# Patient Record
Sex: MALE | Race: WHITE | Employment: OTHER | URBAN - METROPOLITAN AREA
[De-identification: names, ages, dates, MRNs, and addresses within clinical notes are randomized per-mention and may not be internally consistent; named-entity substitution may affect disease eponyms.]

---

## 2017-01-11 RX ORDER — PEN NEEDLE, DIABETIC 31 GX5/16"
NEEDLE, DISPOSABLE MISCELLANEOUS
Qty: 100 EACH | Refills: 4 | Status: SHIPPED | OUTPATIENT
Start: 2017-01-11 | End: 2018-03-26 | Stop reason: SDUPTHER

## 2017-01-23 DIAGNOSIS — F32.A DEPRESSION: ICD-10-CM

## 2017-01-23 RX ORDER — DULOXETIN HYDROCHLORIDE 60 MG/1
CAPSULE, DELAYED RELEASE ORAL
Qty: 180 CAPSULE | Refills: 3 | Status: SHIPPED | OUTPATIENT
Start: 2017-01-23 | End: 2017-09-08 | Stop reason: SDUPTHER

## 2017-02-08 DIAGNOSIS — E11.9 TYPE 2 DIABETES MELLITUS WITHOUT COMPLICATION (HCC): ICD-10-CM

## 2017-02-08 RX ORDER — INSULIN HUMAN 100 [IU]/ML
INJECTION, SUSPENSION SUBCUTANEOUS
Qty: 90 ML | Refills: 3 | Status: SHIPPED | OUTPATIENT
Start: 2017-02-08 | End: 2017-10-09

## 2017-02-20 ENCOUNTER — HOSPITAL ENCOUNTER (OUTPATIENT)
Dept: CT IMAGING | Age: 72
Discharge: HOME | End: 2017-02-20
Attending: UROLOGY | Admitting: UROLOGY

## 2017-02-20 DIAGNOSIS — D41.02 NEOPLASM OF UNCERTAIN BEHAVIOR OF LEFT KIDNEY: ICD-10-CM

## 2017-02-20 DIAGNOSIS — D41.02 HEMANGIOPERICYTOMA OF KIDNEY, LEFT: ICD-10-CM

## 2017-03-22 RX ORDER — FENOFIBRATE 160 MG/1
160 TABLET ORAL DAILY
Qty: 90 TABLET | Refills: 3 | Status: SHIPPED | OUTPATIENT
Start: 2017-03-22 | End: 2018-02-02 | Stop reason: SDUPTHER

## 2017-04-03 ENCOUNTER — OFFICE VISIT (OUTPATIENT)
Dept: INTERNAL MEDICINE CLINIC | Age: 72
End: 2017-04-03

## 2017-04-03 VITALS
HEIGHT: 70 IN | RESPIRATION RATE: 12 BRPM | WEIGHT: 273 LBS | SYSTOLIC BLOOD PRESSURE: 138 MMHG | OXYGEN SATURATION: 98 % | BODY MASS INDEX: 39.08 KG/M2 | DIASTOLIC BLOOD PRESSURE: 76 MMHG | HEART RATE: 67 BPM

## 2017-04-03 DIAGNOSIS — Z79.4 CONTROLLED TYPE 2 DIABETES MELLITUS WITHOUT COMPLICATION, WITH LONG-TERM CURRENT USE OF INSULIN (HCC): Primary | ICD-10-CM

## 2017-04-03 DIAGNOSIS — E11.9 CONTROLLED TYPE 2 DIABETES MELLITUS WITHOUT COMPLICATION, WITH LONG-TERM CURRENT USE OF INSULIN (HCC): Primary | ICD-10-CM

## 2017-04-03 DIAGNOSIS — I10 ESSENTIAL HYPERTENSION: ICD-10-CM

## 2017-04-03 DIAGNOSIS — E78.2 MIXED HYPERLIPIDEMIA: ICD-10-CM

## 2017-04-03 LAB
A/G RATIO: 1.2 (ref 1.1–2.2)
ALBUMIN SERPL-MCNC: 4 G/DL (ref 3.4–5)
ALP BLD-CCNC: 41 U/L (ref 40–129)
ALT SERPL-CCNC: 28 U/L (ref 10–40)
ANION GAP SERPL CALCULATED.3IONS-SCNC: 16 MMOL/L (ref 3–16)
AST SERPL-CCNC: 26 U/L (ref 15–37)
BILIRUB SERPL-MCNC: 1.2 MG/DL (ref 0–1)
BUN BLDV-MCNC: 26 MG/DL (ref 7–20)
CALCIUM SERPL-MCNC: 9.3 MG/DL (ref 8.3–10.6)
CHLORIDE BLD-SCNC: 100 MMOL/L (ref 99–110)
CHOLESTEROL, TOTAL: 103 MG/DL (ref 0–199)
CO2: 25 MMOL/L (ref 21–32)
CREAT SERPL-MCNC: 0.9 MG/DL (ref 0.8–1.3)
GFR AFRICAN AMERICAN: >60
GFR NON-AFRICAN AMERICAN: >60
GLOBULIN: 3.3 G/DL
GLUCOSE BLD-MCNC: 181 MG/DL (ref 70–99)
HBA1C MFR BLD: 7.3 %
HDLC SERPL-MCNC: 22 MG/DL (ref 40–60)
LDL CHOLESTEROL CALCULATED: 35 MG/DL
POTASSIUM SERPL-SCNC: 3.6 MMOL/L (ref 3.5–5.1)
SODIUM BLD-SCNC: 141 MMOL/L (ref 136–145)
TOTAL PROTEIN: 7.3 G/DL (ref 6.4–8.2)
TRIGL SERPL-MCNC: 229 MG/DL (ref 0–150)
VLDLC SERPL CALC-MCNC: 46 MG/DL

## 2017-04-03 PROCEDURE — 3045F PR MOST RECENT HEMOGLOBIN A1C LEVEL 7.0-9.0%: CPT | Performed by: INTERNAL MEDICINE

## 2017-04-03 PROCEDURE — 83036 HEMOGLOBIN GLYCOSYLATED A1C: CPT | Performed by: INTERNAL MEDICINE

## 2017-04-03 PROCEDURE — 99214 OFFICE O/P EST MOD 30 MIN: CPT | Performed by: INTERNAL MEDICINE

## 2017-04-03 PROCEDURE — 3017F COLORECTAL CA SCREEN DOC REV: CPT | Performed by: INTERNAL MEDICINE

## 2017-04-03 PROCEDURE — 4040F PNEUMOC VAC/ADMIN/RCVD: CPT | Performed by: INTERNAL MEDICINE

## 2017-04-03 PROCEDURE — G8427 DOCREV CUR MEDS BY ELIG CLIN: HCPCS | Performed by: INTERNAL MEDICINE

## 2017-04-03 PROCEDURE — 1123F ACP DISCUSS/DSCN MKR DOCD: CPT | Performed by: INTERNAL MEDICINE

## 2017-04-03 PROCEDURE — 36415 COLL VENOUS BLD VENIPUNCTURE: CPT | Performed by: INTERNAL MEDICINE

## 2017-04-03 PROCEDURE — G8419 CALC BMI OUT NRM PARAM NOF/U: HCPCS | Performed by: INTERNAL MEDICINE

## 2017-04-03 PROCEDURE — 1036F TOBACCO NON-USER: CPT | Performed by: INTERNAL MEDICINE

## 2017-04-03 ASSESSMENT — ENCOUNTER SYMPTOMS
RESPIRATORY NEGATIVE: 1
GASTROINTESTINAL NEGATIVE: 1

## 2017-07-03 ENCOUNTER — OFFICE VISIT (OUTPATIENT)
Dept: PULMONOLOGY | Age: 72
End: 2017-07-03

## 2017-07-03 VITALS
OXYGEN SATURATION: 94 % | HEART RATE: 70 BPM | WEIGHT: 270 LBS | SYSTOLIC BLOOD PRESSURE: 120 MMHG | HEIGHT: 70 IN | DIASTOLIC BLOOD PRESSURE: 64 MMHG | TEMPERATURE: 98 F | RESPIRATION RATE: 16 BRPM | BODY MASS INDEX: 38.65 KG/M2

## 2017-07-03 DIAGNOSIS — G47.33 OSA TREATED WITH BIPAP: Primary | ICD-10-CM

## 2017-07-03 DIAGNOSIS — G47.10 HYPERSOMNIA: ICD-10-CM

## 2017-07-03 DIAGNOSIS — R06.83 SNORING: ICD-10-CM

## 2017-07-03 DIAGNOSIS — E66.9 OBESITY (BMI 30-39.9): ICD-10-CM

## 2017-07-03 PROCEDURE — 99214 OFFICE O/P EST MOD 30 MIN: CPT | Performed by: INTERNAL MEDICINE

## 2017-07-03 PROCEDURE — 3017F COLORECTAL CA SCREEN DOC REV: CPT | Performed by: INTERNAL MEDICINE

## 2017-07-03 PROCEDURE — 4040F PNEUMOC VAC/ADMIN/RCVD: CPT | Performed by: INTERNAL MEDICINE

## 2017-07-03 PROCEDURE — 1123F ACP DISCUSS/DSCN MKR DOCD: CPT | Performed by: INTERNAL MEDICINE

## 2017-07-03 PROCEDURE — G8427 DOCREV CUR MEDS BY ELIG CLIN: HCPCS | Performed by: INTERNAL MEDICINE

## 2017-07-03 PROCEDURE — 1036F TOBACCO NON-USER: CPT | Performed by: INTERNAL MEDICINE

## 2017-07-03 PROCEDURE — G8417 CALC BMI ABV UP PARAM F/U: HCPCS | Performed by: INTERNAL MEDICINE

## 2017-07-03 RX ORDER — INSULIN GLARGINE 100 [IU]/ML
INJECTION, SOLUTION SUBCUTANEOUS
Qty: 90 ML | Refills: 1 | Status: SHIPPED | OUTPATIENT
Start: 2017-07-03 | End: 2017-09-27 | Stop reason: SDUPTHER

## 2017-07-03 ASSESSMENT — SLEEP AND FATIGUE QUESTIONNAIRES
HOW LIKELY ARE YOU TO NOD OFF OR FALL ASLEEP WHILE SITTING AND TALKING TO SOMEONE: 0
HOW LIKELY ARE YOU TO NOD OFF OR FALL ASLEEP WHEN YOU ARE A PASSENGER IN A CAR FOR AN HOUR WITHOUT A BREAK: 1
HOW LIKELY ARE YOU TO NOD OFF OR FALL ASLEEP IN A CAR, WHILE STOPPED FOR A FEW MINUTES IN TRAFFIC: 0
HOW LIKELY ARE YOU TO NOD OFF OR FALL ASLEEP WHILE SITTING AND READING: 1
HOW LIKELY ARE YOU TO NOD OFF OR FALL ASLEEP WHILE SITTING INACTIVE IN A PUBLIC PLACE: 0
ESS TOTAL SCORE: 5
HOW LIKELY ARE YOU TO NOD OFF OR FALL ASLEEP WHILE LYING DOWN TO REST IN THE AFTERNOON WHEN CIRCUMSTANCES PERMIT: 1
NECK CIRCUMFERENCE (INCHES): 19.75
HOW LIKELY ARE YOU TO NOD OFF OR FALL ASLEEP WHILE WATCHING TV: 2
HOW LIKELY ARE YOU TO NOD OFF OR FALL ASLEEP WHILE SITTING QUIETLY AFTER LUNCH WITHOUT ALCOHOL: 0

## 2017-07-05 ENCOUNTER — OFFICE VISIT (OUTPATIENT)
Dept: INTERNAL MEDICINE CLINIC | Age: 72
End: 2017-07-05

## 2017-07-05 VITALS
RESPIRATION RATE: 16 BRPM | SYSTOLIC BLOOD PRESSURE: 134 MMHG | DIASTOLIC BLOOD PRESSURE: 70 MMHG | WEIGHT: 270 LBS | HEART RATE: 68 BPM | BODY MASS INDEX: 38.74 KG/M2

## 2017-07-05 DIAGNOSIS — E11.65 UNCONTROLLED TYPE 2 DIABETES MELLITUS WITH HYPERGLYCEMIA, WITH LONG-TERM CURRENT USE OF INSULIN (HCC): Primary | ICD-10-CM

## 2017-07-05 DIAGNOSIS — E78.2 MIXED HYPERLIPIDEMIA: ICD-10-CM

## 2017-07-05 DIAGNOSIS — F32.A DEPRESSION, UNSPECIFIED DEPRESSION TYPE: ICD-10-CM

## 2017-07-05 DIAGNOSIS — I10 ESSENTIAL HYPERTENSION: ICD-10-CM

## 2017-07-05 DIAGNOSIS — Z79.4 UNCONTROLLED TYPE 2 DIABETES MELLITUS WITH HYPERGLYCEMIA, WITH LONG-TERM CURRENT USE OF INSULIN (HCC): Primary | ICD-10-CM

## 2017-07-05 LAB — HBA1C MFR BLD: 7.4 %

## 2017-07-05 PROCEDURE — 83036 HEMOGLOBIN GLYCOSYLATED A1C: CPT | Performed by: INTERNAL MEDICINE

## 2017-07-05 PROCEDURE — 3017F COLORECTAL CA SCREEN DOC REV: CPT | Performed by: INTERNAL MEDICINE

## 2017-07-05 PROCEDURE — 1123F ACP DISCUSS/DSCN MKR DOCD: CPT | Performed by: INTERNAL MEDICINE

## 2017-07-05 PROCEDURE — 3046F HEMOGLOBIN A1C LEVEL >9.0%: CPT | Performed by: INTERNAL MEDICINE

## 2017-07-05 PROCEDURE — G8417 CALC BMI ABV UP PARAM F/U: HCPCS | Performed by: INTERNAL MEDICINE

## 2017-07-05 PROCEDURE — 99214 OFFICE O/P EST MOD 30 MIN: CPT | Performed by: INTERNAL MEDICINE

## 2017-07-05 PROCEDURE — 1036F TOBACCO NON-USER: CPT | Performed by: INTERNAL MEDICINE

## 2017-07-05 PROCEDURE — 4040F PNEUMOC VAC/ADMIN/RCVD: CPT | Performed by: INTERNAL MEDICINE

## 2017-07-05 PROCEDURE — G8427 DOCREV CUR MEDS BY ELIG CLIN: HCPCS | Performed by: INTERNAL MEDICINE

## 2017-07-05 ASSESSMENT — ENCOUNTER SYMPTOMS
SHORTNESS OF BREATH: 0
CHEST TIGHTNESS: 0
GASTROINTESTINAL NEGATIVE: 1

## 2017-07-05 ASSESSMENT — PATIENT HEALTH QUESTIONNAIRE - PHQ9
SUM OF ALL RESPONSES TO PHQ QUESTIONS 1-9: 0
SUM OF ALL RESPONSES TO PHQ9 QUESTIONS 1 & 2: 0
1. LITTLE INTEREST OR PLEASURE IN DOING THINGS: 0
2. FEELING DOWN, DEPRESSED OR HOPELESS: 0

## 2017-07-10 RX ORDER — ALPRAZOLAM 0.25 MG/1
TABLET ORAL
Qty: 180 TABLET | Refills: 1 | Status: SHIPPED | OUTPATIENT
Start: 2017-07-10 | End: 2017-12-15 | Stop reason: SDUPTHER

## 2017-08-21 ENCOUNTER — TELEPHONE (OUTPATIENT)
Dept: PSYCHIATRY | Age: 72
End: 2017-08-21

## 2017-09-08 ENCOUNTER — INITIAL CONSULT (OUTPATIENT)
Dept: PSYCHIATRY | Age: 72
End: 2017-09-08

## 2017-09-08 VITALS
DIASTOLIC BLOOD PRESSURE: 82 MMHG | WEIGHT: 270 LBS | HEIGHT: 70 IN | OXYGEN SATURATION: 97 % | BODY MASS INDEX: 38.65 KG/M2 | SYSTOLIC BLOOD PRESSURE: 138 MMHG | RESPIRATION RATE: 18 BRPM | HEART RATE: 64 BPM

## 2017-09-08 DIAGNOSIS — F41.9 ANXIETY: ICD-10-CM

## 2017-09-08 DIAGNOSIS — F32.A DEPRESSION, UNSPECIFIED DEPRESSION TYPE: Primary | ICD-10-CM

## 2017-09-08 LAB
ALBUMIN SERPL-MCNC: 4.1 G/DL (ref 3.4–5)
ALP BLD-CCNC: 37 U/L (ref 40–129)
ALT SERPL-CCNC: 28 U/L (ref 10–40)
AST SERPL-CCNC: 30 U/L (ref 15–37)
BASOPHILS ABSOLUTE: 0 K/UL (ref 0–0.2)
BASOPHILS RELATIVE PERCENT: 0.2 %
BILIRUB SERPL-MCNC: 1.4 MG/DL (ref 0–1)
BILIRUBIN DIRECT: 0.3 MG/DL (ref 0–0.3)
BILIRUBIN, INDIRECT: 1.1 MG/DL (ref 0–1)
EOSINOPHILS ABSOLUTE: 0.2 K/UL (ref 0–0.6)
EOSINOPHILS RELATIVE PERCENT: 3 %
HCT VFR BLD CALC: 38.3 % (ref 40.5–52.5)
HEMOGLOBIN: 13.1 G/DL (ref 13.5–17.5)
LYMPHOCYTES ABSOLUTE: 0.8 K/UL (ref 1–5.1)
LYMPHOCYTES RELATIVE PERCENT: 15.6 %
MCH RBC QN AUTO: 29.5 PG (ref 26–34)
MCHC RBC AUTO-ENTMCNC: 34.3 G/DL (ref 31–36)
MCV RBC AUTO: 86.1 FL (ref 80–100)
MONOCYTES ABSOLUTE: 0.3 K/UL (ref 0–1.3)
MONOCYTES RELATIVE PERCENT: 5.6 %
NEUTROPHILS ABSOLUTE: 3.8 K/UL (ref 1.7–7.7)
NEUTROPHILS RELATIVE PERCENT: 75.6 %
PDW BLD-RTO: 16.1 % (ref 12.4–15.4)
PLATELET # BLD: 110 K/UL (ref 135–450)
PMV BLD AUTO: 8.5 FL (ref 5–10.5)
RBC # BLD: 4.44 M/UL (ref 4.2–5.9)
TOTAL PROTEIN: 7.5 G/DL (ref 6.4–8.2)
TSH SERPL DL<=0.05 MIU/L-ACNC: 3.72 UIU/ML (ref 0.27–4.2)
WBC # BLD: 5 K/UL (ref 4–11)

## 2017-09-08 PROCEDURE — 1036F TOBACCO NON-USER: CPT | Performed by: PSYCHIATRY & NEUROLOGY

## 2017-09-08 PROCEDURE — 3017F COLORECTAL CA SCREEN DOC REV: CPT | Performed by: PSYCHIATRY & NEUROLOGY

## 2017-09-08 PROCEDURE — 99204 OFFICE O/P NEW MOD 45 MIN: CPT | Performed by: PSYCHIATRY & NEUROLOGY

## 2017-09-08 PROCEDURE — G8417 CALC BMI ABV UP PARAM F/U: HCPCS | Performed by: PSYCHIATRY & NEUROLOGY

## 2017-09-08 PROCEDURE — G8427 DOCREV CUR MEDS BY ELIG CLIN: HCPCS | Performed by: PSYCHIATRY & NEUROLOGY

## 2017-09-08 PROCEDURE — 4040F PNEUMOC VAC/ADMIN/RCVD: CPT | Performed by: PSYCHIATRY & NEUROLOGY

## 2017-09-08 RX ORDER — BUPROPION HYDROCHLORIDE 150 MG/1
150 TABLET ORAL EVERY MORNING
Qty: 30 TABLET | Refills: 3 | Status: SHIPPED | OUTPATIENT
Start: 2017-09-08 | End: 2017-10-13 | Stop reason: SINTOL

## 2017-09-08 RX ORDER — DULOXETIN HYDROCHLORIDE 60 MG/1
CAPSULE, DELAYED RELEASE ORAL
Qty: 180 CAPSULE | Refills: 3 | Status: SHIPPED | OUTPATIENT
Start: 2017-09-08 | End: 2018-08-22 | Stop reason: SDUPTHER

## 2017-09-08 ASSESSMENT — ENCOUNTER SYMPTOMS
CHEST TIGHTNESS: 0
DIARRHEA: 0
SHORTNESS OF BREATH: 0
NAUSEA: 0

## 2017-09-27 RX ORDER — INSULIN GLARGINE 100 [IU]/ML
INJECTION, SOLUTION SUBCUTANEOUS
Qty: 105 ML | Refills: 3 | Status: SHIPPED | OUTPATIENT
Start: 2017-09-27 | End: 2018-07-27 | Stop reason: SDUPTHER

## 2017-10-09 ENCOUNTER — OFFICE VISIT (OUTPATIENT)
Dept: INTERNAL MEDICINE CLINIC | Age: 72
End: 2017-10-09

## 2017-10-09 VITALS
WEIGHT: 272.8 LBS | HEART RATE: 66 BPM | BODY MASS INDEX: 39.14 KG/M2 | RESPIRATION RATE: 18 BRPM | DIASTOLIC BLOOD PRESSURE: 70 MMHG | SYSTOLIC BLOOD PRESSURE: 136 MMHG

## 2017-10-09 DIAGNOSIS — G61.0 GBS (GUILLAIN BARRE SYNDROME) (HCC): ICD-10-CM

## 2017-10-09 DIAGNOSIS — K75.81 LIVER CIRRHOSIS SECONDARY TO NASH (HCC): ICD-10-CM

## 2017-10-09 DIAGNOSIS — K74.60 LIVER CIRRHOSIS SECONDARY TO NASH (HCC): ICD-10-CM

## 2017-10-09 DIAGNOSIS — I10 ESSENTIAL HYPERTENSION: ICD-10-CM

## 2017-10-09 DIAGNOSIS — R31.9 BLOOD IN URINE: ICD-10-CM

## 2017-10-09 DIAGNOSIS — E11.65 UNCONTROLLED TYPE 2 DIABETES MELLITUS WITH HYPERGLYCEMIA, WITH LONG-TERM CURRENT USE OF INSULIN (HCC): Primary | ICD-10-CM

## 2017-10-09 DIAGNOSIS — Z79.4 UNCONTROLLED TYPE 2 DIABETES MELLITUS WITH HYPERGLYCEMIA, WITH LONG-TERM CURRENT USE OF INSULIN (HCC): Primary | ICD-10-CM

## 2017-10-09 LAB
BILIRUBIN, POC: ABNORMAL
BLOOD URINE, POC: ABNORMAL
CLARITY, POC: ABNORMAL
COLOR, POC: ABNORMAL
CREATININE URINE: 98.4 MG/DL (ref 39–259)
GLUCOSE URINE, POC: ABNORMAL
HBA1C MFR BLD: 6.9 %
KETONES, POC: ABNORMAL
LEUKOCYTE EST, POC: ABNORMAL
MICROALBUMIN UR-MCNC: 9.9 MG/DL
MICROALBUMIN/CREAT UR-RTO: 100.6 MG/G (ref 0–30)
NITRITE, POC: ABNORMAL
PH, POC: 7
PROTEIN, POC: ABNORMAL
SPECIFIC GRAVITY, POC: 1.02
UROBILINOGEN, POC: 2

## 2017-10-09 PROCEDURE — G8427 DOCREV CUR MEDS BY ELIG CLIN: HCPCS | Performed by: INTERNAL MEDICINE

## 2017-10-09 PROCEDURE — 1036F TOBACCO NON-USER: CPT | Performed by: INTERNAL MEDICINE

## 2017-10-09 PROCEDURE — 81002 URINALYSIS NONAUTO W/O SCOPE: CPT | Performed by: INTERNAL MEDICINE

## 2017-10-09 PROCEDURE — 3046F HEMOGLOBIN A1C LEVEL >9.0%: CPT | Performed by: INTERNAL MEDICINE

## 2017-10-09 PROCEDURE — 99214 OFFICE O/P EST MOD 30 MIN: CPT | Performed by: INTERNAL MEDICINE

## 2017-10-09 PROCEDURE — 83036 HEMOGLOBIN GLYCOSYLATED A1C: CPT | Performed by: INTERNAL MEDICINE

## 2017-10-09 PROCEDURE — 3017F COLORECTAL CA SCREEN DOC REV: CPT | Performed by: INTERNAL MEDICINE

## 2017-10-09 PROCEDURE — 1123F ACP DISCUSS/DSCN MKR DOCD: CPT | Performed by: INTERNAL MEDICINE

## 2017-10-09 PROCEDURE — G8417 CALC BMI ABV UP PARAM F/U: HCPCS | Performed by: INTERNAL MEDICINE

## 2017-10-09 PROCEDURE — G8484 FLU IMMUNIZE NO ADMIN: HCPCS | Performed by: INTERNAL MEDICINE

## 2017-10-09 PROCEDURE — 4040F PNEUMOC VAC/ADMIN/RCVD: CPT | Performed by: INTERNAL MEDICINE

## 2017-10-09 ASSESSMENT — ENCOUNTER SYMPTOMS
VOMITING: 0
DIARRHEA: 0
BACK PAIN: 1
RESPIRATORY NEGATIVE: 1
NAUSEA: 0
ABDOMINAL PAIN: 0
ABDOMINAL DISTENTION: 0

## 2017-10-11 LAB — URINE CULTURE, ROUTINE: NORMAL

## 2017-10-13 ENCOUNTER — OFFICE VISIT (OUTPATIENT)
Dept: PSYCHIATRY | Age: 72
End: 2017-10-13

## 2017-10-13 VITALS
SYSTOLIC BLOOD PRESSURE: 120 MMHG | HEIGHT: 70 IN | OXYGEN SATURATION: 97 % | HEART RATE: 82 BPM | RESPIRATION RATE: 16 BRPM | DIASTOLIC BLOOD PRESSURE: 70 MMHG

## 2017-10-13 DIAGNOSIS — F32.A DEPRESSION, UNSPECIFIED DEPRESSION TYPE: Primary | ICD-10-CM

## 2017-10-13 DIAGNOSIS — F41.9 ANXIETY: ICD-10-CM

## 2017-10-13 PROCEDURE — G8427 DOCREV CUR MEDS BY ELIG CLIN: HCPCS | Performed by: PSYCHIATRY & NEUROLOGY

## 2017-10-13 PROCEDURE — 1036F TOBACCO NON-USER: CPT | Performed by: PSYCHIATRY & NEUROLOGY

## 2017-10-13 PROCEDURE — 1123F ACP DISCUSS/DSCN MKR DOCD: CPT | Performed by: PSYCHIATRY & NEUROLOGY

## 2017-10-13 PROCEDURE — G8484 FLU IMMUNIZE NO ADMIN: HCPCS | Performed by: PSYCHIATRY & NEUROLOGY

## 2017-10-13 PROCEDURE — G8417 CALC BMI ABV UP PARAM F/U: HCPCS | Performed by: PSYCHIATRY & NEUROLOGY

## 2017-10-13 PROCEDURE — 99214 OFFICE O/P EST MOD 30 MIN: CPT | Performed by: PSYCHIATRY & NEUROLOGY

## 2017-10-13 PROCEDURE — 3017F COLORECTAL CA SCREEN DOC REV: CPT | Performed by: PSYCHIATRY & NEUROLOGY

## 2017-10-13 PROCEDURE — 4040F PNEUMOC VAC/ADMIN/RCVD: CPT | Performed by: PSYCHIATRY & NEUROLOGY

## 2017-10-13 RX ORDER — BUPROPION HYDROCHLORIDE 100 MG/1
100 TABLET, EXTENDED RELEASE ORAL DAILY
Qty: 30 TABLET | Refills: 2 | Status: SHIPPED | OUTPATIENT
Start: 2017-10-13 | End: 2018-01-12 | Stop reason: SDUPTHER

## 2017-10-27 ENCOUNTER — NURSE ONLY (OUTPATIENT)
Dept: INTERNAL MEDICINE CLINIC | Age: 72
End: 2017-10-27

## 2017-10-27 DIAGNOSIS — R82.90 URINE ABNORMALITY: Primary | ICD-10-CM

## 2017-10-27 LAB
BILIRUBIN, POC: ABNORMAL
BLOOD URINE, POC: ABNORMAL
CLARITY, POC: ABNORMAL
COLOR, POC: YELLOW
GLUCOSE URINE, POC: ABNORMAL
KETONES, POC: ABNORMAL
LEUKOCYTE EST, POC: ABNORMAL
NITRITE, POC: ABNORMAL
PH, POC: 5.5
PROTEIN, POC: ABNORMAL
SPECIFIC GRAVITY, POC: 1.02
UROBILINOGEN, POC: 0.2

## 2017-10-27 PROCEDURE — 81002 URINALYSIS NONAUTO W/O SCOPE: CPT | Performed by: INTERNAL MEDICINE

## 2017-11-07 RX ORDER — ATENOLOL AND CHLORTHALIDONE TABLET 50; 25 MG/1; MG/1
TABLET ORAL
Qty: 90 TABLET | Refills: 3 | Status: SHIPPED | OUTPATIENT
Start: 2017-11-07 | End: 2018-10-10 | Stop reason: SDUPTHER

## 2017-11-07 RX ORDER — ATORVASTATIN CALCIUM 20 MG/1
TABLET, FILM COATED ORAL
Qty: 90 TABLET | Refills: 3 | Status: SHIPPED | OUTPATIENT
Start: 2017-11-07 | End: 2018-10-10 | Stop reason: SDUPTHER

## 2017-11-07 RX ORDER — TAMSULOSIN HYDROCHLORIDE 0.4 MG/1
CAPSULE ORAL
Qty: 90 CAPSULE | Refills: 3 | Status: SHIPPED | OUTPATIENT
Start: 2017-11-07 | End: 2018-10-10 | Stop reason: SDUPTHER

## 2017-11-07 RX ORDER — LISINOPRIL 10 MG/1
TABLET ORAL
Qty: 90 TABLET | Refills: 3 | Status: SHIPPED | OUTPATIENT
Start: 2017-11-07 | End: 2018-10-10 | Stop reason: SDUPTHER

## 2017-12-15 ENCOUNTER — TELEPHONE (OUTPATIENT)
Dept: INTERNAL MEDICINE CLINIC | Age: 72
End: 2017-12-15

## 2017-12-16 RX ORDER — ALPRAZOLAM 0.25 MG/1
TABLET ORAL
Qty: 180 TABLET | Refills: 1 | Status: SHIPPED | OUTPATIENT
Start: 2017-12-16 | End: 2018-04-13 | Stop reason: SDUPTHER

## 2017-12-18 NOTE — TELEPHONE ENCOUNTER
Pt said he is not out of meds yet. He is just calling a couple weeks in advance because this is for mail order.      [facility]

## 2018-01-12 ENCOUNTER — OFFICE VISIT (OUTPATIENT)
Dept: PSYCHIATRY | Age: 73
End: 2018-01-12

## 2018-01-12 VITALS
BODY MASS INDEX: 43.03 KG/M2 | HEART RATE: 65 BPM | DIASTOLIC BLOOD PRESSURE: 86 MMHG | WEIGHT: 274.2 LBS | OXYGEN SATURATION: 98 % | SYSTOLIC BLOOD PRESSURE: 132 MMHG | HEIGHT: 67 IN

## 2018-01-12 DIAGNOSIS — F41.9 ANXIETY: ICD-10-CM

## 2018-01-12 DIAGNOSIS — F32.A DEPRESSION, UNSPECIFIED DEPRESSION TYPE: Primary | ICD-10-CM

## 2018-01-12 PROCEDURE — G8427 DOCREV CUR MEDS BY ELIG CLIN: HCPCS | Performed by: PSYCHIATRY & NEUROLOGY

## 2018-01-12 PROCEDURE — 99214 OFFICE O/P EST MOD 30 MIN: CPT | Performed by: PSYCHIATRY & NEUROLOGY

## 2018-01-12 PROCEDURE — 1123F ACP DISCUSS/DSCN MKR DOCD: CPT | Performed by: PSYCHIATRY & NEUROLOGY

## 2018-01-12 PROCEDURE — 3017F COLORECTAL CA SCREEN DOC REV: CPT | Performed by: PSYCHIATRY & NEUROLOGY

## 2018-01-12 PROCEDURE — G8484 FLU IMMUNIZE NO ADMIN: HCPCS | Performed by: PSYCHIATRY & NEUROLOGY

## 2018-01-12 PROCEDURE — 4040F PNEUMOC VAC/ADMIN/RCVD: CPT | Performed by: PSYCHIATRY & NEUROLOGY

## 2018-01-12 PROCEDURE — G8417 CALC BMI ABV UP PARAM F/U: HCPCS | Performed by: PSYCHIATRY & NEUROLOGY

## 2018-01-12 PROCEDURE — 1036F TOBACCO NON-USER: CPT | Performed by: PSYCHIATRY & NEUROLOGY

## 2018-01-12 RX ORDER — BUPROPION HYDROCHLORIDE 100 MG/1
100 TABLET, EXTENDED RELEASE ORAL DAILY
Qty: 90 TABLET | Refills: 2 | Status: SHIPPED | OUTPATIENT
Start: 2018-01-12 | End: 2018-01-12 | Stop reason: SDUPTHER

## 2018-01-12 RX ORDER — BUPROPION HYDROCHLORIDE 100 MG/1
100 TABLET, EXTENDED RELEASE ORAL DAILY
Qty: 90 TABLET | Refills: 2 | Status: SHIPPED | OUTPATIENT
Start: 2018-01-12 | End: 2018-04-13

## 2018-01-12 NOTE — PROGRESS NOTES
Psych Follow Up Progress Note    1/12/18  Juanita Miller  [id number]    I have reviewed recent documentation:  Juanita Miller is a 81 y.o. 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 Chief Complaint   Patient presents with    Depression       Subjective/Interval Hx:  Doing a bit better. Still struggling with his introversion vs wife's extroversion. Not sleeping well. But a bit more energy. Objective:  Vitals:    01/12/18 1457   BP: 132/86   Site: Left Arm   Position: Sitting   Cuff Size: Large Adult   Pulse: 65   SpO2: 98%   Weight: 274 lb 3.2 oz (124.4 kg)   Height: 5' 7\" (1.702 m)        Body mass index is 42.95 kg/m². No results found for this or any previous visit (from the past 168 hour(s)).     Current Outpatient Prescriptions   Medication Sig Dispense Refill    ALPRAZolam (XANAX) 0.25 MG tablet TAKE ONE TABLET BY MOUTH TWICE A DAY AS NEEDED. 180 tablet 1    lisinopril (PRINIVIL;ZESTRIL) 10 MG tablet TAKE 1 TABLET BY MOUTH  DAILY 90 tablet 3    tamsulosin (FLOMAX) 0.4 MG capsule TAKE 1 CAPSULE BY MOUTH  DAILY 90 capsule 3    atenolol-chlorthalidone (TENORETIC) 50-25 MG per tablet TAKE 1 TABLET BY MOUTH  DAILY 90 tablet 3    atorvastatin (LIPITOR) 20 MG tablet TAKE 1 TABLET BY MOUTH ONCE A DAY 90 tablet 3    buPROPion (WELLBUTRIN SR) 100 MG extended release tablet Take 1 tablet by mouth daily 30 tablet 2    Insulin NPH Isophane & Regular (HUMULIN 70/30 KWIKPEN SC) Inject 50 Units into the skin 2 times daily (with meals)      LANTUS SOLOSTAR 100 UNIT/ML injection pen INJECT SUBCUTANEOUSLY 105  UNITS NIGHTLY 105 mL 3    DULoxetine (CYMBALTA) 60 MG

## 2018-01-16 ENCOUNTER — OFFICE VISIT (OUTPATIENT)
Dept: INTERNAL MEDICINE CLINIC | Age: 73
End: 2018-01-16

## 2018-01-16 VITALS
RESPIRATION RATE: 16 BRPM | DIASTOLIC BLOOD PRESSURE: 80 MMHG | SYSTOLIC BLOOD PRESSURE: 138 MMHG | BODY MASS INDEX: 42.51 KG/M2 | HEART RATE: 64 BPM | WEIGHT: 271.4 LBS | OXYGEN SATURATION: 98 %

## 2018-01-16 DIAGNOSIS — F41.9 ANXIETY: ICD-10-CM

## 2018-01-16 DIAGNOSIS — E78.2 MIXED HYPERLIPIDEMIA: ICD-10-CM

## 2018-01-16 DIAGNOSIS — Z79.4 UNCONTROLLED TYPE 2 DIABETES MELLITUS WITH HYPERGLYCEMIA, WITH LONG-TERM CURRENT USE OF INSULIN (HCC): Primary | ICD-10-CM

## 2018-01-16 DIAGNOSIS — R35.0 BENIGN PROSTATIC HYPERPLASIA WITH URINARY FREQUENCY: ICD-10-CM

## 2018-01-16 DIAGNOSIS — I10 ESSENTIAL HYPERTENSION: ICD-10-CM

## 2018-01-16 DIAGNOSIS — R06.09 DOE (DYSPNEA ON EXERTION): ICD-10-CM

## 2018-01-16 DIAGNOSIS — N40.1 BENIGN PROSTATIC HYPERPLASIA WITH URINARY FREQUENCY: ICD-10-CM

## 2018-01-16 DIAGNOSIS — E11.65 UNCONTROLLED TYPE 2 DIABETES MELLITUS WITH HYPERGLYCEMIA, WITH LONG-TERM CURRENT USE OF INSULIN (HCC): Primary | ICD-10-CM

## 2018-01-16 LAB
A/G RATIO: 1.1 (ref 1.1–2.2)
ALBUMIN SERPL-MCNC: 4 G/DL (ref 3.4–5)
ALP BLD-CCNC: 42 U/L (ref 40–129)
ALT SERPL-CCNC: 28 U/L (ref 10–40)
ANION GAP SERPL CALCULATED.3IONS-SCNC: 10 MMOL/L (ref 3–16)
AST SERPL-CCNC: 27 U/L (ref 15–37)
BILIRUB SERPL-MCNC: 1.3 MG/DL (ref 0–1)
BUN BLDV-MCNC: 31 MG/DL (ref 7–20)
CALCIUM SERPL-MCNC: 10 MG/DL (ref 8.3–10.6)
CHLORIDE BLD-SCNC: 99 MMOL/L (ref 99–110)
CHOLESTEROL, TOTAL: 105 MG/DL (ref 0–199)
CO2: 30 MMOL/L (ref 21–32)
CREAT SERPL-MCNC: 1 MG/DL (ref 0.8–1.3)
CREATININE URINE: 98.7 MG/DL (ref 39–259)
GFR AFRICAN AMERICAN: >60
GFR NON-AFRICAN AMERICAN: >60
GLOBULIN: 3.5 G/DL
GLUCOSE BLD-MCNC: 151 MG/DL (ref 70–99)
HBA1C MFR BLD: 7.1 %
HDLC SERPL-MCNC: 23 MG/DL (ref 40–60)
LDL CHOLESTEROL CALCULATED: 37 MG/DL
MICROALBUMIN UR-MCNC: 7.5 MG/DL
MICROALBUMIN/CREAT UR-RTO: 76 MG/G (ref 0–30)
POTASSIUM SERPL-SCNC: 4.4 MMOL/L (ref 3.5–5.1)
PROSTATE SPECIFIC ANTIGEN: 0.3 NG/ML (ref 0–4)
SODIUM BLD-SCNC: 139 MMOL/L (ref 136–145)
TOTAL PROTEIN: 7.5 G/DL (ref 6.4–8.2)
TRIGL SERPL-MCNC: 227 MG/DL (ref 0–150)
VLDLC SERPL CALC-MCNC: 45 MG/DL

## 2018-01-16 PROCEDURE — G8427 DOCREV CUR MEDS BY ELIG CLIN: HCPCS | Performed by: INTERNAL MEDICINE

## 2018-01-16 PROCEDURE — 36415 COLL VENOUS BLD VENIPUNCTURE: CPT | Performed by: INTERNAL MEDICINE

## 2018-01-16 PROCEDURE — G8417 CALC BMI ABV UP PARAM F/U: HCPCS | Performed by: INTERNAL MEDICINE

## 2018-01-16 PROCEDURE — 99214 OFFICE O/P EST MOD 30 MIN: CPT | Performed by: INTERNAL MEDICINE

## 2018-01-16 PROCEDURE — 3045F PR MOST RECENT HEMOGLOBIN A1C LEVEL 7.0-9.0%: CPT | Performed by: INTERNAL MEDICINE

## 2018-01-16 PROCEDURE — 83036 HEMOGLOBIN GLYCOSYLATED A1C: CPT | Performed by: INTERNAL MEDICINE

## 2018-01-16 PROCEDURE — 3017F COLORECTAL CA SCREEN DOC REV: CPT | Performed by: INTERNAL MEDICINE

## 2018-01-16 PROCEDURE — 1036F TOBACCO NON-USER: CPT | Performed by: INTERNAL MEDICINE

## 2018-01-16 PROCEDURE — G8484 FLU IMMUNIZE NO ADMIN: HCPCS | Performed by: INTERNAL MEDICINE

## 2018-01-16 PROCEDURE — 1123F ACP DISCUSS/DSCN MKR DOCD: CPT | Performed by: INTERNAL MEDICINE

## 2018-01-16 PROCEDURE — 4040F PNEUMOC VAC/ADMIN/RCVD: CPT | Performed by: INTERNAL MEDICINE

## 2018-01-16 ASSESSMENT — ENCOUNTER SYMPTOMS
CHEST TIGHTNESS: 0
GASTROINTESTINAL NEGATIVE: 1
SHORTNESS OF BREATH: 0
COUGH: 0

## 2018-01-20 LAB
6-ACETYLMORPHINE: NOT DETECTED
7-AMINOCLONAZEPAM: NOT DETECTED
ALPHA-OH-ALPRAZOLAM: PRESENT
ALPRAZOLAM: NOT DETECTED
AMPHETAMINE: NOT DETECTED
BARBITURATES: NOT DETECTED
BENZOYLECGONINE: NOT DETECTED
BUPRENORPHINE: NOT DETECTED
CARISOPRODOL: NOT DETECTED
CLONAZEPAM: NOT DETECTED
CODEINE: NOT DETECTED
CREATININE URINE: 103.2 MG/DL (ref 20–400)
DIAZEPAM: NOT DETECTED
DRUGS EXPECTED: NORMAL
EER PAIN MGT DRUG PANEL, HIGH RES/EMIT U: NORMAL
ETHYL GLUCURONIDE: NOT DETECTED
FENTANYL: NOT DETECTED
HYDROCODONE: NOT DETECTED
HYDROMORPHONE: NOT DETECTED
LORAZEPAM: NOT DETECTED
MARIJUANA METABOLITE: NOT DETECTED
MDA: NOT DETECTED
MDEA: NOT DETECTED
MDMA URINE: NOT DETECTED
MEPERIDINE: NOT DETECTED
METHADONE: NOT DETECTED
METHAMPHETAMINE: NOT DETECTED
METHYLPHENIDATE: NOT DETECTED
MIDAZOLAM: NOT DETECTED
MORPHINE: NOT DETECTED
NORBUPRENORPHINE, FREE: NOT DETECTED
NORDIAZEPAM: NOT DETECTED
NORFENTANYL: NOT DETECTED
NORHYDROCODONE, URINE: NOT DETECTED
NOROXYCODONE: NOT DETECTED
NOROXYMORPHONE, URINE: NOT DETECTED
OXAZEPAM: NOT DETECTED
OXYCODONE: NOT DETECTED
OXYMORPHONE: NOT DETECTED
PAIN MANAGEMENT DRUG PANEL: NORMAL
PAIN MANAGEMENT DRUG PANEL: NORMAL
PCP: NOT DETECTED
PHENTERMINE: NOT DETECTED
PROPOXYPHENE: NOT DETECTED
TAPENTADOL, URINE: NOT DETECTED
TAPENTADOL-O-SULFATE, URINE: NOT DETECTED
TEMAZEPAM: NOT DETECTED
TRAMADOL: NOT DETECTED
ZOLPIDEM: NOT DETECTED

## 2018-01-22 ENCOUNTER — HOSPITAL ENCOUNTER (OUTPATIENT)
Dept: NON INVASIVE DIAGNOSTICS | Age: 73
Discharge: HOME | End: 2018-01-22
Admitting: INTERNAL MEDICINE

## 2018-01-22 DIAGNOSIS — R06.09 OTHER FORMS OF DYSPNEA: ICD-10-CM

## 2018-01-22 LAB
LV EF: 70 %
LVEF MODALITY: NORMAL

## 2018-01-26 LAB
AFP-TUMOR MARKER: 2.1 NG/ML
FERRITIN: 788.5 NG/ML (ref 23.9–336.2)
INR BLD: 1.2 (ref 0.8–1.2)
IRON SATURATION: 22 % (ref 20–55)
IRON: 75 MCG/DL (ref 50–160)
PROTHROMBIN TIME: 14.5 SECONDS (ref 11.7–14.2)
TOTAL IRON BINDING CAPACITY: 335 MCG/DL (ref 250–400)
VITAMIN B-12: 605 PG/ML (ref 180–914)

## 2018-02-02 RX ORDER — INSULIN HUMAN 100 [IU]/ML
INJECTION, SUSPENSION SUBCUTANEOUS
Qty: 90 ML | Refills: 5 | Status: SHIPPED | OUTPATIENT
Start: 2018-02-02 | End: 2019-05-30 | Stop reason: SDUPTHER

## 2018-02-02 RX ORDER — FENOFIBRATE 160 MG/1
160 TABLET ORAL DAILY
Qty: 90 TABLET | Refills: 3 | Status: SHIPPED | OUTPATIENT
Start: 2018-02-02 | End: 2019-01-08 | Stop reason: SDUPTHER

## 2018-03-14 LAB
CATARACTS: NEGATIVE
DIABETIC RETINOPATHY: NORMAL
GLAUCOMA: NEGATIVE
INTRAOCULAR PRESSURE EYE: NORMAL
VISUAL ACUITY DISTANCE LEFT EYE: NORMAL
VISUAL ACUITY DISTANCE RIGHT EYE: NORMAL

## 2018-03-27 RX ORDER — PEN NEEDLE, DIABETIC 31 GX5/16"
NEEDLE, DISPOSABLE MISCELLANEOUS
Qty: 100 EACH | Refills: 5 | Status: SHIPPED | OUTPATIENT
Start: 2018-03-27 | End: 2019-04-30 | Stop reason: SDUPTHER

## 2018-04-13 ENCOUNTER — OFFICE VISIT (OUTPATIENT)
Dept: PSYCHIATRY | Age: 73
End: 2018-04-13

## 2018-04-13 VITALS
WEIGHT: 270 LBS | SYSTOLIC BLOOD PRESSURE: 128 MMHG | HEIGHT: 70 IN | DIASTOLIC BLOOD PRESSURE: 66 MMHG | BODY MASS INDEX: 38.65 KG/M2

## 2018-04-13 DIAGNOSIS — F41.9 ANXIETY: ICD-10-CM

## 2018-04-13 DIAGNOSIS — F32.A DEPRESSION, UNSPECIFIED DEPRESSION TYPE: Primary | ICD-10-CM

## 2018-04-13 PROCEDURE — G8417 CALC BMI ABV UP PARAM F/U: HCPCS | Performed by: PSYCHIATRY & NEUROLOGY

## 2018-04-13 PROCEDURE — 3017F COLORECTAL CA SCREEN DOC REV: CPT | Performed by: PSYCHIATRY & NEUROLOGY

## 2018-04-13 PROCEDURE — 99214 OFFICE O/P EST MOD 30 MIN: CPT | Performed by: PSYCHIATRY & NEUROLOGY

## 2018-04-13 PROCEDURE — 4040F PNEUMOC VAC/ADMIN/RCVD: CPT | Performed by: PSYCHIATRY & NEUROLOGY

## 2018-04-13 PROCEDURE — 1036F TOBACCO NON-USER: CPT | Performed by: PSYCHIATRY & NEUROLOGY

## 2018-04-13 PROCEDURE — G8427 DOCREV CUR MEDS BY ELIG CLIN: HCPCS | Performed by: PSYCHIATRY & NEUROLOGY

## 2018-04-13 PROCEDURE — 1123F ACP DISCUSS/DSCN MKR DOCD: CPT | Performed by: PSYCHIATRY & NEUROLOGY

## 2018-04-13 RX ORDER — ALPRAZOLAM 0.25 MG/1
TABLET ORAL
Qty: 270 TABLET | Refills: 1 | Status: SHIPPED | OUTPATIENT
Start: 2018-04-13 | End: 2018-07-12

## 2018-04-17 ENCOUNTER — OFFICE VISIT (OUTPATIENT)
Dept: INTERNAL MEDICINE CLINIC | Age: 73
End: 2018-04-17

## 2018-04-17 VITALS
WEIGHT: 273 LBS | OXYGEN SATURATION: 96 % | DIASTOLIC BLOOD PRESSURE: 80 MMHG | HEART RATE: 66 BPM | SYSTOLIC BLOOD PRESSURE: 130 MMHG | BODY MASS INDEX: 39.08 KG/M2 | HEIGHT: 70 IN | RESPIRATION RATE: 16 BRPM

## 2018-04-17 DIAGNOSIS — E11.65 UNCONTROLLED TYPE 2 DIABETES MELLITUS WITH HYPERGLYCEMIA, WITH LONG-TERM CURRENT USE OF INSULIN (HCC): Primary | ICD-10-CM

## 2018-04-17 DIAGNOSIS — K74.60 LIVER CIRRHOSIS SECONDARY TO NASH (HCC): ICD-10-CM

## 2018-04-17 DIAGNOSIS — N32.81 OVERACTIVE BLADDER: ICD-10-CM

## 2018-04-17 DIAGNOSIS — K75.81 LIVER CIRRHOSIS SECONDARY TO NASH (HCC): ICD-10-CM

## 2018-04-17 DIAGNOSIS — I10 ESSENTIAL HYPERTENSION: ICD-10-CM

## 2018-04-17 DIAGNOSIS — E78.2 MIXED HYPERLIPIDEMIA: ICD-10-CM

## 2018-04-17 DIAGNOSIS — Z79.4 UNCONTROLLED TYPE 2 DIABETES MELLITUS WITH HYPERGLYCEMIA, WITH LONG-TERM CURRENT USE OF INSULIN (HCC): Primary | ICD-10-CM

## 2018-04-17 LAB
BILIRUBIN, POC: NORMAL
BLOOD URINE, POC: NORMAL
CLARITY, POC: CLEAR
COLOR, POC: NORMAL
GLUCOSE URINE, POC: NORMAL
HBA1C MFR BLD: 7.5 %
KETONES, POC: NORMAL
LEUKOCYTE EST, POC: NORMAL
NITRITE, POC: NORMAL
PH, POC: 6.5
PROTEIN, POC: NORMAL
SPECIFIC GRAVITY, POC: 1.02
UROBILINOGEN, POC: 1

## 2018-04-17 PROCEDURE — G8427 DOCREV CUR MEDS BY ELIG CLIN: HCPCS | Performed by: INTERNAL MEDICINE

## 2018-04-17 PROCEDURE — 3017F COLORECTAL CA SCREEN DOC REV: CPT | Performed by: INTERNAL MEDICINE

## 2018-04-17 PROCEDURE — 3045F PR MOST RECENT HEMOGLOBIN A1C LEVEL 7.0-9.0%: CPT | Performed by: INTERNAL MEDICINE

## 2018-04-17 PROCEDURE — 2022F DILAT RTA XM EVC RTNOPTHY: CPT | Performed by: INTERNAL MEDICINE

## 2018-04-17 PROCEDURE — 1036F TOBACCO NON-USER: CPT | Performed by: INTERNAL MEDICINE

## 2018-04-17 PROCEDURE — 1123F ACP DISCUSS/DSCN MKR DOCD: CPT | Performed by: INTERNAL MEDICINE

## 2018-04-17 PROCEDURE — 4040F PNEUMOC VAC/ADMIN/RCVD: CPT | Performed by: INTERNAL MEDICINE

## 2018-04-17 PROCEDURE — 99214 OFFICE O/P EST MOD 30 MIN: CPT | Performed by: INTERNAL MEDICINE

## 2018-04-17 PROCEDURE — 83036 HEMOGLOBIN GLYCOSYLATED A1C: CPT | Performed by: INTERNAL MEDICINE

## 2018-04-17 PROCEDURE — 81002 URINALYSIS NONAUTO W/O SCOPE: CPT | Performed by: INTERNAL MEDICINE

## 2018-04-17 PROCEDURE — G8417 CALC BMI ABV UP PARAM F/U: HCPCS | Performed by: INTERNAL MEDICINE

## 2018-04-17 RX ORDER — OXYBUTYNIN CHLORIDE 10 MG/1
10 TABLET, EXTENDED RELEASE ORAL DAILY
Qty: 30 TABLET | Refills: 3 | Status: SHIPPED | OUTPATIENT
Start: 2018-04-17 | End: 2018-07-31 | Stop reason: SDUPTHER

## 2018-04-17 ASSESSMENT — ENCOUNTER SYMPTOMS
BACK PAIN: 1
GASTROINTESTINAL NEGATIVE: 1

## 2018-04-17 ASSESSMENT — PATIENT HEALTH QUESTIONNAIRE - PHQ9
1. LITTLE INTEREST OR PLEASURE IN DOING THINGS: 0
2. FEELING DOWN, DEPRESSED OR HOPELESS: 0
SUM OF ALL RESPONSES TO PHQ9 QUESTIONS 1 & 2: 0
SUM OF ALL RESPONSES TO PHQ QUESTIONS 1-9: 0

## 2018-05-15 ENCOUNTER — OFFICE VISIT (OUTPATIENT)
Dept: PULMONOLOGY | Age: 73
End: 2018-05-15

## 2018-05-15 VITALS
OXYGEN SATURATION: 96 % | SYSTOLIC BLOOD PRESSURE: 126 MMHG | HEIGHT: 70 IN | TEMPERATURE: 97.8 F | RESPIRATION RATE: 16 BRPM | DIASTOLIC BLOOD PRESSURE: 78 MMHG | WEIGHT: 274 LBS | BODY MASS INDEX: 39.22 KG/M2 | HEART RATE: 75 BPM

## 2018-05-15 DIAGNOSIS — F51.04 PSYCHOPHYSIOLOGICAL INSOMNIA: ICD-10-CM

## 2018-05-15 DIAGNOSIS — G47.33 OSA (OBSTRUCTIVE SLEEP APNEA): ICD-10-CM

## 2018-05-15 PROCEDURE — G8427 DOCREV CUR MEDS BY ELIG CLIN: HCPCS | Performed by: INTERNAL MEDICINE

## 2018-05-15 PROCEDURE — 4040F PNEUMOC VAC/ADMIN/RCVD: CPT | Performed by: INTERNAL MEDICINE

## 2018-05-15 PROCEDURE — 1036F TOBACCO NON-USER: CPT | Performed by: INTERNAL MEDICINE

## 2018-05-15 PROCEDURE — 1123F ACP DISCUSS/DSCN MKR DOCD: CPT | Performed by: INTERNAL MEDICINE

## 2018-05-15 PROCEDURE — 99213 OFFICE O/P EST LOW 20 MIN: CPT | Performed by: INTERNAL MEDICINE

## 2018-05-15 PROCEDURE — 3017F COLORECTAL CA SCREEN DOC REV: CPT | Performed by: INTERNAL MEDICINE

## 2018-05-15 PROCEDURE — G8417 CALC BMI ABV UP PARAM F/U: HCPCS | Performed by: INTERNAL MEDICINE

## 2018-05-15 ASSESSMENT — SLEEP AND FATIGUE QUESTIONNAIRES
HOW LIKELY ARE YOU TO NOD OFF OR FALL ASLEEP WHILE SITTING AND TALKING TO SOMEONE: 0
HOW LIKELY ARE YOU TO NOD OFF OR FALL ASLEEP WHILE WATCHING TV: 2
HOW LIKELY ARE YOU TO NOD OFF OR FALL ASLEEP WHILE SITTING AND READING: 2
HOW LIKELY ARE YOU TO NOD OFF OR FALL ASLEEP WHILE LYING DOWN TO REST IN THE AFTERNOON WHEN CIRCUMSTANCES PERMIT: 2
HOW LIKELY ARE YOU TO NOD OFF OR FALL ASLEEP WHILE SITTING QUIETLY AFTER LUNCH WITHOUT ALCOHOL: 1
HOW LIKELY ARE YOU TO NOD OFF OR FALL ASLEEP WHEN YOU ARE A PASSENGER IN A CAR FOR AN HOUR WITHOUT A BREAK: 2
HOW LIKELY ARE YOU TO NOD OFF OR FALL ASLEEP WHILE SITTING INACTIVE IN A PUBLIC PLACE: 1
ESS TOTAL SCORE: 10
HOW LIKELY ARE YOU TO NOD OFF OR FALL ASLEEP IN A CAR, WHILE STOPPED FOR A FEW MINUTES IN TRAFFIC: 0

## 2018-07-13 ENCOUNTER — OFFICE VISIT (OUTPATIENT)
Dept: PSYCHIATRY | Age: 73
End: 2018-07-13

## 2018-07-13 VITALS
HEIGHT: 70 IN | WEIGHT: 267 LBS | DIASTOLIC BLOOD PRESSURE: 61 MMHG | RESPIRATION RATE: 16 BRPM | HEART RATE: 68 BPM | SYSTOLIC BLOOD PRESSURE: 130 MMHG | BODY MASS INDEX: 38.22 KG/M2

## 2018-07-13 DIAGNOSIS — F32.A DEPRESSION, UNSPECIFIED DEPRESSION TYPE: Primary | ICD-10-CM

## 2018-07-13 DIAGNOSIS — F41.9 ANXIETY: ICD-10-CM

## 2018-07-13 PROCEDURE — 1123F ACP DISCUSS/DSCN MKR DOCD: CPT | Performed by: PSYCHIATRY & NEUROLOGY

## 2018-07-13 PROCEDURE — G8417 CALC BMI ABV UP PARAM F/U: HCPCS | Performed by: PSYCHIATRY & NEUROLOGY

## 2018-07-13 PROCEDURE — G8427 DOCREV CUR MEDS BY ELIG CLIN: HCPCS | Performed by: PSYCHIATRY & NEUROLOGY

## 2018-07-13 PROCEDURE — 1036F TOBACCO NON-USER: CPT | Performed by: PSYCHIATRY & NEUROLOGY

## 2018-07-13 PROCEDURE — 1101F PT FALLS ASSESS-DOCD LE1/YR: CPT | Performed by: PSYCHIATRY & NEUROLOGY

## 2018-07-13 PROCEDURE — 99214 OFFICE O/P EST MOD 30 MIN: CPT | Performed by: PSYCHIATRY & NEUROLOGY

## 2018-07-13 PROCEDURE — 3017F COLORECTAL CA SCREEN DOC REV: CPT | Performed by: PSYCHIATRY & NEUROLOGY

## 2018-07-13 PROCEDURE — 4040F PNEUMOC VAC/ADMIN/RCVD: CPT | Performed by: PSYCHIATRY & NEUROLOGY

## 2018-07-13 RX ORDER — BUPROPION HYDROCHLORIDE 100 MG/1
100 TABLET, EXTENDED RELEASE ORAL DAILY
Qty: 90 TABLET | Refills: 1 | Status: SHIPPED | OUTPATIENT
Start: 2018-07-13 | End: 2018-10-12 | Stop reason: SDUPTHER

## 2018-07-27 RX ORDER — INSULIN GLARGINE 100 [IU]/ML
INJECTION, SOLUTION SUBCUTANEOUS
Qty: 105 ML | Refills: 5 | Status: SHIPPED | OUTPATIENT
Start: 2018-07-27 | End: 2019-08-06 | Stop reason: SDUPTHER

## 2018-07-31 ENCOUNTER — OFFICE VISIT (OUTPATIENT)
Dept: INTERNAL MEDICINE CLINIC | Age: 73
End: 2018-07-31

## 2018-07-31 VITALS
BODY MASS INDEX: 38.8 KG/M2 | SYSTOLIC BLOOD PRESSURE: 130 MMHG | RESPIRATION RATE: 16 BRPM | HEIGHT: 70 IN | DIASTOLIC BLOOD PRESSURE: 52 MMHG | WEIGHT: 271 LBS

## 2018-07-31 DIAGNOSIS — E78.2 MIXED HYPERLIPIDEMIA: ICD-10-CM

## 2018-07-31 DIAGNOSIS — N32.81 OVERACTIVE BLADDER: ICD-10-CM

## 2018-07-31 DIAGNOSIS — E11.65 UNCONTROLLED TYPE 2 DIABETES MELLITUS WITH HYPERGLYCEMIA, WITH LONG-TERM CURRENT USE OF INSULIN (HCC): Primary | ICD-10-CM

## 2018-07-31 DIAGNOSIS — I10 ESSENTIAL HYPERTENSION: ICD-10-CM

## 2018-07-31 DIAGNOSIS — Z79.4 UNCONTROLLED TYPE 2 DIABETES MELLITUS WITH HYPERGLYCEMIA, WITH LONG-TERM CURRENT USE OF INSULIN (HCC): Primary | ICD-10-CM

## 2018-07-31 LAB — HBA1C MFR BLD: 7.8 %

## 2018-07-31 PROCEDURE — 4040F PNEUMOC VAC/ADMIN/RCVD: CPT | Performed by: INTERNAL MEDICINE

## 2018-07-31 PROCEDURE — G8417 CALC BMI ABV UP PARAM F/U: HCPCS | Performed by: INTERNAL MEDICINE

## 2018-07-31 PROCEDURE — 3017F COLORECTAL CA SCREEN DOC REV: CPT | Performed by: INTERNAL MEDICINE

## 2018-07-31 PROCEDURE — 3045F PR MOST RECENT HEMOGLOBIN A1C LEVEL 7.0-9.0%: CPT | Performed by: INTERNAL MEDICINE

## 2018-07-31 PROCEDURE — 1101F PT FALLS ASSESS-DOCD LE1/YR: CPT | Performed by: INTERNAL MEDICINE

## 2018-07-31 PROCEDURE — 2022F DILAT RTA XM EVC RTNOPTHY: CPT | Performed by: INTERNAL MEDICINE

## 2018-07-31 PROCEDURE — 99214 OFFICE O/P EST MOD 30 MIN: CPT | Performed by: INTERNAL MEDICINE

## 2018-07-31 PROCEDURE — G8427 DOCREV CUR MEDS BY ELIG CLIN: HCPCS | Performed by: INTERNAL MEDICINE

## 2018-07-31 PROCEDURE — 83036 HEMOGLOBIN GLYCOSYLATED A1C: CPT | Performed by: INTERNAL MEDICINE

## 2018-07-31 PROCEDURE — 1123F ACP DISCUSS/DSCN MKR DOCD: CPT | Performed by: INTERNAL MEDICINE

## 2018-07-31 PROCEDURE — 1036F TOBACCO NON-USER: CPT | Performed by: INTERNAL MEDICINE

## 2018-07-31 RX ORDER — OXYBUTYNIN CHLORIDE 10 MG/1
10 TABLET, EXTENDED RELEASE ORAL DAILY
Qty: 90 TABLET | Refills: 3 | Status: SHIPPED | OUTPATIENT
Start: 2018-07-31 | End: 2019-07-09 | Stop reason: ALTCHOICE

## 2018-07-31 ASSESSMENT — ENCOUNTER SYMPTOMS
RESPIRATORY NEGATIVE: 1
GASTROINTESTINAL NEGATIVE: 1

## 2018-07-31 NOTE — PROGRESS NOTES
Subjective:      Patient ID: Robin Meredith is a 72 y.o. male. HPI  Pradip Mesa is here for follow-up diabetes, HTN and HL. Diabetes mellitus - On Humulin 70/30 50 units bid and metformin. FSBS 140-250 mg%. He again admits he is not very compliant with his diet especially in the summer time with excessive fruit intake. He denies any hyper- or hypoglycemic symptoms. He has no neuropathy. He sees and is followed by Dr. Gertrude Arreola for retinopathy. HTN - The patient denies any chest pain, shortness or breath, headaches or palpitations. There is no lower extremity edema. Continues to use the medication as prescribed (Lisinopril) and is having no side effects. HL - on Atorvastatin and Fenofibrate. Last LDL was 37. Review of Systems   Constitutional: Negative. Respiratory: Negative. Cardiovascular: Negative. Gastrointestinal: Negative. Endocrine: Negative for polydipsia, polyphagia and polyuria. Genitourinary:        Less urinary frequency with oxybutynin. Musculoskeletal:        Right knee pain. Neurological: Positive for dizziness. Negative for numbness. Psychiatric/Behavioral: Positive for dysphoric mood. 14 point ros otherwise negative. Objective:   Physical Exam   Constitutional: He is oriented to person, place, and time. He appears well-developed and well-nourished. No distress. Eyes: EOM are normal. Pupils are equal, round, and reactive to light. Neck: No JVD present. Cardiovascular: Normal rate and regular rhythm. Pulmonary/Chest: Effort normal and breath sounds normal. No respiratory distress. He has no wheezes. He has no rales. Musculoskeletal: He exhibits no edema. Neurological: He is alert and oriented to person, place, and time. Skin: Skin is warm and dry. Assessment:      1. Uncontrolled type 2 diabetes mellitus with hyperglycemia, with long-term current use of insulin ([address]    2. Essential hypertension    3. Mixed hyperlipidemia    4.  Overactive bladder            Plan:      Teddy Dickerson was seen today for diabetes. Diagnoses and all orders for this visit:    Uncontrolled type 2 diabetes mellitus with hyperglycemia, with long-term current use of insulin ([facility])  -     POCT glycosylated hemoglobin (Hb A1C) - 7.8. He again admits to not being very compliant with his diet especially in the summer. Discussed this with him and will make a better attempt. Essential hypertension, controlled        - Continue Lisinopril. Mixed hyperlipidemia, controlled        - Continue Atorvastatin and Fenofibrate. Overactive bladder  -     oxybutynin (DITROPAN XL) 10 MG extended release tablet;  Take 1 tablet by mouth daily

## 2018-08-22 DIAGNOSIS — F32.A DEPRESSION, UNSPECIFIED DEPRESSION TYPE: ICD-10-CM

## 2018-08-22 DIAGNOSIS — F41.1 GAD (GENERALIZED ANXIETY DISORDER): Primary | ICD-10-CM

## 2018-08-23 RX ORDER — ALPRAZOLAM 0.25 MG/1
TABLET ORAL
Qty: 180 TABLET | Refills: 1 | Status: SHIPPED | OUTPATIENT
Start: 2018-08-23 | End: 2019-01-18 | Stop reason: SDUPTHER

## 2018-08-23 RX ORDER — DULOXETIN HYDROCHLORIDE 60 MG/1
CAPSULE, DELAYED RELEASE ORAL
Qty: 180 CAPSULE | Refills: 1 | Status: SHIPPED | OUTPATIENT
Start: 2018-08-23 | End: 2019-01-08 | Stop reason: SDUPTHER

## 2018-10-10 RX ORDER — ATORVASTATIN CALCIUM 20 MG/1
TABLET, FILM COATED ORAL
Qty: 90 TABLET | Refills: 3 | Status: SHIPPED | OUTPATIENT
Start: 2018-10-10 | End: 2019-11-24 | Stop reason: SDUPTHER

## 2018-10-10 RX ORDER — TAMSULOSIN HYDROCHLORIDE 0.4 MG/1
CAPSULE ORAL
Qty: 90 CAPSULE | Refills: 3 | Status: SHIPPED | OUTPATIENT
Start: 2018-10-10 | End: 2019-11-24 | Stop reason: SDUPTHER

## 2018-10-10 RX ORDER — ATENOLOL AND CHLORTHALIDONE TABLET 50; 25 MG/1; MG/1
TABLET ORAL
Qty: 90 TABLET | Refills: 3 | Status: SHIPPED | OUTPATIENT
Start: 2018-10-10 | End: 2019-11-24 | Stop reason: SDUPTHER

## 2018-10-10 RX ORDER — LISINOPRIL 10 MG/1
TABLET ORAL
Qty: 90 TABLET | Refills: 3 | Status: SHIPPED | OUTPATIENT
Start: 2018-10-10 | End: 2019-11-24 | Stop reason: SDUPTHER

## 2018-10-15 RX ORDER — BUPROPION HYDROCHLORIDE 100 MG/1
100 TABLET, EXTENDED RELEASE ORAL DAILY
Qty: 90 TABLET | Refills: 1 | Status: SHIPPED | OUTPATIENT
Start: 2018-10-15 | End: 2019-01-18

## 2018-11-05 ENCOUNTER — OFFICE VISIT (OUTPATIENT)
Dept: INTERNAL MEDICINE CLINIC | Age: 73
End: 2018-11-05
Payer: MEDICARE

## 2018-11-05 VITALS
WEIGHT: 269 LBS | SYSTOLIC BLOOD PRESSURE: 132 MMHG | HEART RATE: 64 BPM | DIASTOLIC BLOOD PRESSURE: 64 MMHG | HEIGHT: 70 IN | OXYGEN SATURATION: 98 % | RESPIRATION RATE: 16 BRPM | BODY MASS INDEX: 38.51 KG/M2

## 2018-11-05 DIAGNOSIS — Z79.4 UNCONTROLLED TYPE 2 DIABETES MELLITUS WITH HYPERGLYCEMIA, WITH LONG-TERM CURRENT USE OF INSULIN (HCC): Primary | ICD-10-CM

## 2018-11-05 DIAGNOSIS — E11.65 UNCONTROLLED TYPE 2 DIABETES MELLITUS WITH HYPERGLYCEMIA, WITH LONG-TERM CURRENT USE OF INSULIN (HCC): Primary | ICD-10-CM

## 2018-11-05 DIAGNOSIS — E78.2 MIXED HYPERLIPIDEMIA: ICD-10-CM

## 2018-11-05 DIAGNOSIS — F32.4 MAJOR DEPRESSIVE DISORDER WITH SINGLE EPISODE, IN PARTIAL REMISSION (HCC): ICD-10-CM

## 2018-11-05 DIAGNOSIS — I10 ESSENTIAL HYPERTENSION: ICD-10-CM

## 2018-11-05 LAB — HBA1C MFR BLD: 7.4 %

## 2018-11-05 PROCEDURE — 3288F FALL RISK ASSESSMENT DOCD: CPT | Performed by: INTERNAL MEDICINE

## 2018-11-05 PROCEDURE — 99214 OFFICE O/P EST MOD 30 MIN: CPT | Performed by: INTERNAL MEDICINE

## 2018-11-05 PROCEDURE — 83036 HEMOGLOBIN GLYCOSYLATED A1C: CPT | Performed by: INTERNAL MEDICINE

## 2018-11-05 ASSESSMENT — ENCOUNTER SYMPTOMS
RESPIRATORY NEGATIVE: 1
BACK PAIN: 1
GASTROINTESTINAL NEGATIVE: 1

## 2019-01-08 DIAGNOSIS — F32.A DEPRESSION, UNSPECIFIED DEPRESSION TYPE: ICD-10-CM

## 2019-01-08 RX ORDER — DULOXETIN HYDROCHLORIDE 60 MG/1
CAPSULE, DELAYED RELEASE ORAL
Qty: 180 CAPSULE | Refills: 3 | Status: SHIPPED | OUTPATIENT
Start: 2019-01-08 | End: 2019-12-26

## 2019-01-08 RX ORDER — FENOFIBRATE 160 MG/1
160 TABLET ORAL DAILY
Qty: 90 TABLET | Refills: 3 | Status: SHIPPED | OUTPATIENT
Start: 2019-01-08 | End: 2019-12-26

## 2019-01-18 ENCOUNTER — OFFICE VISIT (OUTPATIENT)
Dept: PSYCHIATRY | Age: 74
End: 2019-01-18
Payer: MEDICARE

## 2019-01-18 DIAGNOSIS — F32.A DEPRESSION, UNSPECIFIED DEPRESSION TYPE: Primary | ICD-10-CM

## 2019-01-18 DIAGNOSIS — F41.1 GAD (GENERALIZED ANXIETY DISORDER): ICD-10-CM

## 2019-01-18 DIAGNOSIS — F41.9 ANXIETY: ICD-10-CM

## 2019-01-18 PROCEDURE — G8484 FLU IMMUNIZE NO ADMIN: HCPCS | Performed by: PSYCHIATRY & NEUROLOGY

## 2019-01-18 PROCEDURE — 1123F ACP DISCUSS/DSCN MKR DOCD: CPT | Performed by: PSYCHIATRY & NEUROLOGY

## 2019-01-18 PROCEDURE — G8427 DOCREV CUR MEDS BY ELIG CLIN: HCPCS | Performed by: PSYCHIATRY & NEUROLOGY

## 2019-01-18 PROCEDURE — 1101F PT FALLS ASSESS-DOCD LE1/YR: CPT | Performed by: PSYCHIATRY & NEUROLOGY

## 2019-01-18 PROCEDURE — 4040F PNEUMOC VAC/ADMIN/RCVD: CPT | Performed by: PSYCHIATRY & NEUROLOGY

## 2019-01-18 PROCEDURE — 3017F COLORECTAL CA SCREEN DOC REV: CPT | Performed by: PSYCHIATRY & NEUROLOGY

## 2019-01-18 PROCEDURE — G8417 CALC BMI ABV UP PARAM F/U: HCPCS | Performed by: PSYCHIATRY & NEUROLOGY

## 2019-01-18 PROCEDURE — 1036F TOBACCO NON-USER: CPT | Performed by: PSYCHIATRY & NEUROLOGY

## 2019-01-18 PROCEDURE — 99214 OFFICE O/P EST MOD 30 MIN: CPT | Performed by: PSYCHIATRY & NEUROLOGY

## 2019-01-18 RX ORDER — CLOMIPRAMINE HYDROCHLORIDE 25 MG/1
CAPSULE ORAL
Qty: 180 CAPSULE | Refills: 1 | Status: SHIPPED | OUTPATIENT
Start: 2019-01-18 | End: 2019-07-09 | Stop reason: ALTCHOICE

## 2019-01-18 RX ORDER — ALPRAZOLAM 0.25 MG/1
TABLET ORAL
Qty: 270 TABLET | Refills: 0 | Status: SHIPPED | OUTPATIENT
Start: 2019-01-18 | End: 2019-04-18

## 2019-02-11 ENCOUNTER — OFFICE VISIT (OUTPATIENT)
Dept: INTERNAL MEDICINE CLINIC | Age: 74
End: 2019-02-11
Payer: MEDICARE

## 2019-02-11 VITALS
HEART RATE: 63 BPM | DIASTOLIC BLOOD PRESSURE: 66 MMHG | WEIGHT: 276 LBS | OXYGEN SATURATION: 98 % | SYSTOLIC BLOOD PRESSURE: 134 MMHG | BODY MASS INDEX: 39.6 KG/M2

## 2019-02-11 DIAGNOSIS — K74.60 LIVER CIRRHOSIS SECONDARY TO NASH (HCC): ICD-10-CM

## 2019-02-11 DIAGNOSIS — Z79.4 UNCONTROLLED TYPE 2 DIABETES MELLITUS WITH HYPERGLYCEMIA, WITH LONG-TERM CURRENT USE OF INSULIN (HCC): Primary | ICD-10-CM

## 2019-02-11 DIAGNOSIS — E11.65 UNCONTROLLED TYPE 2 DIABETES MELLITUS WITH HYPERGLYCEMIA, WITH LONG-TERM CURRENT USE OF INSULIN (HCC): Primary | ICD-10-CM

## 2019-02-11 DIAGNOSIS — N40.1 BENIGN PROSTATIC HYPERPLASIA WITH NOCTURIA: ICD-10-CM

## 2019-02-11 DIAGNOSIS — R35.1 BENIGN PROSTATIC HYPERPLASIA WITH NOCTURIA: ICD-10-CM

## 2019-02-11 DIAGNOSIS — F32.4 MAJOR DEPRESSIVE DISORDER WITH SINGLE EPISODE, IN PARTIAL REMISSION (HCC): ICD-10-CM

## 2019-02-11 DIAGNOSIS — K75.81 LIVER CIRRHOSIS SECONDARY TO NASH (HCC): ICD-10-CM

## 2019-02-11 DIAGNOSIS — E78.2 MIXED HYPERLIPIDEMIA: ICD-10-CM

## 2019-02-11 LAB
A/G RATIO: 1.1 (ref 1.1–2.2)
ALBUMIN SERPL-MCNC: 3.9 G/DL (ref 3.4–5)
ALP BLD-CCNC: 41 U/L (ref 40–129)
ALT SERPL-CCNC: 34 U/L (ref 10–40)
ANION GAP SERPL CALCULATED.3IONS-SCNC: 11 MMOL/L (ref 3–16)
AST SERPL-CCNC: 30 U/L (ref 15–37)
BILIRUB SERPL-MCNC: 1.1 MG/DL (ref 0–1)
BUN BLDV-MCNC: 25 MG/DL (ref 7–20)
CALCIUM SERPL-MCNC: 9.8 MG/DL (ref 8.3–10.6)
CHLORIDE BLD-SCNC: 97 MMOL/L (ref 99–110)
CHOLESTEROL, TOTAL: 105 MG/DL (ref 0–199)
CO2: 29 MMOL/L (ref 21–32)
CREAT SERPL-MCNC: 1 MG/DL (ref 0.8–1.3)
GFR AFRICAN AMERICAN: >60
GFR NON-AFRICAN AMERICAN: >60
GLOBULIN: 3.6 G/DL
GLUCOSE BLD-MCNC: 225 MG/DL (ref 70–99)
HBA1C MFR BLD: 7.7 %
HDLC SERPL-MCNC: 25 MG/DL (ref 40–60)
LDL CHOLESTEROL CALCULATED: 40 MG/DL
POTASSIUM SERPL-SCNC: 4.5 MMOL/L (ref 3.5–5.1)
PROSTATE SPECIFIC ANTIGEN: 0.28 NG/ML (ref 0–4)
SODIUM BLD-SCNC: 137 MMOL/L (ref 136–145)
TOTAL PROTEIN: 7.5 G/DL (ref 6.4–8.2)
TRIGL SERPL-MCNC: 202 MG/DL (ref 0–150)
VLDLC SERPL CALC-MCNC: 40 MG/DL

## 2019-02-11 PROCEDURE — 83036 HEMOGLOBIN GLYCOSYLATED A1C: CPT | Performed by: INTERNAL MEDICINE

## 2019-02-11 PROCEDURE — 99214 OFFICE O/P EST MOD 30 MIN: CPT | Performed by: INTERNAL MEDICINE

## 2019-02-11 PROCEDURE — 1123F ACP DISCUSS/DSCN MKR DOCD: CPT | Performed by: INTERNAL MEDICINE

## 2019-02-11 PROCEDURE — 1101F PT FALLS ASSESS-DOCD LE1/YR: CPT | Performed by: INTERNAL MEDICINE

## 2019-02-11 PROCEDURE — G8417 CALC BMI ABV UP PARAM F/U: HCPCS | Performed by: INTERNAL MEDICINE

## 2019-02-11 PROCEDURE — 2022F DILAT RTA XM EVC RTNOPTHY: CPT | Performed by: INTERNAL MEDICINE

## 2019-02-11 PROCEDURE — G8427 DOCREV CUR MEDS BY ELIG CLIN: HCPCS | Performed by: INTERNAL MEDICINE

## 2019-02-11 PROCEDURE — 1036F TOBACCO NON-USER: CPT | Performed by: INTERNAL MEDICINE

## 2019-02-11 PROCEDURE — 3017F COLORECTAL CA SCREEN DOC REV: CPT | Performed by: INTERNAL MEDICINE

## 2019-02-11 PROCEDURE — 36415 COLL VENOUS BLD VENIPUNCTURE: CPT | Performed by: INTERNAL MEDICINE

## 2019-02-11 PROCEDURE — 4040F PNEUMOC VAC/ADMIN/RCVD: CPT | Performed by: INTERNAL MEDICINE

## 2019-02-11 PROCEDURE — G8484 FLU IMMUNIZE NO ADMIN: HCPCS | Performed by: INTERNAL MEDICINE

## 2019-02-11 PROCEDURE — 3045F PR MOST RECENT HEMOGLOBIN A1C LEVEL 7.0-9.0%: CPT | Performed by: INTERNAL MEDICINE

## 2019-02-11 ASSESSMENT — ENCOUNTER SYMPTOMS
GASTROINTESTINAL NEGATIVE: 1
BACK PAIN: 1
RESPIRATORY NEGATIVE: 1

## 2019-04-30 RX ORDER — PEN NEEDLE, DIABETIC 31 GX5/16"
NEEDLE, DISPOSABLE MISCELLANEOUS
Qty: 10 EACH | Refills: 5 | Status: SHIPPED | OUTPATIENT
Start: 2019-04-30 | End: 2020-07-30 | Stop reason: SDUPTHER

## 2019-05-13 ENCOUNTER — OFFICE VISIT (OUTPATIENT)
Dept: INTERNAL MEDICINE CLINIC | Age: 74
End: 2019-05-13
Payer: MEDICARE

## 2019-05-13 VITALS
BODY MASS INDEX: 39.6 KG/M2 | WEIGHT: 276 LBS | DIASTOLIC BLOOD PRESSURE: 72 MMHG | SYSTOLIC BLOOD PRESSURE: 150 MMHG | HEART RATE: 66 BPM

## 2019-05-13 DIAGNOSIS — Z79.4 UNCONTROLLED TYPE 2 DIABETES MELLITUS WITH HYPERGLYCEMIA, WITH LONG-TERM CURRENT USE OF INSULIN (HCC): Primary | ICD-10-CM

## 2019-05-13 DIAGNOSIS — E11.65 UNCONTROLLED TYPE 2 DIABETES MELLITUS WITH HYPERGLYCEMIA, WITH LONG-TERM CURRENT USE OF INSULIN (HCC): Primary | ICD-10-CM

## 2019-05-13 DIAGNOSIS — E78.2 MIXED HYPERLIPIDEMIA: ICD-10-CM

## 2019-05-13 DIAGNOSIS — Z23 NEED FOR TDAP VACCINATION: ICD-10-CM

## 2019-05-13 DIAGNOSIS — I10 ESSENTIAL HYPERTENSION: ICD-10-CM

## 2019-05-13 LAB — HBA1C MFR BLD: 7 %

## 2019-05-13 PROCEDURE — G8417 CALC BMI ABV UP PARAM F/U: HCPCS | Performed by: INTERNAL MEDICINE

## 2019-05-13 PROCEDURE — 2022F DILAT RTA XM EVC RTNOPTHY: CPT | Performed by: INTERNAL MEDICINE

## 2019-05-13 PROCEDURE — 90471 IMMUNIZATION ADMIN: CPT | Performed by: INTERNAL MEDICINE

## 2019-05-13 PROCEDURE — 99214 OFFICE O/P EST MOD 30 MIN: CPT | Performed by: INTERNAL MEDICINE

## 2019-05-13 PROCEDURE — 83036 HEMOGLOBIN GLYCOSYLATED A1C: CPT | Performed by: INTERNAL MEDICINE

## 2019-05-13 PROCEDURE — 3045F PR MOST RECENT HEMOGLOBIN A1C LEVEL 7.0-9.0%: CPT | Performed by: INTERNAL MEDICINE

## 2019-05-13 PROCEDURE — G8427 DOCREV CUR MEDS BY ELIG CLIN: HCPCS | Performed by: INTERNAL MEDICINE

## 2019-05-13 PROCEDURE — 1036F TOBACCO NON-USER: CPT | Performed by: INTERNAL MEDICINE

## 2019-05-13 PROCEDURE — 4040F PNEUMOC VAC/ADMIN/RCVD: CPT | Performed by: INTERNAL MEDICINE

## 2019-05-13 PROCEDURE — 3017F COLORECTAL CA SCREEN DOC REV: CPT | Performed by: INTERNAL MEDICINE

## 2019-05-13 PROCEDURE — 90715 TDAP VACCINE 7 YRS/> IM: CPT | Performed by: INTERNAL MEDICINE

## 2019-05-13 PROCEDURE — 1123F ACP DISCUSS/DSCN MKR DOCD: CPT | Performed by: INTERNAL MEDICINE

## 2019-05-13 ASSESSMENT — ENCOUNTER SYMPTOMS
RESPIRATORY NEGATIVE: 1
BACK PAIN: 1
ABDOMINAL DISTENTION: 1

## 2019-05-13 NOTE — PROGRESS NOTES
Subjective:      Patient ID: Pierce Valencia is a 69 y.o. male. HPI  Zak Crawley is here for follow-up of diabetes, HTN and HL. Diabetes - on Lantus 100 units, Humalog 50 units bid and Metformin 1 gram bid. Blood sugars are still elevated. No symptoms. He does see Dr. Real Dawson for his eye twice yearly. HTN - The patient denies any chest pain, shortness or breath, headaches or palpitations. There is no lower extremity edema. Continues to use the medication as prescribed (Lisininopril) and is having no side effects. HL - on Atorvastatin and Fenofibrate. Last LDL 2/19 was 40; . Review of Systems   Constitutional: Positive for activity change. Negative for appetite change. Respiratory: Negative. Cardiovascular: Negative. Gastrointestinal: Positive for abdominal distention. Musculoskeletal: Positive for back pain. Neurological: Negative for numbness. Psychiatric/Behavioral: The patient is nervous/anxious. 14 point ros otherwise negative. Objective:   Physical Exam   Constitutional: He is oriented to person, place, and time. He appears well-nourished. No distress. Cardiovascular: Normal rate, regular rhythm and normal heart sounds. Pulmonary/Chest: Effort normal and breath sounds normal. No respiratory distress. Abdominal: Soft. Bowel sounds are normal.   Musculoskeletal: He exhibits no edema. Neurological: He is alert and oriented to person, place, and time. Assessment:      1. Uncontrolled type 2 diabetes mellitus with hyperglycemia, with long-term current use of insulin ([facility] [address]    2. Essential hypertension    3. Mixed hyperlipidemia    4. Need for Tdap vaccination            Plan:      Zak Crawley was seen today for diabetes. Diagnoses and all orders for this visit:    Uncontrolled type 2 diabetes mellitus with hyperglycemia, with long-term current use of insulin ([facility])  -     POCT glycosylated hemoglobin (Hb A1C) - 7.0 (very good for him).   Just add 25 units to the Humalog regimen in the AM and ocntinue everything else the same. Essential hypertension, controlled       - Continue Lisnopril    Mixed hyperlipidemia, controlled        - Continue Atorvastatin and Lopid.     Need for Tdap vaccination  -     Tdap (age 6y and older) IM ([address])                Honey Belcher MD

## 2019-05-30 RX ORDER — INSULIN HUMAN 100 [IU]/ML
INJECTION, SUSPENSION SUBCUTANEOUS
Qty: 90 ML | Refills: 5 | Status: SHIPPED | OUTPATIENT
Start: 2019-05-30 | End: 2020-06-09 | Stop reason: SDUPTHER

## 2019-07-09 ENCOUNTER — OFFICE VISIT (OUTPATIENT)
Dept: PULMONOLOGY | Age: 74
End: 2019-07-09
Payer: MEDICARE

## 2019-07-09 VITALS
HEIGHT: 70 IN | OXYGEN SATURATION: 96 % | HEART RATE: 62 BPM | TEMPERATURE: 98.2 F | BODY MASS INDEX: 38.94 KG/M2 | WEIGHT: 272 LBS | SYSTOLIC BLOOD PRESSURE: 130 MMHG | DIASTOLIC BLOOD PRESSURE: 60 MMHG | RESPIRATION RATE: 16 BRPM

## 2019-07-09 DIAGNOSIS — G47.33 OSA (OBSTRUCTIVE SLEEP APNEA): ICD-10-CM

## 2019-07-09 PROCEDURE — 1036F TOBACCO NON-USER: CPT | Performed by: INTERNAL MEDICINE

## 2019-07-09 PROCEDURE — G8427 DOCREV CUR MEDS BY ELIG CLIN: HCPCS | Performed by: INTERNAL MEDICINE

## 2019-07-09 PROCEDURE — 1123F ACP DISCUSS/DSCN MKR DOCD: CPT | Performed by: INTERNAL MEDICINE

## 2019-07-09 PROCEDURE — 99213 OFFICE O/P EST LOW 20 MIN: CPT | Performed by: INTERNAL MEDICINE

## 2019-07-09 PROCEDURE — G8417 CALC BMI ABV UP PARAM F/U: HCPCS | Performed by: INTERNAL MEDICINE

## 2019-07-09 PROCEDURE — 3017F COLORECTAL CA SCREEN DOC REV: CPT | Performed by: INTERNAL MEDICINE

## 2019-07-09 PROCEDURE — 4040F PNEUMOC VAC/ADMIN/RCVD: CPT | Performed by: INTERNAL MEDICINE

## 2019-07-09 ASSESSMENT — SLEEP AND FATIGUE QUESTIONNAIRES
HOW LIKELY ARE YOU TO NOD OFF OR FALL ASLEEP WHILE SITTING AND READING: 2
ESS TOTAL SCORE: 9
HOW LIKELY ARE YOU TO NOD OFF OR FALL ASLEEP IN A CAR, WHILE STOPPED FOR A FEW MINUTES IN TRAFFIC: 0
HOW LIKELY ARE YOU TO NOD OFF OR FALL ASLEEP WHILE WATCHING TV: 2
HOW LIKELY ARE YOU TO NOD OFF OR FALL ASLEEP WHILE SITTING QUIETLY AFTER LUNCH WITHOUT ALCOHOL: 0
HOW LIKELY ARE YOU TO NOD OFF OR FALL ASLEEP WHILE SITTING AND TALKING TO SOMEONE: 0
HOW LIKELY ARE YOU TO NOD OFF OR FALL ASLEEP WHEN YOU ARE A PASSENGER IN A CAR FOR AN HOUR WITHOUT A BREAK: 2
HOW LIKELY ARE YOU TO NOD OFF OR FALL ASLEEP WHILE LYING DOWN TO REST IN THE AFTERNOON WHEN CIRCUMSTANCES PERMIT: 2
HOW LIKELY ARE YOU TO NOD OFF OR FALL ASLEEP WHILE SITTING INACTIVE IN A PUBLIC PLACE: 1

## 2019-07-09 NOTE — PROGRESS NOTES
mention of complication, not stated as uncontrolled        PAST SURGICAL HISTORY:  Past Surgical History:   Procedure Laterality Date    CATARACT REMOVAL Left 2010       FAMILY HISTORY:  family history is not on file. SOCIAL HISTORY:   reports that he has never smoked. He has never used smokeless tobacco.      ALLERGIES:  Patient has No Known Allergies. REVIEW OF SYSTEMS:  Constitutional: Negative for fever   HENT: Negative for sore throat  Respiratory: Negative for dyspnea, cough  Cardiovascular: Negative for chest pain  Gastrointestinal: Negative for vomiting, diarrhea   Skin: Negative for rash  Psychiatric/Behavorial: Negative for anxiety     Objective:   PHYSICAL EXAM:  Blood pressure 130/60, pulse 62, temperature 98.2 °F (36.8 °C), temperature source Oral, resp. rate 16, height 5' 10\" (1.778 m), weight 272 lb (123.4 kg), SpO2 96 %.'  Gen: No distress. ENT:   Resp: No accessory muscle use. No crackles. No wheezes. No rhonchi. CV: Regular rate. Regular rhythm. No murmur or rub. No edema. Skin: Warm, dry, normal texture and turgor. No nodule on exposed extremities. M/S: No cyanosis. No clubbing. No joint deformity. Psych: Oriented x 3. No anxiety. Awake. Alert. Intact judgement and insight. Good Mood / Affect.   Memory appears in tact     Current Outpatient Medications   Medication Sig Dispense Refill    HUMULIN 70/30 KWIKPEN (70-30) 100 UNIT/ML injection pen INJECT SUBCUTANEOUSLY 50  UNITS TWO TIMES DAILY  BEFORE MEALS 90 mL 5    B-D ULTRAFINE III SHORT PEN 31G X 8 MM MISC USE TO INJECT INSULIN THREE TIMES A DAY 10 each 5    fenofibrate 160 MG tablet TAKE 1 TABLET BY MOUTH  DAILY 90 tablet 3    DULoxetine (CYMBALTA) 60 MG extended release capsule TAKE 1 CAPSULE BY MOUTH TWO TIMES DAILY 180 capsule 3    metFORMIN (GLUCOPHAGE) 1000 MG tablet TAKE 1 TABLET BY MOUTH  TWICE A  tablet 3    ONE TOUCH ULTRA TEST strip USE THREE TIMES A DAY AS NEEDED 100 strip 5    lisinopril

## 2019-08-06 RX ORDER — INSULIN GLARGINE 100 [IU]/ML
INJECTION, SOLUTION SUBCUTANEOUS
Qty: 105 ML | Refills: 5 | Status: SHIPPED | OUTPATIENT
Start: 2019-08-06 | End: 2020-09-16 | Stop reason: SDUPTHER

## 2019-08-14 ENCOUNTER — OFFICE VISIT (OUTPATIENT)
Dept: INTERNAL MEDICINE CLINIC | Age: 74
End: 2019-08-14
Payer: MEDICARE

## 2019-08-14 VITALS
TEMPERATURE: 98.3 F | SYSTOLIC BLOOD PRESSURE: 138 MMHG | WEIGHT: 270 LBS | BODY MASS INDEX: 42.38 KG/M2 | HEART RATE: 59 BPM | HEIGHT: 67 IN | OXYGEN SATURATION: 98 % | RESPIRATION RATE: 16 BRPM | DIASTOLIC BLOOD PRESSURE: 82 MMHG

## 2019-08-14 DIAGNOSIS — E11.65 UNCONTROLLED TYPE 2 DIABETES MELLITUS WITH HYPERGLYCEMIA, WITH LONG-TERM CURRENT USE OF INSULIN (HCC): Primary | ICD-10-CM

## 2019-08-14 DIAGNOSIS — E78.2 MIXED HYPERLIPIDEMIA: ICD-10-CM

## 2019-08-14 DIAGNOSIS — E66.01 MORBID OBESITY WITH BMI OF 40.0-44.9, ADULT (HCC): ICD-10-CM

## 2019-08-14 DIAGNOSIS — Z79.4 UNCONTROLLED TYPE 2 DIABETES MELLITUS WITH HYPERGLYCEMIA, WITH LONG-TERM CURRENT USE OF INSULIN (HCC): Primary | ICD-10-CM

## 2019-08-14 DIAGNOSIS — F41.1 GAD (GENERALIZED ANXIETY DISORDER): ICD-10-CM

## 2019-08-14 DIAGNOSIS — I10 ESSENTIAL HYPERTENSION: ICD-10-CM

## 2019-08-14 LAB — HBA1C MFR BLD: 7.7 %

## 2019-08-14 PROCEDURE — 1036F TOBACCO NON-USER: CPT | Performed by: INTERNAL MEDICINE

## 2019-08-14 PROCEDURE — 3045F PR MOST RECENT HEMOGLOBIN A1C LEVEL 7.0-9.0%: CPT | Performed by: INTERNAL MEDICINE

## 2019-08-14 PROCEDURE — G8427 DOCREV CUR MEDS BY ELIG CLIN: HCPCS | Performed by: INTERNAL MEDICINE

## 2019-08-14 PROCEDURE — 4040F PNEUMOC VAC/ADMIN/RCVD: CPT | Performed by: INTERNAL MEDICINE

## 2019-08-14 PROCEDURE — 99214 OFFICE O/P EST MOD 30 MIN: CPT | Performed by: INTERNAL MEDICINE

## 2019-08-14 PROCEDURE — 3017F COLORECTAL CA SCREEN DOC REV: CPT | Performed by: INTERNAL MEDICINE

## 2019-08-14 PROCEDURE — 1123F ACP DISCUSS/DSCN MKR DOCD: CPT | Performed by: INTERNAL MEDICINE

## 2019-08-14 PROCEDURE — G8417 CALC BMI ABV UP PARAM F/U: HCPCS | Performed by: INTERNAL MEDICINE

## 2019-08-14 PROCEDURE — 2022F DILAT RTA XM EVC RTNOPTHY: CPT | Performed by: INTERNAL MEDICINE

## 2019-08-14 PROCEDURE — 83036 HEMOGLOBIN GLYCOSYLATED A1C: CPT | Performed by: INTERNAL MEDICINE

## 2019-08-14 RX ORDER — ALPRAZOLAM 0.25 MG/1
TABLET ORAL
Qty: 180 TABLET | Refills: 0 | Status: SHIPPED | OUTPATIENT
Start: 2019-08-14 | End: 2019-11-19 | Stop reason: SDUPTHER

## 2019-08-14 RX ORDER — ALPRAZOLAM 0.25 MG/1
TABLET ORAL
COMMUNITY
Start: 2017-12-18 | End: 2019-08-14 | Stop reason: SDUPTHER

## 2019-08-14 ASSESSMENT — ENCOUNTER SYMPTOMS
GASTROINTESTINAL NEGATIVE: 1
RESPIRATORY NEGATIVE: 1

## 2019-08-14 NOTE — PROGRESS NOTES
Subjective:      Patient ID: Karl Latham is a 69 y.o. male. HPI  Daniel Amezquita is here for follow-up of Diabetes, HTN, HL and obesity. Diabetes - Daniel Amezquita is on Lantus 100 units daily, Humulin 70/30 70 bid and Metformin 1.0 gms bid. Recent retinal exam was + for retinopathy ([place] every 8 months). Feet do \"tingle\". FSBS 150-250. HTN - The patient denies any chest pain, shortness or breath, headaches or palpitations. There is no lower extremity edema. Continues to use the medication as prescribed (Lisinopril) and is having no side effects. HL - on Atorvastatin and Fenofibrate. Last LDL was 40 in 2/19. Obesity - Weight is down 7# since last visit. Review of Systems   Constitutional: Negative for activity change and appetite change. Eyes: Negative for visual disturbance. Respiratory: Negative. Cardiovascular: Negative. Gastrointestinal: Negative. Musculoskeletal: Positive for arthralgias. Neurological: Positive for numbness. Psychiatric/Behavioral: Negative. 14 point ros otherwise negative. Objective:   Physical Exam   Constitutional: He is oriented to person, place, and time. Obese. Eyes: Pupils are equal, round, and reactive to light. EOM are normal.   Neck: No JVD present. Cardiovascular: Normal rate, regular rhythm, normal heart sounds and intact distal pulses. Pulmonary/Chest: Effort normal and breath sounds normal. No stridor. No respiratory distress. Abdominal: Soft. Bowel sounds are normal.   Musculoskeletal: He exhibits no edema. Neurological: He is alert and oriented to person, place, and time. Skin: Skin is warm and dry. Psychiatric: He has a normal mood and affect. Assessment:      1. Uncontrolled type 2 diabetes mellitus with hyperglycemia, with long-term current use of insulin ([address]    2. Morbid obesity with BMI of 40.0-44.9, adult ([address]    3. Mixed hyperlipidemia    4.  Essential hypertension              Plan:      Daniel Amezquita was seen today

## 2019-10-08 ENCOUNTER — TELEPHONE (OUTPATIENT)
Dept: INTERNAL MEDICINE CLINIC | Age: 74
End: 2019-10-08

## 2019-10-10 ENCOUNTER — OFFICE VISIT (OUTPATIENT)
Dept: INTERNAL MEDICINE CLINIC | Age: 74
End: 2019-10-10
Payer: MEDICARE

## 2019-10-10 VITALS
BODY MASS INDEX: 42.75 KG/M2 | OXYGEN SATURATION: 97 % | WEIGHT: 272.4 LBS | SYSTOLIC BLOOD PRESSURE: 120 MMHG | HEIGHT: 67 IN | DIASTOLIC BLOOD PRESSURE: 62 MMHG | RESPIRATION RATE: 16 BRPM | TEMPERATURE: 97.5 F | HEART RATE: 54 BPM

## 2019-10-10 DIAGNOSIS — Z00.00 ROUTINE GENERAL MEDICAL EXAMINATION AT A HEALTH CARE FACILITY: Primary | ICD-10-CM

## 2019-10-10 PROBLEM — Z96.1 PRESENCE OF INTRAOCULAR LENS: Status: ACTIVE | Noted: 2019-10-10

## 2019-10-10 PROBLEM — H02.831 DERMATOCHALASIS OF RIGHT UPPER EYELID: Status: ACTIVE | Noted: 2019-10-10

## 2019-10-10 PROBLEM — H26.493 OTHER SECONDARY CATARACT, BILATERAL: Status: ACTIVE | Noted: 2019-10-10

## 2019-10-10 PROBLEM — H02.834 DERMATOCHALASIS OF LEFT UPPER EYELID: Status: ACTIVE | Noted: 2019-10-10

## 2019-10-10 PROBLEM — H35.63 RETINAL HEMORRHAGE, BILATERAL: Status: ACTIVE | Noted: 2019-10-10

## 2019-10-10 PROBLEM — H02.403 UNSPECIFIED PTOSIS OF BILATERAL EYELIDS: Status: ACTIVE | Noted: 2019-10-10

## 2019-10-10 PROBLEM — K75.81 NASH (NONALCOHOLIC STEATOHEPATITIS): Status: ACTIVE | Noted: 2019-10-10

## 2019-10-10 PROBLEM — H43.811 VITREOUS DEGENERATION OF RIGHT EYE: Status: ACTIVE | Noted: 2019-10-10

## 2019-10-10 PROBLEM — E11.3293 TYPE 2 DIABETES MELLITUS WITH MILD NONPROLIFERATIVE DIABETIC RETINOPATHY WITHOUT MACULAR EDEMA, BILATERAL (HCC): Status: ACTIVE | Noted: 2019-10-10

## 2019-10-10 PROBLEM — Z79.4 LONG TERM (CURRENT) USE OF INSULIN (HCC): Status: ACTIVE | Noted: 2019-10-10

## 2019-10-10 PROBLEM — H11.123 CONJUNCTIVAL CONCRETIONS OF BOTH EYES: Status: ACTIVE | Noted: 2019-10-10

## 2019-10-10 PROCEDURE — G0438 PPPS, INITIAL VISIT: HCPCS | Performed by: NURSE PRACTITIONER

## 2019-10-10 RX ORDER — OMEPRAZOLE 40 MG/1
1 CAPSULE, DELAYED RELEASE ORAL DAILY
COMMUNITY
Start: 2019-09-16 | End: 2020-06-22

## 2019-10-10 ASSESSMENT — PATIENT HEALTH QUESTIONNAIRE - PHQ9
1. LITTLE INTEREST OR PLEASURE IN DOING THINGS: 3
7. TROUBLE CONCENTRATING ON THINGS, SUCH AS READING THE NEWSPAPER OR WATCHING TELEVISION: 2
5. POOR APPETITE OR OVEREATING: 2
4. FEELING TIRED OR HAVING LITTLE ENERGY: 3
SUM OF ALL RESPONSES TO PHQ9 QUESTIONS 1 & 2: 6
SUM OF ALL RESPONSES TO PHQ QUESTIONS 1-9: 18
8. MOVING OR SPEAKING SO SLOWLY THAT OTHER PEOPLE COULD HAVE NOTICED. OR THE OPPOSITE, BEING SO FIGETY OR RESTLESS THAT YOU HAVE BEEN MOVING AROUND A LOT MORE THAN USUAL: 1
9. THOUGHTS THAT YOU WOULD BE BETTER OFF DEAD, OR OF HURTING YOURSELF: 0
SUM OF ALL RESPONSES TO PHQ QUESTIONS 1-9: 18
6. FEELING BAD ABOUT YOURSELF - OR THAT YOU ARE A FAILURE OR HAVE LET YOURSELF OR YOUR FAMILY DOWN: 1
10. IF YOU CHECKED OFF ANY PROBLEMS, HOW DIFFICULT HAVE THESE PROBLEMS MADE IT FOR YOU TO DO YOUR WORK, TAKE CARE OF THINGS AT HOME, OR GET ALONG WITH OTHER PEOPLE: 0
2. FEELING DOWN, DEPRESSED OR HOPELESS: 3
3. TROUBLE FALLING OR STAYING ASLEEP: 3

## 2019-10-10 ASSESSMENT — LIFESTYLE VARIABLES: HOW OFTEN DO YOU HAVE A DRINK CONTAINING ALCOHOL: 0

## 2019-11-11 DIAGNOSIS — N32.81 OVERACTIVE BLADDER: ICD-10-CM

## 2019-11-11 RX ORDER — OXYBUTYNIN CHLORIDE 10 MG/1
10 TABLET, EXTENDED RELEASE ORAL DAILY
Qty: 90 TABLET | Refills: 1 | Status: SHIPPED | OUTPATIENT
Start: 2019-11-11 | End: 2020-06-08 | Stop reason: SDUPTHER

## 2019-11-18 ENCOUNTER — OFFICE VISIT (OUTPATIENT)
Dept: INTERNAL MEDICINE CLINIC | Age: 74
End: 2019-11-18
Payer: MEDICARE

## 2019-11-18 VITALS
HEIGHT: 67 IN | DIASTOLIC BLOOD PRESSURE: 72 MMHG | BODY MASS INDEX: 43.22 KG/M2 | RESPIRATION RATE: 18 BRPM | SYSTOLIC BLOOD PRESSURE: 134 MMHG | HEART RATE: 73 BPM | WEIGHT: 275.4 LBS | TEMPERATURE: 97.6 F | OXYGEN SATURATION: 98 %

## 2019-11-18 DIAGNOSIS — Z79.4 UNCONTROLLED TYPE 2 DIABETES MELLITUS WITH HYPERGLYCEMIA, WITH LONG-TERM CURRENT USE OF INSULIN (HCC): Primary | ICD-10-CM

## 2019-11-18 DIAGNOSIS — E11.65 UNCONTROLLED TYPE 2 DIABETES MELLITUS WITH HYPERGLYCEMIA, WITH LONG-TERM CURRENT USE OF INSULIN (HCC): Primary | ICD-10-CM

## 2019-11-18 DIAGNOSIS — I10 ESSENTIAL HYPERTENSION: ICD-10-CM

## 2019-11-18 DIAGNOSIS — E78.2 MIXED HYPERLIPIDEMIA: ICD-10-CM

## 2019-11-18 DIAGNOSIS — F41.1 GAD (GENERALIZED ANXIETY DISORDER): ICD-10-CM

## 2019-11-18 LAB
CREATININE URINE: 50.1 MG/DL (ref 39–259)
HBA1C MFR BLD: 7.1 %
MICROALBUMIN UR-MCNC: 4.1 MG/DL
MICROALBUMIN/CREAT UR-RTO: 81.8 MG/G (ref 0–30)

## 2019-11-18 PROCEDURE — 2022F DILAT RTA XM EVC RTNOPTHY: CPT | Performed by: INTERNAL MEDICINE

## 2019-11-18 PROCEDURE — 3017F COLORECTAL CA SCREEN DOC REV: CPT | Performed by: INTERNAL MEDICINE

## 2019-11-18 PROCEDURE — G8417 CALC BMI ABV UP PARAM F/U: HCPCS | Performed by: INTERNAL MEDICINE

## 2019-11-18 PROCEDURE — G8484 FLU IMMUNIZE NO ADMIN: HCPCS | Performed by: INTERNAL MEDICINE

## 2019-11-18 PROCEDURE — G8598 ASA/ANTIPLAT THER USED: HCPCS | Performed by: INTERNAL MEDICINE

## 2019-11-18 PROCEDURE — 83036 HEMOGLOBIN GLYCOSYLATED A1C: CPT | Performed by: INTERNAL MEDICINE

## 2019-11-18 PROCEDURE — G8427 DOCREV CUR MEDS BY ELIG CLIN: HCPCS | Performed by: INTERNAL MEDICINE

## 2019-11-18 PROCEDURE — 1123F ACP DISCUSS/DSCN MKR DOCD: CPT | Performed by: INTERNAL MEDICINE

## 2019-11-18 PROCEDURE — 1036F TOBACCO NON-USER: CPT | Performed by: INTERNAL MEDICINE

## 2019-11-18 PROCEDURE — 99214 OFFICE O/P EST MOD 30 MIN: CPT | Performed by: INTERNAL MEDICINE

## 2019-11-18 PROCEDURE — 4040F PNEUMOC VAC/ADMIN/RCVD: CPT | Performed by: INTERNAL MEDICINE

## 2019-11-18 ASSESSMENT — ENCOUNTER SYMPTOMS
GASTROINTESTINAL NEGATIVE: 1
SHORTNESS OF BREATH: 0
COUGH: 0
CHEST TIGHTNESS: 0

## 2019-11-19 PROBLEM — E11.29 TYPE 2 DIABETES MELLITUS WITH PROTEINURIA (HCC): Status: ACTIVE | Noted: 2019-11-19

## 2019-11-19 PROBLEM — K75.81 NASH (NONALCOHOLIC STEATOHEPATITIS): Status: RESOLVED | Noted: 2019-10-10 | Resolved: 2019-11-19

## 2019-11-19 PROBLEM — H11.123 CONJUNCTIVAL CONCRETIONS OF BOTH EYES: Status: RESOLVED | Noted: 2019-10-10 | Resolved: 2019-11-19

## 2019-11-19 PROBLEM — H02.403 UNSPECIFIED PTOSIS OF BILATERAL EYELIDS: Status: RESOLVED | Noted: 2019-10-10 | Resolved: 2019-11-19

## 2019-11-19 PROBLEM — H02.834 DERMATOCHALASIS OF LEFT UPPER EYELID: Status: RESOLVED | Noted: 2019-10-10 | Resolved: 2019-11-19

## 2019-11-19 PROBLEM — H02.831 DERMATOCHALASIS OF RIGHT UPPER EYELID: Status: RESOLVED | Noted: 2019-10-10 | Resolved: 2019-11-19

## 2019-11-19 PROBLEM — H26.493 OTHER SECONDARY CATARACT, BILATERAL: Status: RESOLVED | Noted: 2019-10-10 | Resolved: 2019-11-19

## 2019-11-19 PROBLEM — R80.9 TYPE 2 DIABETES MELLITUS WITH PROTEINURIA (HCC): Status: ACTIVE | Noted: 2019-11-19

## 2019-11-19 RX ORDER — ALPRAZOLAM 0.25 MG/1
TABLET ORAL
Qty: 180 TABLET | Refills: 0 | Status: SHIPPED | OUTPATIENT
Start: 2019-11-19 | End: 2020-03-13 | Stop reason: SDUPTHER

## 2019-11-25 RX ORDER — ATORVASTATIN CALCIUM 20 MG/1
TABLET, FILM COATED ORAL
Qty: 90 TABLET | Refills: 1 | Status: SHIPPED | OUTPATIENT
Start: 2019-11-25 | End: 2020-05-13 | Stop reason: SDUPTHER

## 2019-11-25 RX ORDER — ATENOLOL AND CHLORTHALIDONE TABLET 50; 25 MG/1; MG/1
TABLET ORAL
Qty: 90 TABLET | Refills: 1 | Status: SHIPPED | OUTPATIENT
Start: 2019-11-25 | End: 2020-05-13 | Stop reason: SDUPTHER

## 2019-11-25 RX ORDER — LISINOPRIL 10 MG/1
TABLET ORAL
Qty: 90 TABLET | Refills: 1 | Status: SHIPPED | OUTPATIENT
Start: 2019-11-25 | End: 2020-05-13 | Stop reason: SDUPTHER

## 2019-11-25 RX ORDER — TAMSULOSIN HYDROCHLORIDE 0.4 MG/1
CAPSULE ORAL
Qty: 90 CAPSULE | Refills: 1 | Status: SHIPPED | OUTPATIENT
Start: 2019-11-25 | End: 2020-05-13 | Stop reason: SDUPTHER

## 2019-12-24 DIAGNOSIS — F32.A DEPRESSION, UNSPECIFIED DEPRESSION TYPE: ICD-10-CM

## 2019-12-26 RX ORDER — DULOXETIN HYDROCHLORIDE 60 MG/1
CAPSULE, DELAYED RELEASE ORAL
Qty: 180 CAPSULE | Refills: 0 | Status: SHIPPED | OUTPATIENT
Start: 2019-12-26 | End: 2020-05-13 | Stop reason: SDUPTHER

## 2019-12-26 RX ORDER — FENOFIBRATE 160 MG/1
160 TABLET ORAL DAILY
Qty: 90 TABLET | Refills: 0 | Status: SHIPPED | OUTPATIENT
Start: 2019-12-26 | End: 2020-05-13 | Stop reason: SDUPTHER

## 2020-01-13 ENCOUNTER — OFFICE VISIT (OUTPATIENT)
Dept: INTERNAL MEDICINE CLINIC | Age: 75
End: 2020-01-13
Payer: MEDICARE

## 2020-01-13 VITALS
HEART RATE: 58 BPM | DIASTOLIC BLOOD PRESSURE: 68 MMHG | BODY MASS INDEX: 42.22 KG/M2 | SYSTOLIC BLOOD PRESSURE: 138 MMHG | OXYGEN SATURATION: 96 % | TEMPERATURE: 98 F | WEIGHT: 269 LBS | RESPIRATION RATE: 16 BRPM | HEIGHT: 67 IN

## 2020-01-13 PROCEDURE — 1123F ACP DISCUSS/DSCN MKR DOCD: CPT | Performed by: NURSE PRACTITIONER

## 2020-01-13 PROCEDURE — 99213 OFFICE O/P EST LOW 20 MIN: CPT | Performed by: NURSE PRACTITIONER

## 2020-01-13 PROCEDURE — 1036F TOBACCO NON-USER: CPT | Performed by: NURSE PRACTITIONER

## 2020-01-13 PROCEDURE — G8427 DOCREV CUR MEDS BY ELIG CLIN: HCPCS | Performed by: NURSE PRACTITIONER

## 2020-01-13 PROCEDURE — G8417 CALC BMI ABV UP PARAM F/U: HCPCS | Performed by: NURSE PRACTITIONER

## 2020-01-13 PROCEDURE — 4040F PNEUMOC VAC/ADMIN/RCVD: CPT | Performed by: NURSE PRACTITIONER

## 2020-01-13 PROCEDURE — G8484 FLU IMMUNIZE NO ADMIN: HCPCS | Performed by: NURSE PRACTITIONER

## 2020-01-13 PROCEDURE — 3017F COLORECTAL CA SCREEN DOC REV: CPT | Performed by: NURSE PRACTITIONER

## 2020-01-13 RX ORDER — AMOXICILLIN AND CLAVULANATE POTASSIUM 875; 125 MG/1; MG/1
1 TABLET, FILM COATED ORAL 2 TIMES DAILY
Qty: 14 TABLET | Refills: 0 | Status: SHIPPED | OUTPATIENT
Start: 2020-01-13 | End: 2020-01-20

## 2020-01-13 SDOH — ECONOMIC STABILITY: TRANSPORTATION INSECURITY
IN THE PAST 12 MONTHS, HAS THE LACK OF TRANSPORTATION KEPT YOU FROM MEDICAL APPOINTMENTS OR FROM GETTING MEDICATIONS?: NO

## 2020-01-13 SDOH — ECONOMIC STABILITY: TRANSPORTATION INSECURITY
IN THE PAST 12 MONTHS, HAS LACK OF TRANSPORTATION KEPT YOU FROM MEETINGS, WORK, OR FROM GETTING THINGS NEEDED FOR DAILY LIVING?: NO

## 2020-01-13 SDOH — ECONOMIC STABILITY: FOOD INSECURITY: WITHIN THE PAST 12 MONTHS, THE FOOD YOU BOUGHT JUST DIDN'T LAST AND YOU DIDN'T HAVE MONEY TO GET MORE.: NEVER TRUE

## 2020-01-13 SDOH — ECONOMIC STABILITY: INCOME INSECURITY: HOW HARD IS IT FOR YOU TO PAY FOR THE VERY BASICS LIKE FOOD, HOUSING, MEDICAL CARE, AND HEATING?: NOT HARD AT ALL

## 2020-01-13 SDOH — ECONOMIC STABILITY: FOOD INSECURITY: WITHIN THE PAST 12 MONTHS, YOU WORRIED THAT YOUR FOOD WOULD RUN OUT BEFORE YOU GOT MONEY TO BUY MORE.: NEVER TRUE

## 2020-01-13 ASSESSMENT — ENCOUNTER SYMPTOMS
SINUS PRESSURE: 0
RHINORRHEA: 0
SORE THROAT: 1
TROUBLE SWALLOWING: 0
WHEEZING: 0
NAUSEA: 0
SINUS PAIN: 0
SHORTNESS OF BREATH: 0
FACIAL SWELLING: 1
COUGH: 0

## 2020-01-13 NOTE — PATIENT INSTRUCTIONS
Warm compresses to the area 3-4 x a day . Suck on sour hard candy   Monitor for worsening symptoms that include increase swelling, redness, taste in mouth that is foul, drainage, fever . Notify office asap . Patient Education        Salivary Gland Infection: Care Instructions  Your Care Instructions    Salivary glands make saliva, or spit. An infection in these glands can make the glands swell and hurt. An infection can happen when bacteria gets into the gland. This is more common in people who have diabetes, poor tooth care, or stones in these glands. Bacteria can build up and cause an infection if you don't get enough fluids. It can also happen if the flow of saliva gets blocked by a small stone in the gland. A virus can also cause an infection. Your care depends on the cause. If the problem is caused by bacteria, your doctor may prescribe antibiotics. Home treatment may help. You can drink more fluids or suck on sugar-free lemon drops to increase the flow of saliva. Follow-up care is a key part of your treatment and safety. Be sure to make and go to all appointments, and call your doctor if you are having problems. It's also a good idea to know your test results and keep a list of the medicines you take. How can you care for yourself at home? · If your doctor prescribed antibiotics, take them as directed. Do not stop taking them just because you feel better. You need to take the full course of antibiotics. · Take an over-the-counter pain medicine if needed, such as acetaminophen (Tylenol), ibuprofen (Advil, Motrin), or naproxen (Aleve). Be safe with medicines. Read and follow all instructions on the label. · Do not take two or more pain medicines at the same time unless the doctor told you to. Many pain medicines have acetaminophen, which is Tylenol. Too much acetaminophen (Tylenol) can be harmful. · Drink plenty of fluids, enough so that your urine is light yellow or clear like water.  If you have

## 2020-01-13 NOTE — PROGRESS NOTES
2020     Olaf Awad (:  1945) is a 69 y.o. male, here for evaluation of the following medical concerns:    Chief Complaint   Patient presents with   [place] Led     left right started on saturday        HPI     Presents with swelling and tenderness to the front part of the left ear and jaw x 2 days. Notes that he is having increase pain with chewing on left side . Does use a CPAP and is known to have dry mouth disorder. Reports that he went to see his dentist 3 weeks ago and due back soon , does have a hx of dental caries due to dry mouth. He states that he is to use oral rinses for this but does not. Has tried ibuprofen as a treatment  For symptoms x 1, that  helped slightly . Denies fevers, chills , body aches , tooth pain , drainage from the left ear , SOB , or trouble swallowing. Review of Systems   Constitutional: Negative for appetite change, chills, fatigue and fever. HENT: Positive for ear pain, facial swelling (left side in front of ear ) and sore throat (left side hurts ). Negative for congestion, ear discharge, postnasal drip, rhinorrhea, sinus pressure, sinus pain and trouble swallowing. Respiratory: Negative for cough, shortness of breath and wheezing. Cardiovascular: Negative for chest pain, palpitations and leg swelling. Gastrointestinal: Negative for nausea. Musculoskeletal: Negative for myalgias. Neurological: Negative for headaches. Psychiatric/Behavioral: Negative for dysphoric mood and suicidal ideas. Prior to Visit Medications    Medication Sig Taking?  Authorizing Provider   amoxicillin-clavulanate (AUGMENTIN) 875-125 MG per tablet Take 1 tablet by mouth 2 times daily for 7 days Yes ADDISON German - CNP   fenofibrate 160 MG tablet TAKE 1 TABLET BY MOUTH  DAILY Yes Jose Miguel Holguin MD   metFORMIN (GLUCOPHAGE) 1000 MG tablet TAKE 1 TABLET BY MOUTH  TWICE A DAY Yes Jose Miguel Holguin MD   DULoxetine (CYMBALTA) 60 MG extended release capsule TAKE 1 (1.702 m). Weight as of this encounter: 269 lb (122 kg). Physical Exam  Vitals signs reviewed. Constitutional:       General: He is not in acute distress. Appearance: Normal appearance. He is not ill-appearing. HENT:      Head: Normocephalic and atraumatic. Jaw: Pain on movement (discomfort to left side with movement) present. Salivary Glands: Left salivary gland is diffusely enlarged and tender. Comments: Left parotid gland with swelling and mild tenderness to palpation . NO erythema , warmth or drainage noted. Right Ear: Tympanic membrane, ear canal and external ear normal.      Left Ear: Tympanic membrane, ear canal and external ear normal.      Nose: Nose normal. No congestion. Mouth/Throat:      Lips: Pink. Mouth: Mucous membranes are moist.      Dentition: No dental caries. Tongue: No lesions. Pharynx: Oropharynx is clear. Uvula midline. No pharyngeal swelling or posterior oropharyngeal erythema. Comments: No drainage , odor or redness noted inside mouth . Neck:      Musculoskeletal: Normal range of motion and neck supple. Cardiovascular:      Rate and Rhythm: Normal rate and regular rhythm. Pulses: Normal pulses. Heart sounds: No murmur. Pulmonary:      Effort: Pulmonary effort is normal.      Breath sounds: Normal breath sounds. Musculoskeletal: Normal range of motion. Lymphadenopathy:      Cervical: No cervical adenopathy. Skin:     General: Skin is warm and dry. Capillary Refill: Capillary refill takes less than 2 seconds. Findings: No bruising, erythema or rash. Neurological:      General: No focal deficit present. Mental Status: He is alert and oriented to person, place, and time. Psychiatric:         Mood and Affect: Mood normal.         Behavior: Behavior normal.         Thought Content: Thought content normal.         Judgment: Judgment normal.         ASSESSMENT/PLAN:  1.  Parotitis  Warm compresses to the area 3-4 x a day . Suck on sour hard candy   Monitor for worsening symptoms that include increase swelling, redness, taste in mouth that is foul, drainage, fever . Notify office asap . - amoxicillin-clavulanate (AUGMENTIN) 875-125 MG per tablet; Take 1 tablet by mouth 2 times daily for 7 days  Dispense: 14 tablet; Refill: 0    If you develop worsening or concerning symptoms ( fevers, SOB, difficulty breathing , chest pain , etc)   go to the ER ASAP    Return if symptoms worsen or fail to improve. All questions addresses and answered . Patient agrees with plan of care. An electronic signature was used to authenticate this note.     --ADDISON Liz CNP on 1/13/2020 at 10:12 AM

## 2020-03-05 ENCOUNTER — OFFICE VISIT (OUTPATIENT)
Dept: INTERNAL MEDICINE CLINIC | Age: 75
End: 2020-03-05
Payer: MEDICARE

## 2020-03-05 VITALS
OXYGEN SATURATION: 98 % | HEIGHT: 67 IN | WEIGHT: 270 LBS | RESPIRATION RATE: 18 BRPM | SYSTOLIC BLOOD PRESSURE: 124 MMHG | HEART RATE: 57 BPM | TEMPERATURE: 97.7 F | DIASTOLIC BLOOD PRESSURE: 70 MMHG | BODY MASS INDEX: 42.38 KG/M2

## 2020-03-05 PROBLEM — E66.01 MORBID OBESITY WITH BMI OF 40.0-44.9, ADULT (HCC): Status: ACTIVE | Noted: 2020-03-05

## 2020-03-05 PROBLEM — Z79.4 LONG TERM (CURRENT) USE OF INSULIN (HCC): Status: RESOLVED | Noted: 2019-10-10 | Resolved: 2020-03-05

## 2020-03-05 PROBLEM — H43.811 VITREOUS DEGENERATION OF RIGHT EYE: Status: RESOLVED | Noted: 2019-10-10 | Resolved: 2020-03-05

## 2020-03-05 PROBLEM — H35.63 RETINAL HEMORRHAGE, BILATERAL: Status: RESOLVED | Noted: 2019-10-10 | Resolved: 2020-03-05

## 2020-03-05 PROBLEM — R80.9 TYPE 2 DIABETES MELLITUS WITH PROTEINURIA (HCC): Status: RESOLVED | Noted: 2019-11-19 | Resolved: 2020-03-05

## 2020-03-05 PROBLEM — Z96.1 PRESENCE OF INTRAOCULAR LENS: Status: RESOLVED | Noted: 2019-10-10 | Resolved: 2020-03-05

## 2020-03-05 PROBLEM — E11.29 TYPE 2 DIABETES MELLITUS WITH PROTEINURIA (HCC): Status: RESOLVED | Noted: 2019-11-19 | Resolved: 2020-03-05

## 2020-03-05 PROBLEM — E11.3293 TYPE 2 DIABETES MELLITUS WITH MILD NONPROLIFERATIVE DIABETIC RETINOPATHY WITHOUT MACULAR EDEMA, BILATERAL (HCC): Status: RESOLVED | Noted: 2019-10-10 | Resolved: 2020-03-05

## 2020-03-05 LAB
A/G RATIO: 1.2 (ref 1.1–2.2)
ALBUMIN SERPL-MCNC: 4.2 G/DL (ref 3.4–5)
ALP BLD-CCNC: 42 U/L (ref 40–129)
ALT SERPL-CCNC: 28 U/L (ref 10–40)
ANION GAP SERPL CALCULATED.3IONS-SCNC: 14 MMOL/L (ref 3–16)
AST SERPL-CCNC: 30 U/L (ref 15–37)
BILIRUB SERPL-MCNC: 1.4 MG/DL (ref 0–1)
BUN BLDV-MCNC: 29 MG/DL (ref 7–20)
CALCIUM SERPL-MCNC: 9.5 MG/DL (ref 8.3–10.6)
CHLORIDE BLD-SCNC: 100 MMOL/L (ref 99–110)
CHOLESTEROL, TOTAL: 100 MG/DL (ref 0–199)
CO2: 27 MMOL/L (ref 21–32)
CREAT SERPL-MCNC: 1.1 MG/DL (ref 0.8–1.3)
GFR AFRICAN AMERICAN: >60
GFR NON-AFRICAN AMERICAN: >60
GLOBULIN: 3.4 G/DL
GLUCOSE BLD-MCNC: 158 MG/DL (ref 70–99)
HBA1C MFR BLD: 7.4 %
HDLC SERPL-MCNC: 25 MG/DL (ref 40–60)
LDL CHOLESTEROL CALCULATED: 35 MG/DL
POTASSIUM SERPL-SCNC: 4 MMOL/L (ref 3.5–5.1)
PROSTATE SPECIFIC ANTIGEN: 0.37 NG/ML (ref 0–4)
SODIUM BLD-SCNC: 141 MMOL/L (ref 136–145)
T4 FREE: 1 NG/DL (ref 0.9–1.8)
TOTAL PROTEIN: 7.6 G/DL (ref 6.4–8.2)
TRIGL SERPL-MCNC: 202 MG/DL (ref 0–150)
TSH REFLEX: 4.96 UIU/ML (ref 0.27–4.2)
VLDLC SERPL CALC-MCNC: 40 MG/DL

## 2020-03-05 PROCEDURE — 1123F ACP DISCUSS/DSCN MKR DOCD: CPT | Performed by: NURSE PRACTITIONER

## 2020-03-05 PROCEDURE — 3051F HG A1C>EQUAL 7.0%<8.0%: CPT | Performed by: NURSE PRACTITIONER

## 2020-03-05 PROCEDURE — 99214 OFFICE O/P EST MOD 30 MIN: CPT | Performed by: NURSE PRACTITIONER

## 2020-03-05 PROCEDURE — 3017F COLORECTAL CA SCREEN DOC REV: CPT | Performed by: NURSE PRACTITIONER

## 2020-03-05 PROCEDURE — 2022F DILAT RTA XM EVC RTNOPTHY: CPT | Performed by: NURSE PRACTITIONER

## 2020-03-05 PROCEDURE — G8427 DOCREV CUR MEDS BY ELIG CLIN: HCPCS | Performed by: NURSE PRACTITIONER

## 2020-03-05 PROCEDURE — G8417 CALC BMI ABV UP PARAM F/U: HCPCS | Performed by: NURSE PRACTITIONER

## 2020-03-05 PROCEDURE — 36415 COLL VENOUS BLD VENIPUNCTURE: CPT | Performed by: NURSE PRACTITIONER

## 2020-03-05 PROCEDURE — 83036 HEMOGLOBIN GLYCOSYLATED A1C: CPT | Performed by: NURSE PRACTITIONER

## 2020-03-05 PROCEDURE — 1036F TOBACCO NON-USER: CPT | Performed by: NURSE PRACTITIONER

## 2020-03-05 PROCEDURE — 4040F PNEUMOC VAC/ADMIN/RCVD: CPT | Performed by: NURSE PRACTITIONER

## 2020-03-05 PROCEDURE — G8484 FLU IMMUNIZE NO ADMIN: HCPCS | Performed by: NURSE PRACTITIONER

## 2020-03-05 RX ORDER — OMEPRAZOLE 40 MG/1
40 CAPSULE, DELAYED RELEASE ORAL DAILY
Qty: 90 CAPSULE | Status: CANCELLED | OUTPATIENT
Start: 2020-03-05

## 2020-03-05 ASSESSMENT — ENCOUNTER SYMPTOMS
CONSTIPATION: 0
WHEEZING: 0
SHORTNESS OF BREATH: 0
VISUAL CHANGE: 0
NAUSEA: 0
ABDOMINAL PAIN: 0
CHEST TIGHTNESS: 0
VOMITING: 0
BLURRED VISION: 0
COUGH: 0
TROUBLE SWALLOWING: 0
DIARRHEA: 0

## 2020-03-05 NOTE — PROGRESS NOTES
with stream or urgency . Does not nocturia . Takes ditropan to help with bladder control , he states if he runs out of Flomax he has symptoms in the past pretty quickly. Agrees with PSA testing today. Obesity: Calles snot exercise regularly . In the spring and summer he mows his yard. He did not that he has lost weight since last year . Was 28 now 89/ aware that he needs to diet and exercise for a better control of chronic diseases. WYLIE/cirrohosis - He is followed twice yearly by Dr. Zeynep Humphrey for this. Last appt 11/2019. KIMMIE: Use a  Cpap and followed by Dr Sheyla Katz due to see him in July . Foot exam regularly  : Dr Cedric Barnard , next appt today at 100. Regular yearly eye exams : + diabetic retinopathy. Colonoscopy : scheduled with EGD in June . Review of Systems   Constitutional: Negative for appetite change, chills, diaphoresis, fatigue and unexpected weight change. HENT: Negative for trouble swallowing. Eyes: Negative for blurred vision and visual disturbance. Respiratory: Negative for cough, chest tightness, shortness of breath and wheezing. Cardiovascular: Negative for chest pain, palpitations and leg swelling. Gastrointestinal: Negative for abdominal pain, constipation, diarrhea, nausea and vomiting. Endocrine: Negative for polydipsia, polyphagia and polyuria. Genitourinary: Negative for difficulty urinating, dysuria, frequency, hematuria and urgency. Nocturia , some bladder control issues    Musculoskeletal: Negative for arthralgias, gait problem and myalgias. Neurological: Negative for dizziness, syncope, weakness, light-headedness, numbness and headaches. Psychiatric/Behavioral: Positive for sleep disturbance. Negative for dysphoric mood and suicidal ideas. The patient is nervous/anxious. Prior to Visit Medications    Medication Sig Taking?  Authorizing Provider   blood glucose test strips (ONE TOUCH ULTRA TEST) strip USE TO TEST four TIMES A DAY AS NEEDED Yes Katty Valadez, APRN - CNP   fenofibrate 160 MG tablet TAKE 1 TABLET BY MOUTH  DAILY Yes Kaitlyn Persaud MD   metFORMIN (GLUCOPHAGE) 1000 MG tablet TAKE 1 TABLET BY MOUTH  TWICE A DAY Yes Kaitlyn Persadu MD   DULoxetine (CYMBALTA) 60 MG extended release capsule TAKE 1 CAPSULE BY MOUTH TWO TIMES DAILY Yes Kaitlyn Persaud MD   lisinopril (PRINIVIL;ZESTRIL) 10 MG tablet TAKE 1 TABLET BY MOUTH  DAILY Yes Kaitlyn Persaud MD   tamsulosin (FLOMAX) 0.4 MG capsule TAKE 1 CAPSULE BY MOUTH  DAILY Yes Kaitlyn Persaud MD   atenolol-chlorthalidone (TENORETIC) 50-25 MG per tablet TAKE 1 TABLET BY MOUTH  DAILY Yes Kaitlyn Persaud MD   atorvastatin (LIPITOR) 20 MG tablet TAKE 1 TABLET BY MOUTH ONCE A DAY Yes Kaitlyn Persaud MD   ALPRAZolam (XANAX) 0.25 MG tablet One bid prn anxiety. Yes Kaitlyn Persaud MD   oxybutynin (DITROPAN XL) 10 MG extended release tablet Take 1 tablet by mouth daily Yes Kaitlyn Persaud MD   omeprazole (PRILOSEC) 40 MG delayed release capsule Take 1 capsule by mouth daily Yes Historical Provider, MD   LANFILI SOLOSTAR 100 UNIT/ML injection pen INJECT SUBCUTANEOUSLY 105  UNITS NIGHTLY Yes Kaitlyn Persaud MD   HUMULIN 70/30 KWIKPEN (70-30) 100 UNIT/ML injection pen INJECT SUBCUTANEOUSLY [address] Yes Kaitlyn Persaud MD   B-D ULTRAFINE III SHORT PEN 31G X 8 MM MISC USE TO INJECT INSULIN THREE TIMES A DAY Yes Kaitlyn Persaud MD   albuterol (PROVENTIL HFA) 108 (90 BASE) MCG/ACT inhaler Inhale 2 puffs into the lungs every 6 hours as needed for Wheezing. Yes Kaitlyn Persaud MD   aspirin 81 MG tablet Take 81 mg by mouth daily.    Yes Historical Provider, MD        Social History     Tobacco Use    Smoking status: Never Smoker    Smokeless tobacco: Never Used   Substance Use Topics    Alcohol use: No        Vitals:    03/05/20 0919   BP: 124/70   Site: Right Upper Arm   Position: Sitting   Cuff Size: Large Adult   Pulse: 57   Resp: 18   Temp: 97.7 °F (36.5 °C)   TempSrc: Oral   SpO2: 98%   Weight: 270 lb (122.5 kg)   Height: 5' 7\" (1.702 m)     Estimated body mass index is 42.29 kg/m² as calculated from the following:    Height as of this encounter: 5' 7\" (1.702 m). Weight as of this encounter: 270 lb (122.5 kg). Physical Exam  Vitals signs reviewed. Constitutional:       General: He is not in acute distress. Appearance: Normal appearance. He is not ill-appearing or toxic-appearing. HENT:      Head: Normocephalic and atraumatic. Eyes:      General:         Right eye: No discharge. Left eye: No discharge. Conjunctiva/sclera: Conjunctivae normal.   Neck:      Musculoskeletal: Normal range of motion and neck supple. No muscular tenderness. Thyroid: No thyromegaly. Vascular: No carotid bruit. Cardiovascular:      Rate and Rhythm: Normal rate and regular rhythm. Pulses:           Carotid pulses are 2+ on the right side and 2+ on the left side. Dorsalis pedis pulses are 1+ on the right side and 1+ on the left side. Heart sounds: Normal heart sounds. No murmur. Pulmonary:      Effort: Pulmonary effort is normal. No respiratory distress. Breath sounds: Normal breath sounds. Abdominal:      General: Abdomen is protuberant. Bowel sounds are normal. There is no distension or abdominal bruit. Palpations: Abdomen is soft. There is no hepatomegaly, splenomegaly or mass. Tenderness: There is no abdominal tenderness. Hernia: No hernia is present. Musculoskeletal: Normal range of motion. Right lower leg: No edema. Left lower leg: No edema. Feet:    Feet:      Right foot:      Protective Sensation: 8 sites tested. 7 sites sensed. Skin integrity: Callus and dry skin present. Toenail Condition: Right toenails are abnormally thick. Fungal disease present. Left foot:      Protective Sensation: 8 sites tested. 7 sites sensed. Skin integrity: Skin breakdown, callus and dry skin present. Lisinopril , atenolol-chorthalidone   Reports compliance   No dosage changes , will monitor   -     Comprehensive Metabolic Panel; Future  -     Comprehensive Metabolic Panel    Liver cirrhosis secondary to WYLIE [facility]), stable  Followed and managed by Dr Miguel Angel Izgauirre . Next appt in May 2020. Major depressive disorder with single episode, in partial remission ([facility] [address] controlled   Currently taking  Cymbalta , xanax bid prn  Reports compliance   No dosage changes , will monitor   -   -     TSH with Reflex; Future  -     TSH with Reflex  -     Drug Panel-PM-HI Res-UR Interp-A    Morbid obesity with BMI of 40.0-44.9, adult ([facility] [address] ongoing   BMI: 42  Instructed on lowfat/low cholesterol diet. ADA recommends 150 mins /week of physical activity. Has lost weight since last PE. Discussed that weight loss would benefit his chronic disease maangement    Mixed hyperlipidemia, stable  Currently taking  Fenofibrate, lipitor  Reports compliance   No dosage changes , will monitor ,  -     Lipid Panel; Future  -     Lipid Panel      KIMMIE (obstructive sleep apnea)  Uses CPAP nightly   Followed and managed by Dr Inés Wakefield  Next appt july    Benign prostatic hyperplasia with nocturia, controlled   Currently taking  Flomax and ditropan   Reports compliance   No dosage changes , will monitor     Screening PSA (prostate specific antigen)  -     Psa screening; Future  -     Psa screening    Anxiety/Psychophysiological insomnia :   Currently taking xanax bid prn    Reports compliance   No dosage changes , will monitor   Medication agreement reviewed and signed       Drug Panel-PM-HI Res-UR Interp-A; Future  Controlled Substance Monitoring:    Acute and Chronic Pain Monitoring:   RX Monitoring 3/5/2020   Attestation -   Periodic Controlled Substance Monitoring Possible medication side effects, risk of tolerance/dependence & alternative treatments discussed. ;No signs of potential drug abuse or diversion identified. ;Assessed functional

## 2020-03-05 NOTE — LETTER
MEDICATION AGREEMENT     Ras Kirit  [birth year]      For certain conditions, multiple classes of medications may be used to help better manage your symptoms, and to improve your ability to function at home, work and in social settings. However, these medications do have risks, which will be discussed with you, including addiction and dependency. The following prescribed medications need frequent monitoring and will require you to partner and assist in your healthcare. Medication  Dose, instructions and quantity as indicated on current prescription bottle Diagnosis/Reason(s) for Taking Category     ALPRAZolam (XANAX) 0.25 MG tablet     One bid prn anxiety                            Benefits and goals of Controlled Substance Medications: There are two potential goals for your treatment: (1) decreased pain and suffering (2) improved daily life functions. There are many possible treatments for your chronic condition(s), and, in addition to controlled substance medications, we will try alternatives such as physical therapy, yoga, massage, home daily exercise, meditation, relaxation techniques, injections, chiropractic manipulations, surgery, cognitive therapy, hypnosis and many medications that are not habit-forming. Use of controlled substance medications may be helpful, but they are unlikely to resolve all of your symptoms or restore all function.      Risks of Controlled Substance Medications: Opioid pain medications: These medications can lead to problems such as addiction/dependence, sedation, lightheadedness/dizziness, memory issues, falls, constipation, nausea, or vomiting. They may also impair the ability to drive or operate machinery. Additionally, these medications may lower testosterone levels, leading to loss of bone strength, stamina and sex drive. They may cause problems with breathing, sleep apnea and reduced coughing, which are especially dangerous for patients with lung disease. Overdose or dangerous interactions with alcohol and other medications may occur, leading to death. Hyperalgesia may develop, in which patients receiving opioids for the treatment of pain may actually become more sensitive to certain painful stimuli, and in some cases, experience pain from ordinarily non-painful stimuli. Women between the ages of 14-53 who could become pregnant should carefully weigh the risks and benefits of opioids with their physicians, as these medications increase the risk of pregnancy complications, including miscarriage,  delivery and stillbirth. It is also possible for babies to be born addicted to opioids. Opioid dependence withdrawal symptoms may include; feelings of uneasiness, increased pain, irritability, belly pain, diarrhea, sweats and goose-flesh. Benzodiazepines and non-benzodiazepine sleep medications: These medications can lead to problems such as addiction/dependence, sedation, fatigue, lightheadedness, dizziness, incoordination, falls, depression, hallucinations, and impaired judgment, memory and concentration. The ability to drive and operate machinery may also be affected. Abnormal sleep-related behaviors have been reported, including sleep walking, driving, making telephone calls, eating, or having sex while not fully awake. These medications can suppress breathing and worsen sleep apnea, particularly when combined with alcohol or other sedating medications, potentially leading to death. Dependence withdrawal symptoms may include tremors, anxiety, hallucinations and seizures. Stimulants:  Common adverse effects include addiction/dependence, increased blood pressure and heart rate, decreased appetite, nausea, involuntary weight loss, insomnia, irritability, and headaches. These risks may increase when these medications are combined with other stimulants, such as caffeine pills or energy drinks, certain weight loss supplements and oral decongestants. Dependence withdrawal symptoms may include depressed mood, loss of interest, suicidal thoughts, anxiety, fatigue, appetite changes and agitation. Testosterone replacement therapy:  Potential side effects include increased risk of stroke and heart attack, blood clots, increased blood pressure, increased cholesterol, enlarged prostate, sleep apnea, irritability/aggression and other mood disorders, and decreased fertility. Other:     1. I understand that I have the following responsibilities:  · I will take medications at the dose and frequency prescribed. · I will not increase or change how I take my medications without the approval of the health care provider who signs this Medication Agreement. If in treatment, I will request that a copy of the programs initial evaluation and treatment recommendations be sent to this provider and will not expect refills until that is received. I will also request written monthly updates be sent to this provider to verify my continuing treatment. 3. I will consent to drug screening upon my providers request to assure I am only taking the prescribed drugs, described in this MEDICATION AGREEMENT. I understand that a drug screen is a laboratory test in which a sample of my urine, blood or saliva is checked to see what drugs I have been taking. 4. I agree that I will treat the providers and staff at this office with respect at all times. I will keep all of my scheduled appointments, but if I need to cancel my appointment, I will do so a minimum of 24 hours before it is scheduled. 5. I understand that this provider may stop prescribing the medications listed if:  · I do not show any improvement in pain, or my activity has not improved. · I develop rapid tolerance or loss of improvement, as described in my treatment plan. · I develop significant side effects from the medication. · My behavior is inconsistent with the responsibilities outlined above, which may also result in my being prevented from receiving further care from this office.   · Other:____________________________________________________________________    AGREEMENT: I have read the above and have had all of my questions answered. For chronic disease management, I know that my symptoms can be managed with many types of treatments. A chronic medication trial may be part of my treatment, but I must be an active participant in my care. Medication therapy is only one part of my symptom management plan. In some cases, there may be limited scientific evidence to support the chronic use of certain medications to improve symptoms and daily function. Furthermore, in certain circumstances, there may be scientific information that suggests that use of chronic controlled substances may actually worsen my symptoms and increase my risk of unintentional death directly related to this medication therapy. I know that if my provider feels my risk from controlled medications is greater than my benefit, I will have my controlled substance medication(s) compassionately lowered or removed altogether. I agree to a controlled substance medication trial.      I further agree to allow this office to contact my HIPAA contact on file if there are concerns about my safety and use of controlled medications. I have agreed to use the following medications above as instructed by my physician and as stated in this [address].      Patient Signature:  ______________________  DKWU:4/1/2184 or _____________    Provider Signature:______________________  ZFZJ:1/3/2348 or _____________

## 2020-03-08 LAB
6-ACETYLMORPHINE: NOT DETECTED
7-AMINOCLONAZEPAM: NOT DETECTED
ALPHA-OH-ALPRAZOLAM: PRESENT
ALPRAZOLAM: NOT DETECTED
AMPHETAMINE: NOT DETECTED
BARBITURATES: NOT DETECTED
BENZOYLECGONINE: NOT DETECTED
BUPRENORPHINE: NOT DETECTED
CARISOPRODOL: NOT DETECTED
CLONAZEPAM: NOT DETECTED
CODEINE: NOT DETECTED
CREATININE URINE: 80 MG/DL (ref 20–400)
DIAZEPAM: NOT DETECTED
DRUGS EXPECTED: NORMAL
EER PAIN MGT DRUG PANEL, HIGH RES/EMIT U: NORMAL
ETHYL GLUCURONIDE: NOT DETECTED
FENTANYL: NOT DETECTED
HYDROCODONE: NOT DETECTED
HYDROMORPHONE: NOT DETECTED
LORAZEPAM: NOT DETECTED
MARIJUANA METABOLITE: NOT DETECTED
MDA: NOT DETECTED
MDEA: NOT DETECTED
MDMA URINE: NOT DETECTED
MEPERIDINE: NOT DETECTED
METHADONE: NOT DETECTED
METHAMPHETAMINE: NOT DETECTED
METHYLPHENIDATE: NOT DETECTED
MIDAZOLAM: NOT DETECTED
MORPHINE: NOT DETECTED
NORBUPRENORPHINE, FREE: NOT DETECTED
NORDIAZEPAM: NOT DETECTED
NORFENTANYL: NOT DETECTED
NORHYDROCODONE, URINE: NOT DETECTED
NOROXYCODONE: NOT DETECTED
NOROXYMORPHONE, URINE: NOT DETECTED
OXAZEPAM: NOT DETECTED
OXYCODONE: NOT DETECTED
OXYMORPHONE: NOT DETECTED
PAIN MANAGEMENT DRUG PANEL: NORMAL
PAIN MANAGEMENT DRUG PANEL: NORMAL
PCP: NOT DETECTED
PHENTERMINE: NOT DETECTED
PROPOXYPHENE: NOT DETECTED
TAPENTADOL, URINE: NOT DETECTED
TAPENTADOL-O-SULFATE, URINE: NOT DETECTED
TEMAZEPAM: NOT DETECTED
TRAMADOL: NOT DETECTED
ZOLPIDEM: NOT DETECTED

## 2020-03-13 RX ORDER — ALPRAZOLAM 0.25 MG/1
TABLET ORAL
Qty: 30 TABLET | Refills: 0 | Status: SHIPPED | OUTPATIENT
Start: 2020-03-13 | End: 2020-04-27

## 2020-03-25 PROBLEM — F32.A DEPRESSION: Status: RESOLVED | Noted: 2020-03-25 | Resolved: 2020-03-24

## 2020-04-27 RX ORDER — ALPRAZOLAM 0.25 MG/1
TABLET ORAL
Qty: 30 TABLET | Refills: 0 | Status: SHIPPED | OUTPATIENT
Start: 2020-04-27 | End: 2020-05-20

## 2020-05-13 RX ORDER — ATORVASTATIN CALCIUM 20 MG/1
TABLET, FILM COATED ORAL
Qty: 90 TABLET | Refills: 0 | Status: SHIPPED | OUTPATIENT
Start: 2020-05-13 | End: 2020-08-11

## 2020-05-13 RX ORDER — TAMSULOSIN HYDROCHLORIDE 0.4 MG/1
CAPSULE ORAL
Qty: 90 CAPSULE | Refills: 0 | Status: SHIPPED | OUTPATIENT
Start: 2020-05-13 | End: 2020-08-11

## 2020-05-13 RX ORDER — DULOXETIN HYDROCHLORIDE 60 MG/1
CAPSULE, DELAYED RELEASE ORAL
Qty: 180 CAPSULE | Refills: 0 | Status: SHIPPED | OUTPATIENT
Start: 2020-05-13 | End: 2020-08-11

## 2020-05-13 RX ORDER — FENOFIBRATE 160 MG/1
160 TABLET ORAL DAILY
Qty: 90 TABLET | Refills: 0 | Status: SHIPPED | OUTPATIENT
Start: 2020-05-13 | End: 2020-08-11

## 2020-05-13 RX ORDER — ATENOLOL AND CHLORTHALIDONE TABLET 50; 25 MG/1; MG/1
TABLET ORAL
Qty: 90 TABLET | Refills: 0 | Status: SHIPPED | OUTPATIENT
Start: 2020-05-13 | End: 2020-06-23 | Stop reason: SDUPTHER

## 2020-05-13 RX ORDER — LISINOPRIL 10 MG/1
TABLET ORAL
Qty: 90 TABLET | Refills: 0 | Status: SHIPPED | OUTPATIENT
Start: 2020-05-13 | End: 2020-08-11

## 2020-05-13 NOTE — TELEPHONE ENCOUNTER
Last ov 3/5/2020  Future Appointments   Date Time Provider Eduardo Kennedy   6/2/2020  1:00 PM MD Lziette Yoder [facility]   6/19/2020  8:30 AM Coty Nguyen, [address] [place] [address]   7/7/2020 11:40 AM Gustavo Cannon MD [facility]     rx pended -- patient needs #90 day supply to mail away pharmacy [facility]

## 2020-05-20 NOTE — TELEPHONE ENCOUNTER
Refill request for xanax last filled 4/27/20       Last ov 3/5/20                                        Next ov  6/19/20

## 2020-05-21 RX ORDER — ALPRAZOLAM 0.25 MG/1
TABLET ORAL
Qty: 30 TABLET | Refills: 0 | Status: SHIPPED | OUTPATIENT
Start: 2020-05-21 | End: 2020-06-08

## 2020-06-08 RX ORDER — OXYBUTYNIN CHLORIDE 10 MG/1
10 TABLET, EXTENDED RELEASE ORAL DAILY
Qty: 90 TABLET | Refills: 0 | Status: SHIPPED | OUTPATIENT
Start: 2020-06-08 | End: 2020-09-08

## 2020-06-08 NOTE — TELEPHONE ENCOUNTER
Last ov 3/5/2020  Future Appointments   Date Time Provider Eduardo Kennedy   7/7/2020 11:40 AM Radha Dobbins MD [facility]   7/22/2020  1:00 PM Flavia Gabriel MD [facility]   7/23/2020  9:00 AM Aicha Fitzgerald, [address] [facility]

## 2020-06-09 RX ORDER — INSULIN HUMAN 100 [IU]/ML
50 INJECTION, SUSPENSION SUBCUTANEOUS
Qty: 90 ML | Refills: 1 | Status: SHIPPED | OUTPATIENT
Start: 2020-06-09 | End: 2020-06-23 | Stop reason: SDUPTHER

## 2020-06-09 NOTE — TELEPHONE ENCOUNTER
Refill request for humalin pen last filled 5/30/19                                                        Last ov 3/5/20                                             Next ov 7/23/20

## 2020-06-15 RX ORDER — BLOOD SUGAR DIAGNOSTIC
STRIP MISCELLANEOUS
Qty: 100 STRIP | Refills: 0 | Status: SHIPPED | OUTPATIENT
Start: 2020-06-15 | End: 2020-07-24

## 2020-06-22 ENCOUNTER — OFFICE VISIT (OUTPATIENT)
Dept: INTERNAL MEDICINE CLINIC | Age: 75
End: 2020-06-22
Payer: MEDICARE

## 2020-06-22 VITALS
OXYGEN SATURATION: 99 % | WEIGHT: 269.4 LBS | HEIGHT: 67 IN | SYSTOLIC BLOOD PRESSURE: 112 MMHG | DIASTOLIC BLOOD PRESSURE: 64 MMHG | RESPIRATION RATE: 18 BRPM | BODY MASS INDEX: 42.28 KG/M2 | HEART RATE: 59 BPM | TEMPERATURE: 96.7 F

## 2020-06-22 LAB
A/G RATIO: 1.2 (ref 1.1–2.2)
ALBUMIN SERPL-MCNC: 3.8 G/DL (ref 3.4–5)
ALP BLD-CCNC: 37 U/L (ref 40–129)
ALT SERPL-CCNC: 21 U/L (ref 10–40)
ANION GAP SERPL CALCULATED.3IONS-SCNC: 12 MMOL/L (ref 3–16)
AST SERPL-CCNC: 23 U/L (ref 15–37)
BASOPHILS ABSOLUTE: 0 K/UL (ref 0–0.2)
BASOPHILS RELATIVE PERCENT: 0.2 %
BILIRUB SERPL-MCNC: 1 MG/DL (ref 0–1)
BUN BLDV-MCNC: 41 MG/DL (ref 7–20)
CALCIUM SERPL-MCNC: 8.6 MG/DL (ref 8.3–10.6)
CHLORIDE BLD-SCNC: 102 MMOL/L (ref 99–110)
CO2: 27 MMOL/L (ref 21–32)
CREAT SERPL-MCNC: 1.2 MG/DL (ref 0.8–1.3)
EOSINOPHILS ABSOLUTE: 0.1 K/UL (ref 0–0.6)
EOSINOPHILS RELATIVE PERCENT: 2 %
GFR AFRICAN AMERICAN: >60
GFR NON-AFRICAN AMERICAN: 59
GLOBULIN: 3.2 G/DL
GLUCOSE BLD-MCNC: 89 MG/DL (ref 70–99)
HBA1C MFR BLD: 7.9 %
HCT VFR BLD CALC: 35.1 % (ref 40.5–52.5)
HEMOGLOBIN: 12.1 G/DL (ref 13.5–17.5)
LYMPHOCYTES ABSOLUTE: 0.7 K/UL (ref 1–5.1)
LYMPHOCYTES RELATIVE PERCENT: 15.8 %
MCH RBC QN AUTO: 29.2 PG (ref 26–34)
MCHC RBC AUTO-ENTMCNC: 34.5 G/DL (ref 31–36)
MCV RBC AUTO: 84.5 FL (ref 80–100)
MONOCYTES ABSOLUTE: 0.3 K/UL (ref 0–1.3)
MONOCYTES RELATIVE PERCENT: 6.5 %
NEUTROPHILS ABSOLUTE: 3.3 K/UL (ref 1.7–7.7)
NEUTROPHILS RELATIVE PERCENT: 75.5 %
PDW BLD-RTO: 15.9 % (ref 12.4–15.4)
PLATELET # BLD: 111 K/UL (ref 135–450)
PMV BLD AUTO: 8.5 FL (ref 5–10.5)
POTASSIUM SERPL-SCNC: 4 MMOL/L (ref 3.5–5.1)
RBC # BLD: 4.15 M/UL (ref 4.2–5.9)
SODIUM BLD-SCNC: 141 MMOL/L (ref 136–145)
TOTAL PROTEIN: 7 G/DL (ref 6.4–8.2)
TSH REFLEX: 4.1 UIU/ML (ref 0.27–4.2)
WBC # BLD: 4.4 K/UL (ref 4–11)

## 2020-06-22 PROCEDURE — 83036 HEMOGLOBIN GLYCOSYLATED A1C: CPT | Performed by: NURSE PRACTITIONER

## 2020-06-22 PROCEDURE — 99214 OFFICE O/P EST MOD 30 MIN: CPT | Performed by: NURSE PRACTITIONER

## 2020-06-22 PROCEDURE — G8427 DOCREV CUR MEDS BY ELIG CLIN: HCPCS | Performed by: NURSE PRACTITIONER

## 2020-06-22 PROCEDURE — 1123F ACP DISCUSS/DSCN MKR DOCD: CPT | Performed by: NURSE PRACTITIONER

## 2020-06-22 PROCEDURE — 3017F COLORECTAL CA SCREEN DOC REV: CPT | Performed by: NURSE PRACTITIONER

## 2020-06-22 PROCEDURE — 2022F DILAT RTA XM EVC RTNOPTHY: CPT | Performed by: NURSE PRACTITIONER

## 2020-06-22 PROCEDURE — 1036F TOBACCO NON-USER: CPT | Performed by: NURSE PRACTITIONER

## 2020-06-22 PROCEDURE — 4040F PNEUMOC VAC/ADMIN/RCVD: CPT | Performed by: NURSE PRACTITIONER

## 2020-06-22 PROCEDURE — 36415 COLL VENOUS BLD VENIPUNCTURE: CPT | Performed by: NURSE PRACTITIONER

## 2020-06-22 PROCEDURE — G8417 CALC BMI ABV UP PARAM F/U: HCPCS | Performed by: NURSE PRACTITIONER

## 2020-06-22 PROCEDURE — 3051F HG A1C>EQUAL 7.0%<8.0%: CPT | Performed by: NURSE PRACTITIONER

## 2020-06-22 ASSESSMENT — ENCOUNTER SYMPTOMS
VISUAL CHANGE: 0
WHEEZING: 0
ABDOMINAL PAIN: 0
TROUBLE SWALLOWING: 0
ORTHOPNEA: 0
COUGH: 0
VOMITING: 0
SHORTNESS OF BREATH: 0
CHEST TIGHTNESS: 0
NAUSEA: 0
BLURRED VISION: 0

## 2020-06-22 NOTE — PROGRESS NOTES
negatives include no blurred vision, chest pain, headaches, orthopnea, palpitations, peripheral edema, shortness of breath or sweats. Risk factors for coronary artery disease include diabetes mellitus, dyslipidemia, male gender and obesity. Past treatments include ACE inhibitors. The current treatment provides significant improvement. There are no compliance problems. Hyperlipidemia   This is a chronic problem. The current episode started more than 1 year ago. The problem is controlled. Recent lipid tests were reviewed and are normal. Exacerbating diseases include diabetes. There are no known factors aggravating his hyperlipidemia. Pertinent negatives include no chest pain, myalgias or shortness of breath. Current antihyperlipidemic treatment includes statins. There are no compliance problems. Risk factors for coronary artery disease include diabetes mellitus, dyslipidemia, hypertension, male sex, obesity and a sedentary lifestyle. DM: patient has a appt with Dr Ildefonso Garrido for July . Aware that he needs to llse weight and m,anah-ge diet . He would not lioke to increase or change meds until he see endo . He was been drinking more water . Aware that he needs to check glucose more often cindy with high nu,bers . Aware that we could add long acting insulin or po med . Anxiety : states that he has decrease xanax to only oncwe a day . Feels that anxiety is improving   Has been taking ASA and notes that he bruises when bumped easily . Will check CBC. Review of Systems   Constitutional: Negative for appetite change and fatigue. HENT: Negative for trouble swallowing. Eyes: Negative for blurred vision and visual disturbance. Respiratory: Negative for cough, chest tightness, shortness of breath and wheezing. Cardiovascular: Negative for chest pain, palpitations, orthopnea and leg swelling. Gastrointestinal: Negative for abdominal pain, nausea and vomiting.    Endocrine: Negative for polydipsia, polyphagia and polyuria. Musculoskeletal: Negative for myalgias. Right knee swells sometimes    Neurological: Negative for dizziness, tremors, syncope, weakness, light-headedness, numbness and headaches. Psychiatric/Behavioral: Negative for confusion and sleep disturbance. The patient is nervous/anxious. Prior to Visit Medications    Medication Sig Taking? Authorizing Provider   blood glucose test strips (ONETOUCH ULTRA) strip USE TO TEST FOUR TIMES A DAY AS NEEDED Yes ADDISON German CNP   Insulin NPH Isophane & Regular (HUMULIN 70/30 KWIKPEN) (70-30) 100 UNIT per ML injection pen Inject 50 Units into the skin 2 times daily (before meals) Yes ADDISON German CNP   ALPRAZolam (XANAX) 0.25 MG tablet TAKE ONE TABLET BY MOUTH TWICE A DAY AS NEEDED FOR ANXIETY. Do not drink alcohol or drive while taking this medication.  Yes ADDISON German CNP   oxybutynin (DITROPAN XL) 10 MG extended release tablet Take 1 tablet by mouth daily Yes ADDISON German CNP   atorvastatin (LIPITOR) 20 MG tablet TAKE 1 TABLET BY MOUTH ONCE A DAY Yes ADDISON German CNP   fenofibrate (TRIGLIDE) 160 MG tablet Take 1 tablet by mouth daily Yes ADDISON German CNP   lisinopril (PRINIVIL;ZESTRIL) 10 MG tablet TAKE 1 TABLET BY MOUTH  DAILY Yes ADDISON German CNP   metFORMIN (GLUCOPHAGE) 1000 MG tablet TAKE 1 TABLET BY MOUTH  TWICE A DAY Yes ADDISON German CNP   tamsulosin (FLOMAX) 0.4 MG capsule TAKE 1 CAPSULE BY MOUTH  DAILY Yes ADDISON German CNP   atenolol-chlorthalidone (TENORETIC) 50-25 MG per tablet TAKE 1 TABLET BY MOUTH  DAILY Yes ADDISON German CNP   DULoxetine (CYMBALTA) 60 MG extended release capsule TAKE 1 CAPSULE BY MOUTH TWO TIMES DAILY Yes ADDISON German CNP   LANTUS SOLOSTAR 100 UNIT/ML injection pen INJECT SUBCUTANEOUSLY 105  UNITS NIGHTLY Yes Dot Frye MD   B-D ULTRAFINE III SHORT PEN 31G X 8 MM MISC USE TO INJECT INSULIN THREE TIMES A DAY Yes Emmanuel Ochoa MD Hannah   albuterol (PROVENTIL HFA) 108 (90 BASE) MCG/ACT inhaler Inhale 2 puffs into the lungs every 6 hours as needed for Wheezing. Yes Niurka Brar MD   aspirin 81 MG tablet Take 81 mg by mouth daily. Yes Historical Provider, MD        Social History     Tobacco Use    Smoking status: Never Smoker    Smokeless tobacco: Never Used   Substance Use Topics    Alcohol use: No        Vitals:    06/22/20 0922   BP: 112/64   Site: Left Upper Arm   Position: Sitting   Cuff Size: Large Adult   Pulse: 59   Resp: 18   Temp: 96.7 °F (35.9 °C)   SpO2: 99%   Weight: 269 lb 6.4 oz (122.2 kg)   Height: 5' 7\" (1.702 m)     Estimated body mass index is 42.19 kg/m² as calculated from the following:    Height as of this encounter: 5' 7\" (1.702 m). Weight as of this encounter: 269 lb 6.4 oz (122.2 kg). Physical Exam  Vitals signs reviewed. Constitutional:       General: He is not in acute distress. Appearance: Normal appearance. He is not ill-appearing. HENT:      Head: Normocephalic and atraumatic. Neck:      Musculoskeletal: Normal range of motion and neck supple. Vascular: No carotid bruit. Cardiovascular:      Rate and Rhythm: Normal rate and regular rhythm. Pulses: Normal pulses. Radial pulses are 2+ on the right side and 2+ on the left side. Heart sounds: Normal heart sounds. No murmur. Pulmonary:      Effort: Pulmonary effort is normal.      Breath sounds: Normal breath sounds. Abdominal:      General: Abdomen is flat. Bowel sounds are normal. There is no distension. Palpations: Abdomen is soft. There is no hepatomegaly, splenomegaly or mass. Tenderness: There is no abdominal tenderness. Musculoskeletal: Normal range of motion. General: No swelling. Right lower leg: No edema. Left lower leg: No edema. Lymphadenopathy:      Cervical: No cervical adenopathy. Skin:     General: Skin is warm and dry.       Capillary Refill: Capillary refill

## 2020-06-22 NOTE — PATIENT INSTRUCTIONS
Keep appt with Dr Patricia Duque . Patient Education        Learning About High Blood Sugar  What is high blood sugar? Your body turns the food you eat into glucose (sugar), which it uses for energy. But if your body isn't able to use the sugar right away, it can build up in your blood and lead to high blood sugar. When the amount of sugar in your blood stays too high for too much of the time, you may have diabetes. Diabetes is a disease that can cause serious health problems. The good news is that lifestyle changes may help you get your blood sugar back to normal and avoid or delay diabetes. What causes high blood sugar? Sugar (glucose) can build up in your blood if you:  · Are overweight. · Have a family history of diabetes. · Take certain medicines, such as steroids. What are the symptoms? Having high blood sugar may not cause any symptoms at all. Or it may make you feel very thirsty or very hungry. You may also urinate more often than usual, have blurry vision, or lose weight without trying. How is high blood sugar treated? You can take steps to lower your blood sugar level if you understand what makes it get higher. Your doctor may want you to learn how to test your blood sugar level at home. Then you can see how illness, stress, or different kinds of food or medicine raise or lower your blood sugar level. Other tests may be needed to see if you have diabetes. How can you prevent high blood sugar? · Watch your weight. If you're overweight, losing just a small amount of weight may help. Reducing fat around your waist is most important. · Limit the amount of calories, sweets, and unhealthy fat you eat. Ask your doctor if a dietitian can help you. A registered dietitian can help you create meal plans that fit your lifestyle. · Get at least 30 minutes of exercise on most days of the week. Exercise helps control your blood sugar. It also helps you maintain a healthy weight. Walking is a good choice.  You

## 2020-06-23 RX ORDER — INSULIN HUMAN 100 [IU]/ML
50 INJECTION, SUSPENSION SUBCUTANEOUS
Qty: 90 ML | Refills: 0 | Status: SHIPPED | OUTPATIENT
Start: 2020-06-23 | End: 2020-07-22

## 2020-06-23 RX ORDER — ATENOLOL AND CHLORTHALIDONE TABLET 50; 25 MG/1; MG/1
TABLET ORAL
Qty: 90 TABLET | Refills: 0 | Status: SHIPPED | OUTPATIENT
Start: 2020-06-23 | End: 2020-08-11

## 2020-06-23 NOTE — TELEPHONE ENCOUNTER
LAST OV 6/23/2020  Future Appointments   Date Time Provider Eduardo Kenneyd   7/7/2020 11:40 AM Darrick Medrano MD [facility]ULM [facility]   7/22/2020  1:00 PM Wendy Lutz MD [facility]   9/22/2020  9:00 AM Macario Dale, [address]

## 2020-06-30 RX ORDER — ALPRAZOLAM 0.25 MG/1
TABLET ORAL
Qty: 30 TABLET | Refills: 0 | Status: SHIPPED | OUTPATIENT
Start: 2020-06-30 | End: 2020-07-30

## 2020-06-30 NOTE — TELEPHONE ENCOUNTER
Last office visit :06/22/2020    Future Appointments   Date Time Provider Eduardo Marcia   7/7/2020 11:40 AM Nati Marcos MD [facility] PULM [facility]   7/22/2020  1:00 PM Josephine Chávez MD [facility] Endo [facility]   9/22/2020  9:00 AM Dovie Pallas, [address]

## 2020-07-07 ENCOUNTER — OFFICE VISIT (OUTPATIENT)
Dept: PULMONOLOGY | Age: 75
End: 2020-07-07
Payer: MEDICARE

## 2020-07-07 VITALS
RESPIRATION RATE: 14 BRPM | WEIGHT: 265 LBS | DIASTOLIC BLOOD PRESSURE: 64 MMHG | HEIGHT: 67 IN | OXYGEN SATURATION: 95 % | TEMPERATURE: 98.4 F | HEART RATE: 58 BPM | BODY MASS INDEX: 41.59 KG/M2 | SYSTOLIC BLOOD PRESSURE: 118 MMHG

## 2020-07-07 PROCEDURE — G8427 DOCREV CUR MEDS BY ELIG CLIN: HCPCS | Performed by: INTERNAL MEDICINE

## 2020-07-07 PROCEDURE — G8417 CALC BMI ABV UP PARAM F/U: HCPCS | Performed by: INTERNAL MEDICINE

## 2020-07-07 PROCEDURE — 99214 OFFICE O/P EST MOD 30 MIN: CPT | Performed by: INTERNAL MEDICINE

## 2020-07-07 PROCEDURE — 1036F TOBACCO NON-USER: CPT | Performed by: INTERNAL MEDICINE

## 2020-07-07 PROCEDURE — 1123F ACP DISCUSS/DSCN MKR DOCD: CPT | Performed by: INTERNAL MEDICINE

## 2020-07-07 PROCEDURE — 4040F PNEUMOC VAC/ADMIN/RCVD: CPT | Performed by: INTERNAL MEDICINE

## 2020-07-07 PROCEDURE — 3017F COLORECTAL CA SCREEN DOC REV: CPT | Performed by: INTERNAL MEDICINE

## 2020-07-07 ASSESSMENT — SLEEP AND FATIGUE QUESTIONNAIRES
HOW LIKELY ARE YOU TO NOD OFF OR FALL ASLEEP WHILE SITTING INACTIVE IN A PUBLIC PLACE: 0
HOW LIKELY ARE YOU TO NOD OFF OR FALL ASLEEP WHILE LYING DOWN TO REST IN THE AFTERNOON WHEN CIRCUMSTANCES PERMIT: 0
HOW LIKELY ARE YOU TO NOD OFF OR FALL ASLEEP WHILE SITTING AND READING: 2
ESS TOTAL SCORE: 7
HOW LIKELY ARE YOU TO NOD OFF OR FALL ASLEEP WHEN YOU ARE A PASSENGER IN A CAR FOR AN HOUR WITHOUT A BREAK: 2
HOW LIKELY ARE YOU TO NOD OFF OR FALL ASLEEP WHILE SITTING AND TALKING TO SOMEONE: 0
HOW LIKELY ARE YOU TO NOD OFF OR FALL ASLEEP IN A CAR, WHILE STOPPED FOR A FEW MINUTES IN TRAFFIC: 0
HOW LIKELY ARE YOU TO NOD OFF OR FALL ASLEEP WHILE WATCHING TV: 2
HOW LIKELY ARE YOU TO NOD OFF OR FALL ASLEEP WHILE SITTING QUIETLY AFTER LUNCH WITHOUT ALCOHOL: 1

## 2020-07-07 NOTE — PROGRESS NOTES
REASON FOR CONSULTATION/CC: KIMMIE      CONSULTING PHYSICIAN: ADDISON Diallo Aas, CNP   PCP: ADDISON Diallo Aas, CNP    HISTORY OF PRESENT ILLNESS: Luke Galeazzi is a 70y.o. year old male with a history of KIMMIE who presents :     Sleep history:       KIMMIE  Will having rare sleep paralysis. Occurs once every 1-2 months. Doing well with mask. occational dry mouth        Obesity. Weight loss. [place] Sleepiness Scale:    Sleep Medicine 7/7/2020 7/9/2019 5/15/2018 7/3/2017 5/2/2016 2/2/2016 8/25/2015   Sitting and reading 2 2 2 1 1 1 2   Watching TV 2 2 2 2 1 1 2   Sitting, inactive in a public place (e.g. a theatre or a meeting) 0 1 1 0 0 0 2   As a passenger in a car for an hour without a break 2 2 2 1 0 1 2   Lying down to rest in the afternoon when circumstances permit 0 2 2 1 0 1 1   Sitting and talking to someone 0 0 0 0 0 0 1   Sitting quietly after a lunch without alcohol 1 0 1 0 1 0 0   In a car, while stopped for a few minutes in traffic 0 0 0 0 0 0 0   Total score 7 9 10 5 3 4 10   Neck circumference - - - 19.75 - - 20         0 = no chance of dozing  1 = slight chance of dozing  2 = moderate chance of dozing  3 = high chance of dozing    Interpretation:   0-7: It is unlikely that you are abnormally sleepy. 8-9:You have an average amount of daytime sleepiness. 10-15: You may be excessively sleepy depending on the situation. You may want to consider   seeking medical attention. 16-24: You are excessively sleepy and should consider seeking medical attention      PAST MEDICAL HISTORY:  Past Medical History:   Diagnosis Date    Anxiety     BPH (benign prostatic hyperplasia)     Depression     Diabetes with proteinuria     Disorder of iron metabolism 4/27/2010    GBS (Guillain [place] syndrome) ([facility] 1990's    History of colonic polyps     History of colonic polyps     Hyperlipidemia     Hypertension     Liver cirrhosis secondary to WYLIE ([facility]     Long term (current) use of insulin ([facility] 10/10/2019    Obesity     Presence of intraocular lens 10/10/2019    Psychophysiological insomnia 5/15/2018    Retinal hemorrhage, bilateral 10/10/2019    Type 2 diabetes mellitus with mild nonproliferative diabetic retinopathy without macular edema, bilateral ([address] 10/10/2019    Type 2 diabetes mellitus with proteinuria ([facility] 11/19/2019    Type II or unspecified type diabetes mellitus without mention of complication, not stated as uncontrolled     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       PAST SURGICAL HISTORY:  Past Surgical History:   Procedure Laterality Date    CATARACT REMOVAL Left 2010       FAMILY HISTORY:  family history is not on file. SOCIAL HISTORY:   reports that he has never smoked. He has never used smokeless tobacco.      ALLERGIES:  Patient is allergic to seasonal.    REVIEW OF SYSTEMS:  Constitutional: Negative for fever   HENT: Negative for sore throat  Respiratory: Negative for dyspnea, cough  Cardiovascular: Negative for chest pain  Gastrointestinal: Negative for vomiting, diarrhea   Skin: Negative for rash  Psychiatric/Behavorial: Negative for anxiety     Objective:   PHYSICAL EXAM:  Blood pressure 118/64, pulse 58, temperature 98.4 °F (36.9 °C), temperature source Oral, resp. rate 14, height 5' 7\" (1.702 m), weight 265 lb (120.2 kg), SpO2 95 %.'  Gen: No distress. ENT:   Resp: No accessory muscle use. No crackles. No wheezes. No rhonchi. CV: Regular rate. Regular rhythm. No murmur or rub. No edema. Skin: Warm, dry, normal texture and turgor. No nodule on exposed extremities. M/S: No cyanosis. No clubbing. No joint deformity. Psych: Oriented x 3. No anxiety. Awake. Alert. Intact judgement and insight. Good Mood / Affect.   Memory appears in tact     Current Outpatient Medications   Medication Sig Dispense Refill    ALPRAZolam (XANAX) 0.25 MG tablet TAKE ONE TABLET BY MOUTH TWICE A DAY AS NEEDED FOR ANIXETY DO NOT DRIVE OR DRINK ALCOHOL WHILE TAKING THS MEDICATION 30 tablet 0    atenolol-chlorthalidone (TENORETIC) 50-25 MG per tablet TAKE 1 TABLET BY MOUTH  DAILY 90 tablet 0    Insulin NPH Isophane & Regular (HUMULIN 70/30 KWIKPEN) (70-30) 100 UNIT per ML injection pen Inject 50 Units into the skin 2 times daily (before meals) 90 mL 0    blood glucose test strips (ONETOUCH ULTRA) strip USE TO TEST FOUR TIMES A DAY AS NEEDED 100 strip 0    oxybutynin (DITROPAN XL) 10 MG extended release tablet Take 1 tablet by mouth daily 90 tablet 0    atorvastatin (LIPITOR) 20 MG tablet TAKE 1 TABLET BY MOUTH ONCE A DAY 90 tablet 0    fenofibrate (TRIGLIDE) 160 MG tablet Take 1 tablet by mouth daily 90 tablet 0    lisinopril (PRINIVIL;ZESTRIL) 10 MG tablet TAKE 1 TABLET BY MOUTH  DAILY 90 tablet 0    metFORMIN (GLUCOPHAGE) 1000 MG tablet TAKE 1 TABLET BY MOUTH  TWICE A  tablet 0    tamsulosin (FLOMAX) 0.4 MG capsule TAKE 1 CAPSULE BY MOUTH  DAILY 90 capsule 0    DULoxetine (CYMBALTA) 60 MG extended release capsule TAKE 1 CAPSULE BY MOUTH TWO TIMES DAILY 180 capsule 0    LANTUS SOLOSTAR 100 UNIT/ML injection pen INJECT SUBCUTANEOUSLY 105  UNITS NIGHTLY 105 mL 5    B-D ULTRAFINE III SHORT PEN 31G X 8 MM MISC USE TO INJECT INSULIN THREE TIMES A DAY 10 each 5    albuterol (PROVENTIL HFA) 108 (90 BASE) MCG/ACT inhaler Inhale 2 puffs into the lungs every 6 hours as needed for Wheezing. 1 Inhaler 3    aspirin 81 MG tablet Take 81 mg by mouth daily. No current facility-administered medications for this visit.       Data Reviewed:   CBC and Renal reviewed  Last CBC  Lab Results   Component Value Date    WBC 4.4 06/22/2020     Last Renal  Lab Results   Component Value Date     06/22/2020    K 4.0 06/22/2020     06/22/2020    CO2 27 06/22/2020    CO2 27 03/05/2020    CO2 29 02/11/2019    BUN 41 06/22/2020    CREATININE 1.2 06/22/2020    GLUCOSE 89 06/22/2020    CALCIUM 8.6 06/22/2020       Last ABG  POC Blood Gas:   No results found for: POCPH  No results for input(s): PH, PCO2, PO2, HCO3, BE, O2SAT in the last 72 hours. Assessment:     · KIMMIE  · Fatigue  · Depression  · Sleep paralysis  · Insomnia    Plan:      Problem List Items Addressed This Visit     Sleep paralysis     He has episodes of sleep paralysis which occur every 1 or 2 months. We discussed treatments and he does not want another medication. He does not appear to be a BiPAP titration issue given that this only occurs off of BiPAP or with a leak. If he decides on treatment, I will offer an SSRI to suppress REM sleep and suppress sleep paralysis. KIMMIE (obstructive sleep apnea)     Dinesh Islas  is deriving benefit from PAP demonstrated by improved [place], AHI, symptoms. PAP download information:  Usage > 4 hours  97 %   Pressure setting  autocpap cwp    AHI with usage  3.4                Morbid obesity with BMI of 40.0-44.9, adult (HCC)     Weight loss was advised. We discussed methods of weight loss.

## 2020-07-08 PROBLEM — G47.8 SLEEP PARALYSIS: Status: ACTIVE | Noted: 2020-07-08

## 2020-07-08 NOTE — ASSESSMENT & PLAN NOTE
Rosary Narrow  is deriving benefit from PAP demonstrated by improved [place], AHI, symptoms.     PAP download information:  Usage > 4 hours  97 %   Pressure setting  autocpap cwp    AHI with usage  3.4

## 2020-07-22 ENCOUNTER — OFFICE VISIT (OUTPATIENT)
Dept: ENDOCRINOLOGY | Age: 75
End: 2020-07-22
Payer: MEDICARE

## 2020-07-22 VITALS
RESPIRATION RATE: 18 BRPM | OXYGEN SATURATION: 95 % | HEART RATE: 60 BPM | WEIGHT: 264.6 LBS | BODY MASS INDEX: 41.53 KG/M2 | DIASTOLIC BLOOD PRESSURE: 67 MMHG | HEIGHT: 67 IN | SYSTOLIC BLOOD PRESSURE: 149 MMHG

## 2020-07-22 LAB — HBA1C MFR BLD: 7 %

## 2020-07-22 PROCEDURE — 83036 HEMOGLOBIN GLYCOSYLATED A1C: CPT | Performed by: INTERNAL MEDICINE

## 2020-07-22 PROCEDURE — 4040F PNEUMOC VAC/ADMIN/RCVD: CPT | Performed by: INTERNAL MEDICINE

## 2020-07-22 PROCEDURE — 1123F ACP DISCUSS/DSCN MKR DOCD: CPT | Performed by: INTERNAL MEDICINE

## 2020-07-22 PROCEDURE — G8427 DOCREV CUR MEDS BY ELIG CLIN: HCPCS | Performed by: INTERNAL MEDICINE

## 2020-07-22 PROCEDURE — 3051F HG A1C>EQUAL 7.0%<8.0%: CPT | Performed by: INTERNAL MEDICINE

## 2020-07-22 PROCEDURE — G8417 CALC BMI ABV UP PARAM F/U: HCPCS | Performed by: INTERNAL MEDICINE

## 2020-07-22 PROCEDURE — 2022F DILAT RTA XM EVC RTNOPTHY: CPT | Performed by: INTERNAL MEDICINE

## 2020-07-22 PROCEDURE — 3017F COLORECTAL CA SCREEN DOC REV: CPT | Performed by: INTERNAL MEDICINE

## 2020-07-22 PROCEDURE — 99204 OFFICE O/P NEW MOD 45 MIN: CPT | Performed by: INTERNAL MEDICINE

## 2020-07-22 PROCEDURE — 1036F TOBACCO NON-USER: CPT | Performed by: INTERNAL MEDICINE

## 2020-07-22 RX ORDER — INSULIN LISPRO 100 [IU]/ML
INJECTION, SOLUTION INTRAVENOUS; SUBCUTANEOUS
Qty: 30 PEN | Refills: 2 | Status: SHIPPED | OUTPATIENT
Start: 2020-07-22 | End: 2021-02-15

## 2020-07-22 NOTE — PROGRESS NOTES
Seen as new patient for diabetes    Diagnosed with Type 2 diabetes mellitus > 15 years    Known diabetic complications: diabetic retinopathy  Uncontrolled, moderate    Current diabetic medications     Lantus 105 unit but taking 80 units  Humulin 70/30 50/50  Metformin 1gm BID    States was on humalog in the past but was not keeping them down consistently    Last A1c 7%<------ 7.9<-----   7.4 <--- 7.1    Prior visit with dietician: Yes   Current diet: on average, 3 meals per day  +snacks  Current exercise: walking   Current monitoring regimen: home blood tests - 1  times daily     Has brought blood glucose log/meter:   Home blood sugar records: 140-240  Any episodes of hypoglycemia?    Worsened by high CHO    No Hx of CAD , PVD, CVA    Hyperlipidemia:   For   Years  Takes lipitor 20mg fenofibrate 160mg  Controlled  LDL 35 on 3/20    Hypertension for years  Stable  Takes atenolol/chlorthalidone 50/25 lisinopril 10mg    Last eye exam: 7/19  Last foot exam: 7/20  Last microalbumin to creatinine ratio:  81.8 on 11/19       Past Medical History:   Diagnosis Date    Anxiety     BPH (benign prostatic hyperplasia)     Depression     Diabetes with proteinuria     Disorder of iron metabolism 4/27/2010    GBS (Guillain [place] syndrome) ([address] 1990's    History of colonic polyps     History of colonic polyps     Hyperlipidemia     Hypertension     Liver cirrhosis secondary to WYLIE ([address]     Long term (current) use of insulin ([address] 10/10/2019    Obesity     Presence of intraocular lens 10/10/2019    Psychophysiological insomnia 5/15/2018    Retinal hemorrhage, bilateral 10/10/2019    Type 2 diabetes mellitus with mild nonproliferative diabetic retinopathy without macular edema, bilateral ([address] 10/10/2019    Type 2 diabetes mellitus with proteinuria ([address] 11/19/2019    Type II or unspecified type diabetes mellitus without mention of complication, not stated as uncontrolled     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     Past Surgical History:   Procedure Laterality Date    CATARACT REMOVAL Left 2010       Review of Systems  Please see scanned document dated and signed      Objective:      BP (!) 149/67 (Site: Left Upper Arm, Position: Sitting, Cuff Size: Medium Adult)   Pulse 60   Resp 18   Ht 5' 7\" (1.702 m)   Wt 264 lb 9.6 oz (120 kg)   SpO2 95%   BMI 41.44 kg/m²   Wt Readings from Last 3 Encounters:   07/22/20 264 lb 9.6 oz (120 kg)   07/07/20 265 lb (120.2 kg)   06/22/20 269 lb 6.4 oz (122.2 kg)     Constitutional: Well-developed, alert, appears stated age, cooperative, in no acute distress  H/E/N/M/T:atraumatic, normocephalic, external ears, nose, lips normal without lesions  Eyes: Arcus Senilis is not present, extraocular muscles are intact  Neck: supple, trachea midline, acanthosis nigricance is not present. Thyroid: gland size is normal, non-tender on palpation  Respiratory: breathing is unlabored, lungs are clear to auscultations. Cardiovascular: regular rate and rhythm, S1, S2, regular rate and rhythm, no murmur, rub or gallop.    Skeletal muscular: no kyphosis, no gross abnormalities  Skin: Xanthoma/Xanthelasmas are  not present  Psychiatric: Judgement and Insight:  judgement and insight appear normal  Neuro: Normal without focal findings, speech is spontaneous, and movements are coordinated, alert and oriented x3   Skeletal foot exam is normal, no skin lesions, toenails are normal,  10 g monofilament is detected, lesion/callus on big toe    Lab Reviewed   No components found for: CHLPL  Lab Results   Component Value Date    TRIG 202 (H) 03/05/2020    TRIG 202 (H) 02/11/2019    TRIG 227 (H) 01/16/2018     Lab Results   Component Value Date    HDL 25 (L) 03/05/2020    HDL 25 (L) 02/11/2019    HDL 23 (L) 01/16/2018     Lab Results   Component Value Date    LDLCALC 35 03/05/2020    LDLCALC 40 02/11/2019    LDLCALC 37 01/16/2018     Lab Results   Component Value Date    LABVLDL 40 03/05/2020 LABVLDL 40 02/11/2019    LABVLDL 45 01/16/2018     Lab Results   Component Value Date    LABA1C 7.9 06/22/2020       Assessment:     Dimitri Ceballos is a 70 y.o. male with :    1.T2DM: Longstanding, uncontrolled, discussed goals. He is both basal and 70/30 insulin, advised basal insulin with rapid acting insulin. He agrees to switch once runs out of current insulin. 2.HTN : Blood pressure slightly elevated, reports better home readings  3. HLD: LDL at goal  4. Obesity       Plan:      Lantus 80 units   Humalog 32 units AC TID   SSI 2 for 50 > 150   Continue Metformin   Advise to check blood sugar 4 times a day   Patient to send blood sugar log for titration. Advise to exercise regularly. Advise to low simple carbohydrate and protein with each  meal diet. Diabetes Care: recommend yearly eye exam, foot exam and urine microalbumin to   creatinine ratio. Patient is up-to-date.

## 2020-07-24 RX ORDER — BLOOD SUGAR DIAGNOSTIC
STRIP MISCELLANEOUS
Qty: 100 STRIP | Refills: 0 | Status: SHIPPED | OUTPATIENT
Start: 2020-07-24 | End: 2020-09-02

## 2020-07-30 RX ORDER — PEN NEEDLE, DIABETIC 31 GX5/16"
NEEDLE, DISPOSABLE MISCELLANEOUS
Qty: 100 EACH | Refills: 0 | Status: SHIPPED | OUTPATIENT
Start: 2020-07-30 | End: 2020-10-27

## 2020-07-30 NOTE — TELEPHONE ENCOUNTER
Last visit: 06/22/2020  Future Appointments   Date Time Provider Eduardo Krameri   9/22/2020  9:00 AM Franca Carter, [address] [facility]   10/22/2020  1:40 PM Rochelle Kaur MD [address] Endo & Jovana Balling MMA   7/12/2021 11:00 AM Donald Hernandez MD [facility] PULM MMA

## 2020-08-11 RX ORDER — ATENOLOL AND CHLORTHALIDONE TABLET 50; 25 MG/1; MG/1
TABLET ORAL
Qty: 90 TABLET | Refills: 1 | Status: SHIPPED | OUTPATIENT
Start: 2020-08-11 | End: 2021-02-15

## 2020-08-11 RX ORDER — TAMSULOSIN HYDROCHLORIDE 0.4 MG/1
CAPSULE ORAL
Qty: 90 CAPSULE | Refills: 0 | Status: SHIPPED | OUTPATIENT
Start: 2020-08-11 | End: 2020-10-19

## 2020-08-11 RX ORDER — FENOFIBRATE 160 MG/1
160 TABLET ORAL DAILY
Qty: 90 TABLET | Refills: 0 | Status: SHIPPED | OUTPATIENT
Start: 2020-08-11 | End: 2020-10-19

## 2020-08-11 RX ORDER — ATORVASTATIN CALCIUM 20 MG/1
TABLET, FILM COATED ORAL
Qty: 90 TABLET | Refills: 0 | Status: SHIPPED | OUTPATIENT
Start: 2020-08-11 | End: 2020-10-19

## 2020-08-11 RX ORDER — INSULIN HUMAN 100 [IU]/ML
50 INJECTION, SUSPENSION SUBCUTANEOUS
Qty: 90 ML | Refills: 1 | Status: SHIPPED | OUTPATIENT
Start: 2020-08-11 | End: 2021-01-05 | Stop reason: ALTCHOICE

## 2020-08-11 RX ORDER — LISINOPRIL 10 MG/1
TABLET ORAL
Qty: 90 TABLET | Refills: 1 | Status: SHIPPED | OUTPATIENT
Start: 2020-08-11 | End: 2021-02-15

## 2020-08-11 RX ORDER — DULOXETIN HYDROCHLORIDE 60 MG/1
CAPSULE, DELAYED RELEASE ORAL
Qty: 180 CAPSULE | Refills: 1 | Status: SHIPPED | OUTPATIENT
Start: 2020-08-11 | End: 2021-02-15

## 2020-09-02 RX ORDER — BLOOD SUGAR DIAGNOSTIC
STRIP MISCELLANEOUS
Qty: 100 STRIP | Refills: 0 | Status: SHIPPED | OUTPATIENT
Start: 2020-09-02 | End: 2020-10-22 | Stop reason: SDUPTHER

## 2020-09-02 NOTE — TELEPHONE ENCOUNTER
LAST OFFICE VISIT: 06/22/2020    Future Appointments   Date Time Provider Eduardo Krameri   9/22/2020  9:00 AM Alyssa Alegre, 1800 [facility] [address]   10/22/2020  1:40 PM Meme Muniz MD [address] Endo & Jaspal Quintanilla [facility]   7/12/2021 11:00 AM Akhil Padilla  [facility]

## 2020-09-08 RX ORDER — OXYBUTYNIN CHLORIDE 10 MG/1
TABLET, EXTENDED RELEASE ORAL
Qty: 90 TABLET | Refills: 0 | Status: SHIPPED | OUTPATIENT
Start: 2020-09-08 | End: 2020-11-30

## 2020-09-08 RX ORDER — ALPRAZOLAM 0.25 MG/1
TABLET ORAL
Qty: 60 TABLET | Refills: 0 | Status: SHIPPED | OUTPATIENT
Start: 2020-09-08 | End: 2020-10-27

## 2020-09-08 NOTE — TELEPHONE ENCOUNTER
Last ov 6/22/2020  Future Appointments   Date Time Provider Eduardo Kennedy   9/22/2020  9:00 AM [facility] [place], 1800 [facility]   10/22/2020  1:40 PM Mesfin Ellis MD [facility] & Aniyah Andrade MMA   7/12/2021 11:00 AM Cezar Gilbert MD [facility] PULM MMA     rx pended

## 2020-09-15 RX ORDER — INSULIN GLARGINE 100 [IU]/ML
INJECTION, SOLUTION SUBCUTANEOUS
Qty: 105 ML | Refills: 5 | Status: CANCELLED | OUTPATIENT
Start: 2020-09-15

## 2020-09-15 NOTE — TELEPHONE ENCOUNTER
LAST OV 6.22.20  Future Appointments   Date Time Provider Eduardo Kennedy   9/22/2020  9:00 AM Barrynicholas Goodrich, 1800 [facility]   10/22/2020  1:40 PM [facility] Fozia Ayoub MMA   7/12/2021 11:00 AM Fausto Sal MD [facility] PULM MMA     RX PENDED   #90 DAY SUPPLY REQUESTED FOR [facility]      ROSENDO PLEASE REVIEW CHART   [facility] IS REQUESTING LANTUS SOLOSTAR 5X3 ML* INJECT SUBCUTANEOUSLY 105 UNITS NIGHTLY    QUANTITY 7*15 ML = 105ML TOTAL     IS PATIENT STILL ON THIS MEDICATION ?

## 2020-09-16 ENCOUNTER — TELEPHONE (OUTPATIENT)
Dept: ENDOCRINOLOGY | Age: 75
End: 2020-09-16

## 2020-09-16 RX ORDER — INSULIN GLARGINE 100 [IU]/ML
INJECTION, SOLUTION SUBCUTANEOUS
Qty: 100 ML | Refills: 5 | Status: SHIPPED | OUTPATIENT
Start: 2020-09-16 | End: 2021-09-21 | Stop reason: SDUPTHER

## 2020-09-16 NOTE — TELEPHONE ENCOUNTER
Patient needs us to contact the pharmacy to let them know he is to stop the 70-30 and start taking the humalog     He also needs a script sent for lantus solostar pen 100 units every night qty 7 boxes       [address] [place] [address]

## 2020-09-22 ENCOUNTER — OFFICE VISIT (OUTPATIENT)
Dept: INTERNAL MEDICINE CLINIC | Age: 75
End: 2020-09-22
Payer: MEDICARE

## 2020-09-22 VITALS
HEART RATE: 59 BPM | WEIGHT: 266.2 LBS | DIASTOLIC BLOOD PRESSURE: 72 MMHG | RESPIRATION RATE: 18 BRPM | SYSTOLIC BLOOD PRESSURE: 124 MMHG | OXYGEN SATURATION: 99 % | BODY MASS INDEX: 41.69 KG/M2

## 2020-09-22 PROCEDURE — 3051F HG A1C>EQUAL 7.0%<8.0%: CPT | Performed by: NURSE PRACTITIONER

## 2020-09-22 PROCEDURE — 4040F PNEUMOC VAC/ADMIN/RCVD: CPT | Performed by: NURSE PRACTITIONER

## 2020-09-22 PROCEDURE — 1123F ACP DISCUSS/DSCN MKR DOCD: CPT | Performed by: NURSE PRACTITIONER

## 2020-09-22 PROCEDURE — G8417 CALC BMI ABV UP PARAM F/U: HCPCS | Performed by: NURSE PRACTITIONER

## 2020-09-22 PROCEDURE — 99214 OFFICE O/P EST MOD 30 MIN: CPT | Performed by: NURSE PRACTITIONER

## 2020-09-22 PROCEDURE — G8427 DOCREV CUR MEDS BY ELIG CLIN: HCPCS | Performed by: NURSE PRACTITIONER

## 2020-09-22 PROCEDURE — 3017F COLORECTAL CA SCREEN DOC REV: CPT | Performed by: NURSE PRACTITIONER

## 2020-09-22 PROCEDURE — 2022F DILAT RTA XM EVC RTNOPTHY: CPT | Performed by: NURSE PRACTITIONER

## 2020-09-22 PROCEDURE — 1036F TOBACCO NON-USER: CPT | Performed by: NURSE PRACTITIONER

## 2020-09-22 ASSESSMENT — ENCOUNTER SYMPTOMS
SHORTNESS OF BREATH: 0
NAUSEA: 0
ORTHOPNEA: 0
COUGH: 0
BLURRED VISION: 0
ABDOMINAL PAIN: 0
WHEEZING: 0
VOMITING: 0
TROUBLE SWALLOWING: 0
CHEST TIGHTNESS: 0
CHOKING: 0

## 2020-09-22 NOTE — PROGRESS NOTES
2020     Ozzy Ojeda (:  1945) is a 70 y.o. male, here for evaluation of the following medical concerns:    Chief Complaint   Patient presents with    3 Month Follow-Up       Diabetes   He presents for his follow-up diabetic visit. He has type 2 diabetes mellitus. His disease course has been stable. Hypoglycemia symptoms include nervousness/anxiousness. Pertinent negatives for hypoglycemia include no dizziness or headaches. There are no diabetic associated symptoms. Pertinent negatives for diabetes include no blurred vision, no chest pain, no fatigue and no weakness. There are no hypoglycemic complications. Symptoms are stable. There are no diabetic complications. Risk factors for coronary artery disease include diabetes mellitus, dyslipidemia, hypertension, male sex, obesity and sedentary lifestyle. Current diabetic treatment includes insulin injections and oral agent (monotherapy). He is compliant with treatment all of the time. His weight is stable. He is following a generally healthy diet. When asked about meal planning, he reported none. He rarely participates in exercise. An ACE inhibitor/angiotensin II receptor blocker is being taken. He sees a podiatrist.Eye exam is current. Hypertension   This is a chronic problem. The current episode started more than 1 year ago. The problem has been resolved since onset. The problem is controlled. Associated symptoms include anxiety. Pertinent negatives include no blurred vision, chest pain, headaches, orthopnea, palpitations, peripheral edema or shortness of breath. There are no associated agents to hypertension. Risk factors for coronary artery disease include stress, obesity, male gender, dyslipidemia and diabetes mellitus. Past treatments include ACE inhibitors. The current treatment provides moderate improvement. There are no compliance problems. Hyperlipidemia   This is a chronic problem. The current episode started more than 1 year ago. oxybutynin (DITROPAN-XL) 10 MG extended release tablet TAKE ONE TABLET BY MOUTH DAILY Yes ADDISON German CNP   blood glucose test strips (ONETOUCH ULTRA) strip USE FOUR TIMES A DAY AS NEEDED Yes ADDISON German CNP   fenofibrate (TRIGLIDE) 160 MG tablet TAKE 1 TABLET BY MOUTH  DAILY Yes ADDISON German CNP   atorvastatin (LIPITOR) 20 MG tablet TAKE 1 TABLET BY MOUTH ONCE DAILY Yes ADDISON German CNP   tamsulosin (FLOMAX) 0.4 MG capsule TAKE 1 CAPSULE BY MOUTH  DAILY Yes ADDISON German CNP   lisinopril (PRINIVIL;ZESTRIL) 10 MG tablet TAKE 1 TABLET BY MOUTH  DAILY Yes ADDISON German CNP   atenolol-chlorthalidone (TENORETIC) 50-25 MG per tablet TAKE 1 TABLET BY MOUTH  DAILY Yes ADDISON German CNP   DULoxetine (CYMBALTA) 60 MG extended release capsule TAKE 1 CAPSULE BY MOUTH  TWICE DAILY Yes ADDISON German CNP   metFORMIN (GLUCOPHAGE) 1000 MG tablet TAKE 1 TABLET BY MOUTH  TWICE DAILY Yes ADDISON German CNP   HUMULIN 70/30 KWIKPEN (70-30) 100 UNIT/ML injection pen INJECT 50 UNITS INTO THE  SKIN 2 TIMES DAILY (BEFORE  MEALS) Yes ADDISON German CNP   Insulin Pen Needle (B-D ULTRAFINE III SHORT PEN) 31G X 8 MM MISC USE TO INJECT INSULIN THREE TIMES A DAY Yes ADDISON Germna CNP   albuterol (PROVENTIL HFA) 108 (90 BASE) MCG/ACT inhaler Inhale 2 puffs into the lungs every 6 hours as needed for Wheezing. Yes Azucena Palacios MD   aspirin 81 MG tablet Take 81 mg by mouth daily.    Yes Historical Provider, MD   insulin lispro, 1 Unit Dial, (HUMALOG KWIKPEN) 100 UNIT/ML SOPN 32 -40 units AC TID  Patient not taking: Reported on 9/22/2020  Liborio Dudley MD        Social History     Tobacco Use    Smoking status: Never Smoker    Smokeless tobacco: Never Used   Substance Use Topics    Alcohol use: No        Vitals:    09/22/20 0908   BP: 124/72   Site: Left Upper Arm   Position: Sitting   Cuff Size: Large Adult   Pulse: 59   Resp: 18   SpO2: 99% Weight: 266 lb 3.2 oz (120.7 kg)     Estimated body mass index is 41.69 kg/m² as calculated from the following:    Height as of 7/22/20: 5' 7\" (1.702 m). Weight as of this encounter: 266 lb 3.2 oz (120.7 kg). Physical Exam  Vitals signs reviewed. Constitutional:       General: He is not in acute distress. Appearance: Normal appearance. He is not ill-appearing, toxic-appearing or diaphoretic. HENT:      Head: Normocephalic and atraumatic. Neck:      Musculoskeletal: Normal range of motion and neck supple. Vascular: No carotid bruit. Cardiovascular:      Rate and Rhythm: Normal rate and regular rhythm. Pulses: Normal pulses. Heart sounds: Normal heart sounds. No murmur. Pulmonary:      Effort: Pulmonary effort is normal. No respiratory distress. Breath sounds: Normal breath sounds. Abdominal:      General: Bowel sounds are normal. There is no distension. Palpations: Abdomen is soft. There is no mass. Tenderness: There is no abdominal tenderness. Hernia: A hernia (abdominal rectus ) is present. Musculoskeletal: Normal range of motion. Right lower leg: No edema. Left lower leg: No edema. Lymphadenopathy:      Cervical: No cervical adenopathy. Skin:     General: Skin is warm and dry. Capillary Refill: Capillary refill takes less than 2 seconds. Neurological:      General: No focal deficit present. Mental Status: He is alert and oriented to person, place, and time. Psychiatric:         Attention and Perception: Attention and perception normal.         Mood and Affect: Mood normal.         Speech: Speech normal.         Behavior: Behavior normal. Behavior is cooperative. Thought Content: Thought content normal. Thought content does not include homicidal or suicidal plan. Cognition and Memory: Cognition and memory normal.         Judgment: Judgment normal.         ASSESSMENT/PLAN:  1.  Uncontrolled type 2 diabetes mellitus with hyperglycemia, with long-term current use of insulin ([facility] stable   Followed by Dr Lucie Albright - next appt October  Last A1c 7   Normal foot exam noted in July   Will change to Humolog TID after her uses all his 70/30 insulin. Denies any hypoglycemic events. 2. Major depressive disorder with single episode, in partial remission ([facility] ongoing   Currently taking Cymbalta , xanax   Reports compliance   No dosage changes , will monitor     3. Essential hypertension, stable   Currently taking  Lisinopril   Reports compliance   No dosage changes , will monitor   Reviewed diet and physical activity lifestyle modifications to improve management of HTN. 4. Mixed hyperlipidemia, stable   Currently taking  Lipitor   Reports compliance   No dosage changes , will monitor     Return in about 3 months (around 12/22/2020) for med check . All questions addresses and answered . Patient agrees with plan of care. An electronic signature was used to authenticate this note.     --ADDISON Du - CNP on 9/22/2020 at 9:33 AM

## 2020-10-19 RX ORDER — ATORVASTATIN CALCIUM 20 MG/1
TABLET, FILM COATED ORAL
Qty: 90 TABLET | Refills: 3 | Status: SHIPPED | OUTPATIENT
Start: 2020-10-19 | End: 2021-09-21

## 2020-10-19 RX ORDER — FENOFIBRATE 160 MG/1
160 TABLET ORAL DAILY
Qty: 90 TABLET | Refills: 3 | Status: SHIPPED | OUTPATIENT
Start: 2020-10-19 | End: 2021-09-21

## 2020-10-19 RX ORDER — TAMSULOSIN HYDROCHLORIDE 0.4 MG/1
CAPSULE ORAL
Qty: 90 CAPSULE | Refills: 3 | Status: SHIPPED | OUTPATIENT
Start: 2020-10-19 | End: 2021-09-21

## 2020-10-19 NOTE — TELEPHONE ENCOUNTER
Last office visit :09/22/2020    Future Appointments   Date Time Provider Eduardo Kennedy   10/22/2020  1:40 PM Soumya Hayes MD [address] Endo & Di [facility]   1/5/2021  9:00 AM Cheng Cifuentes, [address] [place] [address]   7/12/2021 11:00 AM Jake Brorego MD [facility] PUL MMA

## 2020-10-22 ENCOUNTER — OFFICE VISIT (OUTPATIENT)
Dept: ENDOCRINOLOGY | Age: 75
End: 2020-10-22
Payer: MEDICARE

## 2020-10-22 VITALS
DIASTOLIC BLOOD PRESSURE: 72 MMHG | SYSTOLIC BLOOD PRESSURE: 136 MMHG | HEIGHT: 67 IN | OXYGEN SATURATION: 94 % | WEIGHT: 260.8 LBS | RESPIRATION RATE: 18 BRPM | HEART RATE: 66 BPM | BODY MASS INDEX: 40.93 KG/M2

## 2020-10-22 LAB — HBA1C MFR BLD: 6 %

## 2020-10-22 PROCEDURE — 99214 OFFICE O/P EST MOD 30 MIN: CPT | Performed by: INTERNAL MEDICINE

## 2020-10-22 PROCEDURE — 1123F ACP DISCUSS/DSCN MKR DOCD: CPT | Performed by: INTERNAL MEDICINE

## 2020-10-22 PROCEDURE — G8417 CALC BMI ABV UP PARAM F/U: HCPCS | Performed by: INTERNAL MEDICINE

## 2020-10-22 PROCEDURE — 3017F COLORECTAL CA SCREEN DOC REV: CPT | Performed by: INTERNAL MEDICINE

## 2020-10-22 PROCEDURE — 83036 HEMOGLOBIN GLYCOSYLATED A1C: CPT | Performed by: INTERNAL MEDICINE

## 2020-10-22 PROCEDURE — 3051F HG A1C>EQUAL 7.0%<8.0%: CPT | Performed by: INTERNAL MEDICINE

## 2020-10-22 PROCEDURE — 4040F PNEUMOC VAC/ADMIN/RCVD: CPT | Performed by: INTERNAL MEDICINE

## 2020-10-22 PROCEDURE — G8484 FLU IMMUNIZE NO ADMIN: HCPCS | Performed by: INTERNAL MEDICINE

## 2020-10-22 PROCEDURE — 2022F DILAT RTA XM EVC RTNOPTHY: CPT | Performed by: INTERNAL MEDICINE

## 2020-10-22 PROCEDURE — 1036F TOBACCO NON-USER: CPT | Performed by: INTERNAL MEDICINE

## 2020-10-22 PROCEDURE — G8427 DOCREV CUR MEDS BY ELIG CLIN: HCPCS | Performed by: INTERNAL MEDICINE

## 2020-10-22 RX ORDER — BLOOD SUGAR DIAGNOSTIC
STRIP MISCELLANEOUS
Qty: 100 STRIP | Refills: 3 | Status: SHIPPED | OUTPATIENT
Start: 2020-10-22 | End: 2021-03-11 | Stop reason: SDUPTHER

## 2020-10-22 NOTE — PROGRESS NOTES
units   Humalog 32 units AC TID   SSI 2 for 50 > 150   Continue Metformin   Advise to check blood sugar 4 times a day   Patient to send blood sugar log for titration. Advise to exercise regularly. Advise to low simple carbohydrate and protein with each  meal diet. Diabetes Care: recommend yearly eye exam, foot exam and urine microalbumin to   creatinine ratio. Patient is up-to-date.

## 2020-10-27 RX ORDER — ALPRAZOLAM 0.25 MG/1
TABLET ORAL
Qty: 60 TABLET | Refills: 0 | Status: SHIPPED | OUTPATIENT
Start: 2020-10-27 | End: 2020-11-30

## 2020-10-27 RX ORDER — PEN NEEDLE, DIABETIC 31 GX5/16"
NEEDLE, DISPOSABLE MISCELLANEOUS
Qty: 100 EACH | Refills: 0 | Status: SHIPPED | OUTPATIENT
Start: 2020-10-27 | End: 2021-03-02

## 2021-01-05 ENCOUNTER — OFFICE VISIT (OUTPATIENT)
Dept: INTERNAL MEDICINE CLINIC | Age: 76
End: 2021-01-05
Payer: MEDICARE

## 2021-01-05 VITALS
WEIGHT: 255 LBS | DIASTOLIC BLOOD PRESSURE: 70 MMHG | RESPIRATION RATE: 18 BRPM | SYSTOLIC BLOOD PRESSURE: 136 MMHG | BODY MASS INDEX: 39.94 KG/M2 | TEMPERATURE: 96.7 F | HEART RATE: 56 BPM | OXYGEN SATURATION: 99 %

## 2021-01-05 DIAGNOSIS — I10 ESSENTIAL HYPERTENSION: ICD-10-CM

## 2021-01-05 DIAGNOSIS — E11.65 UNCONTROLLED TYPE 2 DIABETES MELLITUS WITH HYPERGLYCEMIA, WITH LONG-TERM CURRENT USE OF INSULIN (HCC): Primary | ICD-10-CM

## 2021-01-05 DIAGNOSIS — G47.33 OSA (OBSTRUCTIVE SLEEP APNEA): ICD-10-CM

## 2021-01-05 DIAGNOSIS — Z79.4 UNCONTROLLED TYPE 2 DIABETES MELLITUS WITH HYPERGLYCEMIA, WITH LONG-TERM CURRENT USE OF INSULIN (HCC): Primary | ICD-10-CM

## 2021-01-05 DIAGNOSIS — Z12.5 PROSTATE CANCER SCREENING: ICD-10-CM

## 2021-01-05 DIAGNOSIS — F32.4 MAJOR DEPRESSIVE DISORDER WITH SINGLE EPISODE, IN PARTIAL REMISSION (HCC): ICD-10-CM

## 2021-01-05 DIAGNOSIS — F51.04 PSYCHOPHYSIOLOGICAL INSOMNIA: ICD-10-CM

## 2021-01-05 DIAGNOSIS — E78.2 MIXED HYPERLIPIDEMIA: ICD-10-CM

## 2021-01-05 LAB
A/G RATIO: 1.3 (ref 1.1–2.2)
ALBUMIN SERPL-MCNC: 4.3 G/DL (ref 3.4–5)
ALP BLD-CCNC: 44 U/L (ref 40–129)
ALT SERPL-CCNC: 21 U/L (ref 10–40)
ANION GAP SERPL CALCULATED.3IONS-SCNC: 13 MMOL/L (ref 3–16)
AST SERPL-CCNC: 24 U/L (ref 15–37)
BILIRUB SERPL-MCNC: 1.1 MG/DL (ref 0–1)
BUN BLDV-MCNC: 25 MG/DL (ref 7–20)
CALCIUM SERPL-MCNC: 9.9 MG/DL (ref 8.3–10.6)
CHLORIDE BLD-SCNC: 102 MMOL/L (ref 99–110)
CHOLESTEROL, TOTAL: 131 MG/DL (ref 0–199)
CO2: 25 MMOL/L (ref 21–32)
CREAT SERPL-MCNC: 0.9 MG/DL (ref 0.8–1.3)
CREATININE URINE: 85.5 MG/DL (ref 39–259)
GFR AFRICAN AMERICAN: >60
GFR NON-AFRICAN AMERICAN: >60
GLOBULIN: 3.4 G/DL
GLUCOSE BLD-MCNC: 71 MG/DL (ref 70–99)
HDLC SERPL-MCNC: 27 MG/DL (ref 40–60)
LDL CHOLESTEROL CALCULATED: ABNORMAL MG/DL
LDL CHOLESTEROL DIRECT: 68 MG/DL
MICROALBUMIN UR-MCNC: 11.9 MG/DL
MICROALBUMIN/CREAT UR-RTO: 139.2 MG/G (ref 0–30)
POTASSIUM SERPL-SCNC: 3.3 MMOL/L (ref 3.5–5.1)
PROSTATE SPECIFIC ANTIGEN: 0.35 NG/ML (ref 0–4)
SODIUM BLD-SCNC: 140 MMOL/L (ref 136–145)
TOTAL PROTEIN: 7.7 G/DL (ref 6.4–8.2)
TRIGL SERPL-MCNC: 301 MG/DL (ref 0–150)
VLDLC SERPL CALC-MCNC: ABNORMAL MG/DL

## 2021-01-05 PROCEDURE — 3017F COLORECTAL CA SCREEN DOC REV: CPT | Performed by: NURSE PRACTITIONER

## 2021-01-05 PROCEDURE — 3046F HEMOGLOBIN A1C LEVEL >9.0%: CPT | Performed by: NURSE PRACTITIONER

## 2021-01-05 PROCEDURE — 36415 COLL VENOUS BLD VENIPUNCTURE: CPT | Performed by: NURSE PRACTITIONER

## 2021-01-05 PROCEDURE — G8427 DOCREV CUR MEDS BY ELIG CLIN: HCPCS | Performed by: NURSE PRACTITIONER

## 2021-01-05 PROCEDURE — G8417 CALC BMI ABV UP PARAM F/U: HCPCS | Performed by: NURSE PRACTITIONER

## 2021-01-05 PROCEDURE — G8484 FLU IMMUNIZE NO ADMIN: HCPCS | Performed by: NURSE PRACTITIONER

## 2021-01-05 PROCEDURE — 99214 OFFICE O/P EST MOD 30 MIN: CPT | Performed by: NURSE PRACTITIONER

## 2021-01-05 PROCEDURE — 1036F TOBACCO NON-USER: CPT | Performed by: NURSE PRACTITIONER

## 2021-01-05 PROCEDURE — 2022F DILAT RTA XM EVC RTNOPTHY: CPT | Performed by: NURSE PRACTITIONER

## 2021-01-05 PROCEDURE — 4040F PNEUMOC VAC/ADMIN/RCVD: CPT | Performed by: NURSE PRACTITIONER

## 2021-01-05 PROCEDURE — 1123F ACP DISCUSS/DSCN MKR DOCD: CPT | Performed by: NURSE PRACTITIONER

## 2021-01-05 ASSESSMENT — ENCOUNTER SYMPTOMS
CHEST TIGHTNESS: 0
TROUBLE SWALLOWING: 0
WHEEZING: 0
BLURRED VISION: 0
CHOKING: 0
NAUSEA: 0
SHORTNESS OF BREATH: 0
COUGH: 0
ORTHOPNEA: 0
VOMITING: 0
ABDOMINAL PAIN: 0

## 2021-01-05 NOTE — PATIENT INSTRUCTIONS

## 2021-01-05 NOTE — PROGRESS NOTES
2021     Baltazar Carroll (:  1945) is a 70 y.o. male, here for evaluation of the following medical concerns:    Chief Complaint   Patient presents with    Diabetes     fasting blood work    Hypertension    Hyperlipidemia     Diabetes: Patient is being followed an managed by DR Julia Colon, His last A1C in oct was 6 . He is was changed to sliding scale of Humalog , [id number] was dc , Sriniavsa Arango feels that this is working better. His weight is down 5 lbs , he has been watching his diet better . Exercise is ot to much. But tries to move around. Did see Dr Layne Ellis yesterday and has not c/o of neuropathy. Eye exams with dr Eugenia Wolf - denies any vision issues. Diabetes  He presents for his follow-up diabetic visit. He has type 2 diabetes mellitus. His disease course has been stable. Hypoglycemia symptoms include nervousness/anxiousness. Pertinent negatives for hypoglycemia include no dizziness or headaches. There are no diabetic associated symptoms. Pertinent negatives for diabetes include no blurred vision, no chest pain, no fatigue and no weakness. There are no hypoglycemic complications. Symptoms are stable. There are no diabetic complications. Risk factors for coronary artery disease include diabetes mellitus, dyslipidemia, hypertension, male sex, obesity and sedentary lifestyle. Current diabetic treatment includes insulin injections and oral agent (monotherapy). He is compliant with treatment all of the time. His weight is stable. He is following a generally healthy diet. When asked about meal planning, he reported none. He rarely participates in exercise. An ACE inhibitor/angiotensin II receptor blocker is being taken. He sees a podiatrist.Eye exam is current. Hypertension  This is a chronic problem. The current episode started more than 1 year ago. The problem has been resolved since onset. The problem is controlled. Associated symptoms include anxiety.  Pertinent negatives include no blurred vision, chest pain, headaches, orthopnea, palpitations, peripheral edema or shortness of breath. There are no associated agents to hypertension. Risk factors for coronary artery disease include stress, obesity, male gender, dyslipidemia and diabetes mellitus. Past treatments include ACE inhibitors. The current treatment provides moderate improvement. There are no compliance problems. Hyperlipidemia  This is a chronic problem. The current episode started more than 1 year ago. The problem is controlled. Recent lipid tests were reviewed and are normal. Exacerbating diseases include diabetes. There are no known factors aggravating his hyperlipidemia. Pertinent negatives include no chest pain, myalgias or shortness of breath. Current antihyperlipidemic treatment includes statins. There are no compliance problems. Risk factors for coronary artery disease include diabetes mellitus, dyslipidemia, hypertension, male sex, obesity and stress. Depression/Anxiety : Patient states that he has to use xanax nightly to sleep  he did try for 2 weeks not to take medication but cold not sleep . He states that he has been on Cymbalta for years and may need a new medications. He is followed by Dr Moy Lorenzo but has not seen him in a year and does not really want any more therapy at this time . He denies any Si/HI . Just feels that his depression could be better. KIMMIE/insomnia: Patient states that he see Dr Lindsey Toscano for KIMMIE. He uses CPAP nightly. He c/o of insomnia issues. He is aware that insomnia may be worsening anxiety and depression. He has not changed his settings in awhile. He agrees to call dr Guillermina Rosenbaum to discuss his insomnia issues. Insomnia: states that he wakes at 0400 to void and they can not go back to sleep . He then watches TV until 6 am then goes back to sleep until 10-11. This upset him also . He also has some daytime sleepiness. He denies any need for a sleep med.  He has tried melatonin in the past . Reviewed sleep hygieine strategies. Review of Systems   Constitutional: Negative for appetite change, chills, fatigue and fever. HENT: Negative for trouble swallowing. Eyes: Negative for blurred vision and visual disturbance. Respiratory: Negative for cough, choking, chest tightness, shortness of breath and wheezing. Cardiovascular: Negative for chest pain, palpitations, orthopnea and leg swelling. Gastrointestinal: Negative for abdominal pain, nausea and vomiting. Genitourinary: Negative for dysuria. Nocturia   Musculoskeletal: Negative for arthralgias and myalgias. Neurological: Negative for dizziness, syncope, weakness, light-headedness and headaches. Psychiatric/Behavioral: Positive for dysphoric mood and sleep disturbance. The patient is nervous/anxious. Prior to Visit Medications    Medication Sig Taking?  Authorizing Provider   ALPRAZolam (XANAX) 0.25 MG tablet TAKE ONE TABLET BY MOUTH TWICE A DAY AS NEEDED Yes ADDISON German CNP   oxybutynin (DITROPAN-XL) 10 MG extended release tablet TAKE ONE TABLET BY MOUTH DAILY Yes ADDISON German CNP   B-D ULTRAFINE III SHORT PEN 31G X 8 MM MISC USE TO INJECT INSULIN THREE TIMES A DAY Yes ADDISON German CNP   blood glucose test strips (ONETOUCH ULTRA) strip USE FOUR TIMES A DAY AS NEEDED Yes Jarvis Rollins MD   tamsulosin (FLOMAX) 0.4 MG capsule TAKE 1 CAPSULE BY MOUTH  DAILY Yes ADDISON German CNP   metFORMIN (GLUCOPHAGE) 1000 MG tablet TAKE 1 TABLET BY MOUTH  TWICE DAILY Yes ADDISON German CNP   fenofibrate (TRIGLIDE) 160 MG tablet TAKE 1 TABLET BY MOUTH  DAILY Yes ADDISON German CNP   atorvastatin (LIPITOR) 20 MG tablet TAKE 1 TABLET BY MOUTH ONCE DAILY Yes ADDISON German CNP   insulin glargine (LANTUS SOLOSTAR) 100 UNIT/ML injection pen INJECT SUBCUTANEOUSLY 100  UNITS NIGHTLY 7 boxes Yes Jarvis Rollins MD   lisinopril (PRINIVIL;ZESTRIL) 10 MG tablet TAKE 1 TABLET BY MOUTH  DAILY Yes Normal range of motion. Right lower leg: No edema. Left lower leg: No edema. Lymphadenopathy:      Cervical: No cervical adenopathy. Skin:     General: Skin is warm and dry. Capillary Refill: Capillary refill takes less than 2 seconds. Neurological:      General: No focal deficit present. Mental Status: He is alert and oriented to person, place, and time. Psychiatric:         Attention and Perception: Attention and perception normal.         Mood and Affect: Mood normal.         Speech: Speech normal.         Behavior: Behavior normal. Behavior is cooperative. Thought Content: Thought content normal. Thought content does not include homicidal or suicidal plan. Cognition and Memory: Cognition and memory normal.         Judgment: Judgment normal.       Alivia Kern was seen today for diabetes, hypertension and hyperlipidemia. Diagnoses and all orders for this visit:    Uncontrolled type 2 diabetes mellitus with hyperglycemia, with long-term current use of insulin ([facility] [address] controlled  Followed by Dr Armida Saldana - next appt March   Last A1c 6  Normal foot exam noted in january    Denies any hypoglycemic events. -     Microalbumin / Creatinine Urine Ratio; Future  -     Microalbumin / Creatinine Urine Ratio  Weight down 5 lbs     Essential hypertension, stable   Currently taking  Lisinopril   Reports compliance   No dosage changes , will monitor   Reviewed diet and physical activity lifestyle modifications to improve management of HTN. -     Comprehensive Metabolic Panel; Future  -     Comprehensive Metabolic Panel    Mixed hyperlipidemia, stable   Currently taking  Lipitor   Reports compliance   No dosage changes , will monitor     -     Lipid Panel; Future  -     Lipid Panel    Prostate cancer screening  -     Psa screening;  Future  -     Psa screening    KIMMIE (obstructive sleep apnea), stable  Followed and managed by Dr Lena Schwab  Uses CPAP  Instructed to call and discuss insomnia issues to see if r/t KIMMIE. Psychophysiological insomnia, uncontrolled  Discuss sleep hygiene strategies   Discussing with Dr Ainsley Maldonado to try melatonin  Declines any sleep medication  patient education printout of insomnia given for review. Major depressive disorder with single episode, in partial remission ([facility] [address] ongoing   Currently taking Cymbalta , xanax   Reports compliance   No dosage changes , will monitor   Declines f/u with Dr Jean Pierre Sierra that I will consult with [facility] NP regarding dosing of Cymbalta and let him know a plan. Aware that he needs to call the office of depression worsens. Return in about 6 months (around 7/5/2021) for dm check . All questions addresses and answered . Patient agrees with plan of care. An electronic signature was used to authenticate this note.     --Jina Chowdhury, ADDISON Quintana CNP on 1/5/2021 at 9:49 AM

## 2021-01-07 ENCOUNTER — TELEPHONE (OUTPATIENT)
Dept: INTERNAL MEDICINE CLINIC | Age: 76
End: 2021-01-07

## 2021-01-07 NOTE — TELEPHONE ENCOUNTER
----- Message from Sumi Randolph, [address] - CNP sent at 1/7/2021 12:54 PM EST -----  Regarding: depression medication  Please call Niki Lane and let him know that there are several options to improve his depression symptoms. I did consult with Elías , she would like to meet once to get you set up on a  medication change that will improve your depression. Please help him set a time to meet her in the next few weeks. This is not for counseling but for medication management.

## 2021-01-12 ENCOUNTER — NURSE ONLY (OUTPATIENT)
Dept: INTERNAL MEDICINE CLINIC | Age: 76
End: 2021-01-12
Payer: MEDICARE

## 2021-01-12 DIAGNOSIS — E87.6 LOW SERUM POTASSIUM: Primary | ICD-10-CM

## 2021-01-12 LAB
A/G RATIO: 1.3 (ref 1.1–2.2)
ALBUMIN SERPL-MCNC: 4.3 G/DL (ref 3.4–5)
ALP BLD-CCNC: 36 U/L (ref 40–129)
ALT SERPL-CCNC: 23 U/L (ref 10–40)
ANION GAP SERPL CALCULATED.3IONS-SCNC: 9 MMOL/L (ref 3–16)
AST SERPL-CCNC: 24 U/L (ref 15–37)
BILIRUB SERPL-MCNC: 1.1 MG/DL (ref 0–1)
BUN BLDV-MCNC: 24 MG/DL (ref 7–20)
CALCIUM SERPL-MCNC: 9.5 MG/DL (ref 8.3–10.6)
CHLORIDE BLD-SCNC: 103 MMOL/L (ref 99–110)
CO2: 28 MMOL/L (ref 21–32)
CREAT SERPL-MCNC: 1.1 MG/DL (ref 0.8–1.3)
GFR AFRICAN AMERICAN: >60
GFR NON-AFRICAN AMERICAN: >60
GLOBULIN: 3.2 G/DL
GLUCOSE BLD-MCNC: 167 MG/DL (ref 70–99)
POTASSIUM SERPL-SCNC: 4.4 MMOL/L (ref 3.5–5.1)
SODIUM BLD-SCNC: 140 MMOL/L (ref 136–145)
TOTAL PROTEIN: 7.5 G/DL (ref 6.4–8.2)

## 2021-01-12 PROCEDURE — 36415 COLL VENOUS BLD VENIPUNCTURE: CPT | Performed by: NURSE PRACTITIONER

## 2021-01-12 NOTE — PROGRESS NOTES
PSYCHIATRY INITIAL EVALUATION/DIAGNOSTIC ASSESSMENT    Mallorie Oliveira  1945  01/14/21      CC:   Chief Complaint   Patient presents with    New Patient    Anxiety    Depression       HPI:   Mallorie Oliveira is a 70 y.o. male with h/o anxiety and depression who p/t clinic to establish care with this provider. Referred by ADDISON Gibbs CNP. Patient presents today with worsening complaints of insomnia and mostly anxiety. Lack of sleep is now effecting his mood and thoughts he believes. Experiencing loss of motivation, sleep deprivation, racing thoughts with anxiety, irritability, agitation, poor concentration, and increased isolation. Seemingly uncontrolled at this point with Cymbalta and Alprazolam. Sx occur upon waking up in the morning, anxiety and in the evening. Gets worse when his wife leaves to be with her friends. Finds himself to be more \"possessive\" of wife now that he is retired because she has a very full social life. Patient endorses that he has never had that with friends. Has been isolating himself from family functions, making excuses as to why he cannot go to things. Irritable at home with his wife. Son passed away 10 years ago, Pollo Felton, age the age of 29 from a Pulmonary Embolism. Does not attribute sons death to start of depression or anxiety but certainly endorses that it worsened this. Currently retired, not much of a schedule. Does not thrive on any kind of routine especially during the winter months. Will do some housework but not much else. Previously saw Dr. Ada Beatty who encouraged socialization, routine. Patient states this made him uncomfortable when he would do this. Sleep seems to be the biggest concern for patient at this point as it seems to be causing a tremendous about of fatigue, restlessness, mood changes, and anxiety. Wears a CPAP at night for KIMMIE.      Psych ROS: Depression: rates 3/10 (10 best); decreased sleep, maybe a couple of hours at night, broken up. Takes naps throughout the day. Appetite is okay, has been trying to lose weight. Concentration and focus very poor. +guilt, hopelessness, helplessness sometimes about the fact that he did not save enough money for retired. Self esteem \"not too good. \" DENIES ADL difficulty. Endorses not finding much interest in doing things. Has always kind of been this way. Poor energy daily. DENIES tearfulness. Has always kind of isolated himself, isolating more from family recently, just wants to be by himself for the most part. DENIES SI/HI. Rosie: + irritability, racing thoughts with anxiety. DENIES insomnia with increased energy, rapid speech, easily distracted or decreased attention, expansive mood, increase in energy and goal directed behavior, grandiosity, flight of ideas    Anxiety: rates 5/10 (10 worst); constant worrying about anything and everything. + irritability, sleep disruption. + restlessness, happens at night. + fatigue. Somatic complaints comes with agitation, twitching, feels nervousness on the inside but does not manifest on the outside much.  Endorses fear of doing/saying wrong things, fear of judgement, avoidant of social situations    OCD: DENIES Repetitive actions or rituals, excessive hand washing, contamination fears, need for symmetry, violent thoughts, hoarding, fear of being harmed, mental or verbal repetition of words or phrases and counting    Panic: DENIES shortness of breath, dizziness, diaphoresis, trembling, palpitations, feeing of choking, nausea, feeling outside of self, numbness, chills, hot flashes, chest discomfort and fear of dying Phobias: DENIES shortness of breath, trembling, increased heart rate, panic, dread, terror, horror, awareness that fear is out of proportion to the actual threat, overwhelming urge to escape the situation and intense measures taken to steer clear of the feared object or situation    Psychosis: DENIES A/VH, paranoia, delusions    ADHD:DENIES       PTSD: DENIES nightmares, flashbacks, hypervigilance, easily startled, decreased sleep, reliving the event, avoiding situations that remind you of trauma, physical and mental paralysis when reminded of the experience, same despair, easily angry or irritable, trouble concentrating, fear for safety,  Numbness of emotions, feeling of detachment    Eating disorders: DENIES      TANISHA 7 SCORE 1/14/2021   TANISHA-7 Total Score 13     Interpretation of TANISHA-7 score: 5-9 = mild anxiety, 10-14 = moderate anxiety, 15+ = severe anxiety. Recommend referral to behavioral health for scores 10 or greater. PHQ-9 Total Score: 17 (1/14/2021  9:05 AM)  Thoughts that you would be better off dead, or of hurting yourself in some way: 0 (1/14/2021  9:05 AM)    Interpretation of PHQ-9 score:  1-4 = minimal depression, 5-9 = mild depression, 10-14 = moderate depression; 15-19 = moderately severe depression, 20-27 = severe depression    History obtained from patient and chart (confirmed by patient today). Past Psychiatric History:    Prior hospitalizations: Denies   Prior diagnoses: Anxiety, Depression   Outpatient Treatment:     Psychiatrist: Dr. Terrence Alvarez    Therapist: Has seen therapist, unsure of names. (did not enjoy therapy)   Suicide Attempts: Denies   Hx SH:  Denies    Past Psychopharmacologic Trials (including response/reactions):   Wellbutrin  Clomipramine  Effexor   Celexa    Substance Use History:   Nicotine: Denies  Social History     Tobacco Use   Smoking Status Never Smoker   Smokeless Tobacco Never Used      Alcohol: Denies   Illicits: Denies   Caffeine: Denies Rehabs/Complicated W/D: Denies    Past Medical/Surgical History:   Past Medical History:   Diagnosis Date    Anxiety     BPH (benign prostatic hyperplasia)     Depression     Diabetes with proteinuria     Disorder of iron metabolism 2010    GBS (Guillain [place] syndrome) ([address]     History of colonic polyps     History of colonic polyps     Hyperlipidemia     Hypertension     Liver cirrhosis secondary to WYLIE ([address]     Long term (current) use of insulin ([address] 10/10/2019    Obesity     Presence of intraocular lens 10/10/2019    Psychophysiological insomnia 5/15/2018    Retinal hemorrhage, bilateral 10/10/2019    Type 2 diabetes mellitus with mild nonproliferative diabetic retinopathy without macular edema, bilateral ([address] 10/10/2019    Type 2 diabetes mellitus with proteinuria ([address] 2019    Type II or unspecified type diabetes mellitus without mention of complication, not stated as uncontrolled     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 Raised by both parents throughout lifetime. Both parents involved and good relationships with them. Marital:  for 45 years. Wife, Kelly Cunningham. Overall, average relationships. Kind of possessive over nicole now that he is retired. Children: 4. Samia Sieving . 4 boys, good relationships with them and grandkids 4   Family: Sister, 3 years younger than patient. Close with her. Housing: private residence with wife. 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 Education: Pharmacist    : Denies              Sabianist: [employer]   Legal hx: Denies   Trauma hx: Dad passed away when he was 27, sudden death. Son passed away 10 years ago suddenly as well.     Violence hx: Denies Access to firearms: No    Primary Support System: Jessie Tyson, Wife    Family History:    Medical/Psychiatric History:  Family History   Problem Relation Age of Onset    Alzheimer's Disease Mother     Heart Attack Father     Anxiety Disorder Sister     Other Son         Pulmonary Embolism        Health status of family/Cause of death including parents, siblings: Mom,  66, Alzheimers. Dad,  at 46. Had a heart attack. Jonnie Perez, son, passed away from Pulmonary Embolism. Family Hx of Psychiatric Issues: Sister, suffers from anxiety. Worse with COVID   Family Hx of hereditary Disease: See above         History of completed suicide:Denies    Allergies:    Allergies   Allergen Reactions    Seasonal          Current Medications:     Current Outpatient Medications on File Prior to Visit   Medication Sig Dispense Refill    ALPRAZolam (XANAX) 0.25 MG tablet TAKE ONE TABLET BY MOUTH TWICE A DAY AS NEEDED 60 tablet 0    oxybutynin (DITROPAN-XL) 10 MG extended release tablet TAKE ONE TABLET BY MOUTH DAILY 90 tablet 0    B-D ULTRAFINE III SHORT PEN 31G X 8 MM MISC USE TO INJECT INSULIN THREE TIMES A  each 0    blood glucose test strips (ONETOUCH ULTRA) strip USE FOUR TIMES A DAY AS NEEDED 100 strip 3    tamsulosin (FLOMAX) 0.4 MG capsule TAKE 1 CAPSULE BY MOUTH  DAILY 90 capsule 3    metFORMIN (GLUCOPHAGE) 1000 MG tablet TAKE 1 TABLET BY MOUTH  TWICE DAILY 180 tablet 3    fenofibrate (TRIGLIDE) 160 MG tablet TAKE 1 TABLET BY MOUTH  DAILY 90 tablet 3    atorvastatin (LIPITOR) 20 MG tablet TAKE 1 TABLET BY MOUTH ONCE DAILY 90 tablet 3    insulin glargine (LANTUS SOLOSTAR) 100 UNIT/ML injection pen INJECT SUBCUTANEOUSLY 100  UNITS NIGHTLY 7 boxes 100 mL 5    lisinopril (PRINIVIL;ZESTRIL) 10 MG tablet TAKE 1 TABLET BY MOUTH  DAILY 90 tablet 1    atenolol-chlorthalidone (TENORETIC) 50-25 MG per tablet TAKE 1 TABLET BY MOUTH  DAILY 90 tablet 1  DULoxetine (CYMBALTA) 60 MG extended release capsule TAKE 1 CAPSULE BY MOUTH  TWICE DAILY 180 capsule 1    insulin lispro, 1 Unit Dial, (HUMALOG KWIKPEN) 100 UNIT/ML SOPN 32 -40 units AC TID 30 pen 2    albuterol (PROVENTIL HFA) 108 (90 BASE) MCG/ACT inhaler Inhale 2 puffs into the lungs every 6 hours as needed for Wheezing. 1 Inhaler 3    aspirin 81 MG tablet Take 81 mg by mouth daily. No current facility-administered medications on file prior to visit. Controlled Substance Monitoring:  PDMP Monitoring:    Last PDMP [place] as Reviewed [facility]):  Review User Review Instant Review Result   MADDIE MAJOR 1/12/2021  9:15 AM Reviewed PDMP [1]     Last Controlled Substance Monitoring Documentation      Office Visit from 3/5/2020 in [facility]   Periodic Controlled Substance Monitoring  Possible medication side effects, risk of tolerance/dependence & alternative treatments discussed., No signs of potential drug abuse or diversion identified. , Assessed functional status., Obtaining appropriate analgesic effect of treatment. , Random urine drug screen sent today.  filed at 03/05/2020 1044        Urine Drug Screenings (1 yr)     Drug Panel-PM-HI Res-UR Interp-A  Collected: 3/5/2020 10:10 AM (Final result)    Narrative: Referred out by:  [address] [address]   Phone [phone]    Complete Results          Drug Panel-PM-HI Res-UR Interp-A  Collected: 1/16/2018 10:05 AM (Final result)    Narrative: Referred out by:  [facility]  [address] [facility] [address]   Phone [phone]    Complete Results          Drug Panel-PM-HI Res-UR Interp-A  Collected: 3/8/2016 10:36 AM (Final result)    Complete Results              Medication Contract and Consent for Opioid Use Documents Filed     Patient Documents       Type of Document Status Date Received Received By Description Medication Contract [Status Missing]  Arsh ZUNIGA 12/7/15, Medication Agreement     Medication Contract Received  Delmar Paige 1/16/18, Medication Agreement     Medication Contract [Status Missing]  LETTY ZUNIGA 8/17/15, Medication Agreement     Medication Contract Received 3/5/2020 11:20 AM ENDY KOCH 3/5/2020 medication contract                OBJECTIVE:    Wt Readings from Last 3 Encounters:   01/05/21 255 lb (115.7 kg)   10/22/20 260 lb 12.8 oz (118.3 kg)   09/22/20 266 lb 3.2 oz (120.7 kg)       ROS: Denies trouble with fever, rash, headache, vision changes, chest pain, shortness of breath, nausea, extremity pain, weakness, dysuria.      Review of Systems -   History obtained from chart review and the patient  General ROS: positive for  - fatigue and sleep disturbance  Psychological ROS: positive for - anxiety, concentration difficulties, depression, irritability, mood swings and sleep disturbances  Respiratory ROS: no cough, shortness of breath, or wheezing  Cardiovascular ROS: no chest pain or dyspnea on exertion  Neurological ROS: no TIA or stroke symptoms    Mental Status Exam:     Appearance    alert, cooperative, appropriate dress for season, well groomed, appears stated age  Muscle strength/tone: no atrophy or abnormal movements  Gait/station: normal  Speech    spontaneous, normal rate and normal volume  Mood    Anxious  Depressed  Irritability  Affect    anxiety Congruent to thought content and mood  Thought Content    hopelessness, helplessness, excessive guilt and excessive preoccupations, no delusions voiced  Thought Process    linear   Associations    logical connections  Perceptions: denies AH/VH, does not appear preoccupied with the internal environment  Insight    Good  Judgment    Intact  Orientation    oriented to person, place, time, and general circumstances  Memory    recent and remote memory intact  Attention/Concentration    intact  Ability to understand instructions Yes Ability to respond meaningfully Yes  Language: [address] of knowledge/Intellect: Average  SI:   no suicidal ideation  HI: Denies HI    Labs:   Lab Review   Nurse Only on 01/12/2021   Component Date Value    Sodium 01/12/2021 140     Potassium 01/12/2021 4.4     Chloride 01/12/2021 103     CO2 01/12/2021 28     Anion Gap 01/12/2021 9     Glucose 01/12/2021 167*    BUN 01/12/2021 24*    CREATININE 01/12/2021 1.1     GFR Non- 01/12/2021 >60     GFR  01/12/2021 >60     Calcium 01/12/2021 9.5     Total Protein 01/12/2021 7.5     Alb 01/12/2021 4.3     Albumin/Globulin Ratio 01/12/2021 1.3     Total Bilirubin 01/12/2021 1.1*    Alkaline Phosphatase 01/12/2021 36*    ALT 01/12/2021 23     AST 01/12/2021 24     Globulin 01/12/2021 3.2    Office Visit on 01/05/2021   Component Date Value    Microalbumin, Random Uri* 01/05/2021 11.90*    Creatinine, Ur 01/05/2021 85.5     Microalbumin Creatinine * 01/05/2021 139.2*    PSA 01/05/2021 0.35     Cholesterol, Total 01/05/2021 131     Triglycerides 01/05/2021 301*    HDL 01/05/2021 27*    LDL Calculated 01/05/2021 see below     VLDL Cholesterol Calcula* 01/05/2021 see below     Sodium 01/05/2021 140     Potassium 01/05/2021 3.3*    Chloride 01/05/2021 102     CO2 01/05/2021 25     Anion Gap 01/05/2021 13     Glucose 01/05/2021 71     BUN 01/05/2021 25*    CREATININE 01/05/2021 0.9     GFR Non- 01/05/2021 >60     GFR  01/05/2021 >60     Calcium 01/05/2021 9.9     Total Protein 01/05/2021 7.7     Alb 01/05/2021 4.3     Albumin/Globulin Ratio 01/05/2021 1.3     Total Bilirubin 01/05/2021 1.1*    Alkaline Phosphatase 01/05/2021 44     ALT 01/05/2021 21     AST 01/05/2021 24     Globulin 01/05/2021 3.4     LDL Direct 01/05/2021 68    Office Visit on 10/22/2020   Component Date Value    Hemoglobin A1C 10/22/2020 6.0    Office Visit on 07/22/2020   Component Date Value  Hemoglobin A1C 07/22/2020 7.0        Last Drug screen:   Lab Results   Component Value Date    MDMA Not Detected 03/05/2020       Imaging: no head imaging on file      ASSESSMENT AND PLAN     Diagnosis Orders   1. TANISHA (generalized anxiety disorder)  traZODone (DESYREL) 50 MG tablet   2. Moderate episode of recurrent major depressive disorder (HCC)  traZODone (DESYREL) 50 MG tablet   3. Sleep disturbance  traZODone (DESYREL) 50 MG tablet         1. Safety: NO Imminent risk of danger to/self/others based on the factors considered below. Appropriate for outpatient level of care. Safety plan includes: 911, PES, hotlines, and interventions discussed today. Risk factors: Age <25 or >49, male gender, depressed mood, social isolation, no outpatient services in place, and no collateral information to support safety. Protective factors: denies suicidal ideation, does not have lethal plan, does not have access to guns or weapons, patient is didier for safety, no prior suicide attempts, no family h/o suicide, no substance abuse, patient has social or family support, no active psychosis or cognitive dysfunction, physically healthy, compliant with recommended medications,  and patient is future oriented. 2. Psychiatric Plan    MDD, recurrent, moderate, Generalized Anxiety Disorder, Sleep disturbance:  Patient currently seems to be experiencing worsening of mood, anxiety sx due to the sleep disturbance he is experiencing. Unable to relax his brain with the increased anxiety. Waking up several times throughout the night after a couple hours of sleep. Naps throughout the day. Continues to feel fatigue, low motivation, low energy. -CONTINUE Cymbalta 60 mg BID for depression/anxiety sx. Patient unsure if this medication is actually working at this point but will not titrate off and switch.    -Continue Xanax 0.25 mg BID PRN for anxiety sx. No issues, will not increase. -ADD Trazodone 25-50 mg nightly for sleep/mood augmentation. Side effects discussed. R/B discussed. -CONSIDER augmenting with SGA in future for mood improvement. Considered Seroquel but would want to monitor weight, BMI, Lipids, HA1C closely with this patient. Consider Buspar for anxiety management in future as well if continues.    -Labs: reviewed in Epic. Check TSH, Vit D in future    -Recommend outpt therapy. Not interested at this time. -OARRS reviewed, c/w history  -R/b/se/a d/w pt who consents. 3. Medical  -Following with ADDISON Rob - CNP    4. Substance   -No active issues. 5. RTC - 4 weeks    [facility] COREY Orellana CNP  Psychiatric Mental Health Nurse Practitioner     A total of 55 minutes were spent face-to-face with the patient during this encounter and half this time was med mgt, and > 1/2 of that time was spent counseling this pt.

## 2021-01-14 ENCOUNTER — OFFICE VISIT (OUTPATIENT)
Dept: PSYCHIATRY | Age: 76
End: 2021-01-14
Payer: MEDICARE

## 2021-01-14 DIAGNOSIS — F33.1 MODERATE EPISODE OF RECURRENT MAJOR DEPRESSIVE DISORDER (HCC): ICD-10-CM

## 2021-01-14 DIAGNOSIS — G47.9 SLEEP DISTURBANCE: ICD-10-CM

## 2021-01-14 DIAGNOSIS — F41.1 GAD (GENERALIZED ANXIETY DISORDER): Primary | ICD-10-CM

## 2021-01-14 PROCEDURE — G8427 DOCREV CUR MEDS BY ELIG CLIN: HCPCS | Performed by: NURSE PRACTITIONER

## 2021-01-14 PROCEDURE — 4040F PNEUMOC VAC/ADMIN/RCVD: CPT | Performed by: NURSE PRACTITIONER

## 2021-01-14 PROCEDURE — 1036F TOBACCO NON-USER: CPT | Performed by: NURSE PRACTITIONER

## 2021-01-14 PROCEDURE — 3017F COLORECTAL CA SCREEN DOC REV: CPT | Performed by: NURSE PRACTITIONER

## 2021-01-14 PROCEDURE — G8417 CALC BMI ABV UP PARAM F/U: HCPCS | Performed by: NURSE PRACTITIONER

## 2021-01-14 PROCEDURE — 1123F ACP DISCUSS/DSCN MKR DOCD: CPT | Performed by: NURSE PRACTITIONER

## 2021-01-14 PROCEDURE — 99214 OFFICE O/P EST MOD 30 MIN: CPT | Performed by: NURSE PRACTITIONER

## 2021-01-14 PROCEDURE — G8484 FLU IMMUNIZE NO ADMIN: HCPCS | Performed by: NURSE PRACTITIONER

## 2021-01-14 RX ORDER — TRAZODONE HYDROCHLORIDE 50 MG/1
TABLET ORAL
Qty: 30 TABLET | Refills: 2 | Status: SHIPPED | OUTPATIENT
Start: 2021-01-14 | End: 2021-03-18

## 2021-01-14 ASSESSMENT — PATIENT HEALTH QUESTIONNAIRE - PHQ9
6. FEELING BAD ABOUT YOURSELF - OR THAT YOU ARE A FAILURE OR HAVE LET YOURSELF OR YOUR FAMILY DOWN: 2
5. POOR APPETITE OR OVEREATING: 3
SUM OF ALL RESPONSES TO PHQ9 QUESTIONS 1 & 2: 5
3. TROUBLE FALLING OR STAYING ASLEEP: 3
8. MOVING OR SPEAKING SO SLOWLY THAT OTHER PEOPLE COULD HAVE NOTICED. OR THE OPPOSITE, BEING SO FIGETY OR RESTLESS THAT YOU HAVE BEEN MOVING AROUND A LOT MORE THAN USUAL: 0
SUM OF ALL RESPONSES TO PHQ QUESTIONS 1-9: 17
SUM OF ALL RESPONSES TO PHQ QUESTIONS 1-9: 17
1. LITTLE INTEREST OR PLEASURE IN DOING THINGS: 2

## 2021-01-14 ASSESSMENT — ANXIETY QUESTIONNAIRES
7. FEELING AFRAID AS IF SOMETHING AWFUL MIGHT HAPPEN: 1-SEVERAL DAYS
3. WORRYING TOO MUCH ABOUT DIFFERENT THINGS: 2-OVER HALF THE DAYS
4. TROUBLE RELAXING: 3-NEARLY EVERY DAY
1. FEELING NERVOUS, ANXIOUS, OR ON EDGE: 3-NEARLY EVERY DAY
6. BECOMING EASILY ANNOYED OR IRRITABLE: 2-OVER HALF THE DAYS
2. NOT BEING ABLE TO STOP OR CONTROL WORRYING: 2-OVER HALF THE DAYS

## 2021-01-14 NOTE — PATIENT INSTRUCTIONS
Mobile Crisis, Emergency [facility] for patients in need of medication and or a Psychiatrist, temporarily. [place] [address] [address], [address]. ([facility]). [phone]      [address] [place] [address] [place]   [phone]  [phone])    National Suicide Prevention Lifeline  (158) 273-TALK (0284)  www.suicidepreventionlifeline. Geraldine Peck [address] (PES): [phone]  [address] [place] [address]) provides authorization for Crisis Stabilization services in [facility] for both Adults and Children.   Phone: [phone]  Fax: [phone]  [address]    Mobile Crisis Team: [phone]    Text Support  Text 950675 \"connect\" if suicidal for contact via text or phone call

## 2021-02-09 DIAGNOSIS — N32.81 OVERACTIVE BLADDER: ICD-10-CM

## 2021-02-09 RX ORDER — OXYBUTYNIN CHLORIDE 10 MG/1
TABLET, EXTENDED RELEASE ORAL
Qty: 90 TABLET | Refills: 3 | Status: SHIPPED | OUTPATIENT
Start: 2021-02-09 | End: 2021-02-11 | Stop reason: SDUPTHER

## 2021-02-11 DIAGNOSIS — N32.81 OVERACTIVE BLADDER: ICD-10-CM

## 2021-02-11 RX ORDER — OXYBUTYNIN CHLORIDE 10 MG/1
TABLET, EXTENDED RELEASE ORAL
Qty: 90 TABLET | Refills: 3 | Status: SHIPPED | OUTPATIENT
Start: 2021-02-11 | End: 2022-01-17 | Stop reason: SDUPTHER

## 2021-02-11 NOTE — TELEPHONE ENCOUNTER
#90  Day supply requested to [facility]  Sent to local pharmacy on 2/9/2021  oxybutynin (DITROPAN-XL) 10 MG extended release tablet     rx pended

## 2021-02-14 DIAGNOSIS — F32.A DEPRESSION, UNSPECIFIED DEPRESSION TYPE: ICD-10-CM

## 2021-02-15 RX ORDER — INSULIN LISPRO 100 [IU]/ML
INJECTION, SOLUTION INTRAVENOUS; SUBCUTANEOUS
Qty: 120 ML | Refills: 3 | Status: SHIPPED | OUTPATIENT
Start: 2021-02-15 | End: 2022-04-13 | Stop reason: SDUPTHER

## 2021-02-15 RX ORDER — ATENOLOL AND CHLORTHALIDONE TABLET 50; 25 MG/1; MG/1
TABLET ORAL
Qty: 90 TABLET | Refills: 3 | Status: SHIPPED | OUTPATIENT
Start: 2021-02-15 | End: 2022-01-14 | Stop reason: SDUPTHER

## 2021-02-15 RX ORDER — LISINOPRIL 10 MG/1
TABLET ORAL
Qty: 90 TABLET | Refills: 3 | Status: SHIPPED | OUTPATIENT
Start: 2021-02-15 | End: 2021-11-11 | Stop reason: DRUGHIGH

## 2021-02-15 RX ORDER — DULOXETIN HYDROCHLORIDE 60 MG/1
CAPSULE, DELAYED RELEASE ORAL
Qty: 180 CAPSULE | Refills: 3 | Status: SHIPPED | OUTPATIENT
Start: 2021-02-15 | End: 2022-04-26 | Stop reason: SDUPTHER

## 2021-02-15 NOTE — TELEPHONE ENCOUNTER
Request cymbalta 8/11/20  lisinopril 8/11/20 tenoretic 8/11/20                                                                               Last ov 1/5/21                                                              Next ov 7/6/21

## 2021-02-15 NOTE — TELEPHONE ENCOUNTER
Medication:   Requested Prescriptions     Pending Prescriptions Disp Refills    insulin lispro, 1 Unit Dial, (HUMALOG KWIKPEN) 100 UNIT/ML SOPN [Pharmacy Med Name: Kasey Britton 100U/ML 5X3ML SOLUTION] 120 mL 3     Sig: INJECT 32-40 UNITS  SUBCUTANEOUSLY BEFORE MEALS 3 TIMES DAILY       Last Filled:      Patient Phone Number: [phone] (home) [phone] (work)    Last appt: 10/22/2020   Next appt: 3/11/2021    Last Labs DM:   Lab Results   Component Value Date    LABA1C 6.0 10/22/2020

## 2021-02-16 NOTE — PROGRESS NOTES
HISTORICALLY rates 3/10 (10 best). Decreased sleep, maybe a couple of hours at night, broken up. Takes naps throughout the day. Appetite is okay, has been trying to lose weight. Concentration and focus very poor. +guilt, hopelessness, helplessness sometimes about the fact that he did not save enough money for retired. Self esteem \"not too good. \" DENIES ADL difficulty. Endorses not finding much interest in doing things. Has always kind of been this way. Poor energy daily. DENIES tearfulness. Has always kind of isolated himself, isolating more from family recently, just wants to be by himself for the most part. DENIES SI/HI.      Rosie: CONTINUES to endorse and HISTORICALLY endorses + irritability, racing thoughts with anxiety. DENIES insomnia with increased energy, rapid speech, easily distracted or decreased attention, expansive mood, increase in energy and goal directed behavior, grandiosity, flight of ideas.      Anxiety: Today continues to rate anxiety as 5/10 (10 worst). Denies panic attacks. Just constant anxiety throughout the day. HISTORICALLY rates 5/10 (10 worst); constant worrying about anything and everything. + irritability, sleep disruption. + restlessness, happens at night. + fatigue. Somatic complaints comes with agitation, twitching, feels nervousness on the inside but does not manifest on the outside much.  Endorses fear of doing/saying wrong things, fear of judgement, avoidant of social situations     OCD: DENIES Repetitive actions or rituals, excessive hand washing, contamination fears, need for symmetry, violent thoughts, hoarding, fear of being harmed, mental or verbal repetition of words or phrases and counting     Panic: DENIES shortness of breath, dizziness, diaphoresis, trembling, palpitations, feeing of choking, nausea, feeling outside of self, numbness, chills, hot flashes, chest discomfort and fear of dying    Phobias: DENIES shortness of breath, trembling, increased heart rate, panic, dread, terror, horror, awareness that fear is out of proportion to the actual threat, overwhelming urge to escape the situation and intense measures taken to steer clear of the feared object or situation     Psychosis: DENIES A/VH, paranoia, delusions     ADHD:DENIES        PTSD: DENIES nightmares, flashbacks, hypervigilance, easily startled, decreased sleep, reliving the event, avoiding situations that remind you of trauma, physical and mental paralysis when reminded of the experience, same despair, easily angry or irritable, trouble concentrating, fear for safety,  Numbness of emotions, feeling of detachment     Eating disorders: DENIES    TANISHA 7 SCORE 2/18/2021 1/14/2021   TANISHA-7 Total Score 13 13     Interpretation of TANISHA-7 score: 5-9 = mild anxiety, 10-14 = moderate anxiety,   15+ = severe anxiety. Recommend referral to behavioral health for scores 10 or greater. PHQ-9 Total Score: 11 (2/18/2021  9:11 AM)  Thoughts that you would be better off dead, or of hurting yourself in some way: 0 (2/18/2021  9:11 AM)    Interpretation of PHQ-9 score:  1-4 = minimal depression, 5-9 = mild depression,   10-14 = moderate depression; 15-19 = moderately severe depression, 20-27 = severe depression    Past Psychiatric History:               Prior hospitalizations: Denies              Prior diagnoses: Anxiety, Depression              Outpatient Treatment:                           Psychiatrist: Dr. Krystian Aldana                          Therapist: Has seen therapist, unsure of names. 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/2010    GBS (Guillain [place] syndrome) ([address] 1990's    History of colonic polyps     History of colonic polyps     Hyperlipidemia     Hypertension     Liver cirrhosis secondary to WYLIE ([address]     Long term (current) use of insulin ([address] 10/10/2019    Obesity     Presence of intraocular lens 10/10/2019    Psychophysiological insomnia 5/15/2018    Retinal hemorrhage, bilateral 10/10/2019    Type 2 diabetes mellitus with mild nonproliferative diabetic retinopathy without macular edema, bilateral ([address] 10/10/2019    Type 2 diabetes mellitus with proteinuria ([address] 11/19/2019    Type II or unspecified type diabetes mellitus without mention of complication, not stated as uncontrolled     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     Past Surgical History:   Procedure Laterality Date    CATARACT REMOVAL Left 2010       Family History   Problem Relation Age of Onset    Alzheimer's Disease Mother     Heart Attack Father     Anxiety Disorder Sister     Other Son         Pulmonary Embolism         PCP: ADDISON Black CNP      Allergies:    Allergies   Allergen Reactions    Seasonal          Current Medications:   Current Outpatient Medications on File Prior to Visit   Medication Sig Dispense Refill    lisinopril (PRINIVIL;ZESTRIL) 10 MG tablet TAKE 1 TABLET BY MOUTH  DAILY 90 tablet 3    atenolol-chlorthalidone (TENORETIC) 50-25 MG per tablet TAKE 1 TABLET BY MOUTH  DAILY 90 tablet 3    DULoxetine (CYMBALTA) 60 MG extended release capsule TAKE 1 CAPSULE BY MOUTH  TWICE DAILY 180 capsule 3    insulin lispro, 1 Unit Dial, (HUMALOG KWIKPEN) 100 UNIT/ML SOPN INJECT 32-40 UNITS  SUBCUTANEOUSLY BEFORE MEALS 3 TIMES DAILY 120 mL 3    oxybutynin (DITROPAN-XL) 10 MG extended release tablet TAKE ONE TABLET BY MOUTH DAILY 90 tablet 3    ALPRAZolam (XANAX) 0.25 MG tablet TAKE ONE TABLET BY MOUTH TWICE A DAY AS NEEDED 60 tablet 0  traZODone (DESYREL) 50 MG tablet Start with 1/2 tablet at night and then can increase to 1 tablet if needed 30 tablet 2    B-D ULTRAFINE III SHORT PEN 31G X 8 MM MISC USE TO INJECT INSULIN THREE TIMES A  each 0    blood glucose test strips (ONETOUCH ULTRA) strip USE FOUR TIMES A DAY AS NEEDED 100 strip 3    tamsulosin (FLOMAX) 0.4 MG capsule TAKE 1 CAPSULE BY MOUTH  DAILY 90 capsule 3    metFORMIN (GLUCOPHAGE) 1000 MG tablet TAKE 1 TABLET BY MOUTH  TWICE DAILY 180 tablet 3    fenofibrate (TRIGLIDE) 160 MG tablet TAKE 1 TABLET BY MOUTH  DAILY 90 tablet 3    atorvastatin (LIPITOR) 20 MG tablet TAKE 1 TABLET BY MOUTH ONCE DAILY 90 tablet 3    insulin glargine (LANTUS SOLOSTAR) 100 UNIT/ML injection pen INJECT SUBCUTANEOUSLY 100  UNITS NIGHTLY 7 boxes 100 mL 5    albuterol (PROVENTIL HFA) 108 (90 BASE) MCG/ACT inhaler Inhale 2 puffs into the lungs every 6 hours as needed for Wheezing. 1 Inhaler 3    aspirin 81 MG tablet Take 81 mg by mouth daily. No current facility-administered medications on file prior to visit. Controlled Substance Monitoring:    PDMP Monitoring:    Last PDMP [place] as Reviewed [facility]):  Review User Review Instant Review Result   Malathi Proctor 2/16/2021 11:29 AM Reviewed PDMP [1]     Last Controlled Substance Monitoring Documentation      Office Visit from 3/5/2020 in [facility]   Periodic Controlled Substance Monitoring  Possible medication side effects, risk of tolerance/dependence & alternative treatments discussed., No signs of potential drug abuse or diversion identified. , Assessed functional status., Obtaining appropriate analgesic effect of treatment. , Random urine drug screen sent today.  filed at 03/05/2020 1044          OBJECTIVE:    Wt Readings from Last 3 Encounters:   01/05/21 255 lb (115.7 kg)   10/22/20 260 lb 12.8 oz (118.3 kg)   09/22/20 266 lb 3.2 oz (120.7 kg) ROS: Denies trouble with fever, rash, headache, vision changes, chest pain, shortness of breath, nausea, extremity pain, weakness, dysuria.      Mental Status Exam:     Appearance    alert, cooperative  Muscle strength/tone: no atrophy or abnormal movements  Gait/station: normal  Impulsive behavior No  Speech    spontaneous, normal rate and normal volume  Mood    Anxious  Depressed  Affect    depressed affect Congruent to thought content and mood  Thought Content    intact, no delusions voiced  Thought Process    linear   Associations    logical connections  Perceptions: denies AH/VH, does not appear preoccupied with the internal environment, no delusions  Insight    Good  Judgment    Intact  Orientation    oriented to person, place, time, and general circumstances  Memory    recent and remote memory intact  Attention/Concentration    intact  Ability to understand instructions Yes  Ability to respond meaningfully Yes  Language:  [address] of Knowledge/Intelligence:  Average  SI:   no suicidal ideation  HI: Denies HI    Labs:     Nurse Only on 01/12/2021   Component Date Value    Sodium 01/12/2021 140     Potassium 01/12/2021 4.4     Chloride 01/12/2021 103     CO2 01/12/2021 28     Anion Gap 01/12/2021 9     Glucose 01/12/2021 167*    BUN 01/12/2021 24*    CREATININE 01/12/2021 1.1     GFR Non- 01/12/2021 >60     GFR  01/12/2021 >60     Calcium 01/12/2021 9.5     Total Protein 01/12/2021 7.5     Albumin 01/12/2021 4.3     Albumin/Globulin Ratio 01/12/2021 1.3     Total Bilirubin 01/12/2021 1.1*    Alkaline Phosphatase 01/12/2021 36*    ALT 01/12/2021 23     AST 01/12/2021 24     Globulin 01/12/2021 3.2    Office Visit on 01/05/2021   Component Date Value    Microalbumin, Random Uri* 01/05/2021 11.90*    Creatinine, Ur 01/05/2021 85.5     Microalbumin Creatinine * 01/05/2021 139.2*    PSA 01/05/2021 0.35     Cholesterol, Total 01/05/2021 131  Triglycerides 01/05/2021 301*    HDL 01/05/2021 27*    LDL Calculated 01/05/2021 see below     VLDL Cholesterol Calcula* 01/05/2021 see below     Sodium 01/05/2021 140     Potassium 01/05/2021 3.3*    Chloride 01/05/2021 102     CO2 01/05/2021 25     Anion Gap 01/05/2021 13     Glucose 01/05/2021 71     BUN 01/05/2021 25*    CREATININE 01/05/2021 0.9     GFR Non- 01/05/2021 >60     GFR  01/05/2021 >60     Calcium 01/05/2021 9.9     Total Protein 01/05/2021 7.7     Albumin 01/05/2021 4.3     Albumin/Globulin Ratio 01/05/2021 1.3     Total Bilirubin 01/05/2021 1.1*    Alkaline Phosphatase 01/05/2021 44     ALT 01/05/2021 21     AST 01/05/2021 24     Globulin 01/05/2021 3.4     LDL Direct 01/05/2021 68    Office Visit on 10/22/2020   Component Date Value    Hemoglobin A1C 10/22/2020 6.0        Last Drug screen:  Lab Results   Component Value Date    MDMA Not Detected 03/05/2020         Imaging: no head imaging on file      ASSESSMENT AND PLAN     Diagnosis Orders   1. Moderate episode of recurrent major depressive disorder (HCC)  ARIPiprazole (ABILIFY) 5 MG tablet   2. Sleep disturbance     3. TANISHA (generalized anxiety disorder)         1. Safety: NO Imminent risk of danger to/self/others based on the factors considered below. Appropriate for outpatient level of care.   Safety plan includes: 911, PES, hotlines, and interventions discussed today.      Risk factors: Age <25 or >49, male gender, depressed mood, social isolation, no outpatient services in place, and no collateral information to support safety.

## 2021-02-18 ENCOUNTER — OFFICE VISIT (OUTPATIENT)
Dept: PSYCHIATRY | Age: 76
End: 2021-02-18
Payer: MEDICARE

## 2021-02-18 DIAGNOSIS — F41.1 GAD (GENERALIZED ANXIETY DISORDER): ICD-10-CM

## 2021-02-18 DIAGNOSIS — G47.9 SLEEP DISTURBANCE: ICD-10-CM

## 2021-02-18 DIAGNOSIS — F33.1 MODERATE EPISODE OF RECURRENT MAJOR DEPRESSIVE DISORDER (HCC): Primary | ICD-10-CM

## 2021-02-18 PROCEDURE — 4040F PNEUMOC VAC/ADMIN/RCVD: CPT | Performed by: NURSE PRACTITIONER

## 2021-02-18 PROCEDURE — 3017F COLORECTAL CA SCREEN DOC REV: CPT | Performed by: NURSE PRACTITIONER

## 2021-02-18 PROCEDURE — 1036F TOBACCO NON-USER: CPT | Performed by: NURSE PRACTITIONER

## 2021-02-18 PROCEDURE — 99214 OFFICE O/P EST MOD 30 MIN: CPT | Performed by: NURSE PRACTITIONER

## 2021-02-18 PROCEDURE — 1123F ACP DISCUSS/DSCN MKR DOCD: CPT | Performed by: NURSE PRACTITIONER

## 2021-02-18 PROCEDURE — G8427 DOCREV CUR MEDS BY ELIG CLIN: HCPCS | Performed by: NURSE PRACTITIONER

## 2021-02-18 PROCEDURE — G8484 FLU IMMUNIZE NO ADMIN: HCPCS | Performed by: NURSE PRACTITIONER

## 2021-02-18 PROCEDURE — G8417 CALC BMI ABV UP PARAM F/U: HCPCS | Performed by: NURSE PRACTITIONER

## 2021-02-18 RX ORDER — ARIPIPRAZOLE 5 MG/1
5 TABLET ORAL DAILY
Qty: 30 TABLET | Refills: 3 | Status: SHIPPED
Start: 2021-02-18 | End: 2021-03-18 | Stop reason: SINTOL

## 2021-02-18 ASSESSMENT — PATIENT HEALTH QUESTIONNAIRE - PHQ9
SUM OF ALL RESPONSES TO PHQ QUESTIONS 1-9: 11
6. FEELING BAD ABOUT YOURSELF - OR THAT YOU ARE A FAILURE OR HAVE LET YOURSELF OR YOUR FAMILY DOWN: 1
SUM OF ALL RESPONSES TO PHQ QUESTIONS 1-9: 11
SUM OF ALL RESPONSES TO PHQ QUESTIONS 1-9: 11
8. MOVING OR SPEAKING SO SLOWLY THAT OTHER PEOPLE COULD HAVE NOTICED. OR THE OPPOSITE, BEING SO FIGETY OR RESTLESS THAT YOU HAVE BEEN MOVING AROUND A LOT MORE THAN USUAL: 0
9. THOUGHTS THAT YOU WOULD BE BETTER OFF DEAD, OR OF HURTING YOURSELF: 0
4. FEELING TIRED OR HAVING LITTLE ENERGY: 1
5. POOR APPETITE OR OVEREATING: 3
10. IF YOU CHECKED OFF ANY PROBLEMS, HOW DIFFICULT HAVE THESE PROBLEMS MADE IT FOR YOU TO DO YOUR WORK, TAKE CARE OF THINGS AT HOME, OR GET ALONG WITH OTHER PEOPLE: 1
3. TROUBLE FALLING OR STAYING ASLEEP: 1

## 2021-02-18 ASSESSMENT — ANXIETY QUESTIONNAIRES
6. BECOMING EASILY ANNOYED OR IRRITABLE: 3-NEARLY EVERY DAY
2. NOT BEING ABLE TO STOP OR CONTROL WORRYING: 1-SEVERAL DAYS
1. FEELING NERVOUS, ANXIOUS, OR ON EDGE: 2-OVER HALF THE DAYS
4. TROUBLE RELAXING: 2-OVER HALF THE DAYS

## 2021-02-18 NOTE — PATIENT INSTRUCTIONS
[facility] for patients in need of medication and or a Psychiatrist, temporarily. [place], [id number]. (North Entrance). [phone]      [facility]   [phone]  [phone])    National Suicide Prevention Lifeline  (076) 273-TALK (5346)  www.suicidepreventionlifeline. [employer] [place] (PES): [phone]  [address] [place] [address]) provides authorization for Crisis Stabilization services in [facility] for both Adults and Children.   Phone: [phone]  Fax: [phone]  [address]    Mobile Crisis Team: [phone]    Text Support  Text 850474 \"connect\" if suicidal for contact via text or phone call

## 2021-03-02 RX ORDER — PEN NEEDLE, DIABETIC 31 GX5/16"
NEEDLE, DISPOSABLE MISCELLANEOUS
Qty: 100 EACH | Refills: 2 | Status: SHIPPED | OUTPATIENT
Start: 2021-03-02 | End: 2022-04-06 | Stop reason: SDUPTHER

## 2021-03-11 ENCOUNTER — OFFICE VISIT (OUTPATIENT)
Dept: ENDOCRINOLOGY | Age: 76
End: 2021-03-11
Payer: MEDICARE

## 2021-03-11 VITALS
HEART RATE: 62 BPM | WEIGHT: 262 LBS | HEIGHT: 67 IN | OXYGEN SATURATION: 97 % | RESPIRATION RATE: 18 BRPM | DIASTOLIC BLOOD PRESSURE: 64 MMHG | SYSTOLIC BLOOD PRESSURE: 116 MMHG | BODY MASS INDEX: 41.12 KG/M2

## 2021-03-11 DIAGNOSIS — I10 ESSENTIAL HYPERTENSION: ICD-10-CM

## 2021-03-11 LAB — HBA1C MFR BLD: 6.4 %

## 2021-03-11 PROCEDURE — 99214 OFFICE O/P EST MOD 30 MIN: CPT | Performed by: INTERNAL MEDICINE

## 2021-03-11 PROCEDURE — 1036F TOBACCO NON-USER: CPT | Performed by: INTERNAL MEDICINE

## 2021-03-11 PROCEDURE — 1123F ACP DISCUSS/DSCN MKR DOCD: CPT | Performed by: INTERNAL MEDICINE

## 2021-03-11 PROCEDURE — G8427 DOCREV CUR MEDS BY ELIG CLIN: HCPCS | Performed by: INTERNAL MEDICINE

## 2021-03-11 PROCEDURE — 3017F COLORECTAL CA SCREEN DOC REV: CPT | Performed by: INTERNAL MEDICINE

## 2021-03-11 PROCEDURE — G8417 CALC BMI ABV UP PARAM F/U: HCPCS | Performed by: INTERNAL MEDICINE

## 2021-03-11 PROCEDURE — 3046F HEMOGLOBIN A1C LEVEL >9.0%: CPT | Performed by: INTERNAL MEDICINE

## 2021-03-11 PROCEDURE — 2022F DILAT RTA XM EVC RTNOPTHY: CPT | Performed by: INTERNAL MEDICINE

## 2021-03-11 PROCEDURE — 4040F PNEUMOC VAC/ADMIN/RCVD: CPT | Performed by: INTERNAL MEDICINE

## 2021-03-11 PROCEDURE — 83036 HEMOGLOBIN GLYCOSYLATED A1C: CPT | Performed by: INTERNAL MEDICINE

## 2021-03-11 PROCEDURE — G8484 FLU IMMUNIZE NO ADMIN: HCPCS | Performed by: INTERNAL MEDICINE

## 2021-03-11 RX ORDER — BLOOD SUGAR DIAGNOSTIC
STRIP MISCELLANEOUS
Qty: 400 STRIP | Refills: 3 | Status: SHIPPED | OUTPATIENT
Start: 2021-03-11 | End: 2021-06-24

## 2021-03-11 NOTE — PROGRESS NOTES
Seen as  patient for diabetes    Interim:  Glucose high since on abilify    Diagnosed with Type 2 diabetes mellitus > 15 years    Known diabetic complications: diabetic retinopathy  Uncontrolled, moderate    Current diabetic medications     Lantus  80 units  Humalog 32  Metformin 1gm BID    States was on humalog in the past but was not keeping them down consistently    Last A1c 6.4%<-----6%<----- 7%<------ 7.9<-----   7.4 <--- 7.1    Prior visit with dietician: Yes   Current diet: on average, 3 meals per day  +snacks  Current exercise: walking   Current monitoring regimen: home blood tests -4  times daily     Has brought blood glucose log/meter:   Home blood sugar records: 100-250  Any episodes of hypoglycemia?    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/2010    GBS (Guillain [place] syndrome) ([address] 1990's    History of colonic polyps     History of colonic polyps     Hyperlipidemia     Hypertension     Liver cirrhosis secondary to WYLIE ([address]     Long term (current) use of insulin ([address] 10/10/2019    Obesity     Presence of intraocular lens 10/10/2019    Psychophysiological insomnia 5/15/2018    Retinal hemorrhage, bilateral 10/10/2019    Type 2 diabetes mellitus with mild nonproliferative diabetic retinopathy without macular edema, bilateral ([address] 10/10/2019    Type 2 diabetes mellitus with proteinuria ([address] 11/19/2019    Type II or unspecified type diabetes mellitus without mention of complication, not stated as uncontrolled     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     Past Surgical History:   Procedure Laterality Date    CATARACT REMOVAL Left 2010       Review of Systems  Please see scanned document dated and signed      Objective:      /64   Pulse 62   Resp 18   Ht 5' 7\" (1.702 m)   Wt 262 lb (118.8 kg)   SpO2 97%   BMI 41.04 kg/m²   Wt Readings from Last 3 Encounters:   03/11/21 262 lb (118.8 kg)   01/05/21 255 lb (115.7 kg)   10/22/20 260 lb 12.8 oz (118.3 kg)     Constitutional: Well-developed, appears stated age, cooperative, in no acute distress  H/E/N/M/T:atraumatic, normocephalic, external ears, nose, lips normal without lesions  Eyes: Lids, lashes, conjunctivae and sclerae normal, No proptosis, no redness  Neck: supple, symmetrical, no swelling  Skin: No obvious rashes or lesions present. Skin and hair texture normal  Psychiatric: Judgement and Insight:  judgement and insight appear normal  Neuro: Normal without focal findings, speech is normal normal, speech is spontaneous  Chest: No labored breathing, no chest deformity, no stridor  Musculoskeletal: No joint deformity, swelling    Lab Reviewed   No components found for: CHLPL  Lab Results   Component Value Date    TRIG 301 (H) 01/05/2021    TRIG 202 (H) 03/05/2020    TRIG 202 (H) 02/11/2019     Lab Results   Component Value Date    HDL 27 (L) 01/05/2021    HDL 25 (L) 03/05/2020    HDL 25 (L) 02/11/2019     Lab Results   Component Value Date    LDLCALC see below 01/05/2021    LDLCALC 35 03/05/2020    LDLCALC 40 02/11/2019     Lab Results   Component Value Date    LABVLDL see below 01/05/2021    LABVLDL 40 03/05/2020    LABVLDL 40 02/11/2019     Lab Results   Component Value Date    LABA1C 6.0 10/22/2020       Assessment:     Alberto Maier is a 78 y.o. male with :    1.T2DM: Longstanding, controlled, discussed goals. He is on basal bolus. A1c at goal. Discussed CGM, he prefers finger stick.   Reviewed log, glucose high, will adjust insulin dose  Discussed trulicity, he would like to wait till the next time. 2.HTN : Blood pressure at goal  3. HLD: LDL at goal  4. Obesity       Plan:      Lantus 80 --->88 units   Humalog 32---> 35 units AC TID   SSI 2 for 50 > 150   Continue Metformin   Advise to check blood sugar 4 times a day   Patient to send blood sugar log for titration. Advise to exercise regularly. Advise to low simple carbohydrate and protein with each  meal diet. Diabetes Care: recommend yearly eye exam, foot exam and urine microalbumin to   creatinine ratio. Patient is up-to-date.

## 2021-03-16 NOTE — PROGRESS NOTES
PSYCHIATRY PROGRESS NOTE    Marny Bumpers  1945  03/18/21      CC:   Chief Complaint   Patient presents with    Depression    Follow-up     HPI:   Marny Bumpers is a 78 y.o. male with h/o depression and anxiety who p/t clinic for follow up. Since initial evaluation, patient endorses that he has been sleeping much better than previously. Continues to use BIPAP. Stopped taking the Abilify because he felt that he was noticing an increase in the blood sugars in the morning as well his blood pressure. Irritability continues to be a little bit of an issue. Together all the time still due to COVID. Got vaccinated, 2 doses, no side effects of issues with the vaccination. Has been doing some yard work on the nice days. Has a backyard pool so he is excited to have the family over to swim over the summer. Wife is doing pretty good, has been on the keto diet and has been losing a lot of weight. Continues to feel less and less fatigued. Believes that his moods really can be regulated with sleep issues. Continues to endorse some new low motivation, isolation but states he states he leads a pretty simple life, not a lot of friends. Psych ROS:     Depression: Continues rates 7-8/10 (10 best); sleep continues to be better. Appetite remains okay. Concentration and focus poor on days where he feels like he is not getting enough sleep. More interest in things since weather is getting better. DENIES SI/HI. HISTORICALLY rates 3/10 (10 best). Decreased sleep, maybe a couple of hours at night, broken up. Takes naps throughout the day. Appetite is okay, has been trying to lose weight. Concentration and focus very poor. +guilt, hopelessness, helplessness sometimes about the fact that he did not save enough money for retired. Self esteem \"not too good. \" DENIES ADL difficulty. Endorses not finding much interest in doing things. Has always kind of been this way. Poor energy daily. DENIES tearfulness.  Has same despair, easily angry or irritable, trouble concentrating, fear for safety,  Numbness of emotions, feeling of detachment     Eating disorders: DENIES    TANISHA 7 SCORE 3/18/2021 2/18/2021 1/14/2021   TANISHA-7 Total Score 10 13 13     Interpretation of TANISHA-7 score: 5-9 = mild anxiety, 10-14 = moderate anxiety,   15+ = severe anxiety. Recommend referral to behavioral health for scores 10 or greater. PHQ-9 Total Score: 12 (3/18/2021  9:01 AM)  Thoughts that you would be better off dead, or of hurting yourself in some way: 0 (3/18/2021  9:01 AM)    Interpretation of PHQ-9 score:  1-4 = minimal depression, 5-9 = mild depression,   10-14 = moderate depression; 15-19 = moderately severe depression, 20-27 = severe depression    Past Psychiatric History:               Prior hospitalizations: Denies              Prior diagnoses: Anxiety, Depression              Outpatient Treatment:                           Psychiatrist: Dr. Barth Peer                          Therapist: Has seen therapist, unsure of names. 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/2010    GBS (Guillain [place] syndrome) ([address] 1990's    History of colonic polyps     History of colonic polyps     Hyperlipidemia     Hypertension     Liver cirrhosis secondary to WYLIE ([address]     Long term (current) use of insulin ([address] 10/10/2019    Obesity     Presence of intraocular lens 10/10/2019    Psychophysiological insomnia 5/15/2018    Retinal hemorrhage, bilateral 10/10/2019    Type 2 diabetes mellitus with mild nonproliferative diabetic retinopathy without macular edema, bilateral ([address] Agreement     Medication Contract [Status Missing]  LETTY ZUNIGA 8/17/15, Medication Agreement     Medication Contract Received 3/5/2020 11:20 AM ENDY KOCH 3/5/2020 medication contract                OBJECTIVE:    Wt Readings from Last 3 Encounters:   03/11/21 262 lb (118.8 kg)   01/05/21 255 lb (115.7 kg)   10/22/20 260 lb 12.8 oz (118.3 kg)       ROS: Denies trouble with fever, rash, headache, vision changes, chest pain, shortness of breath, nausea, extremity pain, weakness, dysuria.      Mental Status Exam:     Appearance    alert, cooperative  Muscle strength/tone: no atrophy or abnormal movements  Gait/station: normal  Impulsive behavior No  Speech    spontaneous, normal rate and normal volume  Mood    Neutral  Affect    normal affect Congruent to thought content and mood  Thought Content    intact, no delusions voiced  Thought Process    linear   Associations    logical connections  Perceptions: denies AH/VH, does not appear preoccupied with the internal environment, no delusions  Insight    Good  Judgment    Intact  Orientation    oriented to person, place, time, and general circumstances  Memory    recent and remote memory intact  Attention/Concentration    intact  Ability to understand instructions Yes  Ability to respond meaningfully Yes  Language:  1948 [address] of Knowledge/Intelligence:  Average  SI:   no suicidal ideation  HI: Denies HI    Labs:     Office Visit on 03/11/2021   Component Date Value    Hemoglobin A1C 03/11/2021 6.4    Nurse Only on 01/12/2021   Component Date Value    Sodium 01/12/2021 140     Potassium 01/12/2021 4.4     Chloride 01/12/2021 103     CO2 01/12/2021 28     Anion Gap 01/12/2021 9     Glucose 01/12/2021 167*    BUN 01/12/2021 24*    CREATININE 01/12/2021 1.1     GFR Non- 01/12/2021 >60     GFR  01/12/2021 >60     Calcium 01/12/2021 9.5     Total Protein 01/12/2021 7.5     Albumin 01/12/2021 4.3     Albumin/Globulin Ratio 01/12/2021 1.3     Total Bilirubin 01/12/2021 1.1*    Alkaline Phosphatase 01/12/2021 36*    ALT 01/12/2021 23     AST 01/12/2021 24     Globulin 01/12/2021 3.2    Office Visit on 01/05/2021   Component Date Value    Microalbumin, Random Uri* 01/05/2021 11.90*    Creatinine, Ur 01/05/2021 85.5     Microalbumin Creatinine * 01/05/2021 139.2*    PSA 01/05/2021 0.35     Cholesterol, Total 01/05/2021 131     Triglycerides 01/05/2021 301*    HDL 01/05/2021 27*    LDL Calculated 01/05/2021 see below     VLDL Cholesterol Calcula* 01/05/2021 see below     Sodium 01/05/2021 140     Potassium 01/05/2021 3.3*    Chloride 01/05/2021 102     CO2 01/05/2021 25     Anion Gap 01/05/2021 13     Glucose 01/05/2021 71     BUN 01/05/2021 25*    CREATININE 01/05/2021 0.9     GFR Non- 01/05/2021 >60     GFR  01/05/2021 >60     Calcium 01/05/2021 9.9     Total Protein 01/05/2021 7.7     Albumin 01/05/2021 4.3     Albumin/Globulin Ratio 01/05/2021 1.3     Total Bilirubin 01/05/2021 1.1*    Alkaline Phosphatase 01/05/2021 44     ALT 01/05/2021 21     AST 01/05/2021 24     Globulin 01/05/2021 3.4     LDL Direct 01/05/2021 68    Office Visit on 10/22/2020   Component Date Value    Hemoglobin A1C 10/22/2020 6.0        Last Drug screen:  Lab Results   Component Value Date    MDMA Not Detected 03/05/2020         Imaging: no head imaging on file      ASSESSMENT AND PLAN     Diagnosis Orders   1. TANISHA (generalized anxiety disorder)  traZODone (DESYREL) 100 MG tablet   2. Moderate episode of recurrent major depressive disorder (HCC)  traZODone (DESYREL) 100 MG tablet   3. Sleep disturbance  traZODone (DESYREL) 100 MG tablet       1. Safety: NO Imminent risk of danger to/self/others based on the factors considered below.  Appropriate for outpatient level of care.  Safety plan includes: 911, PES, hotlines, and interventions discussed today.      Risk factors: Age <25 or >49, male gender, depressed mood, social isolation, no outpatient services in place, and no collateral information to support safety.     Protective factors: denies suicidal ideation, does not have lethal plan, does not have access to guns or weapons, patient is didier for safety, no prior suicide attempts, no family h/o suicide, no substance abuse, patient has social or family support, no active psychosis or cognitive dysfunction, physically healthy, compliant with recommended medications,  and patient is future oriented.        2. Psychiatric Plan     MDD, recurrent, moderate, Generalized Anxiety Disorder, Sleep disturbance:     -CONTINUE Cymbalta 60 mg BID for depression/anxiety sx. Patient unsure if this medication is actually working at this point but will not titrate off and switch.     -CONTINUE Xanax 0.25 mg BID PRN for anxiety sx. No issues, will not increase. LNF 2/4/2021     -INCREASE Trazodone  mg nightly for sleep/mood augmentation. Side effects discussed. R/B discussed.      -D/C Abilify 5 mg once daily for mood augmentation. Possible side effects of increased blood pressure and sugars. Patient going to monitor this week. -Consider Rexulti for mood in future. Info given to patient.     -Labs: reviewed in Epic. Check TSH, Vit D in future    -Declines therapy at this time    -OARRS reviewed, c/w history  -R/b/se/a d/w pt who consents. 3. Medical  -Following with ADDISON Flores - CNP    4. Substance   -No active issues. 5. RTC - 4 weeks    [facility], [address] [place] [address], Ne Nurse Practitioner    On this date 3/18/2021 I have spent 25 minutes reviewing previous notes, test results and face to face with the patient discussing the diagnosis and importance of compliance with the treatment plan as well as documenting on the day of the visit.

## 2021-03-18 ENCOUNTER — OFFICE VISIT (OUTPATIENT)
Dept: PSYCHIATRY | Age: 76
End: 2021-03-18
Payer: MEDICARE

## 2021-03-18 DIAGNOSIS — F41.1 GAD (GENERALIZED ANXIETY DISORDER): ICD-10-CM

## 2021-03-18 DIAGNOSIS — F33.1 MODERATE EPISODE OF RECURRENT MAJOR DEPRESSIVE DISORDER (HCC): ICD-10-CM

## 2021-03-18 DIAGNOSIS — G47.9 SLEEP DISTURBANCE: ICD-10-CM

## 2021-03-18 PROCEDURE — 3017F COLORECTAL CA SCREEN DOC REV: CPT | Performed by: NURSE PRACTITIONER

## 2021-03-18 PROCEDURE — 1036F TOBACCO NON-USER: CPT | Performed by: NURSE PRACTITIONER

## 2021-03-18 PROCEDURE — G8484 FLU IMMUNIZE NO ADMIN: HCPCS | Performed by: NURSE PRACTITIONER

## 2021-03-18 PROCEDURE — G8417 CALC BMI ABV UP PARAM F/U: HCPCS | Performed by: NURSE PRACTITIONER

## 2021-03-18 PROCEDURE — G8427 DOCREV CUR MEDS BY ELIG CLIN: HCPCS | Performed by: NURSE PRACTITIONER

## 2021-03-18 PROCEDURE — 99213 OFFICE O/P EST LOW 20 MIN: CPT | Performed by: NURSE PRACTITIONER

## 2021-03-18 PROCEDURE — 1123F ACP DISCUSS/DSCN MKR DOCD: CPT | Performed by: NURSE PRACTITIONER

## 2021-03-18 PROCEDURE — 4040F PNEUMOC VAC/ADMIN/RCVD: CPT | Performed by: NURSE PRACTITIONER

## 2021-03-18 RX ORDER — TRAZODONE HYDROCHLORIDE 100 MG/1
TABLET ORAL
Qty: 30 TABLET | Refills: 2 | Status: SHIPPED | OUTPATIENT
Start: 2021-03-18 | End: 2021-07-13 | Stop reason: SDUPTHER

## 2021-03-18 ASSESSMENT — PATIENT HEALTH QUESTIONNAIRE - PHQ9
SUM OF ALL RESPONSES TO PHQ QUESTIONS 1-9: 12
8. MOVING OR SPEAKING SO SLOWLY THAT OTHER PEOPLE COULD HAVE NOTICED. OR THE OPPOSITE, BEING SO FIGETY OR RESTLESS THAT YOU HAVE BEEN MOVING AROUND A LOT MORE THAN USUAL: 0
5. POOR APPETITE OR OVEREATING: 3
2. FEELING DOWN, DEPRESSED OR HOPELESS: 1
9. THOUGHTS THAT YOU WOULD BE BETTER OFF DEAD, OR OF HURTING YOURSELF: 0
SUM OF ALL RESPONSES TO PHQ QUESTIONS 1-9: 12
4. FEELING TIRED OR HAVING LITTLE ENERGY: 2
SUM OF ALL RESPONSES TO PHQ QUESTIONS 1-9: 12

## 2021-03-18 ASSESSMENT — ANXIETY QUESTIONNAIRES
GAD7 TOTAL SCORE: 10
5. BEING SO RESTLESS THAT IT IS HARD TO SIT STILL: 0-NOT AT ALL
2. NOT BEING ABLE TO STOP OR CONTROL WORRYING: 1-SEVERAL DAYS

## 2021-03-18 NOTE — PATIENT INSTRUCTIONS
[facility] for patients in need of medication and or a Psychiatrist, temporarily. [place] Gunn, [id number]. ([facility]). [phone]      [address] [place] [address] [phone]  [phone])    National Suicide Prevention Lifeline  (110) 273-TALK (1331)  www.suicidepreventionlifeline. Lawerance Mutter [address] (PES): [phone]  [address] [place] [address]) provides authorization for Crisis Stabilization services in [facility] for both Adults and Children.   Phone: [phone]  Fax: [phone]  [address]    Mobile Crisis Team: [phone]    Text Support  Text [phone] \"connect\" if suicidal for contact via text or phone call

## 2021-03-26 ENCOUNTER — OFFICE VISIT (OUTPATIENT)
Dept: ORTHOPEDIC SURGERY | Age: 76
End: 2021-03-26
Payer: MEDICARE

## 2021-03-26 VITALS
DIASTOLIC BLOOD PRESSURE: 70 MMHG | HEART RATE: 58 BPM | BODY MASS INDEX: 41.12 KG/M2 | WEIGHT: 262 LBS | TEMPERATURE: 97.3 F | SYSTOLIC BLOOD PRESSURE: 168 MMHG | HEIGHT: 67 IN

## 2021-03-26 DIAGNOSIS — M17.11 ARTHRITIS OF RIGHT KNEE: ICD-10-CM

## 2021-03-26 DIAGNOSIS — G89.29 CHRONIC PAIN OF RIGHT KNEE: Primary | ICD-10-CM

## 2021-03-26 DIAGNOSIS — M25.561 CHRONIC PAIN OF RIGHT KNEE: Primary | ICD-10-CM

## 2021-03-26 PROCEDURE — G8484 FLU IMMUNIZE NO ADMIN: HCPCS | Performed by: ORTHOPAEDIC SURGERY

## 2021-03-26 PROCEDURE — 20610 DRAIN/INJ JOINT/BURSA W/O US: CPT | Performed by: ORTHOPAEDIC SURGERY

## 2021-03-26 PROCEDURE — 4040F PNEUMOC VAC/ADMIN/RCVD: CPT | Performed by: ORTHOPAEDIC SURGERY

## 2021-03-26 PROCEDURE — G8427 DOCREV CUR MEDS BY ELIG CLIN: HCPCS | Performed by: ORTHOPAEDIC SURGERY

## 2021-03-26 PROCEDURE — 1036F TOBACCO NON-USER: CPT | Performed by: ORTHOPAEDIC SURGERY

## 2021-03-26 PROCEDURE — 99204 OFFICE O/P NEW MOD 45 MIN: CPT | Performed by: ORTHOPAEDIC SURGERY

## 2021-03-26 PROCEDURE — 3017F COLORECTAL CA SCREEN DOC REV: CPT | Performed by: ORTHOPAEDIC SURGERY

## 2021-03-26 PROCEDURE — G8417 CALC BMI ABV UP PARAM F/U: HCPCS | Performed by: ORTHOPAEDIC SURGERY

## 2021-03-26 PROCEDURE — 1123F ACP DISCUSS/DSCN MKR DOCD: CPT | Performed by: ORTHOPAEDIC SURGERY

## 2021-03-26 RX ORDER — LIDOCAINE HYDROCHLORIDE 10 MG/ML
4 INJECTION, SOLUTION INFILTRATION; PERINEURAL ONCE
Status: COMPLETED | OUTPATIENT
Start: 2021-03-26 | End: 2021-03-26

## 2021-03-26 RX ORDER — METHYLPREDNISOLONE ACETATE 40 MG/ML
80 INJECTION, SUSPENSION INTRA-ARTICULAR; INTRALESIONAL; INTRAMUSCULAR; SOFT TISSUE ONCE
Status: COMPLETED | OUTPATIENT
Start: 2021-03-26 | End: 2021-03-26

## 2021-03-26 RX ADMIN — LIDOCAINE HYDROCHLORIDE 4 ML: 10 INJECTION, SOLUTION INFILTRATION; PERINEURAL at 13:16

## 2021-03-26 RX ADMIN — METHYLPREDNISOLONE ACETATE 80 MG: 40 INJECTION, SUSPENSION INTRA-ARTICULAR; INTRALESIONAL; INTRAMUSCULAR; SOFT TISSUE at 13:16

## 2021-03-26 ASSESSMENT — ENCOUNTER SYMPTOMS
RESPIRATORY NEGATIVE: 1
ALLERGIC/IMMUNOLOGIC COMMENTS: SEASONAL
GASTROINTESTINAL NEGATIVE: 1
EYES NEGATIVE: 1

## 2021-03-26 NOTE — PROGRESS NOTES
Subjective:      Patient ID: Allyn Noyola is a 78 y.o. male. HPI  Allyn Noyola is seen today for evaluation of chronic right knee pain. He has had symptoms for 10 to 12 years. He does recall any major trauma. With typically bother him with activity such as mowing the lawn or getting up from a seated position. Gotten worse over the last 6 to 8 months with increased swelling. He typically manages the swelling with rest and ibuprofen. Pain is medial.  He has not had formal treatment. He is retired. He has multiple medical problems including type 2 diabetes that now requires insulin and hypertension. He also gets psychiatric counseling for anxiety. Review of Systems   Constitutional: Negative. HENT: Negative. Eyes: Negative. Respiratory: Negative. Cardiovascular: Negative. Gastrointestinal: Negative. Endocrine: Negative. Genitourinary: Negative. Musculoskeletal: Positive for arthralgias, gait problem and joint swelling. Right knee pain    Skin: Negative. Allergic/Immunologic: Positive for environmental allergies. Seasonal    Hematological: Negative. Psychiatric/Behavioral: Negative. Objective:   Physical Exam  History: Patient's relevant past family, medical, and social history are reviewed as part of today's visit. ROS of pertinent positives and negatives as above; otherwise negative. General Exam:    Vitals: Blood pressure (!) 168/70, pulse 58, temperature 97.3 °F (36.3 °C), temperature source Temporal, height 5' 7\" (1.702 m), weight 262 lb (118.8 kg). Constitutional: Patient is adequately groomed with no evidence of malnutrition  Mental Status: The patient is oriented to time, place and person. The patient's mood and affect are appropriate. Gait:  Patient walks with a hesitant gait  Lymphatic: The lymphatic examination bilaterally reveals all areas to be without enlargement or induration.   Vascular: Examination reveals no swelling or calf tenderness. Peripheral pulses are palpable and 2+. Neurological: The patient has good coordination. There is no weakness or sensory deficit. Skin:    Head/Neck: inspection reveals no rashes, ulcerations or lesions. Trunk:  inspection reveals no rashes, ulcerations or lesions. Right Lower Extremity: inspection reveals no rashes, ulcerations or lesions. Left Lower Extremity: inspection reveals no rashes, ulcerations or lesions. Examination of the bilateral hips reveals normal flexion and extension. There is no restriction in rotation. There is no tenderness to palpation anteriorly posteriorly or laterally. Left knee exam is principally benign. He has mild crepitation but no joint line tenderness. Range of motion 0 to 120 degrees. Calf is soft but he does have mild edema and stasis. No evidence of infection. Right knee has no large effusion. He has mild varicosities. He has moderate tenderness medially. He has a moderate crepitation. Range of motion 0 to 120 degrees. Calf is soft but there is pitting edema and stasis. There is no ligamentous  instability    X-rays were obtained today in the office and interpreted by me. AP standing, PA flexed, and merchant views of the bilateral knees as well as a lateral of the right knee. These demonstrate:  Bone-on-bone medial compartment arthritis with severe patellofemoral narrowing as well. Left knee has more mild degenerative change. Most recent hemoglobin A1c was 6.4. Assessment:      Right knee arthritis      Plan:      We discussed this at length. We discussed treatment options including the fact that arthroscopy would not be beneficial.  We discussed arthroplasty. We discussed viscosupplementation. We discussed cortisone injections. At this point he is elected to proceed with a cortisone injection. He understands this will raise his sugar for several days.   If this does not provide relief we will proceed with viscosupplementation. Handouts were provided about viscosupplementation as well as arthroplasty. Procedure: Under sterile technique, the right knee was injected into the anterolateral arthroscopy portal 80 mg of Depo-Medrol and 40 mg of lidocaine. He tolerated that well. Follow-up with me in about 4 weeks. Medical decision making today was at least moderate. This note was created using voice recognition software. It has been proofread, but occasionally errors remain. Please disregard these errors. They will be corrected as they are noted.

## 2021-04-16 DIAGNOSIS — M54.41 ACUTE RIGHT-SIDED LOW BACK PAIN WITH RIGHT-SIDED SCIATICA: Primary | ICD-10-CM

## 2021-04-16 NOTE — TELEPHONE ENCOUNTER
----- Message from [facility] [place] Amanda sent at 4/16/2021  8:56 AM EDT -----  Subject: Message to Provider    QUESTIONS  Information for Provider? Pt has been having pain in right leg from   buttock to foot. Has an appt for 4/20/21. Is wondering if he could get   some anti-inflammatory medicine until the appt. He is having to use a cane   to walk due to pain.   ---------------------------------------------------------------------------  --------------  CALL BACK INFO  What is the best way for the office to contact you? OK to leave message on   voicemail  Preferred Call Back Phone Number? [phone]  ---------------------------------------------------------------------------  --------------  SCRIPT ANSWERS  Relationship to Patient?  Self

## 2021-04-16 NOTE — TELEPHONE ENCOUNTER
Spoke with Jarrod Her has no other symptoms would like to try diclofenac please. Has appt set up with Dr Vidal Keep but on April 26th and one with you on April 20th. Should he still be seen on the 20th? Please send rx to [facility].

## 2021-04-20 ENCOUNTER — OFFICE VISIT (OUTPATIENT)
Dept: ORTHOPEDIC SURGERY | Age: 76
End: 2021-04-20
Payer: MEDICARE

## 2021-04-20 ENCOUNTER — OFFICE VISIT (OUTPATIENT)
Dept: INTERNAL MEDICINE CLINIC | Age: 76
End: 2021-04-20
Payer: MEDICARE

## 2021-04-20 VITALS — WEIGHT: 260 LBS | HEIGHT: 70 IN | BODY MASS INDEX: 37.22 KG/M2

## 2021-04-20 VITALS
SYSTOLIC BLOOD PRESSURE: 126 MMHG | OXYGEN SATURATION: 99 % | DIASTOLIC BLOOD PRESSURE: 78 MMHG | HEART RATE: 65 BPM | RESPIRATION RATE: 18 BRPM

## 2021-04-20 DIAGNOSIS — M54.50 LUMBAR SPINE PAIN: Primary | ICD-10-CM

## 2021-04-20 DIAGNOSIS — M54.40 BACK PAIN OF LUMBAR REGION WITH SCIATICA: Primary | ICD-10-CM

## 2021-04-20 DIAGNOSIS — M54.16 RIGHT LUMBAR RADICULITIS: ICD-10-CM

## 2021-04-20 DIAGNOSIS — M43.00 SPONDYLOLYSIS: ICD-10-CM

## 2021-04-20 PROCEDURE — 3017F COLORECTAL CA SCREEN DOC REV: CPT | Performed by: FAMILY MEDICINE

## 2021-04-20 PROCEDURE — 1123F ACP DISCUSS/DSCN MKR DOCD: CPT | Performed by: FAMILY MEDICINE

## 2021-04-20 PROCEDURE — G8427 DOCREV CUR MEDS BY ELIG CLIN: HCPCS | Performed by: NURSE PRACTITIONER

## 2021-04-20 PROCEDURE — 1036F TOBACCO NON-USER: CPT | Performed by: FAMILY MEDICINE

## 2021-04-20 PROCEDURE — 99203 OFFICE O/P NEW LOW 30 MIN: CPT | Performed by: FAMILY MEDICINE

## 2021-04-20 PROCEDURE — 4040F PNEUMOC VAC/ADMIN/RCVD: CPT | Performed by: FAMILY MEDICINE

## 2021-04-20 PROCEDURE — G8427 DOCREV CUR MEDS BY ELIG CLIN: HCPCS | Performed by: FAMILY MEDICINE

## 2021-04-20 PROCEDURE — 4040F PNEUMOC VAC/ADMIN/RCVD: CPT | Performed by: NURSE PRACTITIONER

## 2021-04-20 PROCEDURE — G8417 CALC BMI ABV UP PARAM F/U: HCPCS | Performed by: FAMILY MEDICINE

## 2021-04-20 PROCEDURE — 3017F COLORECTAL CA SCREEN DOC REV: CPT | Performed by: NURSE PRACTITIONER

## 2021-04-20 PROCEDURE — 1123F ACP DISCUSS/DSCN MKR DOCD: CPT | Performed by: NURSE PRACTITIONER

## 2021-04-20 PROCEDURE — 1036F TOBACCO NON-USER: CPT | Performed by: NURSE PRACTITIONER

## 2021-04-20 PROCEDURE — 99213 OFFICE O/P EST LOW 20 MIN: CPT | Performed by: NURSE PRACTITIONER

## 2021-04-20 PROCEDURE — L0625 LO FLEX L1-BELOW L5 PRE OTS: HCPCS | Performed by: FAMILY MEDICINE

## 2021-04-20 PROCEDURE — G8417 CALC BMI ABV UP PARAM F/U: HCPCS | Performed by: NURSE PRACTITIONER

## 2021-04-20 RX ORDER — METHYLPREDNISOLONE 4 MG/1
TABLET ORAL
Qty: 42 KIT | Refills: 0 | Status: SHIPPED | OUTPATIENT
Start: 2021-04-20 | End: 2021-04-26 | Stop reason: ALTCHOICE

## 2021-04-20 ASSESSMENT — ENCOUNTER SYMPTOMS
SHORTNESS OF BREATH: 0
BACK PAIN: 1

## 2021-04-20 NOTE — PATIENT INSTRUCTIONS
If you're currently taking an anti-inflammatory such as advil, aleve, ibuprofen, diclofenac, naproxen, meloxicam, celebrex, or nabumetone, please stop. Take Medrol first for 6 912 pills for you) days. This is a steroid pack. Flip the package over to the foil side and the directions will tell you to start with 6 pills the first day, 5 pills the second day, etc. Please do not take any other anti-inflammatories with the medrol dose amanda as this can upset your stomach. If something else is needed, you may take extra strength tylenol.      Once you are finished with the medrol, then you may re-start or start your anti-inflammatory: DICLOFENAC

## 2021-04-20 NOTE — PROGRESS NOTES
2021     Annel Marroquin (:  1945) is a 78 y.o. male, here for evaluation of the following medical concerns:    Chief Complaint   Patient presents with    Back Pain     lower back pain radiating down to leg/ankle. started last wednesday legs feel fatigued    Hypertension     has had high BP readings at home       HPI  Back pain: States that back pain has been to the right side for  Awhile intermittently but in the last week he has felt radiating pain down his buttock to lateral leg to foot. He is not using a cane due to pain and ambulation issues. He has been using diclofenac as prescribed and it has helped some. He denies any trauma or injury. NO swelling, lesions or redness. He is seeing Dr Joseph Gómez fro his right knee. Review of Systems   Constitutional: Negative for chills, fatigue and fever. Eyes: Negative for visual disturbance. Respiratory: Negative for shortness of breath. Cardiovascular: Negative for chest pain, palpitations and leg swelling. Musculoskeletal: Positive for back pain and gait problem. Skin: Negative for rash. Neurological: Positive for weakness and numbness. Negative for dizziness and headaches. Hematological: Does not bruise/bleed easily. Prior to Visit Medications    Medication Sig Taking?  Authorizing Provider   diclofenac (VOLTAREN) 50 MG EC tablet Take 1 tablet by mouth 2 times daily Yes ADDISON German CNP   traZODone (DESYREL) 100 MG tablet Take 1/2 tablet to 1 tablet nightly Yes ADDISON Sahni - NP   blood glucose test strips (ONETOUCH ULTRA) strip USE FOUR TIMES A DAY AS NEEDED Yes Ayde Colindres MD   B-D ULTRAFINE III SHORT PEN 31G X 8 MM MISC USE TO INJECT INSULIN THREE TIMES A DAY Yes ADDISON German CNP   lisinopril (PRINIVIL;ZESTRIL) 10 MG tablet TAKE 1 TABLET BY MOUTH  DAILY Yes ADDISON German CNP   atenolol-chlorthalidone (TENORETIC) 50-25 MG per tablet TAKE 1 TABLET BY MOUTH  DAILY Yes ADDISON Morgan - CNP   DULoxetine (CYMBALTA) 60 MG extended release capsule TAKE 1 CAPSULE BY MOUTH  TWICE DAILY Yes ADDISON German CNP   insulin lispro, 1 Unit Dial, (HUMALOG KWIKPEN) 100 UNIT/ML SOPN INJECT 32-40 UNITS  SUBCUTANEOUSLY BEFORE MEALS 3 TIMES DAILY Yes Eri Prakash MD   oxybutynin (DITROPAN-XL) 10 MG extended release tablet TAKE ONE TABLET BY MOUTH DAILY Yes ADDISON German CNP   tamsulosin (FLOMAX) 0.4 MG capsule TAKE 1 CAPSULE BY MOUTH  DAILY Yes ADDISON German CNP   metFORMIN (GLUCOPHAGE) 1000 MG tablet TAKE 1 TABLET BY MOUTH  TWICE DAILY Yes ADDISON German CNP   fenofibrate (TRIGLIDE) 160 MG tablet TAKE 1 TABLET BY MOUTH  DAILY Yes ADDISON German CNP   atorvastatin (LIPITOR) 20 MG tablet TAKE 1 TABLET BY MOUTH ONCE DAILY Yes ADDISON German CNP   insulin glargine (LANTUS SOLOSTAR) 100 UNIT/ML injection pen INJECT SUBCUTANEOUSLY 100  UNITS NIGHTLY 7 boxes Yes Eri Prakash MD   ALPRAZolam (XANAX) 0.25 MG tablet TAKE ONE TABLET BY MOUTH TWICE A DAY AS NEEDED  ADDISON Torres CNP        Social History     Tobacco Use    Smoking status: Never Smoker    Smokeless tobacco: Never Used   Substance Use Topics    Alcohol use: No        Vitals:    04/20/21 1106   BP: 126/78   Site: Left Upper Arm   Position: Sitting   Cuff Size: Medium Adult   Pulse: 65   Resp: 18   SpO2: 99%     Estimated body mass index is 41.04 kg/m² as calculated from the following:    Height as of 3/26/21: 5' 7\" (1.702 m). Weight as of 3/26/21: 262 lb (118.8 kg). Physical Exam  Vitals signs reviewed. Constitutional:       General: He is not in acute distress. Appearance: Normal appearance. He is obese. He is not ill-appearing, toxic-appearing or diaphoretic. HENT:      Head: Normocephalic and atraumatic. Neck:      Musculoskeletal: Normal range of motion and neck supple. Cardiovascular:      Rate and Rhythm: Normal rate and regular rhythm. Pulses: Normal pulses.

## 2021-04-21 ENCOUNTER — TELEPHONE (OUTPATIENT)
Dept: ORTHOPEDIC SURGERY | Age: 76
End: 2021-04-21

## 2021-04-23 ENCOUNTER — HOSPITAL ENCOUNTER (OUTPATIENT)
Dept: PHYSICAL THERAPY | Age: 76
Setting detail: THERAPIES SERIES
Discharge: HOME | End: 2021-04-23
Payer: MEDICARE

## 2021-04-23 PROCEDURE — 97162 PT EVAL MOD COMPLEX 30 MIN: CPT | Performed by: PHYSICAL THERAPIST

## 2021-04-23 PROCEDURE — 97110 THERAPEUTIC EXERCISES: CPT | Performed by: PHYSICAL THERAPIST

## 2021-04-23 PROCEDURE — 97112 NEUROMUSCULAR REEDUCATION: CPT | Performed by: PHYSICAL THERAPIST

## 2021-04-23 NOTE — PLAN OF CARE
[address] [facility],[address], [address] [place], [address]  Phone: [phone]   Fax: [phone]              Physical Therapy Certification    Dear Referring Practitioner: Shawna Mukherjee,    We had the pleasure of evaluating the following patient for physical therapy services at     [address] [place] [address]. A summary of our findings can be found in the initial assessment below. This includes our plan of care. If you have any questions or concerns regarding these findings, please do not hesitate to contact me at the office phone number checked above.   Thank you for the referral.       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   [] Yes, Patient intake triggered further evaluation      [] C-SSRS Screening completed  [] PCP notified via Plan of Care  [] Emergency services notified     Latex Allergy:  [] NO      [] YES  Preferred Language for Healthcare:   [] English       [] other:    SUBJECTIVE: Patient stated complaint: Take from MD intake; Loan Crawley is a 78 y.o. 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 Functional Limitations/Impairments: [] Sitting [x] Standing [] Walking    [x] Squatting/bending  [x] Stairs         [x] ADL's  [x] Transfers [] Sports/Recreation [] Other:    Occupation/School: retired    Living Status/Prior Level of Function: Independent with ADLs     Standing Posture Exam   [] Normal    [x] FF head and rounded shlds: [] Mild   [x] Mod  []Severe     [] Lateral shift:  []  Left:                     [] Right   [] Lumbar Lordosis:    [] Normal        [x] Abnormal; flat    [] Other:         Standing Exam Normal Abnormal N/A Comments   Gait flat terrain- ADL pace  x  antalgic due to knee and distal sxs.     Toe walk    x     Heel Walk  x     Side bending  x  Decreased bilateral    Iliac crest height x      PSIS height x      SIJ sulcus- static x      SIJ sulcus- BB x      SIJ sulcus- hip flexion  x                 Fwd Bend- (aberrant juttering or innominate mvmt)  x     Extension  x     Trendelenburg  x                   Seated Exam Normal Abnormal N/A Comments   Pelvic Height       Seated Rotation- Left       Seated Rotation- Right       Seated flexion- SIJ x   Helped with sxs   Seated ext- SIJ  x     Hip IR- Left       Hip- Right       Slump test        Supine Exam Left Right N/A Comments   Hip flexion  Decreased with early rotation     Abduction       ER       IR 15 degrees trace     KANDY/Sherman       Hip scour       SLR  Decreased BLE     SI distraction/compression       Thigh thrust       LLD                   Myotomes / MMT Left Right Comments   Hip flexion (L1-L2) WNL WNL    Knee extension (L2-L4) WNL WNL    Dorsiflexion (L4-L5)      Great Toe Ext (L5)      Ankle Eversion (S1-S2)      Ankle PF(S1-S2)      Hip Abductors      Hip Extensors          Dermatomes Normal Abnormal Comments   inguinal area (L1)  x     anterior mid-thigh (L2) x     distal ant thigh/med knee (L3) x     medial lower leg and foot (L4) x     lateral lower leg and foot (L5)  x Decrease due to DM Optimization Approach   [x] \"unspecified subgroup\"    [x] lower disability scores     [x] lower fear avoidance scores     [x] longer duration of symptoms (chronic)    [x] prior episodes of low back pain    [x] older age                         [x] Patient history, allergies, meds reviewed. Medical chart reviewed. See intake form. Review Of Systems (ROS):  [x]Performed Review of systems (Integumentary, CardioPulmonary, Neurological) by intake and observation. Intake form has been scanned into medical record. Patient has been instructed to contact their primary care physician regarding ROS issues if not already being addressed at this time.       Co-morbidities/Complexities (which will affect course of rehabilitation):   []None           Arthritic conditions   [x]Rheumatoid arthritis (M05.9)  [x]Osteoarthritis (M19.91)   Cardiovascular conditions   [x]Hypertension (I10)  []Hyperlipidemia (E78.5)  []Angina pectoris (I20)  []Atherosclerosis (I70)   Musculoskeletal conditions   []Disc pathology   []Congenital spine pathologies   []Prior surgical intervention  []Osteoporosis (M81.8)  []Osteopenia (M85.8)   Endocrine conditions   []Hypothyroid (E03.9)  []Hyperthyroid Gastrointestinal conditions   []Constipation (D77.26)   Metabolic conditions   [x]Morbid obesity (E66.01)  [x]Diabetes type 1(E10.65) or 2 (E11.65)   [x]Neuropathy (G60.9)     Pulmonary conditions   []Asthma (J45)  []Coughing   []COPD (J44.9)   Psychological Disorders  [x]Anxiety (F41.9)  [x]Depression (F32.9)   []Other:   [x]Other:          Barriers to/and or personal factors that will affect rehab potential:              [x]Age  []Sex              []Motivation/Lack of Motivation                        [x]Co-Morbidities              []Cognitive Function, education/learning barriers              []Environmental, home barriers              []profession/work barriers  [x]past PT/medical experience  []other:  Justification: The patient requires skilled PT to restore functional mobility and strength to be I with ADL's. Falls Risk Assessment (30 days):   [x] Falls Risk assessed and no intervention required. [] Falls Risk assessed and Patient requires intervention due to being higher risk   TUG score (>12s at risk):     [] Falls education provided, including       ASSESSMENT:   Functional Impairments:     [x]Noted lumbar/proximal hip hypomobility   []Noted lumbosacral and/or generalized hypermobility   [x]Decreased Lumbosacral/hip/LE functional ROM   [x]Decreased core/proximal hip strength and neuromuscular control    [x]Decreased LE functional strength    []Abnormal reflexes/sensation/myotomal/dermatomal deficits  [x]Reduced balance/proprioceptive control    []other:      Functional Activity Limitations (from functional questionnaire and intake)   [x]Reduced ability to tolerate prolonged functional positions   [x]Reduced ability or difficulty with changes of positions or transfers between positions   [x]Reduced ability to maintain good posture and demonstrate good body mechanics with sitting, bending, and lifting   [x]Reduced ability to sleep   [] Reduced ability or tolerance with driving and/or computer work   [x]Reduced ability to perform lifting, reaching, carrying tasks   [x]Reduced ability to squat   [x]Reduced ability to forward bend   [x]Reduced ability to ambulate prolonged functional periods/distances/surfaces   [x]Reduced ability to ascend/descend stairs   []other:       Participation Restrictions   [x]Reduced participation in self care activities   [x]Reduced participation in home management activities   [x]Reduced participation in work activities   [x]Reduced participation in social activities. []Reduced participation in sport/recreation activities. Classification:   []Signs/symptoms consistent with Lumbar instability/stabilization subgroup.       []Signs/symptoms consistent with Lumbar mobilization/manipulation subgroup, myotomes and dermatomes intact. Meets manipulation criteria. [x]Signs/symptoms consistent with Lumbar direction specific/centralization subgroup   []Signs/symptoms consistent with Lumbar traction subgroup     []Signs/symptoms consistent with lumbar facet dysfunction   [x]Signs/symptoms consistent with lumbar stenosis type dysfunction   []Signs/symptoms consistent with nerve root involvement including myotome & dermatome dysfunction   []Signs/symptoms consistent with post-surgical status including: decreased ROM, strength and function. []signs/symptoms consistent with pathology which may benefit from Dry needling     []other:  Clinically, the pt presents with decreased ROM, decreased strength, decreased function, and increased pain consistent with the MD diagnosis. Prognosis/Rehab Potential:      []Excellent   [x]Good    []Fair   []Poor    Tolerance of evaluation/treatment:    []Excellent   [x]Good    []Fair   []Poor    HEP instruction: (see scanned forms)  Access Code: [email or web address]: [email or web address]: 04/23/2021Prepared by: Len Dalton   Supine Hamstring Stretch with Strap - 1 x daily - 7 x weekly - 1 sets - 5 reps - 30 hold   Supine Piriformis Stretch with Foot on Ground - 1 x daily - 7 x weekly - 1 sets - 5 reps - 30 hold   Hooklying Single Knee to Chest Stretch - 1 x daily - 7 x weekly - 10 reps - 3 sets   Supine Posterior Pelvic Tilt - 1 x daily - 7 x weekly - 1 sets - 10 reps - 10 hold   Lower Trunk Rotations - 1 x daily - 7 x weekly - 1 sets - 10 reps - 5 hold   Seated Forward Bending - 1 x daily - 7 x weekly - 1 sets - 10 reps - 5 hold   Supine Figure 4 Piriformis Stretch - 1 x daily - 7 x weekly - 3 sets - 10 reps      GOALS:  Patient stated goal: decrease pain and not use cane with amb. [] Progressing: [] Met: [] Not Met: [] Adjusted  Therapist goals for Patient:   Short Term Goals: To be achieved in: 2 weeks  1.  Independent in HEP and progression per patient tolerance, in order to prevent re-injury. [] Progressing: [] Met: [] Not Met: [] Adjusted  2. Patient will have a decrease in pain to facilitate improvement in movement, function, and ADLs as indicated by Functional Deficits. [] Progressing: [] Met: [] Not Met: [] Adjusted    Long Term Goals: To be achieved in: 4 weeks  1. Disability index score of 50% or less for the [facility] to assist with reaching prior level of function. [] Progressing: [] Met: [] Not Met: [] Adjusted  2. Patient will demonstrate increased AROM to WNL, good LS mobility, good hip ROM to allow for proper joint functioning as indicated by patients Functional Deficits. [] Progressing: [] Met: [] Not Met: [] Adjusted  3. Patient will demonstrate an increase in Strength to good proximal hip and core activation to allow for proper functional mobility as indicated by patients Functional Deficits. [] Progressing: [] Met: [] Not Met: [] Adjusted  4. Patient will return to I functional activities without increased symptoms or restriction. [] Progressing: [] Met: [] Not Met: [] Adjusted  5. Patient able to amb for 100 feet x 3 without A device.    [] Progressing: [] Met: [] Not Met: [] Adjusted    Physical Therapy Evaluation Complexity Justification  [x] A history of present problem with:  [] no personal factors and/or comorbidities that impact the plan of care;  []1-2 personal factors and/or comorbidities that impact the plan of care  [x]3 personal factors and/or comorbidities that impact the plan of care  [x] An examination of body systems using standardized tests and measures addressing any of the following: body structures and functions (impairments), activity limitations, and/or participation restrictions;:  [] a total of 1-2 or more elements   [x] a total of 3 or more elements   [] a total of 4 or more elements   [x] A clinical presentation with:  [] stable and/or uncomplicated characteristics   [x] evolving clinical presentation with changing characteristics  [] unstable and

## 2021-04-23 NOTE — FLOWSHEET NOTE
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 / M54.16 (ICD-10-CM) - Right lumbar radiculitis  · Treatment Diagnosis: M54.5 (ICD-10-CM) - Lumbar spine pain  Insurance/Certification information:  PT Insurance Information: Medicare  Physician Information:  Referring Practitioner: Dawn Baker  Has the plan of care been signed (Y/N):        []  Yes  [x]  No     Date of Patient follow up with Physician: one week for MRI results      Is this a Progress Report:     []  Yes  [x]  No        If Yes:  Date Range for reporting period:  Beginning  Ending    Progress report will be due (10 Rx or 30 days whichever is less):        Recertification will be due (POC Duration  / 90 days whichever is less): 21       Precautions/ Contra-indications:     C-SSRS Triggered by Intake questionnaire (Past 2 wk assessment):   [x] No, Questionnaire did not trigger screening.   [] Yes, Patient intake triggered further evaluation      [] C-SSRS Screening completed  [] PCP notified via Plan of Care  [] Emergency services notified       Visit # Insurance Allowable Auth Required   1 medicare []  Yes [x]  No        Functional Scale:     Owestry= 22%   Date assessed:  21     Pain level:  6/10     SUBJECTIVE:  See eval    OBJECTIVE: See eval      Numbness/Tingling: burning and N/T mostly at night.      Functional Limitations/Impairments: []? Sitting     [x]? Standing    []? Walking    [x]? Squatting/bending      [x]? Stairs         [x]? ADL's         [x]? Transfers   []? Sports/Recreation []? Other:     Occupation/School: retired     Living Status/Prior Level of Function: Independent with ADLs      Standing Posture Exam              []? Normal                     [x]?  FF head and rounded Hip and knee              []? Hyper     Palpation: TTP piriformis and R Lumbar paraspinalis     Functional Mobility/Transfers:     Normal Abnormal Comments   Sit ? Stand   x Increased effort and LLE ramos most of the work. Sit ? Supine x       Other:                                 Classification driving today's treatment approach and dosing:  - Prevailing signs and symptoms consistent with:  []? Symptom Modulation Approach              []? \"Directional preference subgroup\"                          [x]? Flexion Based                                       [x]? age >47                                       [x]? imaging evidence of lumbar stenosis                                      [x]? preference for flexion                           []? Extension Based                                      []? symptoms distal to buttock                                      []? symptoms centralize with lumbar extension                                      []? symptoms peripheralize with lumbar flexion                                      []? directional preference for extension                          []? Lateral Shift                                      []? visible frontal plane deviation                                      []? directional preference for lateral translation              []? \"Manipulation subgroup\"  (3/4 meets manipulation criteria)                           []? symptoms less than 16 days                          []? FABQ less than 19                          []? Hypomobility of lumbar spine                          []? IR of at least 1 hip >35 degrees                          Other Factors to consider:                          []? no symptoms distal to the knee                          []? no red flags and minimal yellow flags                          []? no contraindications to manipulation              []?   \"Traction subgroup\"                            []? signs and symptoms of nerve root compression (radiculopathy)                           []? unable to centralize distal symptoms with movement                          []? pain or numbness in the lumbar spine, buttock, and/or LE                          []? peripheralization with extension movements                          []? + SLR or + crossed SLR (symptoms less than 70 degrees)  []? Movement Control Approach              []? \"Stabilization subgroup\"                          []? younger age (less than 36)                           []? greater general flexibility (post-partum, SLR >90)                          []? abberant movements, painful arc, or [place]'s sign with flex/ext ROM                          []? positive prone instability test  []? Functional Optimization Approach              [x]? \"unspecified subgroup\"                          [x]? lower disability scores                           [x]? lower fear avoidance scores                           [x]? longer duration of symptoms (chronic)                          [x]? prior episodes of low back pain                          [x]?  older age    EExercises/Interventions:     Therapeutic Ex (85776) Position Sets/sec Reps Notes/CUES   Stretching       Hamstring supine 30 5    Hip Flexion       ITB       Groin       Quad       Inclined Calf- Gastroc       Inclined Calf-Soleus       S- KTC  10 10    D- KTC       Piriformis  30 5    Figure 4 push   30 5    Hip Adductor       Prone Press-ups       Cat / Camel                            Child's pose       Isometrics       Quad Sets       Glut Sets       Hip Abduction       Pelvic tilts       Hip Flexion       Hip Adduction- BS                     ROM       Sheet Pulls       Wall Slides       Edge of Bed       ERMI - Flexionator       ERMI- Extensionator       Weighted Hangs       Ankle Pumps       E-Z Stretch              PRE's       Leg Press- Range: 70 - 5        Leg Extension- Range: 90 - 40        Leg Curl- Range: 0 -90              CCTKE- Cable Column CCTKE- Prone on table              CKC       Calf Raises       Wall Sits       Mini Squats       Lateral Band Walks                                                 Scifit Bike Time:   Level:   Program:   Seat:       AD- Bike Time:   RPM's:   Seat:      Alter G Treadmill Time:      Short Size:    Treadmill       Elliptical              NMR re-education (26716)    CUES NEEDED   Biodex-balance       Single leg stance       Plyoback       Rocker Board       SL Deadlifts       Bridging       Planks- front       Planks- Side       TrA/mutlifidi walk outs       Bird / Dog       Pelvic tilt with kegel  10 10    Lower trunk rotation  10 10    Seated trunk flexion  10 10                  Therapeutic Activity (15315)       Step Ups       Step up and overs       Lateral Step downs       Stool Scoots                     Manual Intervention (36775)                                                          HEP instruction: (see scanned forms)  Access Code: [email or web address]: [email or web address]. [employer]/[email or web address]: 04/23/2021Prepared by: Denny Leyva   · Supine Hamstring Stretch with Strap - 1 x daily - 7 x weekly - 1 sets - 5 reps - 30 hold   · Supine Piriformis Stretch with Foot on Ground - 1 x daily - 7 x weekly - 1 sets - 5 reps - 30 hold   · Hooklying Single Knee to Chest Stretch - 1 x daily - 7 x weekly - 10 reps - 3 sets   · Supine Posterior Pelvic Tilt - 1 x daily - 7 x weekly - 1 sets - 10 reps - 10 hold   · Lower Trunk Rotations - 1 x daily - 7 x weekly - 1 sets - 10 reps - 5 hold   · Seated Forward Bending - 1 x daily - 7 x weekly - 1 sets - 10 reps - 5 hold   · Supine Figure 4 Piriformis Stretch - 1 x daily - 7 x weekly - 3 sets - 10 reps    Therapeutic Exercise and NMR EXR  [] (36493) Provided verbal/tactile cueing for activities related to strengthening, flexibility, endurance, ROM for improvements in LE, proximal hip, and core control with self care, mobility, lifting, ambulation.  [] (47059) Provided verbal/tactile cueing for activities related to improving balance, coordination, kinesthetic sense, posture, motor skill, proprioception  to assist with LE, proximal hip, and core control in self care, mobility, lifting, ambulation and eccentric single leg control. NMR and Therapeutic Activities:    [] (80556 or 12601) Provided verbal/tactile cueing for activities related to improving balance, coordination, kinesthetic sense, posture, motor skill, proprioception and motor activation to allow for proper function of core, proximal hip and LE with self care and ADLs  [] (81677) Gait Re-education- Provided training and instruction to the patient for proper LE, core and proximal hip recruitment and positioning and eccentric body weight control with ambulation re-education including up and down stairs     Home Exercise Program:    [x] (00844) Reviewed/Progressed HEP activities related to strengthening, flexibility, endurance, ROM of core, proximal hip and LE for functional self-care, mobility, lifting and ambulation/stair navigation   [] (36010)Reviewed/Progressed HEP activities related to improving balance, coordination, kinesthetic sense, posture, motor skill, proprioception of core, proximal hip and LE for self care, mobility, lifting, and ambulation/stair navigation      Manual Treatments:  PROM / STM / Oscillations-Mobs:  G-I, II, III, IV (PA's, Inf., Post.)  [x] (00713) Provided manual therapy to mobilize LE, proximal hip and/or LS spine soft tissue/joints for the purpose of modulating pain, promoting relaxation,  increasing ROM, reducing/eliminating soft tissue swelling/inflammation/restriction, improving soft tissue extensibility and allowing for proper ROM for normal function with self care, mobility, lifting and ambulation. Hypervolt to piriformis - 6'       Modalities:     [] GAME READY (VASO)- for significant edema, swelling, pain control.      Charges:  Timed Code Treatment Minutes: 30   Total Treatment Minutes: 65 [] EVAL (LOW) 89744 (typically 20 minutes face-to-face)  [x] EVAL (MOD) 75753 (typically 30 minutes face-to-face)  [] EVAL (HIGH) 53472 (typically 45 minutes face-to-face)  [] RE-EVAL     [x] UD(29787) x 1    [] IONTO  [x] NMR (51027) x  1  [] VASO  [] Manual (17992) x     [] Other:  [] TA x      [] Mech Traction (66769)  [] ES(attended) (63113)      [] ES (un) (19977):    GOALS:  Patient stated goal: decrease pain and not use cane with amb. [] Progressing: [] Met: [] Not Met: [] Adjusted  Therapist goals for Patient:   Short Term Goals: To be achieved in: 2 weeks  1. Independent in HEP and progression per patient tolerance, in order to prevent re-injury. [] Progressing: [] Met: [] Not Met: [] Adjusted  2. Patient will have a decrease in pain to facilitate improvement in movement, function, and ADLs as indicated by Functional Deficits. [] Progressing: [] Met: [] Not Met: [] Adjusted    Long Term Goals: To be achieved in: 4 weeks  1. Disability index score of 50% or less for the [facility] to assist with reaching prior level of function. [] Progressing: [] Met: [] Not Met: [] Adjusted  2. Patient will demonstrate increased AROM to WNL, good LS mobility, good hip ROM to allow for proper joint functioning as indicated by patients Functional Deficits. [] Progressing: [] Met: [] Not Met: [] Adjusted  3. Patient will demonstrate an increase in Strength to good proximal hip and core activation to allow for proper functional mobility as indicated by patients Functional Deficits. [] Progressing: [] Met: [] Not Met: [] Adjusted  4. Patient will return to I functional activities without increased symptoms or restriction. [] Progressing: [] Met: [] Not Met: [] Adjusted  5. Patient able to amb for 100 feet x 3 without A device.    [] Progressing: [] Met: [] Not Met: [] Adjusted      Overall Progression Towards Functional goals/ Treatment Progress Update:  [] Patient is progressing as expected towards functional goals listed. [] Progression is slowed due to complexities/Impairments listed. [] Progression has been slowed due to co-morbidities. [x] Plan just implemented, too soon to assess goals progression <30days   [] Goals require adjustment due to lack of progress  [] Patient is not progressing as expected and requires additional follow up with physician  [] Other    Prognosis for POC: [x] Good [] Fair  [] Poor      Patient requires continued skilled intervention: [x] Yes  [] No    ASSESSMENT:  See eval    Treatment/Activity Tolerance:  [x] Patient tolerated treatment well [] Patient limited by fatique  [] Patient limited by pain  [] Patient limited by other medical complications  [] Other:         Return to Play: (if applicable)   []  Stage 1: Intro to Strength   []  Stage 2: Return to Run and Strength   []  Stage 3: Return to Jump and Strength   []  Stage 4: Dynamic Strength and Agility   []  Stage 5: Sport Specific Training     []  Ready to Return to Play, Meets All Above Stages   []  Not Ready for Return to Sports   Comments:                             PLAN: See eval  [] Continue per plan of care [] Alter current plan (see comments above)  [x] Plan of care initiated [] Hold pending MD visit [] Discharge      Electronically signed by:  Haja Garcia, PT    Note: If patient does not return for scheduled/ recommended follow up visits, this note will serve as a discharge from care along with most recent update on progress.

## 2021-04-26 ENCOUNTER — OFFICE VISIT (OUTPATIENT)
Dept: ORTHOPEDIC SURGERY | Age: 76
End: 2021-04-26
Payer: MEDICARE

## 2021-04-26 VITALS
WEIGHT: 262 LBS | HEIGHT: 67 IN | BODY MASS INDEX: 41.12 KG/M2 | RESPIRATION RATE: 12 BRPM | SYSTOLIC BLOOD PRESSURE: 135 MMHG | HEART RATE: 55 BPM | DIASTOLIC BLOOD PRESSURE: 57 MMHG

## 2021-04-26 DIAGNOSIS — G89.29 CHRONIC PAIN OF RIGHT KNEE: Primary | ICD-10-CM

## 2021-04-26 DIAGNOSIS — M17.11 ARTHRITIS OF RIGHT KNEE: ICD-10-CM

## 2021-04-26 DIAGNOSIS — M25.561 CHRONIC PAIN OF RIGHT KNEE: Primary | ICD-10-CM

## 2021-04-26 PROCEDURE — 20610 DRAIN/INJ JOINT/BURSA W/O US: CPT | Performed by: ORTHOPAEDIC SURGERY

## 2021-04-26 PROCEDURE — G8427 DOCREV CUR MEDS BY ELIG CLIN: HCPCS | Performed by: ORTHOPAEDIC SURGERY

## 2021-04-26 PROCEDURE — 1036F TOBACCO NON-USER: CPT | Performed by: ORTHOPAEDIC SURGERY

## 2021-04-26 PROCEDURE — G8417 CALC BMI ABV UP PARAM F/U: HCPCS | Performed by: ORTHOPAEDIC SURGERY

## 2021-04-26 PROCEDURE — 4040F PNEUMOC VAC/ADMIN/RCVD: CPT | Performed by: ORTHOPAEDIC SURGERY

## 2021-04-26 PROCEDURE — 1123F ACP DISCUSS/DSCN MKR DOCD: CPT | Performed by: ORTHOPAEDIC SURGERY

## 2021-04-26 PROCEDURE — 99213 OFFICE O/P EST LOW 20 MIN: CPT | Performed by: ORTHOPAEDIC SURGERY

## 2021-04-26 PROCEDURE — 3017F COLORECTAL CA SCREEN DOC REV: CPT | Performed by: ORTHOPAEDIC SURGERY

## 2021-04-26 RX ORDER — HYALURONATE SODIUM 10 MG/ML
20 SYRINGE (ML) INTRAARTICULAR ONCE
Status: COMPLETED | OUTPATIENT
Start: 2021-04-26 | End: 2021-04-26

## 2021-04-26 RX ADMIN — Medication 20 MG: at 13:35

## 2021-04-28 ENCOUNTER — HOSPITAL ENCOUNTER (OUTPATIENT)
Dept: PHYSICAL THERAPY | Age: 76
Setting detail: THERAPIES SERIES
Discharge: HOME | End: 2021-04-28
Payer: MEDICARE

## 2021-04-28 PROCEDURE — 97110 THERAPEUTIC EXERCISES: CPT | Performed by: PHYSICAL THERAPIST

## 2021-04-28 PROCEDURE — 97140 MANUAL THERAPY 1/> REGIONS: CPT | Performed by: PHYSICAL THERAPIST

## 2021-04-28 PROCEDURE — 97112 NEUROMUSCULAR REEDUCATION: CPT | Performed by: PHYSICAL THERAPIST

## 2021-04-28 NOTE — FLOWSHEET NOTE
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 / M54.16 (ICD-10-CM) - Right lumbar radiculitis  · Treatment Diagnosis: M54.5 (ICD-10-CM) - Lumbar spine pain  Insurance/Certification information:  PT Insurance Information: Medicare  Physician Information:  Referring Practitioner: Donato Daily  Has the plan of care been signed (Y/N):        []  Yes  [x]  No     Date of Patient follow up with Physician: one week for MRI results      Is this a Progress Report:     []  Yes  [x]  No        If Yes:  Date Range for reporting period:  Beginning  Ending    Progress report will be due (10 Rx or 30 days whichever is less): 2-95-10       Recertification will be due (POC Duration  / 90 days whichever is less): 21       Precautions/ Contra-indications:     C-SSRS Triggered by Intake questionnaire (Past 2 wk assessment):   [x] No, Questionnaire did not trigger screening.   [] Yes, Patient intake triggered further evaluation      [] C-SSRS Screening completed  [] PCP notified via Plan of Care  [] Emergency services notified       Visit # Insurance Allowable Auth Required   1 medicare []  Yes [x]  No        Functional Scale:     Owestry= 22%   Date assessed:  21     Pain level:  6/10     SUBJECTIVE:  The patient had an MRI yesterday and was very sore afterwards due to the position he had to be in. Results next Tuesday. He has been unable to to sleep in bed due to pain. Sleeping in chair with ottoman. OBJECTIVE:       Numbness/Tingling: burning and N/T mostly at night.      Functional Limitations/Impairments: []? Sitting     [x]? Standing    []? Walking    [x]? Squatting/bending      [x]? Stairs         [x]? ADL's         [x]? Transfers   []? Sports/Recreation []? Other:     Occupation/School: retired     Living Status/Prior Level of Function: Independent with ADLs      Standing Posture Exam              []? Normal                     [x]? FF head and rounded shlds: []? Mild   [x]? Mod  []? Severe                []? Lateral shift:  []? Left:                     []? Right              []? Lumbar Lordosis:    []? Normal        [x]? Abnormal; flat               []? Other:            Standing Exam Normal Abnormal N/A Comments   Gait flat terrain- ADL pace   x   antalgic due to knee and distal sxs.     Toe walk     x       Heel Walk   x       Side bending   x   Decreased bilateral    Iliac crest height x         PSIS height x         SIJ sulcus- static x         SIJ sulcus- BB x         SIJ sulcus- hip flexion   x                             Fwd Bend- (aberrant juttering or innominate mvmt)   x       Extension   x       Trendelenburg   x                               Seated Exam Normal Abnormal N/A Comments   Pelvic Height           Seated Rotation- Left           Seated Rotation- Right           Seated flexion- SIJ x     Helped with sxs   Seated ext- SIJ   x       Hip IR- Left           Hip- Right           Slump test            Supine Exam Left Right N/A Comments   Hip flexion   Decreased with early rotation       Abduction           ER           IR 15 degrees trace       KANDY/Sherman           Hip scour           SLR   Decreased BLE       SI distraction/compression           Thigh thrust           LLD                             Myotomes / MMT Left Right Comments   Hip flexion (L1-L2) WNL WNL     Knee extension (L2-L4) WNL WNL     Dorsiflexion (L4-L5)         Great Toe Ext (L5)         Ankle Eversion (S1-S2)         Ankle PF(S1-S2)         Hip Abductors         Hip Extensors               Dermatomes Normal Abnormal Comments   inguinal area (L1)  x       anterior mid-thigh (L2) x       distal ant thigh/med knee (L3) x       medial lower leg and foot (L4) x       lateral lower leg and foot (L5)   x Decrease due to DM   posterior calf (S1)   x  \"  \"   medial calcaneus (S2)   x \"  \"         Joint mobility:              []? Normal                     [x]? Hypo- lumabr spine / Hip and knee              []? Hyper     Palpation: TTP piriformis and R Lumbar paraspinalis     Functional Mobility/Transfers:     Normal Abnormal Comments   Sit ? Stand   x Increased effort and LLE ramos most of the work. Sit ? Supine x       Other:                                 Classification driving today's treatment approach and dosing:  - Prevailing signs and symptoms consistent with:  []? Symptom Modulation Approach              []? \"Directional preference subgroup\"                          [x]? Flexion Based                                       [x]? age >47                                       [x]? imaging evidence of lumbar stenosis                                      [x]? preference for flexion                           []? Extension Based                                      []? symptoms distal to buttock                                      []? symptoms centralize with lumbar extension                                      []? symptoms peripheralize with lumbar flexion                                      []? directional preference for extension                          []? Lateral Shift                                      []? visible frontal plane deviation                                      []? directional preference for lateral translation              []?  \"Manipulation subgroup\"  (3/4 meets manipulation criteria)                           []? symptoms less than 16 days                          []? FABQ less than 19                          []? Hypomobility of lumbar spine                          []? IR of at least 1 hip >35 degrees                          Other Factors to consider:                          []? no symptoms distal to the knee                          []? no red flags and minimal yellow flags                          []? no contraindications to manipulation              []? \"Traction subgroup\"                            []? signs and symptoms of nerve root compression (radiculopathy)                           []? unable to centralize distal symptoms with movement                          []? pain or numbness in the lumbar spine, buttock, and/or LE                          []? peripheralization with extension movements                          []? + SLR or + crossed SLR (symptoms less than 70 degrees)  []? Movement Control Approach              []? \"Stabilization subgroup\"                          []? younger age (less than 36)                           []? greater general flexibility (post-partum, SLR >90)                          []? abberant movements, painful arc, or Clarissa's sign with flex/ext ROM                          []? positive prone instability test  []? Functional Optimization Approach              [x]? \"unspecified subgroup\"                          [x]? lower disability scores                           [x]? lower fear avoidance scores                           [x]? longer duration of symptoms (chronic)                          [x]? prior episodes of low back pain                          [x]? older age    EExercises/Interventions:     Therapeutic Ex (44686) Position Sets/sec Reps Notes/CUES   Stretching       Hamstring seated 30 5    Hip Flexion       ITB       Groin       Quad       Inclined Calf- Gastroc       Inclined Calf-Soleus       S- KTC  10 10    D- KTC       Piriformis  30 5 Pull across and L knee back.     Figure 4 push   30 5    Hip Adductor       Prone Press-ups       Cat / Camel                            Child's pose       Isometrics       Quad Sets       Glut Sets       Hip Abduction       Pelvic tilts       Hip Flexion       Hip Adduction- BS                     ROM       Sheet Pulls       Wall Slides       Edge of Bed       ERMI - Flexionator       ERMI- Extensionator       Weighted Hangs       Ankle Pumps       E-Z Stretch              PRE's       Leg Press- Range: 70 - 5        Leg Extension- Range: 90 - 40        Leg Curl- Range: 0 -90              CCTKE- Cable Column       CCTKE- Prone on table              CKC       Calf Raises       Wall Sits       Mini Squats       Lateral Band Walks                                                 Scifit Bike Time:   Level:   Program:   Seat:       AD- Bike Time:   RPM's:   Seat:      Alter G Treadmill Time:      Short Size:    Treadmill       Elliptical              NMR re-education (85692)    CUES NEEDED   Biodex-balance       Single leg stance       Plyoback       Rocker Board       SL Deadlifts       Bridging       Planks- front       Planks- Side       TrA/mutlifidi walk outs       Bird / Dog       Pelvic tilt with kegel 2 10 10 Alternating LE march   Lower trunk rotation 2 sets 10 10    Seated trunk flexion  10 10    Seated thoracic extension  10 10           Therapeutic Activity (75544)       Step Ups       Step up and overs       Lateral Step downs       Stool Scoots                     Manual Intervention (36905)              Intermittent manual lumbar traction  Green TB  10 cycles   45 sec on 30 sec off sxs decreased and centralized with traction. 30 sec max relief                                        HEP instruction: (see scanned forms)  Access Code: [email or web address]: The Clearing/Date: 04/23/2021Prepared by: Ekta Slight   · Supine Hamstring Stretch with Strap - 1 x daily - 7 x weekly - 1 sets - 5 reps - 30 hold   · Supine Piriformis Stretch with Foot on Ground - 1 x daily - 7 x weekly - 1 sets - 5 reps - 30 hold   · Hooklying Single Knee to Chest Stretch - 1 x daily - 7 x weekly - 10 reps - 3 sets   · Supine Posterior Pelvic Tilt - 1 x daily - 7 x weekly - 1 sets - 10 reps - 10 hold   · Lower Trunk Rotations - 1 x daily - 7 x weekly - 1 sets - 10 reps - 5 hold   · Seated Forward Bending - 1 x daily - 7 x weekly - 1 sets - 10 reps - 5 hold   · Supine Figure 4 Piriformis Stretch - 1 x daily - 7 x weekly - 3 sets - 10 reps    Therapeutic Exercise and NMR EXR  [x] (50485) Provided verbal/tactile cueing for activities related to strengthening, flexibility, endurance, ROM for improvements in LE, proximal hip, and core control with self care, mobility, lifting, ambulation.  [] (27840) Provided verbal/tactile cueing for activities related to improving balance, coordination, kinesthetic sense, posture, motor skill, proprioception  to assist with LE, proximal hip, and core control in self care, mobility, lifting, ambulation and eccentric single leg control.      NMR and Therapeutic Activities:    [] (17870 or 79883) Provided verbal/tactile cueing for activities related to improving balance, coordination, kinesthetic sense, posture, motor skill, proprioception and motor activation to allow for proper function of core, proximal hip and LE with self care and ADLs  [] (14647) Gait Re-education- Provided training and instruction to the patient for proper LE, core and proximal hip recruitment and positioning and eccentric body weight control with ambulation re-education including up and down stairs     Home Exercise Program:    [x] (04914) Reviewed/Progressed HEP activities related to strengthening, flexibility, endurance, ROM of core, proximal hip and LE for functional self-care, mobility, lifting and ambulation/stair navigation   [] (85155)Reviewed/Progressed HEP activities related to improving balance, coordination, kinesthetic sense, posture, motor skill, proprioception of core, proximal hip and LE for self care, mobility, lifting, and ambulation/stair navigation      Manual Treatments:  PROM / STM / Oscillations-Mobs:  G-I, II, III, IV (PA's, Inf., Post.)  [x] (73886) Provided manual therapy to mobilize LE, proximal hip and/or LS spine soft tissue/joints for the purpose of modulating pain, promoting relaxation, increasing ROM, reducing/eliminating soft tissue swelling/inflammation/restriction, improving soft tissue extensibility and allowing for proper ROM for normal function with self care, mobility, lifting and ambulation. Hypervolt to piriformis - 6'       Modalities:     [] GAME READY (VASO)- for significant edema, swelling, pain control. Charges:  Timed Code Treatment Minutes: 50   Total Treatment Minutes: 65      [] EVAL (LOW) 82722 (typically 20 minutes face-to-face)  [] EVAL (MOD) 47436 (typically 30 minutes face-to-face)  [] EVAL (HIGH) 73439 (typically 45 minutes face-to-face)  [] RE-EVAL     [x] KI(08709) x 1    [] IONTO  [x] NMR (76631) x  1  [] VASO  [x] Manual (47024) x  1   [] Other:  [] TA x      [] Mech Traction (73810)  [] ES(attended) (71728)      [] ES (un) (56291):    GOALS:  Patient stated goal: decrease pain and not use cane with amb. [] Progressing: [] Met: [] Not Met: [] Adjusted  Therapist goals for Patient:   Short Term Goals: To be achieved in: 2 weeks  1. Independent in HEP and progression per patient tolerance, in order to prevent re-injury. [] Progressing: [] Met: [] Not Met: [] Adjusted  2. Patient will have a decrease in pain to facilitate improvement in movement, function, and ADLs as indicated by Functional Deficits. [] Progressing: [] Met: [] Not Met: [] Adjusted    Long Term Goals: To be achieved in: 4 weeks  1. Disability index score of 50% or less for the [facility] to assist with reaching prior level of function. [] Progressing: [] Met: [] Not Met: [] Adjusted  2. Patient will demonstrate increased AROM to WNL, good LS mobility, good hip ROM to allow for proper joint functioning as indicated by patients Functional Deficits. [] Progressing: [] Met: [] Not Met: [] Adjusted  3. Patient will demonstrate an increase in Strength to good proximal hip and core activation to allow for proper functional mobility as indicated by patients Functional Deficits.    [] Progressing: [] Met: [] Not Met: [] Adjusted  4. Patient will return to I functional activities without increased symptoms or restriction. [] Progressing: [] Met: [] Not Met: [] Adjusted  5. Patient able to amb for 100 feet x 3 without A device. [] Progressing: [] Met: [] Not Met: [] Adjusted      Overall Progression Towards Functional goals/ Treatment Progress Update:  [] Patient is progressing as expected towards functional goals listed. [] Progression is slowed due to complexities/Impairments listed. [] Progression has been slowed due to co-morbidities. [x] Plan just implemented, too soon to assess goals progression <30days   [] Goals require adjustment due to lack of progress  [] Patient is not progressing as expected and requires additional follow up with physician  [] Other    Prognosis for POC: [x] Good [] Fair  [] Poor      Patient requires continued skilled intervention: [x] Yes  [] No    ASSESSMENT:      Treatment/Activity Tolerance:  [x] Patient tolerated treatment well [] Patient limited by fatique  [] Patient limited by pain  [] Patient limited by other medical complications  [] Other: the patient tolerated tx well. sxs decreased with manual traction, however, only short lived ( 30 sec)     Return to Play: (if applicable)   []  Stage 1: Intro to Strength   []  Stage 2: Return to Run and Strength   []  Stage 3: Return to Jump and Strength   []  Stage 4: Dynamic Strength and Agility   []  Stage 5: Sport Specific Training     []  Ready to Return to Play, Meets All Above Stages   []  Not Ready for Return to Sports   Comments:                             PLAN: Continue with POC and progress as tolerated. MRI results next Tuesday and Pt on Wednesday.    [x] Continue per plan of care [] Alter current plan (see comments above)  [] Plan of care initiated [] Hold pending MD visit [] Discharge      Electronically signed by:  Joan Thomas PT    Note: If patient does not return for scheduled/ recommended follow up visits, this note will serve as a discharge from care along with most recent update on progress.

## 2021-04-29 ENCOUNTER — APPOINTMENT (OUTPATIENT)
Dept: PHYSICAL THERAPY | Age: 76
End: 2021-04-29
Payer: MEDICARE

## 2021-04-30 ENCOUNTER — TELEPHONE (OUTPATIENT)
Dept: ORTHOPEDIC SURGERY | Age: 76
End: 2021-04-30

## 2021-04-30 DIAGNOSIS — M43.00 SPONDYLOLYSIS: ICD-10-CM

## 2021-04-30 DIAGNOSIS — M54.16 RIGHT LUMBAR RADICULITIS: Primary | ICD-10-CM

## 2021-04-30 DIAGNOSIS — M54.50 LUMBAR SPINE PAIN: ICD-10-CM

## 2021-04-30 RX ORDER — DICLOFENAC SODIUM 75 MG/1
75 TABLET, DELAYED RELEASE ORAL 2 TIMES DAILY
Qty: 60 TABLET | Refills: 3 | Status: SHIPPED | OUTPATIENT
Start: 2021-04-30 | End: 2021-09-21 | Stop reason: ALTCHOICE

## 2021-05-03 ENCOUNTER — OFFICE VISIT (OUTPATIENT)
Dept: ORTHOPEDIC SURGERY | Age: 76
End: 2021-05-03
Payer: MEDICARE

## 2021-05-03 VITALS — RESPIRATION RATE: 12 BRPM | HEIGHT: 67 IN | WEIGHT: 262 LBS | BODY MASS INDEX: 41.12 KG/M2

## 2021-05-03 DIAGNOSIS — M17.11 ARTHRITIS OF RIGHT KNEE: Primary | ICD-10-CM

## 2021-05-03 DIAGNOSIS — G89.29 CHRONIC PAIN OF RIGHT KNEE: ICD-10-CM

## 2021-05-03 DIAGNOSIS — M25.561 CHRONIC PAIN OF RIGHT KNEE: ICD-10-CM

## 2021-05-03 PROCEDURE — 20610 DRAIN/INJ JOINT/BURSA W/O US: CPT | Performed by: ORTHOPAEDIC SURGERY

## 2021-05-03 RX ORDER — HYALURONATE SODIUM 10 MG/ML
20 SYRINGE (ML) INTRAARTICULAR ONCE
Status: COMPLETED | OUTPATIENT
Start: 2021-05-03 | End: 2021-05-03

## 2021-05-03 RX ADMIN — Medication 20 MG: at 10:37

## 2021-05-03 NOTE — PROGRESS NOTES
April Akbar returns today for his right knee. He is feeling pretty good and reports only 2 out of 10 pain in the knee today. Patient's medications, allergies, past medical, surgical, social and family histories were reviewed and updated as appropriate. Relevant review of systems reviewed. Right knee has no large effusion.  He has mild varicosities.  He has moderate tenderness medially.  He has a moderate crepitation.  Range of motion 0 to 120 degrees.  Calf is soft but there is pitting edema and stasis.  There is no ligamentous  instability       Assessment: Osteoarthritis right knee.     Plan: Euflexxa viscosupplementation attempt alleviate the need for arthroplasty.          Procedure: Under sterile technique, right knee was injected into the anterolateral arthroscopy portal with 20 mg of Euflexxa. He tolerated that well. Follow-up next week for third injection. In the meantime he is being treated for right-sided sciatica by Dr. Jaime Cervantes. This note was created using voice recognition software. It has been proofread, but occasionally errors remain. Please disregard these errors. They will be corrected as they are noted.

## 2021-05-04 ENCOUNTER — OFFICE VISIT (OUTPATIENT)
Dept: ORTHOPEDIC SURGERY | Age: 76
End: 2021-05-04
Payer: MEDICARE

## 2021-05-04 VITALS — BODY MASS INDEX: 41.12 KG/M2 | HEIGHT: 67 IN | WEIGHT: 262 LBS

## 2021-05-04 DIAGNOSIS — M51.27 HERNIATED NUCLEUS PULPOSUS, L5-S1, RIGHT: ICD-10-CM

## 2021-05-04 DIAGNOSIS — M43.00 SPONDYLOLYSIS: ICD-10-CM

## 2021-05-04 DIAGNOSIS — M54.16 RIGHT LUMBAR RADICULITIS: Primary | ICD-10-CM

## 2021-05-04 DIAGNOSIS — M54.50 LUMBAR SPINE PAIN: ICD-10-CM

## 2021-05-04 PROCEDURE — 4040F PNEUMOC VAC/ADMIN/RCVD: CPT | Performed by: FAMILY MEDICINE

## 2021-05-04 PROCEDURE — 3017F COLORECTAL CA SCREEN DOC REV: CPT | Performed by: FAMILY MEDICINE

## 2021-05-04 PROCEDURE — 1123F ACP DISCUSS/DSCN MKR DOCD: CPT | Performed by: FAMILY MEDICINE

## 2021-05-04 PROCEDURE — G8417 CALC BMI ABV UP PARAM F/U: HCPCS | Performed by: FAMILY MEDICINE

## 2021-05-04 PROCEDURE — 99213 OFFICE O/P EST LOW 20 MIN: CPT | Performed by: FAMILY MEDICINE

## 2021-05-04 PROCEDURE — G8427 DOCREV CUR MEDS BY ELIG CLIN: HCPCS | Performed by: FAMILY MEDICINE

## 2021-05-04 PROCEDURE — 1036F TOBACCO NON-USER: CPT | Performed by: FAMILY MEDICINE

## 2021-05-04 RX ORDER — GABAPENTIN 300 MG/1
CAPSULE ORAL
Qty: 90 CAPSULE | Refills: 1 | Status: SHIPPED | OUTPATIENT
Start: 2021-05-04 | End: 2021-09-21 | Stop reason: ALTCHOICE

## 2021-05-04 NOTE — PROGRESS NOTES
Chief Complaint  Back Pain (9/10 pain, MRI test results, rt leg pain)      Follow-up worsening right-sided mechanical low back pain with right leg pain suspected right-sided disc herniation right radiculopathy. Review of lumbar imaging    History of Present Illness:  Annel Marroquin is a 78 y.o. 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 He states that on 4/14/2021 he began noticed the rather acute onset of pain to his lower back. There is no history of actual injury no activity prior to coming symptomatic and initially it was somewhat achy but is progressively worse since that time or he is having difficult time with positional changes sleeping and is now having pain rating down his right leg primarily in the L5 distribution. He was initially treated with anti-inflammatories and did follow-up with Karina Rojas CNP who had contemplated placing him on steroids but opted to send him to us today for evaluation. He is having difficulty with positional changes and distance walking. Is a well-controlled diabetic with an A1c of 6.4. He does have soreness to his back as well as an achy type burning into his right leg. He does deny neurogenic bowel or bladder symptoms has not had bracing or therapy. He had recently seen Dr. Anisha Morales right knee arthritis but states that his current symptoms are much different from his previous symptoms. He is being seen today for urgent orthopedic and sports consultation with imaging. Michaelle Leal was initially evaluated in the office on 4/20/2021 and is now about 3 weeks into the development of symptoms consistent with right radiculopathy.  We did send him for an MRI which was obtained at [facility] on 4/27/2021 and did show signs of a right-sided L5-S1 disc herniation with impingement on the right S1 nerve root into the recess extending to the foramen. 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 There was mild bilateral L4-5 foraminal stenosis but is not having any left-sided radicular symptoms. He did get a slight improvement with his double dose Medrol pack and has continue with his diclofenac. He has been periodically utilizing his brace has been started in physical therapy. He would rate his improvement about 20% thus far. Denies neurogenic bowel or bladder symptoms. He has pain with prolonged sitting and positional changes still has some discomfort at night extending down into his foot. Pain Assessment  Location of Pain: Back  Severity of Pain: 9  Duration of Pain: Other (Comment)  Frequency of Pain: Other (Comment)       Medical History  Patient's medications, allergies, past medical, surgical, social and family histories were reviewed and updated as appropriate. Review of Systems  Pertinent items are noted in HPI  Review of systems reviewed from Patient History Form dated on 4/20/2021 and available in the patient's chart under the Media tab. Vital Signs  There were no vitals filed for this visit. General Exam:     Constitutional: Patient is adequately groomed with no evidence of malnutrition  DTRs: Deep tendon reflexes are intact  Mental Status: The patient is oriented to time, place and person. The patient's mood and affect are appropriate. Lymphatic: The lymphatic examination bilaterally reveals all areas to be without enlargement or induration. Vascular: Examination reveals no swelling or calf tenderness. Peripheral pulses are palpable and 2+. Neurological: The patient has good coordination. There is no weakness or sensory deficit. Lumbar/Sacral Spine Examination  Inspection: High-grade deformity or soft tissue swelling. No palpable spasm. Palpation: Clinical tenderness mildly over the lumbar paraspinals and lower lumbar facets. He does have some mild right-sided central gluteal tenderness. No discrete hip tenderness. Rang of Motion: He can forward flex to about 45 degrees today. He does have limitations in extension to 0 does have pain with facet loading which does produce pain into his right leg and L5-S1 distribution to the right. 50% reduction in lateral bending rotation. Strength: Not since focal lower extremity motor deficits. Special Tests: He does appear to have a trace positive straight leg raise on the right and negative on the left with negative screening hip testing. Skin: There are no rashes, ulcerations or lesions. Distal motor sensory and vascular exams intact. Gait: Fluid smooth gait    Reflexes:  Symmetrically preserved     Additional Comments:     Additional Examinations:  Right Lower Extremity: Examination of the right lower extremity does not show any tenderness, deformity or injury. Range of motion is unremarkable. There is no gross instability. There are no rashes, ulcerations or lesions. Strength and tone are normal.  Left Lower Extremity: Examination of the left lower extremity does not show any tenderness, deformity or injury. Range of motion is unremarkable. There is no gross instability. There are no rashes, ulcerations or lesions. Strength and tone are normal.      Diagnostic Test Findings: Spine MRI obtained at [facility] on 4/27/2021 is listed above     Site: [facility] Rad #: [id number] #: [id number] Procedure: MR Lumbar Spine w/o Contrast ; Reason for Exam: rule out right sided L4-5 HNP, evaluate spondy   This document is confidential medical information.  Unauthorized disclosure or use of this information is prohibited by law. If you are not the intended recipient of this document, please advise us by calling immediately [phone].       [facility] - [facility], [address] [place] [address]           Patient Name: Alicia Nino   Case ID: [id number]   Patient : 1945   Referring Physician: Jourdan Navarro MD   Exam Date: 2021   Exam Description: MR Lumbar Spine w/o Contrast            HISTORY:   Low back pain and right leg radiculopathy       TECHNICAL FACTORS:  Long- and short-axis fat- and water-weighted images were performed.       COMPARISON:  None.       FINDINGS:   The L5-S1 level shows a right-sided disc herniation impinging on the right S1 nerve    in the recess and extending into the right L5 S1 foramen.  Facet arthropathy.  The neural    foramina patent.       The L4-5 level shows grade 1/3 mm of anterolisthesis of L4 on L5.  Mild central spinal stenosis    and mild bilateral L4-5 foraminal stenosis secondary to concentric bulging disc, ligamentous    hypertrophy and facet arthropathy.       The L3-4 level shows concentric bulging disc without focal disc herniation or central spinal    stenosis.  The neural foramina patent.         The L2-3 level shows concentric bulging disc without focal disc herniation or central spinal    stenosis.  The neural foramina patent.         The L1-2 level shows no evidence of disc herniation or spinal stenosis.  The neural foramina    patent.       The T12-L1 level shows concentric bulging disc without focal disc herniation or central spinal    stenosis.  The neural foramina patent.       T11-12 and T12-L1 levels show no evidence of disc herniation or spinal stenosis.  The neural    foramina patent.       The conus and cauda equina are normal.       Paravertebral soft tissues are normal.       No fracture dislocation.                               CONCLUSION:   1. Right-sided L5-S1 disc herniation impinging on the right S1 nerve in the recess and    extending into the right L5-S1 foramen. 2. Lumbar spondylosis producing mild L4-5 central spinal stenosis.    3. Grade 1/3 mm of anterolisthesis of L4 on L5.   4. Mild performing some exercise program was discussed. We'll see him back as needed but hopefully can have his evaluation at [place] in near future. He will contact us in the interim with questions or concerns. This dictation was performed with a verbal recognition program (DRAGON) and it was checked for errors. It is possible that there are still dictated errors within this office note. If so, please bring any errors to my attention for an addendum. All efforts were made to ensure that this office note is accurate.

## 2021-05-04 NOTE — LETTER
[facility] [address] [place] [address] [place] [address]  Phone: [phone]  Fax: [phone]    Zurdo Chakraborty MD        May 4, 2021    TO WHOM IT MAY CONCERN,    PLEASE LET THIS LETTER SERVE AS A PRESCRIPTION FOR A ROLLING WALKER FOR MY PATIENT, AZUCENA BELTRE, IN REGARDS TO HIS RIGHT LUMBAR RADICULITIS, SPONDYLOLYSIS, AND LUMBAR DISC HERNIATION.        Sincerely,        Zurdo Chakraborty MD

## 2021-05-05 ENCOUNTER — HOSPITAL ENCOUNTER (OUTPATIENT)
Dept: PHYSICAL THERAPY | Age: 76
Setting detail: THERAPIES SERIES
Discharge: HOME | End: 2021-05-05
Payer: MEDICARE

## 2021-05-05 PROCEDURE — 97110 THERAPEUTIC EXERCISES: CPT | Performed by: PHYSICAL THERAPIST

## 2021-05-05 PROCEDURE — 97112 NEUROMUSCULAR REEDUCATION: CPT | Performed by: PHYSICAL THERAPIST

## 2021-05-05 PROCEDURE — 97530 THERAPEUTIC ACTIVITIES: CPT | Performed by: PHYSICAL THERAPIST

## 2021-05-05 NOTE — FLOWSHEET NOTE
Orthopaedics and [address] [place] [address] DR ADWNA RAE    Physical Therapy Daily Treatment Note  DDate:  2021    Patient Name:  Cece Huang    :  1945  MRN: [id number]  Restrictions/Precautions:    Medical/Treatment Diagnosis Information:  · Diagnosis: M43.00 (ICD-10-CM) - Spondylolysis / M54.16 (ICD-10-CM) - Right lumbar radiculitis  · Treatment Diagnosis: M54.5 (ICD-10-CM) - Lumbar spine pain  Insurance/Certification information:  PT Insurance Information: Medicare  Physician Information:  Referring Practitioner: Mone Newell  Has the plan of care been signed (Y/N):        []  Yes  [x]  No     Date of Patient follow up with Physician: one week for MRI results      Is this a Progress Report:     []  Yes  [x]  No        If Yes:  Date Range for reporting period:  Beginning  Ending    Progress report will be due (10 Rx or 30 days whichever is less):        Recertification will be due (POC Duration  / 90 days whichever is less): 21       Precautions/ Contra-indications:     C-SSRS Triggered by Intake questionnaire (Past 2 wk assessment):   [x] No, Questionnaire did not trigger screening.   [] Yes, Patient intake triggered further evaluation      [] C-SSRS Screening completed  [] PCP notified via Plan of Care  [] Emergency services notified       Visit # Insurance Allowable Auth Required   3 medicare []  Yes [x]  No        Functional Scale:     Owestry= 22%   Date assessed:  21     Pain level:  6/10     SUBJECTIVE:  The patient noted that he saw MD for MRI review. He was referred for epidural injection- pending. CONCLUSION:   1. Right-sided L5-S1 disc herniation impinging on the right S1 nerve in the recess and    extending into the right L5-S1 foramen. 2. Lumbar spondylosis producing mild L4-5 central spinal stenosis. 3. Grade 1/3 mm of anterolisthesis of L4 on L5.   4. Mild bilateral L4-5 foraminal stenosis. He noted that he continues to get moderately sore from his HEP.  Still []? FABQ less than 19                          []? Hypomobility of lumbar spine                          []? IR of at least 1 hip >35 degrees                          Other Factors to consider:                          []? no symptoms distal to the knee                          []? no red flags and minimal yellow flags                          []? no contraindications to manipulation              []? \"Traction subgroup\"                            []? signs and symptoms of nerve root compression (radiculopathy)                           []? unable to centralize distal symptoms with movement                          []? pain or numbness in the lumbar spine, buttock, and/or LE                          []? peripheralization with extension movements                          []? + SLR or + crossed SLR (symptoms less than 70 degrees)  []? Movement Control Approach              []? \"Stabilization subgroup\"                          []? younger age (less than 36)                           []? greater general flexibility (post-partum, SLR >90)                          []? abberant movements, painful arc, or [place]'s sign with flex/ext ROM                          []? positive prone instability test  []? Functional Optimization Approach              [x]? \"unspecified subgroup\"                          [x]? lower disability scores                           [x]? lower fear avoidance scores                           [x]? longer duration of symptoms (chronic)                          [x]? prior episodes of low back pain                          [x]? older age    EExercises/Interventions:     Therapeutic Ex (10698) Position Sets/sec Reps Notes/CUES   Stretching       Hamstring seated 30 5    Hip Flexion       ITB       Groin       Quad       Inclined Calf- Gastroc       Inclined Calf-Soleus       S- KTC  10 10    D- KTC       Piriformis  30 5 Pull across and L knee back.     Figure 4 push   30 5    Hip Adductor       Prone Press-ups Cat / Camel                            Child's pose       Isometrics       Quad Sets       Glut Sets       Hip Abduction       Pelvic tilts       Hip Flexion       Hip Adduction- BS                     ROM       Sheet Pulls       Wall Slides       Edge of Bed       ERMI - Flexionator       ERMI- Extensionator       Weighted Hangs       Ankle Pumps       E-Z Stretch              PRE's       Leg Press- Range: 70 - 5        Leg Extension- Range: 90 - 40        Leg Curl- Range: 0 -90              CCTKE- Cable Column       CCTKE- Prone on table              CKC       Calf Raises       Wall Sits       Mini Squats       Lateral Band Walks                                                 Scifit Bike Time:   Level:   Program:   Seat:       AD- Bike Time:   RPM's:   Seat:      Alter G Treadmill Time:      Short Size:    Treadmill       Elliptical              NMR re-education (93132)    CUES NEEDED   Biodex-balance       Single leg stance       Plyoback       Rocker Board       SL Deadlifts       Bridging       Planks- front       Planks- Side       TrA/mutlifidi walk outs       Bird / Dog       D/C due to pain   Lower trunk rotation 2 sets 10 10    Seated trunk flexion  10 10       Abd brace / kegels and hip Add iso  10 10    Supine alternating LE march  2 10    Therapeutic Activity (27086)       Step Ups       Step up and overs       Lateral Step downs       Stool Scoots                     Manual Intervention (86183)              Intermittent manual lumbar traction  Green TB  15 cycles   45 sec on 30 sec off sxs decreased and centralized with traction. 30 sec max relief                                        HEP instruction: (see scanned forms)  Access Code: [email or web address]: 04/23/2021Prepared by: Ekta Slight   · Supine Hamstring Stretch with Strap - 1 x daily - 7 x weekly - 1 sets - 5 reps - 30 hold   · Supine Piriformis Stretch with Foot on Ground - 1 x daily - 7 x weekly - 1 sets - 5 reps - 30 hold   · Hooklying Single Knee to Chest Stretch - 1 x daily - 7 x weekly - 10 reps - 3 sets   · Supine Posterior Pelvic Tilt - 1 x daily - 7 x weekly - 1 sets - 10 reps - 10 hold   · Lower Trunk Rotations - 1 x daily - 7 x weekly - 1 sets - 10 reps - 5 hold   · Seated Forward Bending - 1 x daily - 7 x weekly - 1 sets - 10 reps - 5 hold   · Supine Figure 4 Piriformis Stretch - 1 x daily - 7 x weekly - 3 sets - 10 reps    Therapeutic Exercise and NMR EXR  [x] (90201) Provided verbal/tactile cueing for activities related to strengthening, flexibility, endurance, ROM for improvements in LE, proximal hip, and core control with self care, mobility, lifting, ambulation.  [] (05103) Provided verbal/tactile cueing for activities related to improving balance, coordination, kinesthetic sense, posture, motor skill, proprioception  to assist with LE, proximal hip, and core control in self care, mobility, lifting, ambulation and eccentric single leg control.      NMR and Therapeutic Activities:    [] (94407 or 36407) Provided verbal/tactile cueing for activities related to improving balance, coordination, kinesthetic sense, posture, motor skill, proprioception and motor activation to allow for proper function of core, proximal hip and LE with self care and ADLs  [] (02875) Gait Re-education- Provided training and instruction to the patient for proper LE, core and proximal hip recruitment and positioning and eccentric body weight control with ambulation re-education including up and down stairs     Home Exercise Program:    [x] (49223) Reviewed/Progressed HEP activities related to strengthening, flexibility, endurance, ROM of core, proximal hip and LE for functional self-care, mobility, lifting and ambulation/stair navigation   [] (15816)Reviewed/Progressed HEP activities related to improving balance, coordination, kinesthetic sense, posture, motor skill, proprioception of core, proximal hip and LE for self care, mobility, lifting, and ambulation/stair navigation      Manual Treatments:  PROM / STM / Oscillations-Mobs:  G-I, II, III, IV (PA's, Inf., Post.)  [x] (34579) Provided manual therapy to mobilize LE, proximal hip and/or LS spine soft tissue/joints for the purpose of modulating pain, promoting relaxation,  increasing ROM, reducing/eliminating soft tissue swelling/inflammation/restriction, improving soft tissue extensibility and allowing for proper ROM for normal function with self care, mobility, lifting and ambulation. Hypervolt to piriformis - 6'       Modalities:     [] GAME READY (VASO)- for significant edema, swelling, pain control. Charges:  Timed Code Treatment Minutes: 48   Total Treatment Minutes: 60      [] EVAL (LOW) 53660 (typically 20 minutes face-to-face)  [] EVAL (MOD) 82712 (typically 30 minutes face-to-face)  [] EVAL (HIGH) 83902 (typically 45 minutes face-to-face)  [] RE-EVAL     [x] TN(79007) x 1    [] IONTO  [x] NMR (32367) x  1  [] VASO  [x] Manual (40541) x  1   [] Other:  [] TA x      [] Mech Traction (28347)  [] ES(attended) (75310)      [] ES (un) (32478):    GOALS:  Patient stated goal: decrease pain and not use cane with amb. [] Progressing: [] Met: [] Not Met: [] Adjusted  Therapist goals for Patient:   Short Term Goals: To be achieved in: 2 weeks  1. Independent in HEP and progression per patient tolerance, in order to prevent re-injury. [] Progressing: [] Met: [] Not Met: [] Adjusted  2. Patient will have a decrease in pain to facilitate improvement in movement, function, and ADLs as indicated by Functional Deficits. [] Progressing: [] Met: [] Not Met: [] Adjusted    Long Term Goals: To be achieved in: 4 weeks  1. Disability index score of 50% or less for the [facility] to assist with reaching prior level of function. [] Progressing: [] Met: [] Not Met: [] Adjusted  2.  Patient will demonstrate increased AROM to WNL, good LS mobility, good hip ROM to allow for proper joint functioning as indicated by patients Functional Deficits. [] Progressing: [] Met: [] Not Met: [] Adjusted  3. Patient will demonstrate an increase in Strength to good proximal hip and core activation to allow for proper functional mobility as indicated by patients Functional Deficits. [] Progressing: [] Met: [] Not Met: [] Adjusted  4. Patient will return to I functional activities without increased symptoms or restriction. [] Progressing: [] Met: [] Not Met: [] Adjusted  5. Patient able to amb for 100 feet x 3 without A device. [] Progressing: [] Met: [] Not Met: [] Adjusted      Overall Progression Towards Functional goals/ Treatment Progress Update:  [x] Patient is progressing as expected towards functional goals listed. [] Progression is slowed due to complexities/Impairments listed. [] Progression has been slowed due to co-morbidities. [] Plan just implemented, too soon to assess goals progression <30days   [] Goals require adjustment due to lack of progress  [] Patient is not progressing as expected and requires additional follow up with physician  [] Other    Prognosis for POC: [x] Good [] Fair  [] Poor      Patient requires continued skilled intervention: [x] Yes  [] No    ASSESSMENT:      Treatment/Activity Tolerance:  [x] Patient tolerated treatment well [] Patient limited by fatique  [] Patient limited by pain  [] Patient limited by other medical complications  [] Other: increased response to manual traction. Increased time relief to sxs into lateral calf. 2 min relief after traction. Updated HEP with pics; removed pelvic tilt due to pain.  If patient continues to respond well to manual traction consider transfer     Return to Play: (if applicable)   []  Stage 1: Intro to Strength   []  Stage 2: Return to Run and Strength   []  Stage 3: Return to Jump and Strength   []  Stage 4: Dynamic Strength and Agility   []  Stage 5: Sport Specific Training     []  Ready to Return to Play, Meets All Above Stages   []  Not Ready for Return to Sports   Comments:                             PLAN: Continue with POC and progress as tolerated. [x] Continue per plan of care [] Alter current plan (see comments above)  [] Plan of care initiated [] Hold pending MD visit [] Discharge      Electronically signed by:  Leigh Ann Trevino PT    Note: If patient does not return for scheduled/ recommended follow up visits, this note will serve as a discharge from care along with most recent update on progress.

## 2021-05-10 ENCOUNTER — OFFICE VISIT (OUTPATIENT)
Dept: ORTHOPEDIC SURGERY | Age: 76
End: 2021-05-10
Payer: MEDICARE

## 2021-05-10 DIAGNOSIS — M17.11 PRIMARY OSTEOARTHRITIS OF RIGHT KNEE: ICD-10-CM

## 2021-05-10 DIAGNOSIS — G89.29 CHRONIC PAIN OF RIGHT KNEE: Primary | ICD-10-CM

## 2021-05-10 DIAGNOSIS — M25.561 CHRONIC PAIN OF RIGHT KNEE: Primary | ICD-10-CM

## 2021-05-10 PROCEDURE — 20610 DRAIN/INJ JOINT/BURSA W/O US: CPT | Performed by: ORTHOPAEDIC SURGERY

## 2021-05-10 RX ORDER — HYALURONATE SODIUM 10 MG/ML
20 SYRINGE (ML) INTRAARTICULAR ONCE
Status: COMPLETED | OUTPATIENT
Start: 2021-05-10 | End: 2021-05-10

## 2021-05-10 RX ADMIN — Medication 20 MG: at 09:02

## 2021-05-10 NOTE — PROGRESS NOTES
Michel Palmer returns today for his right knee. He reports no pain in the right knee today.      Patient's medications, allergies, past medical, surgical, social and family histories were reviewed and updated as appropriate. Relevant review of systems reviewed.      Right knee has no large effusion.  He has mild varicosities.  He has moderate tenderness medially.  He has a moderate crepitation.  Range of motion 0 to 120 degrees.  Calf is soft but there is pitting edema and stasis.  There is no ligamentous  instability        Assessment: Osteoarthritis right knee.     Plan: Euflexxa viscosupplementation attempt alleviate the need for arthroplasty.           Procedure: Under sterile technique, right knee was injected into the anterolateral arthroscopy portal with 20 mg of Euflexxa.  He tolerated that well.           He is being treated for right-sided sciatica by Dr. Julia Holden, and reportedly will be getting epidural injections.                                                                                                                                                           This note was created using voice recognition software. It has been proofread, but occasionally errors remain. Please disregard these errors. They will be corrected as they are noted.

## 2021-05-11 ENCOUNTER — HOSPITAL ENCOUNTER (OUTPATIENT)
Dept: PHYSICAL THERAPY | Age: 76
Setting detail: THERAPIES SERIES
Discharge: HOME | End: 2021-05-11
Payer: MEDICARE

## 2021-05-11 PROCEDURE — 97112 NEUROMUSCULAR REEDUCATION: CPT | Performed by: PHYSICAL THERAPIST

## 2021-05-11 PROCEDURE — 97140 MANUAL THERAPY 1/> REGIONS: CPT | Performed by: PHYSICAL THERAPIST

## 2021-05-11 PROCEDURE — 97110 THERAPEUTIC EXERCISES: CPT | Performed by: PHYSICAL THERAPIST

## 2021-05-11 NOTE — FLOWSHEET NOTE
Orthopaedics and [address] [place] [address] DR DAWNA RAE    Physical Therapy Daily Treatment Note  DDate:  2021    Patient Name:  Padilla Chopra    :  1945  MRN: [id number]  Restrictions/Precautions:    Medical/Treatment Diagnosis Information:  · Diagnosis: M43.00 (ICD-10-CM) - Spondylolysis / M54.16 (ICD-10-CM) - Right lumbar radiculitis  · Treatment Diagnosis: M54.5 (ICD-10-CM) - Lumbar spine pain  Insurance/Certification information:  PT Insurance Information: Medicare  Physician Information:  Referring Practitioner: Era Nagel  Has the plan of care been signed (Y/N):        []  Yes  [x]  No     Date of Patient follow up with Physician: one week for MRI results      Is this a Progress Report:     []  Yes  [x]  No        If Yes:  Date Range for reporting period:  Beginning  Ending    Progress report will be due (10 Rx or 30 days whichever is less):        Recertification will be due (POC Duration  / 90 days whichever is less): 21       Precautions/ Contra-indications:     C-SSRS Triggered by Intake questionnaire (Past 2 wk assessment):   [x] No, Questionnaire did not trigger screening.   [] Yes, Patient intake triggered further evaluation      [] C-SSRS Screening completed  [] PCP notified via Plan of Care  [] Emergency services notified       Visit # Insurance Allowable Auth Required   4 medicare []  Yes [x]  No        Functional Scale:     Owestry= 22%   Date assessed:  21     Pain level:  6/10     SUBJECTIVE:  The patient noted that he is feeling a bit better and moving around a bit. He saw Garcia and he is going to wait with the Epidural injections. He has been walking without cane at home and now is only using for when he is on his feet for prolonged times. MRI  CONCLUSION:   1. Right-sided L5-S1 disc herniation impinging on the right S1 nerve in the recess and    extending into the right L5-S1 foramen. 2. Lumbar spondylosis producing mild L4-5 central spinal stenosis.    3. flexion (L1-L2) WNL WNL     Knee extension (L2-L4) WNL WNL     Dorsiflexion (L4-L5)         Great Toe Ext (L5)         Ankle Eversion (S1-S2)         Ankle PF(S1-S2)         Hip Abductors         Hip Extensors               Dermatomes Normal Abnormal Comments   inguinal area (L1)  x       anterior mid-thigh (L2) x       distal ant thigh/med knee (L3) x       medial lower leg and foot (L4) x       lateral lower leg and foot (L5)   x Decrease due to DM   posterior calf (S1)   x  \"  \"   medial calcaneus (S2)   x \"  \"         Joint mobility:              []? Normal                     [x]? Hypo- lumabr spine / Hip and knee              []? Hyper     Palpation: TTP piriformis and R Lumbar paraspinalis     Functional Mobility/Transfers:     Normal Abnormal Comments   Sit ? Stand   x Increased effort and LLE ramos most of the work. Sit ? Supine x       Other:                                 Classification driving today's treatment approach and dosing:  - Prevailing signs and symptoms consistent with:  []? Symptom Modulation Approach              []? \"Directional preference subgroup\"                          [x]? Flexion Based                                       [x]? age >47                                       [x]? imaging evidence of lumbar stenosis                                      [x]? preference for flexion                           []? Extension Based                                      []? symptoms distal to buttock                                      []? symptoms centralize with lumbar extension                                      []? symptoms peripheralize with lumbar flexion                                      []? directional preference for extension                          []? Lateral Shift                                      []? visible frontal plane deviation                                      []? directional preference for lateral translation              []?  \"Manipulation subgroup\"  (3/4 meets manipulation criteria)                           []? symptoms less than 16 days                          []? FABQ less than 19                          []? Hypomobility of lumbar spine                          []? IR of at least 1 hip >35 degrees                          Other Factors to consider:                          []? no symptoms distal to the knee                          []? no red flags and minimal yellow flags                          []? no contraindications to manipulation              []? \"Traction subgroup\"                            []? signs and symptoms of nerve root compression (radiculopathy)                           []? unable to centralize distal symptoms with movement                          []? pain or numbness in the lumbar spine, buttock, and/or LE                          []? peripheralization with extension movements                          []? + SLR or + crossed SLR (symptoms less than 70 degrees)  []? Movement Control Approach              []? \"Stabilization subgroup\"                          []? younger age (less than 36)                           []? greater general flexibility (post-partum, SLR >90)                          []? abberant movements, painful arc, or Clarissa's sign with flex/ext ROM                          []? positive prone instability test  []? Functional Optimization Approach              [x]? \"unspecified subgroup\"                          [x]? lower disability scores                           [x]? lower fear avoidance scores                           [x]? longer duration of symptoms (chronic)                          [x]? prior episodes of low back pain                          [x]?  older age    EExercises/Interventions:     Therapeutic Ex (50984) Position Sets/sec Reps Notes/CUES   Stretching       Hamstring seated 30 5    Hip Flexion       ITB       Groin       Quad       Inclined Calf- Gastroc  30 5    Inclined Calf-Soleus       S- KTC  10 10    D- KTC x daily - 7 x weekly - 1 sets - 5 reps - 30 hold   · Supine Piriformis Stretch with Foot on Ground - 1 x daily - 7 x weekly - 1 sets - 5 reps - 30 hold   · Hooklying Single Knee to Chest Stretch - 1 x daily - 7 x weekly - 10 reps - 3 sets   · Supine Posterior Pelvic Tilt - 1 x daily - 7 x weekly - 1 sets - 10 reps - 10 hold   · Lower Trunk Rotations - 1 x daily - 7 x weekly - 1 sets - 10 reps - 5 hold   · Seated Forward Bending - 1 x daily - 7 x weekly - 1 sets - 10 reps - 5 hold   · Supine Figure 4 Piriformis Stretch - 1 x daily - 7 x weekly - 3 sets - 10 reps    Therapeutic Exercise and NMR EXR  [x] (46252) Provided verbal/tactile cueing for activities related to strengthening, flexibility, endurance, ROM for improvements in LE, proximal hip, and core control with self care, mobility, lifting, ambulation.  [] (56722) Provided verbal/tactile cueing for activities related to improving balance, coordination, kinesthetic sense, posture, motor skill, proprioception  to assist with LE, proximal hip, and core control in self care, mobility, lifting, ambulation and eccentric single leg control.      NMR and Therapeutic Activities:    [] (55662 or 39009) Provided verbal/tactile cueing for activities related to improving balance, coordination, kinesthetic sense, posture, motor skill, proprioception and motor activation to allow for proper function of core, proximal hip and LE with self care and ADLs  [] (03524) Gait Re-education- Provided training and instruction to the patient for proper LE, core and proximal hip recruitment and positioning and eccentric body weight control with ambulation re-education including up and down stairs     Home Exercise Program:    [x] (99278) Reviewed/Progressed HEP activities related to strengthening, flexibility, endurance, ROM of core, proximal hip and LE for functional self-care, mobility, lifting and ambulation/stair navigation   [] (04833)Reviewed/Progressed HEP activities related to improving balance, coordination, kinesthetic sense, posture, motor skill, proprioception of core, proximal hip and LE for self care, mobility, lifting, and ambulation/stair navigation      Manual Treatments:  PROM / STM / Oscillations-Mobs:  G-I, II, III, IV (PA's, Inf., Post.)  [x] (77198) Provided manual therapy to mobilize LE, proximal hip and/or LS spine soft tissue/joints for the purpose of modulating pain, promoting relaxation,  increasing ROM, reducing/eliminating soft tissue swelling/inflammation/restriction, improving soft tissue extensibility and allowing for proper ROM for normal function with self care, mobility, lifting and ambulation. Modalities:     [] GAME READY (VASO)- for significant edema, swelling, pain control. Charges:  Timed Code Treatment Minutes: 48   Total Treatment Minutes: 60      [] EVAL (LOW) 47235 (typically 20 minutes face-to-face)  [] EVAL (MOD) 98584 (typically 30 minutes face-to-face)  [] EVAL (HIGH) 87588 (typically 45 minutes face-to-face)  [] RE-EVAL     [x] SH(54347) x 1    [] IONTO  [x] NMR (08774) x  1  [] VASO  [x] Manual (49868) x  1   [] Other:  [] TA x      [] Mech Traction (56521)  [] ES(attended) (98281)      [] ES (un) (71676):    GOALS:  Patient stated goal: decrease pain and not use cane with amb. [] Progressing: [] Met: [] Not Met: [] Adjusted  Therapist goals for Patient:   Short Term Goals: To be achieved in: 2 weeks  1. Independent in HEP and progression per patient tolerance, in order to prevent re-injury. [] Progressing: [] Met: [] Not Met: [] Adjusted  2. Patient will have a decrease in pain to facilitate improvement in movement, function, and ADLs as indicated by Functional Deficits. [] Progressing: [] Met: [] Not Met: [] Adjusted    Long Term Goals: To be achieved in: 4 weeks  1. Disability index score of 50% or less for the [facility] to assist with reaching prior level of function. [] Progressing: [] Met: [] Not Met: [] Adjusted  2.  Patient will demonstrate increased AROM to WNL, good LS mobility, good hip ROM to allow for proper joint functioning as indicated by patients Functional Deficits. [] Progressing: [] Met: [] Not Met: [] Adjusted  3. Patient will demonstrate an increase in Strength to good proximal hip and core activation to allow for proper functional mobility as indicated by patients Functional Deficits. [] Progressing: [] Met: [] Not Met: [] Adjusted  4. Patient will return to I functional activities without increased symptoms or restriction. [] Progressing: [] Met: [] Not Met: [] Adjusted  5. Patient able to amb for 100 feet x 3 without A device. [] Progressing: [] Met: [] Not Met: [] Adjusted      Overall Progression Towards Functional goals/ Treatment Progress Update:  [x] Patient is progressing as expected towards functional goals listed. [] Progression is slowed due to complexities/Impairments listed. [] Progression has been slowed due to co-morbidities. [] Plan just implemented, too soon to assess goals progression <30days   [] Goals require adjustment due to lack of progress  [] Patient is not progressing as expected and requires additional follow up with physician  [] Other    Prognosis for POC: [x] Good [] Fair  [] Poor      Patient requires continued skilled intervention: [x] Yes  [] No    ASSESSMENT:      Treatment/Activity Tolerance:  [x] Patient tolerated treatment well [] Patient limited by fatique  [] Patient limited by pain  [] Patient limited by other medical complications  [] Other: increased response to manual traction. Almost no distal sxs thru our session. Increased functional mobility due to decreased pain.      Return to Play: (if applicable)   []  Stage 1: Intro to Strength   []  Stage 2: Return to Run and Strength   []  Stage 3: Return to Jump and Strength   []  Stage 4: Dynamic Strength and Agility   []  Stage 5: Sport Specific Training     []  Ready to Return to Play, Meets All Above Stages   []  Not Ready for Return to Sports   Comments:                             PLAN: Continue with POC and progress as tolerated. [x] Continue per plan of care [] Alter current plan (see comments above)  [] Plan of care initiated [] Hold pending MD visit [] Discharge      Electronically signed by:  Anahi Ann PT    Note: If patient does not return for scheduled/ recommended follow up visits, this note will serve as a discharge from care along with most recent update on progress.

## 2021-05-19 ENCOUNTER — TELEPHONE (OUTPATIENT)
Dept: INTERNAL MEDICINE CLINIC | Age: 76
End: 2021-05-19

## 2021-05-19 NOTE — TELEPHONE ENCOUNTER
Pt was seen in their office today. They transferred pt to [facility] because of low blood sugar. 61  Also low O2 on 4 liters. Took his insulin this morning and didn't eat before procedure.     PARKER

## 2021-05-20 DIAGNOSIS — M54.16 RIGHT LUMBAR RADICULITIS: ICD-10-CM

## 2021-05-20 DIAGNOSIS — M43.00 SPONDYLOLYSIS: ICD-10-CM

## 2021-05-20 DIAGNOSIS — M51.27 HERNIATED NUCLEUS PULPOSUS, L5-S1, RIGHT: Primary | ICD-10-CM

## 2021-05-20 DIAGNOSIS — M54.50 LUMBAR SPINE PAIN: ICD-10-CM

## 2021-05-20 RX ORDER — METHYLPREDNISOLONE 4 MG/1
TABLET ORAL
Qty: 21 KIT | Refills: 0 | Status: SHIPPED | OUTPATIENT
Start: 2021-05-20 | End: 2021-07-01 | Stop reason: ALTCHOICE

## 2021-05-21 ENCOUNTER — HOSPITAL ENCOUNTER (OUTPATIENT)
Dept: PHYSICAL THERAPY | Age: 76
Setting detail: THERAPIES SERIES
Discharge: HOME | End: 2021-05-21
Payer: MEDICARE

## 2021-05-21 ENCOUNTER — TELEPHONE (OUTPATIENT)
Dept: ENDOCRINOLOGY | Age: 76
End: 2021-05-21

## 2021-05-21 PROCEDURE — 97140 MANUAL THERAPY 1/> REGIONS: CPT | Performed by: PHYSICAL THERAPIST

## 2021-05-21 PROCEDURE — 97110 THERAPEUTIC EXERCISES: CPT | Performed by: PHYSICAL THERAPIST

## 2021-05-21 PROCEDURE — 97112 NEUROMUSCULAR REEDUCATION: CPT | Performed by: PHYSICAL THERAPIST

## 2021-05-21 NOTE — TELEPHONE ENCOUNTER
Please advise patient that he should take only 65 units of lantus the day before procedure. Take Humalog only when eating. He can then proceed with procedure based on diabetes.  We can also please fax this note to Radha

## 2021-05-21 NOTE — FLOWSHEET NOTE
Orthopaedics and [address] [place] [address] DR DAWNA RAE    Physical Therapy Daily Treatment Note  DDate:  2021    Patient Name:  Asmita Croft    :  1945  MRN: [id number]  Restrictions/Precautions:    Medical/Treatment Diagnosis Information:  · Diagnosis: M43.00 (ICD-10-CM) - Spondylolysis / M54.16 (ICD-10-CM) - Right lumbar radiculitis  · Treatment Diagnosis: M54.5 (ICD-10-CM) - Lumbar spine pain  Insurance/Certification information:  PT Insurance Information: Medicare  Physician Information:  Referring Practitioner: Gurvinder Chowdhury  Has the plan of care been signed (Y/N):        []  Yes  [x]  No     Date of Patient follow up with Physician: one week for MRI results      Is this a Progress Report:     []  Yes  [x]  No        If Yes:  Date Range for reporting period:  Beginning  Ending    Progress report will be due (10 Rx or 30 days whichever is less):        Recertification will be due (POC Duration  / 90 days whichever is less): 21       Precautions/ Contra-indications:     C-SSRS Triggered by Intake questionnaire (Past 2 wk assessment):   [x] No, Questionnaire did not trigger screening.   [] Yes, Patient intake triggered further evaluation      [] C-SSRS Screening completed  [] PCP notified via Plan of Care  [] Emergency services notified       Visit # Insurance Allowable Auth Required   5 medicare []  Yes [x]  No        Functional Scale:     Owestry= 22%   Date assessed:  21     Pain level:  6/10     SUBJECTIVE:  The patient noted that he was not able to get his epidural due to low blood sugar issues. In fact when he went for the test, it was so low that they sent him to the hospital for monitoring. He is waiting on MD clearance before he can go thru the procedure. MRI  CONCLUSION:   1. Right-sided L5-S1 disc herniation impinging on the right S1 nerve in the recess and    extending into the right L5-S1 foramen. 2. Lumbar spondylosis producing mild L4-5 central spinal stenosis.    3. Right Comments   Hip flexion (L1-L2) WNL WNL     Knee extension (L2-L4) WNL WNL     Dorsiflexion (L4-L5)         Great Toe Ext (L5)         Ankle Eversion (S1-S2)         Ankle PF(S1-S2)         Hip Abductors         Hip Extensors               Dermatomes Normal Abnormal Comments   inguinal area (L1)  x       anterior mid-thigh (L2) x       distal ant thigh/med knee (L3) x       medial lower leg and foot (L4) x       lateral lower leg and foot (L5)   x Decrease due to DM   posterior calf (S1)   x  \"  \"   medial calcaneus (S2)   x \"  \"         Joint mobility:              []? Normal                     [x]? Hypo- lumabr spine / Hip and knee              []? Hyper     Palpation: TTP piriformis and R Lumbar paraspinalis     Functional Mobility/Transfers:     Normal Abnormal Comments   Sit ? Stand   x Increased effort and LLE ramos most of the work. Sit ? Supine x       Other:                                 Classification driving today's treatment approach and dosing:  - Prevailing signs and symptoms consistent with:  []? Symptom Modulation Approach              []? \"Directional preference subgroup\"                          [x]? Flexion Based                                       [x]? age >47                                       [x]? imaging evidence of lumbar stenosis                                      [x]? preference for flexion                           []? Extension Based                                      []? symptoms distal to buttock                                      []? symptoms centralize with lumbar extension                                      []? symptoms peripheralize with lumbar flexion                                      []? directional preference for extension                          []? Lateral Shift                                      []? visible frontal plane deviation                                      []? directional preference for lateral translation              []?  \"Manipulation subgroup\"  (3/4 meets manipulation criteria)                           []? symptoms less than 16 days                          []? FABQ less than 19                          []? Hypomobility of lumbar spine                          []? IR of at least 1 hip >35 degrees                          Other Factors to consider:                          []? no symptoms distal to the knee                          []? no red flags and minimal yellow flags                          []? no contraindications to manipulation              []? \"Traction subgroup\"                            []? signs and symptoms of nerve root compression (radiculopathy)                           []? unable to centralize distal symptoms with movement                          []? pain or numbness in the lumbar spine, buttock, and/or LE                          []? peripheralization with extension movements                          []? + SLR or + crossed SLR (symptoms less than 70 degrees)  []? Movement Control Approach              []? \"Stabilization subgroup\"                          []? younger age (less than 36)                           []? greater general flexibility (post-partum, SLR >90)                          []? abberant movements, painful arc, or [place]'s sign with flex/ext ROM                          []? positive prone instability test  []? Functional Optimization Approach              [x]? \"unspecified subgroup\"                          [x]? lower disability scores                           [x]? lower fear avoidance scores                           [x]? longer duration of symptoms (chronic)                          [x]? prior episodes of low back pain                          [x]?  older age    EExercises/Interventions:     Therapeutic Ex (53995) Position Sets/sec Reps Notes/CUES   Stretching       Hamstring seated 30 5    Hip Flexion       ITB       Groin       Quad       Inclined Calf- Gastroc  30 5    Inclined Calf-Soleus       S- KTC 10 10    D- KTC       Piriformis  30 5 Pull across and L knee back. Figure 4 push   30 5    Hip Adductor       Prone Press-ups       Cat / Camel                            Child's pose       Isometrics       Quad Sets       Glut Sets       Hip Abduction       Pelvic tilts       Hip Flexion       Hip Adduction- BS                     ROM       Sheet Pulls       Wall Slides       Edge of Bed       ERMI - Flexionator       ERMI- Extensionator       Weighted Hangs       Ankle Pumps       E-Z Stretch              PRE's       Leg Press- Range: 70 - 5        Leg Extension- Range: 90 - 40        Leg Curl- Range: 0 -90              CCTKE- Cable Column       CCTKE- Prone on table              CKC       Calf Raises  3 10    Wall Sits       Mini Squats       Lateral Band Walks                     SLR abd standing   3 10 BLE                        Scifit Bike Time:   Level:   Program:   Seat:       AD- Bike Time:   RPM's:   Seat:      Alter G Treadmill Time:      Short Size:    Treadmill       Elliptical              NMR re-education (01954)    CUES NEEDED   Biodex-balance       Single leg stance       Plyoback       Rocker Board       SL Deadlifts       Bridging       Planks- front       Planks- Side       TrA/mutlifidi walk outs       Bird / Dog       D/C due to pain   Lower trunk rotation 3 sets 10 10    Seated trunk flexion       Abd brace / kegels and hip Add iso Sets= 2 10 10    Supine alternating LE march  3 10    Therapeutic Activity (39927)       Step Ups       Step up and overs       Lateral Step downs       Stool Scoots                     Manual Intervention (61654)              Intermittent manual lumbar traction  Green TB  15 cycles   45 sec on 30 sec off sxs decreased and centralized with traction. 30 sec max relief                                        HEP instruction: (see scanned forms)  Access Code: [email or web address]: 04/23/2021Prepared by: Nasir Keen   · Supine Hamstring Stretch with Strap - 1 x daily - 7 x weekly - 1 sets - 5 reps - 30 hold   · Supine Piriformis Stretch with Foot on Ground - 1 x daily - 7 x weekly - 1 sets - 5 reps - 30 hold   · Hooklying Single Knee to Chest Stretch - 1 x daily - 7 x weekly - 10 reps - 3 sets   · Supine Posterior Pelvic Tilt - 1 x daily - 7 x weekly - 1 sets - 10 reps - 10 hold   · Lower Trunk Rotations - 1 x daily - 7 x weekly - 1 sets - 10 reps - 5 hold   · Seated Forward Bending - 1 x daily - 7 x weekly - 1 sets - 10 reps - 5 hold   · Supine Figure 4 Piriformis Stretch - 1 x daily - 7 x weekly - 3 sets - 10 reps    Therapeutic Exercise and NMR EXR  [x] (33854) Provided verbal/tactile cueing for activities related to strengthening, flexibility, endurance, ROM for improvements in LE, proximal hip, and core control with self care, mobility, lifting, ambulation.  [] (30722) Provided verbal/tactile cueing for activities related to improving balance, coordination, kinesthetic sense, posture, motor skill, proprioception  to assist with LE, proximal hip, and core control in self care, mobility, lifting, ambulation and eccentric single leg control.      NMR and Therapeutic Activities:    [] (80720 or 33402) Provided verbal/tactile cueing for activities related to improving balance, coordination, kinesthetic sense, posture, motor skill, proprioception and motor activation to allow for proper function of core, proximal hip and LE with self care and ADLs  [] (41430) Gait Re-education- Provided training and instruction to the patient for proper LE, core and proximal hip recruitment and positioning and eccentric body weight control with ambulation re-education including up and down stairs     Home Exercise Program:    [x] (48187) Reviewed/Progressed HEP activities related to strengthening, flexibility, endurance, ROM of core, proximal hip and LE for functional self-care, mobility, lifting and ambulation/stair navigation   [] (85672)Reviewed/Progressed HEP activities related to improving balance, coordination, kinesthetic sense, posture, motor skill, proprioception of core, proximal hip and LE for self care, mobility, lifting, and ambulation/stair navigation      Manual Treatments:  PROM / STM / Oscillations-Mobs:  G-I, II, III, IV (PA's, Inf., Post.)  [x] (51242) Provided manual therapy to mobilize LE, proximal hip and/or LS spine soft tissue/joints for the purpose of modulating pain, promoting relaxation,  increasing ROM, reducing/eliminating soft tissue swelling/inflammation/restriction, improving soft tissue extensibility and allowing for proper ROM for normal function with self care, mobility, lifting and ambulation. Modalities:     [] GAME READY (VASO)- for significant edema, swelling, pain control. Charges:  Timed Code Treatment Minutes: 52   Total Treatment Minutes: 60      [] EVAL (LOW) 50421 (typically 20 minutes face-to-face)  [] EVAL (MOD) 53117 (typically 30 minutes face-to-face)  [] EVAL (HIGH) 79749 (typically 45 minutes face-to-face)  [] RE-EVAL     [x] SE(08081) x 1    [] IONTO  [x] NMR (38108) x  1  [] VASO  [x] Manual (92349) x  1   [] Other:  [] TA x      [] Mech Traction (31078)  [] ES(attended) (93751)      [] ES (un) (15605):    GOALS:  Patient stated goal: decrease pain and not use cane with amb. [] Progressing: [] Met: [] Not Met: [] Adjusted  Therapist goals for Patient:   Short Term Goals: To be achieved in: 2 weeks  1. Independent in HEP and progression per patient tolerance, in order to prevent re-injury. [] Progressing: [] Met: [] Not Met: [] Adjusted  2. Patient will have a decrease in pain to facilitate improvement in movement, function, and ADLs as indicated by Functional Deficits. [] Progressing: [] Met: [] Not Met: [] Adjusted    Long Term Goals: To be achieved in: 4 weeks  1. Disability index score of 50% or less for the [facility] to assist with reaching prior level of function.    [] Progressing: [] Met: [] Not Met: [] Adjusted  2. Patient will demonstrate increased AROM to WNL, good LS mobility, good hip ROM to allow for proper joint functioning as indicated by patients Functional Deficits. [] Progressing: [] Met: [] Not Met: [] Adjusted  3. Patient will demonstrate an increase in Strength to good proximal hip and core activation to allow for proper functional mobility as indicated by patients Functional Deficits. [] Progressing: [] Met: [] Not Met: [] Adjusted  4. Patient will return to I functional activities without increased symptoms or restriction. [] Progressing: [] Met: [] Not Met: [] Adjusted  5. Patient able to amb for 100 feet x 3 without A device. [] Progressing: [] Met: [] Not Met: [] Adjusted      Overall Progression Towards Functional goals/ Treatment Progress Update:  [x] Patient is progressing as expected towards functional goals listed. [] Progression is slowed due to complexities/Impairments listed. [] Progression has been slowed due to co-morbidities. [] Plan just implemented, too soon to assess goals progression <30days   [] Goals require adjustment due to lack of progress  [] Patient is not progressing as expected and requires additional follow up with physician  [] Other    Prognosis for POC: [x] Good [] Fair  [] Poor      Patient requires continued skilled intervention: [x] Yes  [] No    ASSESSMENT:      Treatment/Activity Tolerance:  [x] Patient tolerated treatment well [] Patient limited by fatique  [] Patient limited by pain  [] Patient limited by other medical complications  [] Other: tolerated tx well. No distal sxs thru out session. Pt requires freq VC to ensure proper tech on HEP activities. Compliance to recommended freq seems to be an issue.      Return to Play: (if applicable)   []  Stage 1: Intro to Strength   []  Stage 2: Return to Run and Strength   []  Stage 3: Return to Jump and Strength   []  Stage 4: Dynamic Strength and Agility   []  Stage 5:

## 2021-05-24 NOTE — TELEPHONE ENCOUNTER
Patient called and said that note needs to be sent to [facility]. He stated they sent a request last Thursday. Julian Whaley

## 2021-05-25 NOTE — TELEPHONE ENCOUNTER
PATIENT CALLED REGARDING NEEDING A REFERRAL SENT TO [facility] HIM TO HAVE A PROCEDURE DONE. I INFORMED HIM THIS WAS TAKEN CARE OF YESTERDAY MORNING, 5/24/21, ACCORDING TO THE NOTES. yes

## 2021-05-27 ENCOUNTER — HOSPITAL ENCOUNTER (OUTPATIENT)
Dept: PHYSICAL THERAPY | Age: 76
Setting detail: THERAPIES SERIES
Discharge: HOME | End: 2021-05-27
Payer: MEDICARE

## 2021-05-27 PROCEDURE — 97110 THERAPEUTIC EXERCISES: CPT | Performed by: PHYSICAL THERAPIST

## 2021-05-27 PROCEDURE — 97112 NEUROMUSCULAR REEDUCATION: CPT | Performed by: PHYSICAL THERAPIST

## 2021-05-27 PROCEDURE — 97140 MANUAL THERAPY 1/> REGIONS: CPT | Performed by: PHYSICAL THERAPIST

## 2021-05-27 NOTE — FLOWSHEET NOTE
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 3. Grade 1/3 mm of anterolisthesis of L4 on L5.   4. Mild bilateral L4-5 foraminal stenosis. OBJECTIVE:       Numbness/Tingling: burning and N/T mostly at night. - decreasing 5-21-21      Functional Limitations/Impairments: []? Sitting     [x]? Standing    []? Walking    [x]? Squatting/bending      [x]? Stairs         [x]? ADL's         [x]? Transfers   []? Sports/Recreation []? Other:     Occupation/School: retired     Living Status/Prior Level of Function: Independent with ADLs      Standing Posture Exam              []? Normal                     [x]? FF head and rounded shlds: []? Mild   [x]? Mod  []? Severe                []? Lateral shift:  []? Left:                     []? Right              []? Lumbar Lordosis:    []? Normal        [x]? Abnormal; flat               []? Other:       5/27/2021: improved gait due to decreased pain.        Standing Exam Normal Abnormal N/A Comments   Gait flat terrain- ADL pace   x   antalgic due to knee and distal sxs.     Toe walk     x       Heel Walk   x       Side bending   x   Decreased bilateral    Iliac crest height x         PSIS height x         SIJ sulcus- static x         SIJ sulcus- BB x         SIJ sulcus- hip flexion   x                             Fwd Bend- (aberrant juttering or innominate mvmt)   x       Extension   x       Trendelenburg   x                               Seated Exam Normal Abnormal N/A Comments   Pelvic Height           Seated Rotation- Left           Seated Rotation- Right           Seated flexion- SIJ x     Helped with sxs   Seated ext- SIJ   x       Hip IR- Left           Hip- Right           Slump test            Supine Exam Left Right N/A Comments   Hip flexion   Decreased with early rotation       Abduction           ER           IR 15 degrees trace       KANDY/Sherman           Hip scour           SLR   Decreased BLE       SI distraction/compression           Thigh thrust           LLD                             Myotomes / MMT Left Right Comments   Hip flexion (L1-L2) WNL WNL     Knee extension (L2-L4) WNL WNL     Dorsiflexion (L4-L5)         Great Toe Ext (L5)         Ankle Eversion (S1-S2)         Ankle PF(S1-S2)         Hip Abductors         Hip Extensors               Dermatomes Normal Abnormal Comments   inguinal area (L1)  x       anterior mid-thigh (L2) x       distal ant thigh/med knee (L3) x       medial lower leg and foot (L4) x       lateral lower leg and foot (L5)   x Decrease due to DM   posterior calf (S1)   x  \"  \"   medial calcaneus (S2)   x \"  \"         Joint mobility:              []? Normal                     [x]? Hypo- lumabr spine / Hip and knee              []? Hyper     Palpation: TTP piriformis and R Lumbar paraspinalis     Functional Mobility/Transfers:     Normal Abnormal Comments   Sit ? Stand   x Increased effort and LLE ramos most of the work. Sit ? Supine x       Other:                                 Classification driving today's treatment approach and dosing:  - Prevailing signs and symptoms consistent with:  []? Symptom Modulation Approach              []? \"Directional preference subgroup\"                          [x]?  Flexion Based                                       [x]? age >47                                       [x]? imaging evidence of lumbar stenosis                                      [x]? preference for flexion                           []? Extension Based                                      []? symptoms distal to buttock                                      []? symptoms centralize with lumbar extension                                      []? symptoms peripheralize with lumbar flexion                                      []? directional preference for extension                          []? Lateral Shift                                      []? visible frontal plane deviation                                      []? directional preference for lateral translation              []? \"Manipulation subgroup\"  (3/4 meets manipulation criteria)                           []? symptoms less than 16 days                          []? FABQ less than 19                          []? Hypomobility of lumbar spine                          []? IR of at least 1 hip >35 degrees                          Other Factors to consider:                          []? no symptoms distal to the knee                          []? no red flags and minimal yellow flags                          []? no contraindications to manipulation              []? \"Traction subgroup\"                            []? signs and symptoms of nerve root compression (radiculopathy)                           []? unable to centralize distal symptoms with movement                          []? pain or numbness in the lumbar spine, buttock, and/or LE                          []? peripheralization with extension movements                          []? + SLR or + crossed SLR (symptoms less than 70 degrees)  []? Movement Control Approach              []? \"Stabilization subgroup\"                          []? younger age (less than 36)                           []? greater general flexibility (post-partum, SLR >90)                          []? abberant movements, painful arc, or Clarissa's sign with flex/ext ROM                          []? positive prone instability test  []? Functional Optimization Approach              [x]? \"unspecified subgroup\"                          [x]? lower disability scores                           [x]? lower fear avoidance scores                           [x]? longer duration of symptoms (chronic)                          [x]? prior episodes of low back pain                          [x]?  older age    EExercises/Interventions:     Therapeutic Ex (17473) Position Sets/sec Reps Notes/CUES   Stretching       Hamstring seated 30 5    Hip Flexion       ITB       Groin       Quad Inclined Calf- Gastroc  30 5    Inclined Calf-Soleus       S- KTC  10 10    D- KTC       Piriformis  30 5 Pull across and L knee back. Figure 4 push   30 5    Hip Adductor       Prone Press-ups       Cat / Camel                            Child's pose       Isometrics       Quad Sets       Glut Sets       Hip Abduction       Pelvic tilts       Hip Flexion       Hip Adduction- BS                     ROM       Sheet Pulls       Wall Slides       Edge of Bed       ERMI - Flexionator       ERMI- Extensionator       Weighted Hangs       Ankle Pumps       E-Z Stretch              PRE's       Leg Press- Range: 70 - 5        Leg Extension- Range: 90 - 40        Leg Curl- Range: 0 -90              CCTKE- Cable Column       CCTKE- Prone on table              CKC       Calf Raises  3 10    Wall Sits       Mini Squats       Lateral Band Walks                     SLR abd standing   3 10 BLE   Clams  3 10    SLR   2 5           Scifit Bike Time:   Level:   Program:   Seat:       AD- Bike Time:   RPM's:   Seat:      Alter G Treadmill Time:      Short Size:    Treadmill       Elliptical              NMR re-education (01996)    CUES NEEDED   Biodex-balance       Single leg stance       Plyoback       Rocker Board       SL Deadlifts       Bridging       Planks- front       Planks- Side       TrA/mutlifidi walk outs       Bird / Dog       D/C due to pain   Lower trunk rotation 3 sets 10 10    Seated trunk flexion       Abd brace / kegels and hip Add iso Sets= 2 10 10    Supine alternating UE and LE march  3 10    Therapeutic Activity (59524)       Step Ups       Step up and overs       Lateral Step downs       Stool Scoots                     Manual Intervention (39792)              Intermittent manual lumbar traction  Green TB  15 cycles   45 sec on 30 sec off sxs decreased and centralized with traction.  30 sec max relief                                        HEP instruction: (see scanned forms)  Access Code: [email or web address]: 04/23/2021Prepared by: Ekta Slight   · Supine Hamstring Stretch with Strap - 1 x daily - 7 x weekly - 1 sets - 5 reps - 30 hold   · Supine Piriformis Stretch with Foot on Ground - 1 x daily - 7 x weekly - 1 sets - 5 reps - 30 hold   · Hooklying Single Knee to Chest Stretch - 1 x daily - 7 x weekly - 10 reps - 3 sets   · Supine Posterior Pelvic Tilt - 1 x daily - 7 x weekly - 1 sets - 10 reps - 10 hold   · Lower Trunk Rotations - 1 x daily - 7 x weekly - 1 sets - 10 reps - 5 hold   · Seated Forward Bending - 1 x daily - 7 x weekly - 1 sets - 10 reps - 5 hold   · Supine Figure 4 Piriformis Stretch - 1 x daily - 7 x weekly - 3 sets - 10 reps    Therapeutic Exercise and NMR EXR  [x] (06724) Provided verbal/tactile cueing for activities related to strengthening, flexibility, endurance, ROM for improvements in LE, proximal hip, and core control with self care, mobility, lifting, ambulation.  [] (59847) Provided verbal/tactile cueing for activities related to improving balance, coordination, kinesthetic sense, posture, motor skill, proprioception  to assist with LE, proximal hip, and core control in self care, mobility, lifting, ambulation and eccentric single leg control.      NMR and Therapeutic Activities:    [] (99961 or 74600) Provided verbal/tactile cueing for activities related to improving balance, coordination, kinesthetic sense, posture, motor skill, proprioception and motor activation to allow for proper function of core, proximal hip and LE with self care and ADLs  [] (99219) Gait Re-education- Provided training and instruction to the patient for proper LE, core and proximal hip recruitment and positioning and eccentric body weight control with ambulation re-education including up and down stairs     Home Exercise Program:    [x] (10870) Reviewed/Progressed HEP activities related to strengthening, flexibility, endurance, ROM of core, proximal hip and LE for functional self-care, mobility, lifting and ambulation/stair navigation   [] (81558)Reviewed/Progressed HEP activities related to improving balance, coordination, kinesthetic sense, posture, motor skill, proprioception of core, proximal hip and LE for self care, mobility, lifting, and ambulation/stair navigation      Manual Treatments:  PROM / STM / Oscillations-Mobs:  G-I, II, III, IV (PA's, Inf., Post.)  [x] (58757) Provided manual therapy to mobilize LE, proximal hip and/or LS spine soft tissue/joints for the purpose of modulating pain, promoting relaxation,  increasing ROM, reducing/eliminating soft tissue swelling/inflammation/restriction, improving soft tissue extensibility and allowing for proper ROM for normal function with self care, mobility, lifting and ambulation. Modalities:     [] GAME READY (VASO)- for significant edema, swelling, pain control. Charges:  Timed Code Treatment Minutes: 60   Total Treatment Minutes: 65      [] EVAL (LOW) 16878 (typically 20 minutes face-to-face)  [] EVAL (MOD) 70903 (typically 30 minutes face-to-face)  [] EVAL (HIGH) 72526 (typically 45 minutes face-to-face)  [] RE-EVAL     [x] RK(12014) x 2    [] IONTO  [x] NMR (32412) x  1  [] VASO  [x] Manual (83018) x  1   [] Other:  [] TA x      [] Mech Traction (04881)  [] ES(attended) (99114)      [] ES (un) (15956):    GOALS:  Patient stated goal: decrease pain and not use cane with amb. [] Progressing: [] Met: [] Not Met: [] Adjusted  Therapist goals for Patient:   Short Term Goals: To be achieved in: 2 weeks  1. Independent in HEP and progression per patient tolerance, in order to prevent re-injury. [] Progressing: [] Met: [] Not Met: [] Adjusted  2. Patient will have a decrease in pain to facilitate improvement in movement, function, and ADLs as indicated by Functional Deficits. [] Progressing: [] Met: [] Not Met: [] Adjusted    Long Term Goals: To be achieved in: 4 weeks  1.  Disability index score of 50% or less for the [facility] to assist with reaching prior level of function. [] Progressing: [] Met: [] Not Met: [] Adjusted  2. Patient will demonstrate increased AROM to WNL, good LS mobility, good hip ROM to allow for proper joint functioning as indicated by patients Functional Deficits. [] Progressing: [] Met: [] Not Met: [] Adjusted  3. Patient will demonstrate an increase in Strength to good proximal hip and core activation to allow for proper functional mobility as indicated by patients Functional Deficits. [] Progressing: [] Met: [] Not Met: [] Adjusted  4. Patient will return to I functional activities without increased symptoms or restriction. [] Progressing: [] Met: [] Not Met: [] Adjusted  5. Patient able to amb for 100 feet x 3 without A device. [] Progressing: [] Met: [] Not Met: [] Adjusted      Overall Progression Towards Functional goals/ Treatment Progress Update:  [x] Patient is progressing as expected towards functional goals listed. [] Progression is slowed due to complexities/Impairments listed. [] Progression has been slowed due to co-morbidities. [] Plan just implemented, too soon to assess goals progression <30days   [] Goals require adjustment due to lack of progress  [] Patient is not progressing as expected and requires additional follow up with physician  [] Other    Prognosis for POC: [x] Good [] Fair  [] Poor      Patient requires continued skilled intervention: [x] Yes  [] No    ASSESSMENT:      Treatment/Activity Tolerance:  [x] Patient tolerated treatment well [] Patient limited by fatique  [] Patient limited by pain  [] Patient limited by other medical complications  [] Other: tolerated tx well. Significant improvement in functional mobility and ROM due to decreased pain from injection.    Return to Play: (if applicable)   []  Stage 1: Intro to Strength   []  Stage 2: Return to Run and Strength   []  Stage 3: Return to Jump and Strength   []  Stage 4: Dynamic Strength and Agility   []  Stage 5: Sport Specific Training     []  Ready to Return to Play, Meets All Above Stages   []  Not Ready for Return to Sports   Comments:                             PLAN: Continue with POC and progress as tolerated. Will patient in 2 weeks  [x] Continue per plan of care [] Alter current plan (see comments above)  [] Plan of care initiated [] Hold pending MD visit [] Discharge      Electronically signed by:  Patric Shi PT    Note: If patient does not return for scheduled/ recommended follow up visits, this note will serve as a discharge from care along with most recent update on progress.

## 2021-06-08 ENCOUNTER — HOSPITAL ENCOUNTER (OUTPATIENT)
Dept: PHYSICAL THERAPY | Age: 76
Setting detail: THERAPIES SERIES
Discharge: HOME | End: 2021-06-08
Payer: MEDICARE

## 2021-06-08 PROCEDURE — 97112 NEUROMUSCULAR REEDUCATION: CPT | Performed by: PHYSICAL THERAPIST

## 2021-06-08 PROCEDURE — 97530 THERAPEUTIC ACTIVITIES: CPT | Performed by: PHYSICAL THERAPIST

## 2021-06-08 PROCEDURE — 97110 THERAPEUTIC EXERCISES: CPT | Performed by: PHYSICAL THERAPIST

## 2021-06-08 NOTE — FLOWSHEET NOTE
Grade 1/3 mm of anterolisthesis of L4 on L5.   4. Mild bilateral L4-5 foraminal stenosis. OBJECTIVE:       Numbness/Tingling: burning and N/T mostly at night. - decreasing 5-21-21      Functional Limitations/Impairments: []? Sitting     [x]? Standing    []? Walking    [x]? Squatting/bending      [x]? Stairs         [x]? ADL's         [x]? Transfers   []? Sports/Recreation []? Other:     Occupation/School: retired     Living Status/Prior Level of Function: Independent with ADLs      Standing Posture Exam              []? Normal                     [x]? FF head and rounded shlds: []? Mild   [x]? Mod  []? Severe                []? Lateral shift:  []? Left:                     []? Right              []? Lumbar Lordosis:    []? Normal        [x]? Abnormal; flat               []? Other:       6/8/2021: improved gait due to decreased pain.        Standing Exam Normal Abnormal N/A Comments   Gait flat terrain- ADL pace   x   antalgic due to knee and distal sxs.     Toe walk     x       Heel Walk   x       Side bending   x   Decreased bilateral    Iliac crest height x         PSIS height x         SIJ sulcus- static x         SIJ sulcus- BB x         SIJ sulcus- hip flexion   x                             Fwd Bend- (aberrant juttering or innominate mvmt)   x       Extension   x       Trendelenburg   x                               Seated Exam Normal Abnormal N/A Comments   Pelvic Height           Seated Rotation- Left           Seated Rotation- Right           Seated flexion- SIJ x     Helped with sxs   Seated ext- SIJ   x       Hip IR- Left           Hip- Right           Slump test            Supine Exam Left Right N/A Comments   Hip flexion   Decreased with early rotation       Abduction           ER           IR 15 degrees trace       KANDY/Sherman           Hip scour           SLR   Decreased BLE       SI distraction/compression           Thigh thrust           LLD                             Myotomes / MMT Left Right Comments   Hip flexion (L1-L2) WNL WNL     Knee extension (L2-L4) WNL WNL     Dorsiflexion (L4-L5)         Great Toe Ext (L5)         Ankle Eversion (S1-S2)         Ankle PF(S1-S2)         Hip Abductors         Hip Extensors               Dermatomes Normal Abnormal Comments   inguinal area (L1)  x       anterior mid-thigh (L2) x       distal ant thigh/med knee (L3) x       medial lower leg and foot (L4) x       lateral lower leg and foot (L5)   x Decrease due to DM   posterior calf (S1)   x  \"  \"   medial calcaneus (S2)   x \"  \"         Joint mobility:              []? Normal                     [x]? Hypo- lumabr spine / Hip and knee              []? Hyper     Palpation: TTP piriformis and R Lumbar paraspinalis     Functional Mobility/Transfers:     Normal Abnormal Comments   Sit ? Stand   x Increased effort and LLE ramos most of the work. Sit ? Supine x       Other:                                 Classification driving today's treatment approach and dosing:  - Prevailing signs and symptoms consistent with:  []? Symptom Modulation Approach              []? \"Directional preference subgroup\"                          [x]? Flexion Based                                       [x]? age >47                                       [x]? imaging evidence of lumbar stenosis                                      [x]? preference for flexion                           []? Extension Based                                      []? symptoms distal to buttock                                      []? symptoms centralize with lumbar extension                                      []? symptoms peripheralize with lumbar flexion                                      []? directional preference for extension                          []? Lateral Shift                                      []? visible frontal plane deviation                                      []? directional preference for lateral translation              []?  \"Manipulation subgroup\"  (3/4 meets manipulation criteria)                           []? symptoms less than 16 days                          []? FABQ less than 19                          []? Hypomobility of lumbar spine                          []? IR of at least 1 hip >35 degrees                          Other Factors to consider:                          []? no symptoms distal to the knee                          []? no red flags and minimal yellow flags                          []? no contraindications to manipulation              []? \"Traction subgroup\"                            []? signs and symptoms of nerve root compression (radiculopathy)                           []? unable to centralize distal symptoms with movement                          []? pain or numbness in the lumbar spine, buttock, and/or LE                          []? peripheralization with extension movements                          []? + SLR or + crossed SLR (symptoms less than 70 degrees)  []? Movement Control Approach              []? \"Stabilization subgroup\"                          []? younger age (less than 36)                           []? greater general flexibility (post-partum, SLR >90)                          []? abberant movements, painful arc, or Bismarck's sign with flex/ext ROM                          []? positive prone instability test  []? Functional Optimization Approach              [x]? \"unspecified subgroup\"                          [x]? lower disability scores                           [x]? lower fear avoidance scores                           [x]? longer duration of symptoms (chronic)                          [x]? prior episodes of low back pain                          [x]?  older age    EExercises/Interventions:     Therapeutic Ex (59603) Position Sets/sec Reps Notes/CUES   Stretching       Hamstring seated 30 5    Hip Flexion       ITB       Groin       Quad       Inclined Calf- Gastroc  30 5    Inclined Calf-Soleus       S- KTC 10 10    D- KTC       Piriformis  30 5 Pull across and L knee back. Figure 4 push   30 5    Hip Adductor       Prone Press-ups       Cat / Camel                            Child's pose              Quad Sets  10 10 iso   Glut Sets       Hip Abduction       Pelvic tilts       Hip Flexion       Hip Adduction- BS                     ROM       Sheet Pulls       Wall Slides       Edge of Bed       ERMI - Flexionator       ERMI- Extensionator       Weighted Hangs       Ankle Pumps       E-Z Stretch              PRE's       Leg Press- Range: 70 - 5        Leg Extension- Range: 90 - 40        Leg Curl- Range: 0 -90              CCTKE- Cable Column       CCTKE- Prone on table              CKC       Calf Raises  3 10    Wall Sits       Mini Squats       Lateral Band Walks                     SLR abd standing   3 10 BLE   Clams  3 10    SLR flexion  2 5           Scifit Bike Time:   Level:   Program:   Seat:       AD- Bike Time:   RPM's:   Seat:      Alter G Treadmill Time:      Short Size:    Treadmill       Elliptical              NMR re-education (07528)    CUES NEEDED   Biodex-balance       Single leg stance       Plyoback       Rocker Board       SL Deadlifts       Bridging       Planks- front       Planks- Side       TrA/mutlifidi walk outs       Bird / Dog       D/C due to pain   Lower trunk rotation 3 sets 10 10    Seated trunk flexion       Abd brace / kegels and hip Add iso Sets= 2 10 10    Supine alternating UE and LE march  3 10    Therapeutic Activity (43883)       Step Ups       Step up and overs       Lateral Step downs       Stool Scoots                     Manual Intervention (98859)                                                   HEP instruction: (see scanned forms)  Access Code: [email or web address]: 04/23/2021Prepared by: Ivon Hayden   · Supine Hamstring Stretch with Strap - 1 x daily - 7 x weekly - 1 sets - 5 reps - 30 hold   · Supine Piriformis Stretch with Foot on Ground - 1 x daily - 7 x weekly - 1 sets - 5 reps - 30 hold   · Hooklying Single Knee to Chest Stretch - 1 x daily - 7 x weekly - 10 reps - 3 sets   · Supine Posterior Pelvic Tilt - 1 x daily - 7 x weekly - 1 sets - 10 reps - 10 hold   · Lower Trunk Rotations - 1 x daily - 7 x weekly - 1 sets - 10 reps - 5 hold   · Seated Forward Bending - 1 x daily - 7 x weekly - 1 sets - 10 reps - 5 hold   · Supine Figure 4 Piriformis Stretch - 1 x daily - 7 x weekly - 3 sets - 10 reps    Leg part    Access Code: [email or web address]  URL: [email or web address]  Date: 06/08/2021  Prepared by: Juany Garcia    Exercises  Supine Straight Leg Raises - 1 x daily - 7 x weekly - 2 sets - 5 reps  Supine Quad Set - 1 x daily - 7 x weekly - 1 sets - 10 reps - 10 hold  Standing Hip Abduction - 1 x daily - 7 x weekly - 10 reps - 3 sets  Beginner Clam - 1 x daily - 7 x weekly - 10 reps - 3 sets    Therapeutic Exercise and NMR EXR  [x] (32035) Provided verbal/tactile cueing for activities related to strengthening, flexibility, endurance, ROM for improvements in LE, proximal hip, and core control with self care, mobility, lifting, ambulation.  [] (76900) Provided verbal/tactile cueing for activities related to improving balance, coordination, kinesthetic sense, posture, motor skill, proprioception  to assist with LE, proximal hip, and core control in self care, mobility, lifting, ambulation and eccentric single leg control.      NMR and Therapeutic Activities:    [] (35839 or 71019) Provided verbal/tactile cueing for activities related to improving balance, coordination, kinesthetic sense, posture, motor skill, proprioception and motor activation to allow for proper function of core, proximal hip and LE with self care and ADLs  [] (07449) Gait Re-education- Provided training and instruction to the patient for proper LE, core and proximal hip recruitment and positioning and eccentric body weight control with ambulation re-education including up and down stairs     Home Exercise Program:    [x] (93140) Reviewed/Progressed HEP activities related to strengthening, flexibility, endurance, ROM of core, proximal hip and LE for functional self-care, mobility, lifting and ambulation/stair navigation   [] (39129)Reviewed/Progressed HEP activities related to improving balance, coordination, kinesthetic sense, posture, motor skill, proprioception of core, proximal hip and LE for self care, mobility, lifting, and ambulation/stair navigation      Manual Treatments:  PROM / STM / Oscillations-Mobs:  G-I, II, III, IV (PA's, Inf., Post.)  [x] (77180) Provided manual therapy to mobilize LE, proximal hip and/or LS spine soft tissue/joints for the purpose of modulating pain, promoting relaxation,  increasing ROM, reducing/eliminating soft tissue swelling/inflammation/restriction, improving soft tissue extensibility and allowing for proper ROM for normal function with self care, mobility, lifting and ambulation. Modalities:     [] GAME READY (VASO)- for significant edema, swelling, pain control. Charges:  Timed Code Treatment Minutes: 60   Total Treatment Minutes: 65      [] EVAL (LOW) 54844 (typically 20 minutes face-to-face)  [] EVAL (MOD) 70467 (typically 30 minutes face-to-face)  [] EVAL (HIGH) 35411 (typically 45 minutes face-to-face)  [] RE-EVAL     [x] YX(94294) x 2    [] IONTO  [x] NMR (77493) x  1  [] VASO  [] Manual (85763) x     [] Other:  [x] TA x  1    [] [facility] Traction (49582)  [] ES(attended) (71282)      [] ES (un) (75566):    GOALS:  Patient stated goal: decrease pain and not use cane with amb. [] Progressing: [] Met: [] Not Met: [] Adjusted  Therapist goals for Patient:   Short Term Goals: To be achieved in: 2 weeks  1. Independent in HEP and progression per patient tolerance, in order to prevent re-injury. [] Progressing: [] Met: [] Not Met: [] Adjusted  2.  Patient will have a decrease in pain to facilitate improvement in movement, function, and ADLs as indicated by Functional Deficits. [] Progressing: [] Met: [] Not Met: [] Adjusted    Long Term Goals: To be achieved in: 4 weeks  1. Disability index score of 50% or less for the [facility] to assist with reaching prior level of function. [] Progressing: [] Met: [] Not Met: [] Adjusted  2. Patient will demonstrate increased AROM to WNL, good LS mobility, good hip ROM to allow for proper joint functioning as indicated by patients Functional Deficits. [] Progressing: [] Met: [] Not Met: [] Adjusted  3. Patient will demonstrate an increase in Strength to good proximal hip and core activation to allow for proper functional mobility as indicated by patients Functional Deficits. [] Progressing: [] Met: [] Not Met: [] Adjusted  4. Patient will return to I functional activities without increased symptoms or restriction. [] Progressing: [] Met: [] Not Met: [] Adjusted  5. Patient able to amb for 100 feet x 3 without A device. [] Progressing: [] Met: [] Not Met: [] Adjusted      Overall Progression Towards Functional goals/ Treatment Progress Update:  [x] Patient is progressing as expected towards functional goals listed. [] Progression is slowed due to complexities/Impairments listed. [] Progression has been slowed due to co-morbidities. [] Plan just implemented, too soon to assess goals progression <30days   [] Goals require adjustment due to lack of progress  [] Patient is not progressing as expected and requires additional follow up with physician  [] Other    Prognosis for POC: [x] Good [] Fair  [] Poor      Patient requires continued skilled intervention: [x] Yes  [] No    ASSESSMENT:      Treatment/Activity Tolerance:  [x] Patient tolerated treatment well [] Patient limited by fatique  [] Patient limited by pain  [] Patient limited by other medical complications  [] Other: tolerated tx well.  The patient continues to require freq verbal and physical cues to ensure proper from on his activities. Return to Play: (if applicable)   []  Stage 1: Intro to Strength   []  Stage 2: Return to Run and Strength   []  Stage 3: Return to Jump and Strength   []  Stage 4: Dynamic Strength and Agility   []  Stage 5: Sport Specific Training     []  Ready to Return to Play, Meets All Above Stages   []  Not Ready for Return to Sports   Comments:                             PLAN: Continue with POC and progress as tolerated. Cont with PT 1 time per week until his next shot     [x] Continue per plan of care [] Alter current plan (see comments above)  [] Plan of care initiated [] Hold pending MD visit [] Discharge      Electronically signed by:  Jose Oneill PT    Note: If patient does not return for scheduled/ recommended follow up visits, this note will serve as a discharge from care along with most recent update on progress.

## 2021-06-16 ENCOUNTER — HOSPITAL ENCOUNTER (OUTPATIENT)
Dept: PHYSICAL THERAPY | Age: 76
Setting detail: THERAPIES SERIES
Discharge: HOME | End: 2021-06-16
Payer: MEDICARE

## 2021-06-16 PROCEDURE — 97112 NEUROMUSCULAR REEDUCATION: CPT | Performed by: PHYSICAL THERAPIST

## 2021-06-16 PROCEDURE — 97530 THERAPEUTIC ACTIVITIES: CPT | Performed by: PHYSICAL THERAPIST

## 2021-06-16 PROCEDURE — 97110 THERAPEUTIC EXERCISES: CPT | Performed by: PHYSICAL THERAPIST

## 2021-06-16 PROCEDURE — 97140 MANUAL THERAPY 1/> REGIONS: CPT | Performed by: PHYSICAL THERAPIST

## 2021-06-16 NOTE — FLOWSHEET NOTE
Orthopaedics and [address] [place] [address] DR DAWNA RAE    Physical Therapy Daily Treatment Note  DDate:  2021    Patient Name:  Jorge Ayala    :  1945  MRN: [id number]  Restrictions/Precautions:    Medical/Treatment Diagnosis Information:  · Diagnosis: M43.00 (ICD-10-CM) - Spondylolysis / M54.16 (ICD-10-CM) - Right lumbar radiculitis  · Treatment Diagnosis: M54.5 (ICD-10-CM) - Lumbar spine pain  Insurance/Certification information:  PT Insurance Information: Medicare  Physician Information:  Referring Practitioner: Sally Wakefield  Has the plan of care been signed (Y/N):        []  Yes  [x]  No     Date of Patient follow up with Physician: one week for MRI results      Is this a Progress Report:     []  Yes  [x]  No        If Yes:  Date Range for reporting period:  Beginning  Ending    Progress report will be due (10 Rx or 30 days whichever is less):        Recertification will be due (POC Duration  / 90 days whichever is less): 21       Precautions/ Contra-indications:     C-SSRS Triggered by Intake questionnaire (Past 2 wk assessment):   [x] No, Questionnaire did not trigger screening.   [] Yes, Patient intake triggered further evaluation      [] C-SSRS Screening completed  [] PCP notified via Plan of Care  [] Emergency services notified       Visit # Insurance Allowable Auth Required   8 medicare []  Yes [x]  No        Functional Scale:     Owestry=   22%   Date assessed:  21  28%   Date assessed:  21     Pain level:  2/10; since shot. Helping relieve pain in foot significantly. SUBJECTIVE:  The patient noted that he is feeling better since his shot, but not all the way. Still having some irritation into buttock region, but distal sxs are better. He just saw MD for his foot and his big toe is infected. He is DM and wearing a walking shoe to limit WB thru foot. He goes for his 2nd shot next Wednesday      MRI  CONCLUSION:   1.  Right-sided L5-S1 disc herniation impinging on the right S1 nerve in the recess and    extending into the right L5-S1 foramen. 2. Lumbar spondylosis producing mild L4-5 central spinal stenosis. 3. Grade 1/3 mm of anterolisthesis of L4 on L5.   4. Mild bilateral L4-5 foraminal stenosis. OBJECTIVE:       Numbness/Tingling: burning and N/T mostly at night. - decreasing 5-21-21      Functional Limitations/Impairments: []? Sitting     [x]? Standing    []? Walking    [x]? Squatting/bending      [x]? Stairs         [x]? ADL's         [x]? Transfers   []? Sports/Recreation []? Other:     Occupation/School: retired     Living Status/Prior Level of Function: Independent with ADLs      Standing Posture Exam              []? Normal                     [x]? FF head and rounded shlds: []? Mild   [x]? Mod  []? Severe                []? Lateral shift:  []? Left:                     []? Right              []? Lumbar Lordosis:    []? Normal        [x]? Abnormal; flat               []? Other:       6/16/2021: improved gait due to decreased pain.        Standing Exam Normal Abnormal N/A Comments   Gait flat terrain- ADL pace   x   antalgic due to knee and distal sxs.     Toe walk     x       Heel Walk   x       Side bending   x   Decreased bilateral    Iliac crest height x         PSIS height x         SIJ sulcus- static x         SIJ sulcus- BB x         SIJ sulcus- hip flexion   x                             Fwd Bend- (aberrant juttering or innominate mvmt)   x       Extension   x       Trendelenburg   x                               Seated Exam Normal Abnormal N/A Comments   Pelvic Height           Seated Rotation- Left           Seated Rotation- Right           Seated flexion- SIJ x     Helped with sxs   Seated ext- SIJ   x       Hip IR- Left           Hip- Right           Slump test            Supine Exam Left Right N/A Comments   Hip flexion   Decreased with early rotation       Abduction           ER           IR 15 degrees trace       KANDY/Sherman           Hip scour           SLR   Decreased BLE       SI distraction/compression           Thigh thrust           LLD                             Myotomes / MMT Left Right Comments   Hip flexion (L1-L2) WNL WNL     Knee extension (L2-L4) WNL WNL     Dorsiflexion (L4-L5)         Great Toe Ext (L5)         Ankle Eversion (S1-S2)         Ankle PF(S1-S2)         Hip Abductors         Hip Extensors               Dermatomes Normal Abnormal Comments   inguinal area (L1)  x       anterior mid-thigh (L2) x       distal ant thigh/med knee (L3) x       medial lower leg and foot (L4) x       lateral lower leg and foot (L5)   x Decrease due to DM   posterior calf (S1)   x  \"  \"   medial calcaneus (S2)   x \"  \"         Joint mobility:              []? Normal                     [x]? Hypo- lumabr spine / Hip and knee              []? Hyper     Palpation: TTP piriformis and R Lumbar paraspinalis     Functional Mobility/Transfers:     Normal Abnormal Comments   Sit ? Stand   x Increased effort and LLE ramos most of the work. Sit ? Supine x       Other:                                 Classification driving today's treatment approach and dosing:  - Prevailing signs and symptoms consistent with:  [x]? Symptom Modulation Approach              []? \"Directional preference subgroup\"                          [x]?  Flexion Based                                       [x]? age >47                                       [x]? imaging evidence of lumbar stenosis                                      [x]? preference for flexion                           []? Extension Based                                      []? symptoms distal to buttock                                      []? symptoms centralize with lumbar extension                                      []? symptoms peripheralize with lumbar flexion                                      []? directional preference for extension                          []? Lateral Shift                                      []? visible frontal plane deviation                                      []? directional preference for lateral translation              []? \"Manipulation subgroup\"  (3/4 meets manipulation criteria)                           []? symptoms less than 16 days                          []? FABQ less than 19                          []? Hypomobility of lumbar spine                          []? IR of at least 1 hip >35 degrees                          Other Factors to consider:                          []? no symptoms distal to the knee                          []? no red flags and minimal yellow flags                          []? no contraindications to manipulation              []? \"Traction subgroup\"                            []? signs and symptoms of nerve root compression (radiculopathy)                           []? unable to centralize distal symptoms with movement                          []? pain or numbness in the lumbar spine, buttock, and/or LE                          []? peripheralization with extension movements                          []? + SLR or + crossed SLR (symptoms less than 70 degrees)  []? Movement Control Approach              []? \"Stabilization subgroup\"                          []? younger age (less than 36)                           []? greater general flexibility (post-partum, SLR >90)                          []? abberant movements, painful arc, or [place]'s sign with flex/ext ROM                          []? positive prone instability test  []? Functional Optimization Approach              [x]? \"unspecified subgroup\"                          [x]? lower disability scores                           [x]? lower fear avoidance scores                           [x]? longer duration of symptoms (chronic)                          [x]? prior episodes of low back pain                          [x]?  older age    EExercises/Interventions:     Therapeutic Ex (18112) Position Sets/sec Reps Notes/CUES Stretching       Hamstring seated 30 5    Hip Flexion       ITB       Groin       Quad       Inclined Calf- Gastroc  30 5    Inclined Calf-Soleus       S- KTC  10 10    D- KTC       Piriformis  30 5 Pull across and L knee back.     Figure 4 push   30 5    Hip Adductor       Prone Press-ups       Cat / Camel                            Child's pose              Quad Sets  10 10 iso   Glut Sets       Hip Abduction       Pelvic tilts       Hip Flexion       Hip Adduction- BS                     ROM       Sheet Pulls       Wall Slides       Edge of Bed       ERMI - Flexionator       ERMI- Extensionator       Weighted Hangs       Ankle Pumps       E-Z Stretch              PRE's       Leg Press- Range: 70 - 5        Leg Extension- Range: 90 - 40        Leg Curl- Range: 0 -90              CCTKE- Cable Column       CCTKE- Prone on table              CKC       Calf Raises  3 10    Wall Sits       Mini Squats       Lateral Band Walks                     SLR abd standing   3 15 BLE   Clams  3 10    SLR flexion  2 8           Scifit Bike Time:   Level:   Program:   Seat:       AD- Bike Time:   RPM's:   Seat:      Alter G Treadmill Time:      Short Size:    Treadmill       Elliptical              NMR re-education (50582)    CUES NEEDED   Biodex-balance       Single leg stance       Plyoback       Rocker Board       SL Deadlifts       Bridging       Planks- front       Planks- Side       TrA/mutlifidi walk outs       Bird / Dog       D/C due to pain   Lower trunk rotation 3 sets 10 10    Seated trunk flexion       Abd brace / kegels and hip Add iso Sets= 2 10 10    Supine alternating UE and LE march  3 10           SWB A/P rock  3 30\"           Therapeutic Activity (46844)       Step Ups       Step up and overs       Lateral Step downs       Stool Scoots                     Manual Intervention (30421)                                                   HEP instruction: (see scanned forms)  Access Code: [email or web address]: [email or web address]/Date: 04/23/2021Prepared by: Court Thanh   · Supine Hamstring Stretch with Strap - 1 x daily - 7 x weekly - 1 sets - 5 reps - 30 hold   · Supine Piriformis Stretch with Foot on Ground - 1 x daily - 7 x weekly - 1 sets - 5 reps - 30 hold   · Hooklying Single Knee to Chest Stretch - 1 x daily - 7 x weekly - 10 reps - 3 sets   · Supine Posterior Pelvic Tilt - 1 x daily - 7 x weekly - 1 sets - 10 reps - 10 hold   · Lower Trunk Rotations - 1 x daily - 7 x weekly - 1 sets - 10 reps - 5 hold   · Seated Forward Bending - 1 x daily - 7 x weekly - 1 sets - 10 reps - 5 hold   · Supine Figure 4 Piriformis Stretch - 1 x daily - 7 x weekly - 3 sets - 10 reps    Leg part    Access Code: [email or web address]  URL: [employer]/  Date: 06/08/2021  Prepared by: Tiera Grangerr    Exercises  Supine Straight Leg Raises - 1 x daily - 7 x weekly - 2 sets - 5 reps  Supine Quad Set - 1 x daily - 7 x weekly - 1 sets - 10 reps - 10 hold  Standing Hip Abduction - 1 x daily - 7 x weekly - 10 reps - 3 sets  Beginner Clam - 1 x daily - 7 x weekly - 10 reps - 3 sets    Therapeutic Exercise and NMR EXR  [x] (18714) Provided verbal/tactile cueing for activities related to strengthening, flexibility, endurance, ROM for improvements in LE, proximal hip, and core control with self care, mobility, lifting, ambulation.  [] (43118) Provided verbal/tactile cueing for activities related to improving balance, coordination, kinesthetic sense, posture, motor skill, proprioception  to assist with LE, proximal hip, and core control in self care, mobility, lifting, ambulation and eccentric single leg control.      NMR and Therapeutic Activities:    [] (87677 or 62245) Provided verbal/tactile cueing for activities related to improving balance, coordination, kinesthetic sense, posture, motor skill, proprioception and motor activation to allow for proper function of core, proximal hip and LE with self care and ADLs  [] (83138) Gait Re-education- Provided training and instruction to the patient for proper LE, core and proximal hip recruitment and positioning and eccentric body weight control with ambulation re-education including up and down stairs     Home Exercise Program:    [x] (97896) Reviewed/Progressed HEP activities related to strengthening, flexibility, endurance, ROM of core, proximal hip and LE for functional self-care, mobility, lifting and ambulation/stair navigation   [] (14887)Reviewed/Progressed HEP activities related to improving balance, coordination, kinesthetic sense, posture, motor skill, proprioception of core, proximal hip and LE for self care, mobility, lifting, and ambulation/stair navigation      Manual Treatments:  PROM / STM / Oscillations-Mobs:  G-I, II, III, IV (PA's, Inf., Post.)  [x] (07567) Provided manual therapy to mobilize LE, proximal hip and/or LS spine soft tissue/joints for the purpose of modulating pain, promoting relaxation,  increasing ROM, reducing/eliminating soft tissue swelling/inflammation/restriction, improving soft tissue extensibility and allowing for proper ROM for normal function with self care, mobility, lifting and ambulation. Hypervolt to piriformis - 8'       Modalities:     [] GAME READY (VASO)- for significant edema, swelling, pain control. Charges:  Timed Code Treatment Minutes: 60   Total Treatment Minutes: 65      [] EVAL (LOW) 83509 (typically 20 minutes face-to-face)  [] EVAL (MOD) 69343 (typically 30 minutes face-to-face)  [] EVAL (HIGH) 36362 (typically 45 minutes face-to-face)  [] RE-EVAL     [x] HF(71068) x 1    [] IONTO  [x] NMR (26807) x  1  [] VASO  [x] Manual (31390) x 1    [] Other:  [x] TA x  1    [] Mech Traction (68449)  [] ES(attended) (23589)      [] ES (un) (51611):    GOALS:  Patient stated goal: decrease pain and not use cane with amb. [] Progressing: [] Met: [] Not Met: [] Adjusted  Therapist goals for Patient:   Short Term Goals:  To be achieved in: 2 weeks  1. Independent in HEP and progression per patient tolerance, in order to prevent re-injury. [] Progressing: [] Met: [] Not Met: [] Adjusted  2. Patient will have a decrease in pain to facilitate improvement in movement, function, and ADLs as indicated by Functional Deficits. [] Progressing: [] Met: [] Not Met: [] Adjusted    Long Term Goals: To be achieved in: 4 weeks  1. Disability index score of 50% or less for the [facility] to assist with reaching prior level of function. [] Progressing: [] Met: [] Not Met: [] Adjusted  2. Patient will demonstrate increased AROM to WNL, good LS mobility, good hip ROM to allow for proper joint functioning as indicated by patients Functional Deficits. [] Progressing: [] Met: [] Not Met: [] Adjusted  3. Patient will demonstrate an increase in Strength to good proximal hip and core activation to allow for proper functional mobility as indicated by patients Functional Deficits. [] Progressing: [] Met: [] Not Met: [] Adjusted  4. Patient will return to I functional activities without increased symptoms or restriction. [] Progressing: [] Met: [] Not Met: [] Adjusted  5. Patient able to amb for 100 feet x 3 without A device. [] Progressing: [] Met: [] Not Met: [] Adjusted      Overall Progression Towards Functional goals/ Treatment Progress Update:  [x] Patient is progressing as expected towards functional goals listed. [] Progression is slowed due to complexities/Impairments listed. [] Progression has been slowed due to co-morbidities.   [] Plan just implemented, too soon to assess goals progression <30days   [] Goals require adjustment due to lack of progress  [] Patient is not progressing as expected and requires additional follow up with physician  [] Other    Prognosis for POC: [x] Good [] Fair  [] Poor      Patient requires continued skilled intervention: [x] Yes  [] No    ASSESSMENT:      Treatment/Activity Tolerance:  [x] Patient tolerated treatment well [] Patient limited by fatique  [] Patient limited by pain  [] Patient limited by other medical complications  [] Other: tolerated tx well. The patient continues to require freq verbal and physical cues to ensure proper from on his activities. Moderate concern about his compliance with his HEP. Local sxs at proximal hip slightly better after Hypervolt work. Return to Play: (if applicable)   []  Stage 1: Intro to Strength   []  Stage 2: Return to Run and Strength   []  Stage 3: Return to Jump and Strength   []  Stage 4: Dynamic Strength and Agility   []  Stage 5: Sport Specific Training     []  Ready to Return to Play, Meets All Above Stages   []  Not Ready for Return to Sports   Comments:                             PLAN: Continue with POC and progress as tolerated. Cont with PT 1 time per week until his next shot     [x] Continue per plan of care [] Alter current plan (see comments above)  [] Plan of care initiated [] Hold pending MD visit [] Discharge      Electronically signed by:  Shilpa Cordero PT    Note: If patient does not return for scheduled/ recommended follow up visits, this note will serve as a discharge from care along with most recent update on progress.

## 2021-06-24 RX ORDER — BLOOD SUGAR DIAGNOSTIC
STRIP MISCELLANEOUS
Qty: 100 STRIP | Refills: 2 | Status: SHIPPED | OUTPATIENT
Start: 2021-06-24 | End: 2021-10-11

## 2021-06-30 ENCOUNTER — HOSPITAL ENCOUNTER (OUTPATIENT)
Dept: PHYSICAL THERAPY | Age: 76
Setting detail: THERAPIES SERIES
End: 2021-06-30
Payer: MEDICARE

## 2021-07-01 ENCOUNTER — OFFICE VISIT (OUTPATIENT)
Dept: PULMONOLOGY | Age: 76
End: 2021-07-01
Payer: MEDICARE

## 2021-07-01 VITALS
DIASTOLIC BLOOD PRESSURE: 62 MMHG | SYSTOLIC BLOOD PRESSURE: 134 MMHG | RESPIRATION RATE: 16 BRPM | BODY MASS INDEX: 38.51 KG/M2 | HEIGHT: 69 IN | TEMPERATURE: 96.8 F | OXYGEN SATURATION: 97 % | WEIGHT: 260 LBS | HEART RATE: 60 BPM

## 2021-07-01 DIAGNOSIS — G47.33 OSA (OBSTRUCTIVE SLEEP APNEA): ICD-10-CM

## 2021-07-01 DIAGNOSIS — G47.8 SLEEP PARALYSIS: ICD-10-CM

## 2021-07-01 PROCEDURE — G8417 CALC BMI ABV UP PARAM F/U: HCPCS | Performed by: INTERNAL MEDICINE

## 2021-07-01 PROCEDURE — 3017F COLORECTAL CA SCREEN DOC REV: CPT | Performed by: INTERNAL MEDICINE

## 2021-07-01 PROCEDURE — 1036F TOBACCO NON-USER: CPT | Performed by: INTERNAL MEDICINE

## 2021-07-01 PROCEDURE — G8427 DOCREV CUR MEDS BY ELIG CLIN: HCPCS | Performed by: INTERNAL MEDICINE

## 2021-07-01 PROCEDURE — 1123F ACP DISCUSS/DSCN MKR DOCD: CPT | Performed by: INTERNAL MEDICINE

## 2021-07-01 PROCEDURE — 4040F PNEUMOC VAC/ADMIN/RCVD: CPT | Performed by: INTERNAL MEDICINE

## 2021-07-01 PROCEDURE — 99214 OFFICE O/P EST MOD 30 MIN: CPT | Performed by: INTERNAL MEDICINE

## 2021-07-01 ASSESSMENT — SLEEP AND FATIGUE QUESTIONNAIRES
HOW LIKELY ARE YOU TO NOD OFF OR FALL ASLEEP WHILE SITTING INACTIVE IN A PUBLIC PLACE: 2
HOW LIKELY ARE YOU TO NOD OFF OR FALL ASLEEP WHEN YOU ARE A PASSENGER IN A CAR FOR AN HOUR WITHOUT A BREAK: 2
HOW LIKELY ARE YOU TO NOD OFF OR FALL ASLEEP WHILE SITTING AND TALKING TO SOMEONE: 0
HOW LIKELY ARE YOU TO NOD OFF OR FALL ASLEEP WHILE SITTING QUIETLY AFTER LUNCH WITHOUT ALCOHOL: 0
ESS TOTAL SCORE: 10
HOW LIKELY ARE YOU TO NOD OFF OR FALL ASLEEP WHILE WATCHING TV: 2
HOW LIKELY ARE YOU TO NOD OFF OR FALL ASLEEP WHILE SITTING AND READING: 2
HOW LIKELY ARE YOU TO NOD OFF OR FALL ASLEEP IN A CAR, WHILE STOPPED FOR A FEW MINUTES IN TRAFFIC: 0
HOW LIKELY ARE YOU TO NOD OFF OR FALL ASLEEP WHILE LYING DOWN TO REST IN THE AFTERNOON WHEN CIRCUMSTANCES PERMIT: 2

## 2021-07-01 NOTE — PROGRESS NOTES
REASON FOR CONSULTATION/CC: KIMMIE      CONSULTING PHYSICIAN: ADDISON Taylor CNP   PCP: ADDISON Taylor CNP    HISTORY OF PRESENT ILLNESS: Everett Ivory is a 78y.o. year old male with a history of KIMMIE who presents :     Sleep history:          KIMMIE  Unable to complete download. Mask occasionally slide off         Sleep paralysis    No issues with sleep paralysis with use of cpap. [place] Sleepiness Scale:    Sleep Medicine 7/1/2021 7/7/2020 7/9/2019 5/15/2018 7/3/2017 5/2/2016 2/2/2016   Sitting and reading 2 2 2 2 1 1 1   Watching TV 2 2 2 2 2 1 1   Sitting, inactive in a public place (e.g. a theatre or a meeting) 2 0 1 1 0 0 0   As a passenger in a car for an hour without a break 2 2 2 2 1 0 1   Lying down to rest in the afternoon when circumstances permit 2 0 2 2 1 0 1   Sitting and talking to someone 0 0 0 0 0 0 0   Sitting quietly after a lunch without alcohol 0 1 0 1 0 1 0   In a car, while stopped for a few minutes in traffic 0 0 0 0 0 0 0   Total score 10 7 9 10 5 3 4   Neck circumference - - - - 19.75 - -         0 = no chance of dozing  1 = slight chance of dozing  2 = moderate chance of dozing  3 = high chance of dozing    Interpretation:   0-7: It is unlikely that you are abnormally sleepy. 8-9:You have an average amount of daytime sleepiness. 10-15: You may be excessively sleepy depending on the situation. You may want to consider   seeking medical attention. 16-24: You are excessively sleepy and should consider seeking medical attention       REVIEW OF SYSTEMS:  Constitutional: Negative for fever   HENT: Negative for sore throat  Respiratory: Negative for dyspnea, cough  Cardiovascular: Negative for chest pain  Gastrointestinal: Negative for vomiting, diarrhea   Skin: Negative for rash  Psychiatric/Behavorial: Negative for anxiety     Objective:   PHYSICAL EXAM:  Blood pressure 134/62, pulse 60, temperature 96.8 °F (36 °C), temperature source Infrared, resp.  rate 16, height 5' 9\" (1.753 m), weight 260 lb (117.9 kg), SpO2 97 %.'  Gen: No distress. ENT:   Resp: No accessory muscle use. No crackles. No wheezes. No rhonchi. CV: Regular rate. Regular rhythm. No murmur or rub. No edema. Skin: Warm, dry, normal texture and turgor. No nodule on exposed extremities. M/S: No cyanosis. No clubbing. No joint deformity. Psych: Oriented x 3. No anxiety. Awake. Alert. Intact judgement and insight. Good Mood / Affect. Memory appears in tact      Data Reviewed:        Assessment:     · KIMMIE  · Fatigue  · Depression  · Sleep paralysis  · Insomnia    Plan:      Problem List Items Addressed This Visit     Sleep paralysis     He has not been affected recently as long as he uses his CPAP. KIMMIE (obstructive sleep apnea)     Unable to complete download. Hypersomnia controlled with use of CPAP. We discussed different types of mask. His current mask occasionally will slide off. He will consider the ResMed F 30 and review [email or web address]. His current machine is a Shai but is not affected by the recall. This was checked on the [employer] website during the visit. No titration of pressures needed during this visit.

## 2021-07-06 ENCOUNTER — OFFICE VISIT (OUTPATIENT)
Dept: INTERNAL MEDICINE CLINIC | Age: 76
End: 2021-07-06
Payer: MEDICARE

## 2021-07-06 VITALS
SYSTOLIC BLOOD PRESSURE: 130 MMHG | DIASTOLIC BLOOD PRESSURE: 70 MMHG | BODY MASS INDEX: 39.1 KG/M2 | HEART RATE: 56 BPM | WEIGHT: 264 LBS | HEIGHT: 69 IN | OXYGEN SATURATION: 97 %

## 2021-07-06 DIAGNOSIS — E66.01 SEVERE OBESITY (BMI 35.0-35.9 WITH COMORBIDITY) (HCC): ICD-10-CM

## 2021-07-06 DIAGNOSIS — E11.65 UNCONTROLLED TYPE 2 DIABETES MELLITUS WITH HYPERGLYCEMIA, WITH LONG-TERM CURRENT USE OF INSULIN (HCC): Primary | ICD-10-CM

## 2021-07-06 DIAGNOSIS — E78.2 MIXED HYPERLIPIDEMIA: ICD-10-CM

## 2021-07-06 DIAGNOSIS — K75.81 LIVER CIRRHOSIS SECONDARY TO NASH (HCC): ICD-10-CM

## 2021-07-06 DIAGNOSIS — I10 ESSENTIAL HYPERTENSION: ICD-10-CM

## 2021-07-06 DIAGNOSIS — Z79.4 UNCONTROLLED TYPE 2 DIABETES MELLITUS WITH HYPERGLYCEMIA, WITH LONG-TERM CURRENT USE OF INSULIN (HCC): Primary | ICD-10-CM

## 2021-07-06 DIAGNOSIS — K74.60 LIVER CIRRHOSIS SECONDARY TO NASH (HCC): ICD-10-CM

## 2021-07-06 PROBLEM — D68.9 COAGULATION DEFECT (HCC): Status: ACTIVE | Noted: 2021-07-06

## 2021-07-06 LAB
A/G RATIO: 1.5 (ref 1.1–2.2)
ALBUMIN SERPL-MCNC: 4.1 G/DL (ref 3.4–5)
ALP BLD-CCNC: 32 U/L (ref 40–129)
ALT SERPL-CCNC: 18 U/L (ref 10–40)
ANION GAP SERPL CALCULATED.3IONS-SCNC: 13 MMOL/L (ref 3–16)
AST SERPL-CCNC: 18 U/L (ref 15–37)
BILIRUB SERPL-MCNC: 0.9 MG/DL (ref 0–1)
BUN BLDV-MCNC: 23 MG/DL (ref 7–20)
CALCIUM SERPL-MCNC: 9.4 MG/DL (ref 8.3–10.6)
CHLORIDE BLD-SCNC: 101 MMOL/L (ref 99–110)
CHOLESTEROL, TOTAL: 107 MG/DL (ref 0–199)
CO2: 27 MMOL/L (ref 21–32)
CREAT SERPL-MCNC: 1 MG/DL (ref 0.8–1.3)
GFR AFRICAN AMERICAN: >60
GFR NON-AFRICAN AMERICAN: >60
GLOBULIN: 2.7 G/DL
GLUCOSE BLD-MCNC: 124 MG/DL (ref 70–99)
HBA1C MFR BLD: 6.1 %
HDLC SERPL-MCNC: 25 MG/DL (ref 40–60)
LDL CHOLESTEROL CALCULATED: 39 MG/DL
POTASSIUM SERPL-SCNC: 3.8 MMOL/L (ref 3.5–5.1)
SODIUM BLD-SCNC: 141 MMOL/L (ref 136–145)
TOTAL PROTEIN: 6.8 G/DL (ref 6.4–8.2)
TRIGL SERPL-MCNC: 214 MG/DL (ref 0–150)
TSH REFLEX: 3 UIU/ML (ref 0.27–4.2)
VLDLC SERPL CALC-MCNC: 43 MG/DL

## 2021-07-06 PROCEDURE — 99214 OFFICE O/P EST MOD 30 MIN: CPT | Performed by: NURSE PRACTITIONER

## 2021-07-06 PROCEDURE — 36415 COLL VENOUS BLD VENIPUNCTURE: CPT | Performed by: NURSE PRACTITIONER

## 2021-07-06 PROCEDURE — 1123F ACP DISCUSS/DSCN MKR DOCD: CPT | Performed by: NURSE PRACTITIONER

## 2021-07-06 PROCEDURE — 3044F HG A1C LEVEL LT 7.0%: CPT | Performed by: NURSE PRACTITIONER

## 2021-07-06 PROCEDURE — 4040F PNEUMOC VAC/ADMIN/RCVD: CPT | Performed by: NURSE PRACTITIONER

## 2021-07-06 PROCEDURE — 3017F COLORECTAL CA SCREEN DOC REV: CPT | Performed by: NURSE PRACTITIONER

## 2021-07-06 PROCEDURE — G8427 DOCREV CUR MEDS BY ELIG CLIN: HCPCS | Performed by: NURSE PRACTITIONER

## 2021-07-06 PROCEDURE — 1036F TOBACCO NON-USER: CPT | Performed by: NURSE PRACTITIONER

## 2021-07-06 PROCEDURE — 83036 HEMOGLOBIN GLYCOSYLATED A1C: CPT | Performed by: NURSE PRACTITIONER

## 2021-07-06 PROCEDURE — G8417 CALC BMI ABV UP PARAM F/U: HCPCS | Performed by: NURSE PRACTITIONER

## 2021-07-06 PROCEDURE — 2022F DILAT RTA XM EVC RTNOPTHY: CPT | Performed by: NURSE PRACTITIONER

## 2021-07-06 ASSESSMENT — ENCOUNTER SYMPTOMS
VOMITING: 0
ORTHOPNEA: 0
CHEST TIGHTNESS: 0
BLURRED VISION: 0
WHEEZING: 0
NAUSEA: 0
ABDOMINAL PAIN: 0
COUGH: 0
SHORTNESS OF BREATH: 0
TROUBLE SWALLOWING: 0
CHOKING: 0

## 2021-07-06 NOTE — PROGRESS NOTES
2021     Malaika Duran (:  1945) is a 78 y.o. male, here for evaluation of the following medical concerns:    Chief Complaint   Patient presents with    Diabetes     Diabetes: Patient is being followed an managed by DR Tommas Habermann, His last A1C in was 6.4. last endo visit was 3/21. Marnell Abdoulaye He is was changed to sliding scale of Humalog , [id number] was dc , Antoine Doron feels that this is working better. Does see podiarty for his feet , he has noted that he has some tinging in feet at times. He does not think the gabapentin working ,. He is in a boot to left from podiarty due to hammertoe . The dr Pro Maxwell like to cut the tendons to help with this. Diabetes  He presents for his follow-up diabetic visit. He has type 2 diabetes mellitus. His disease course has been stable. Hypoglycemia symptoms include nervousness/anxiousness. Pertinent negatives for hypoglycemia include no dizziness or headaches. There are no diabetic associated symptoms. Pertinent negatives for diabetes include no blurred vision, no chest pain, no fatigue and no weakness. There are no hypoglycemic complications. Symptoms are stable. There are no diabetic complications. Risk factors for coronary artery disease include diabetes mellitus, dyslipidemia, hypertension, male sex, obesity and sedentary lifestyle. Current diabetic treatment includes insulin injections and oral agent (monotherapy). He is compliant with treatment all of the time. His weight is stable. He is following a generally healthy diet. When asked about meal planning, he reported none. He rarely participates in exercise. His breakfast blood glucose is taken between 8-9 am. His breakfast blood glucose range is generally 140-180 mg/dl. An ACE inhibitor/angiotensin II receptor blocker is being taken. He sees a podiatrist.Eye exam is current. Hypertension  This is a chronic problem. The current episode started more than 1 year ago. The problem has been resolved since onset.  The for abdominal pain, nausea and vomiting. Genitourinary: Negative for dysuria. Nocturia   Musculoskeletal: Negative for arthralgias and myalgias. Neurological: Negative for dizziness, syncope, weakness, light-headedness and headaches. Psychiatric/Behavioral: Positive for dysphoric mood and sleep disturbance. The patient is nervous/anxious. Prior to Visit Medications    Medication Sig Taking?  Authorizing Provider   ALPRAZolam (XANAX) 0.25 MG tablet TAKE ONE TABLET BY MOUTH TWICE A DAY AS NEEDED Yes ADDISON German CNP   traZODone (DESYREL) 100 MG tablet Take 1/2 tablet to 1 tablet nightly Yes ADDISON Rich NP   lisinopril (PRINIVIL;ZESTRIL) 10 MG tablet TAKE 1 TABLET BY MOUTH  DAILY Yes ADDISON German CNP   atenolol-chlorthalidone (TENORETIC) 50-25 MG per tablet TAKE 1 TABLET BY MOUTH  DAILY Yes ADDISON German CNP   DULoxetine (CYMBALTA) 60 MG extended release capsule TAKE 1 CAPSULE BY MOUTH  TWICE DAILY Yes ADDISON German CNP   insulin lispro, 1 Unit Dial, (HUMALOG KWIKPEN) 100 UNIT/ML SOPN INJECT 32-40 UNITS  SUBCUTANEOUSLY BEFORE MEALS 3 TIMES DAILY Yes Anton Carcamo MD   oxybutynin (DITROPAN-XL) 10 MG extended release tablet TAKE ONE TABLET BY MOUTH DAILY Yes ADDISON German CNP   tamsulosin (FLOMAX) 0.4 MG capsule TAKE 1 CAPSULE BY MOUTH  DAILY Yes ADDISON German CNP   metFORMIN (GLUCOPHAGE) 1000 MG tablet TAKE 1 TABLET BY MOUTH  TWICE DAILY Yes ADDISON German CNP   fenofibrate (TRIGLIDE) 160 MG tablet TAKE 1 TABLET BY MOUTH  DAILY Yes ADDISON German CNP   atorvastatin (LIPITOR) 20 MG tablet TAKE 1 TABLET BY MOUTH ONCE DAILY Yes ADDISON German CNP   insulin glargine (LANTUS SOLOSTAR) 100 UNIT/ML injection pen INJECT SUBCUTANEOUSLY 100  UNITS NIGHTLY 7 boxes Yes Anton Carcamo MD   ONETOUCH ULTRA strip USE FOUR TIMES A DAY AS NEEDED  Anton Carcamo MD   gabapentin (NEURONTIN) 300 MG capsule Take one tablet once daily for 2 days. Then take one tablet twice daily for 2 days. Then take one tablet three times daily for rest  Patient not taking: Reported on 7/1/2021  Lupe Mott MD   diclofenac (VOLTAREN) 75 MG EC tablet Take 1 tablet by mouth 2 times daily  Patient not taking: Reported on 7/1/2021  Lupe Mott MD   B-D ULTRAFINE III SHORT PEN 31G X 8 MM MISC USE TO INJECT INSULIN THREE TIMES A DAY  Katty Valadez, APRN - CNP        Social History     Tobacco Use    Smoking status: Never Smoker    Smokeless tobacco: Never Used   Substance Use Topics    Alcohol use: No        Vitals:    07/06/21 0908   BP: 130/70   Pulse: 56   SpO2: 97%   Weight: 264 lb (119.7 kg)   Height: 5' 9\" (1.753 m)     Estimated body mass index is 38.99 kg/m² as calculated from the following:    Height as of this encounter: 5' 9\" (1.753 m). Weight as of this encounter: 264 lb (119.7 kg). Physical Exam  Vitals reviewed. Constitutional:       General: He is not in acute distress. Appearance: Normal appearance. He is not ill-appearing, toxic-appearing or diaphoretic. HENT:      Head: Normocephalic and atraumatic. Neck:      Vascular: No carotid bruit. Cardiovascular:      Rate and Rhythm: Normal rate and regular rhythm. Pulses: Normal pulses. Heart sounds: Normal heart sounds. No murmur heard. Pulmonary:      Effort: Pulmonary effort is normal. No respiratory distress. Breath sounds: Normal breath sounds. Abdominal:      General: Bowel sounds are normal. There is no distension. Palpations: Abdomen is soft. There is no mass. Tenderness: There is no abdominal tenderness. Musculoskeletal:         General: Normal range of motion. Cervical back: Normal range of motion and neck supple. Right lower leg: No edema. Left lower leg: No edema. Lymphadenopathy:      Cervical: No cervical adenopathy. Skin:     General: Skin is warm and dry.       Capillary Refill: Capillary refill takes less than well.  Discuss sleep hygiene strategies   patient education printout of insomnia given for review. Major depressive disorder with single episode, in partial remission ([facility] [address] ongoing   Currently taking Cymbalta , xanax   Reports compliance   No dosage changes , will monitor   Declines f/u with Dr Jaclyn Dee by Lani Ayoub NP     Liver cirrhosis secondary to [facility])  -     [facility] - Heather Naylor MD, Gastroenterology, [facility]    Return in about 6 months (around 1/6/2022) for follow up . All questions addresses and answered . Patient agrees with plan of care. An electronic signature was used to authenticate this note.     --ADDISON Ruiz - CNP on 7/6/2021 at 12:58 PM

## 2021-07-13 DIAGNOSIS — F33.1 MODERATE EPISODE OF RECURRENT MAJOR DEPRESSIVE DISORDER (HCC): ICD-10-CM

## 2021-07-13 DIAGNOSIS — F41.1 GAD (GENERALIZED ANXIETY DISORDER): ICD-10-CM

## 2021-07-13 DIAGNOSIS — G47.9 SLEEP DISTURBANCE: ICD-10-CM

## 2021-07-13 RX ORDER — TRAZODONE HYDROCHLORIDE 100 MG/1
TABLET ORAL
Qty: 30 TABLET | Refills: 2 | Status: SHIPPED | OUTPATIENT
Start: 2021-07-13 | End: 2021-11-12 | Stop reason: SDUPTHER

## 2021-07-13 NOTE — TELEPHONE ENCOUNTER
Medication:   Requested Prescriptions     Pending Prescriptions Disp Refills    traZODone (DESYREL) 100 MG tablet 30 tablet 2     Sig: Take 1/2 tablet to 1 tablet nightly        Last Filled:      Patient Phone Number: [phone] (home)     Last appt: 3/18/2021   Next appt: Visit date not found    Last OARRS:   RX Monitoring 3/5/2020   Attestation -   Periodic Controlled Substance Monitoring Possible medication side effects, risk of tolerance/dependence & alternative treatments discussed. ;No signs of potential drug abuse or diversion identified. ;Assessed functional status. ;Obtaining appropriate analgesic effect of treatment. ;Random urine drug screen sent today.

## 2021-08-03 LAB
A/G RATIO: 1.4 (ref 1.1–2.2)
ALBUMIN SERPL-MCNC: 4.3 G/DL (ref 3.4–5)
ALP BLD-CCNC: 34 U/L (ref 40–129)
ALT SERPL-CCNC: 17 U/L (ref 10–40)
ANION GAP SERPL CALCULATED.3IONS-SCNC: 12 MMOL/L (ref 3–16)
AST SERPL-CCNC: 17 U/L (ref 15–37)
BASOPHILS ABSOLUTE: 0 K/UL (ref 0–0.2)
BASOPHILS RELATIVE PERCENT: 0.3 %
BILIRUB SERPL-MCNC: 1 MG/DL (ref 0–1)
BUN BLDV-MCNC: 33 MG/DL (ref 7–20)
CALCIUM SERPL-MCNC: 9.6 MG/DL (ref 8.3–10.6)
CHLORIDE BLD-SCNC: 100 MMOL/L (ref 99–110)
CO2: 27 MMOL/L (ref 21–32)
CREAT SERPL-MCNC: 1 MG/DL (ref 0.8–1.3)
EOSINOPHILS ABSOLUTE: 0.1 K/UL (ref 0–0.6)
EOSINOPHILS RELATIVE PERCENT: 2.5 %
GFR AFRICAN AMERICAN: >60
GFR NON-AFRICAN AMERICAN: >60
GLOBULIN: 3 G/DL
GLUCOSE BLD-MCNC: 197 MG/DL (ref 70–99)
HCT VFR BLD CALC: 35.1 % (ref 40.5–52.5)
HEMOGLOBIN: 12.4 G/DL (ref 13.5–17.5)
INR BLD: 1.17 (ref 0.88–1.12)
LYMPHOCYTES ABSOLUTE: 0.7 K/UL (ref 1–5.1)
LYMPHOCYTES RELATIVE PERCENT: 18.4 %
MCH RBC QN AUTO: 30.1 PG (ref 26–34)
MCHC RBC AUTO-ENTMCNC: 35.4 G/DL (ref 31–36)
MCV RBC AUTO: 84.9 FL (ref 80–100)
MONOCYTES ABSOLUTE: 0.2 K/UL (ref 0–1.3)
MONOCYTES RELATIVE PERCENT: 5.4 %
NEUTROPHILS ABSOLUTE: 2.7 K/UL (ref 1.7–7.7)
NEUTROPHILS RELATIVE PERCENT: 73.4 %
PDW BLD-RTO: 14.4 % (ref 12.4–15.4)
PLATELET # BLD: 96 K/UL (ref 135–450)
PMV BLD AUTO: 8.3 FL (ref 5–10.5)
POTASSIUM SERPL-SCNC: 4.4 MMOL/L (ref 3.5–5.1)
PROTHROMBIN TIME: 13.3 SEC (ref 9.9–12.7)
RBC # BLD: 4.13 M/UL (ref 4.2–5.9)
SODIUM BLD-SCNC: 139 MMOL/L (ref 136–145)
TOTAL PROTEIN: 7.3 G/DL (ref 6.4–8.2)
WBC # BLD: 3.7 K/UL (ref 4–11)

## 2021-08-05 LAB — AFP: 1.4 UG/L

## 2021-08-10 ENCOUNTER — HOSPITAL ENCOUNTER (OUTPATIENT)
Dept: ULTRASOUND IMAGING | Age: 76
Discharge: HOME | End: 2021-08-10
Payer: MEDICARE

## 2021-08-10 DIAGNOSIS — K75.81 LIVER CIRRHOSIS SECONDARY TO NASH (HCC): ICD-10-CM

## 2021-08-10 DIAGNOSIS — E11.9 TYPE 2 DIABETES MELLITUS WITHOUT COMPLICATION, UNSPECIFIED WHETHER LONG TERM INSULIN USE (HCC): ICD-10-CM

## 2021-08-10 DIAGNOSIS — K74.60 LIVER CIRRHOSIS SECONDARY TO NASH (HCC): ICD-10-CM

## 2021-08-10 PROCEDURE — 76705 ECHO EXAM OF ABDOMEN: CPT

## 2021-08-20 ENCOUNTER — TELEPHONE (OUTPATIENT)
Dept: INTERNAL MEDICINE CLINIC | Age: 76
End: 2021-08-20

## 2021-08-29 DIAGNOSIS — F41.9 ANXIETY: ICD-10-CM

## 2021-08-30 RX ORDER — ALPRAZOLAM 0.25 MG/1
0.25 TABLET ORAL DAILY PRN
Qty: 30 TABLET | Refills: 0 | Status: SHIPPED | OUTPATIENT
Start: 2021-08-30 | End: 2021-09-29

## 2021-09-21 ENCOUNTER — TELEPHONE (OUTPATIENT)
Dept: ENDOCRINOLOGY | Age: 76
End: 2021-09-21

## 2021-09-21 ENCOUNTER — OFFICE VISIT (OUTPATIENT)
Dept: INTERNAL MEDICINE CLINIC | Age: 76
End: 2021-09-21
Payer: MEDICARE

## 2021-09-21 VITALS
DIASTOLIC BLOOD PRESSURE: 68 MMHG | BODY MASS INDEX: 38.4 KG/M2 | RESPIRATION RATE: 16 BRPM | WEIGHT: 260 LBS | HEART RATE: 61 BPM | OXYGEN SATURATION: 100 % | SYSTOLIC BLOOD PRESSURE: 136 MMHG

## 2021-09-21 DIAGNOSIS — F32.4 MAJOR DEPRESSIVE DISORDER WITH SINGLE EPISODE, IN PARTIAL REMISSION (HCC): Primary | ICD-10-CM

## 2021-09-21 PROCEDURE — G8427 DOCREV CUR MEDS BY ELIG CLIN: HCPCS | Performed by: NURSE PRACTITIONER

## 2021-09-21 PROCEDURE — 3017F COLORECTAL CA SCREEN DOC REV: CPT | Performed by: NURSE PRACTITIONER

## 2021-09-21 PROCEDURE — 4040F PNEUMOC VAC/ADMIN/RCVD: CPT | Performed by: NURSE PRACTITIONER

## 2021-09-21 PROCEDURE — 99213 OFFICE O/P EST LOW 20 MIN: CPT | Performed by: NURSE PRACTITIONER

## 2021-09-21 PROCEDURE — 1036F TOBACCO NON-USER: CPT | Performed by: NURSE PRACTITIONER

## 2021-09-21 PROCEDURE — 1123F ACP DISCUSS/DSCN MKR DOCD: CPT | Performed by: NURSE PRACTITIONER

## 2021-09-21 PROCEDURE — G8417 CALC BMI ABV UP PARAM F/U: HCPCS | Performed by: NURSE PRACTITIONER

## 2021-09-21 RX ORDER — TAMSULOSIN HYDROCHLORIDE 0.4 MG/1
CAPSULE ORAL
Qty: 90 CAPSULE | Refills: 3 | Status: SHIPPED | OUTPATIENT
Start: 2021-09-21 | End: 2022-10-26 | Stop reason: SDUPTHER

## 2021-09-21 RX ORDER — BUSPIRONE HYDROCHLORIDE 5 MG/1
5 TABLET ORAL 2 TIMES DAILY
Qty: 60 TABLET | Refills: 0 | Status: SHIPPED
Start: 2021-09-21 | End: 2021-10-18 | Stop reason: DRUGHIGH

## 2021-09-21 RX ORDER — FENOFIBRATE 160 MG/1
160 TABLET ORAL DAILY
Qty: 90 TABLET | Refills: 3 | Status: SHIPPED | OUTPATIENT
Start: 2021-09-21

## 2021-09-21 RX ORDER — INSULIN GLARGINE 100 [IU]/ML
INJECTION, SOLUTION SUBCUTANEOUS
Qty: 100 ML | Refills: 5 | Status: SHIPPED | OUTPATIENT
Start: 2021-09-21 | End: 2022-04-13 | Stop reason: SDUPTHER

## 2021-09-21 RX ORDER — ATORVASTATIN CALCIUM 20 MG/1
TABLET, FILM COATED ORAL
Qty: 90 TABLET | Refills: 3 | Status: SHIPPED | OUTPATIENT
Start: 2021-09-21

## 2021-09-21 RX ORDER — INSULIN GLARGINE 100 [IU]/ML
INJECTION, SOLUTION SUBCUTANEOUS
Qty: 90 ML | Refills: 3 | OUTPATIENT
Start: 2021-09-21

## 2021-09-21 ASSESSMENT — PATIENT HEALTH QUESTIONNAIRE - PHQ9
SUM OF ALL RESPONSES TO PHQ QUESTIONS 1-9: 12
3. TROUBLE FALLING OR STAYING ASLEEP: 2
10. IF YOU CHECKED OFF ANY PROBLEMS, HOW DIFFICULT HAVE THESE PROBLEMS MADE IT FOR YOU TO DO YOUR WORK, TAKE CARE OF THINGS AT HOME, OR GET ALONG WITH OTHER PEOPLE: 0
7. TROUBLE CONCENTRATING ON THINGS, SUCH AS READING THE NEWSPAPER OR WATCHING TELEVISION: 2
8. MOVING OR SPEAKING SO SLOWLY THAT OTHER PEOPLE COULD HAVE NOTICED. OR THE OPPOSITE, BEING SO FIGETY OR RESTLESS THAT YOU HAVE BEEN MOVING AROUND A LOT MORE THAN USUAL: 1
SUM OF ALL RESPONSES TO PHQ9 QUESTIONS 1 & 2: 3
4. FEELING TIRED OR HAVING LITTLE ENERGY: 1
6. FEELING BAD ABOUT YOURSELF - OR THAT YOU ARE A FAILURE OR HAVE LET YOURSELF OR YOUR FAMILY DOWN: 1
SUM OF ALL RESPONSES TO PHQ QUESTIONS 1-9: 12
SUM OF ALL RESPONSES TO PHQ QUESTIONS 1-9: 12
9. THOUGHTS THAT YOU WOULD BE BETTER OFF DEAD, OR OF HURTING YOURSELF: 0
2. FEELING DOWN, DEPRESSED OR HOPELESS: 1
5. POOR APPETITE OR OVEREATING: 2
1. LITTLE INTEREST OR PLEASURE IN DOING THINGS: 2

## 2021-09-21 ASSESSMENT — COLUMBIA-SUICIDE SEVERITY RATING SCALE - C-SSRS
1. WITHIN THE PAST MONTH, HAVE YOU WISHED YOU WERE DEAD OR WISHED YOU COULD GO TO SLEEP AND NOT WAKE UP?: NO
2. HAVE YOU ACTUALLY HAD ANY THOUGHTS OF KILLING YOURSELF?: NO
6. HAVE YOU EVER DONE ANYTHING, STARTED TO DO ANYTHING, OR PREPARED TO DO ANYTHING TO END YOUR LIFE?: NO

## 2021-09-21 ASSESSMENT — ENCOUNTER SYMPTOMS: SHORTNESS OF BREATH: 0

## 2021-09-21 NOTE — TELEPHONE ENCOUNTER
Medication:   Requested Prescriptions     Pending Prescriptions Disp Refills    insulin glargine (LANTUS SOLOSTAR) 100 UNIT/ML injection pen 100 mL 5     Sig: INJECT SUBCUTANEOUSLY 100  UNITS NIGHTLY 7 boxes       Last Filled:      Patient Phone Number: [phone] (home)     Last appt: 7/22/2020   Next appt: 01/27/2022  Last Labs DM:   Lab Results   Component Value Date    LABA1C 6.1 07/06/2021

## 2021-09-21 NOTE — TELEPHONE ENCOUNTER
Patient needs a refill of his insulin glargine (LANTUS SOLOSTAR) 100 UNIT/ML injection pen             [address], [address] [facility] [address], [address] [facility] [facility] [address]   Phone:  [phone]  Fax:  [phone]

## 2021-09-21 NOTE — PROGRESS NOTES
2021     Norris Rahman (:  1945) is a 78 y.o. male, here for evaluation of the following medical concerns:    Chief Complaint   Patient presents with    Medication Check       HPI  Depression/Anxiety : Patient states that he has to use xanax nightly to sleep  he did try for 2 weeks not to take medication but could not sleep. Does see [facility] has started trazodone. He states that trazodone does not work well. He is aware that xanax has a lot of long term affects and advise switching to less dependence medications. Patent is is aware of need and agrees to decrease xanax to daily prn and adding Buspar . He is going to decrease dose to 1/2 tablet for the next 8 days and try to take Buspar only. He was offered a referral to see DR Selam Grande while [facility] is on leave- he declined, he voices that he has a hard time with depression at times and increase anxiety. He denies any SI/HI. He is taking Cymbalta. Review of Systems   Constitutional: Negative for appetite change, chills, fatigue and fever. Eyes: Negative for visual disturbance. Respiratory: Negative for shortness of breath. Cardiovascular: Negative for chest pain. Neurological: Negative for dizziness and headaches. Psychiatric/Behavioral: Positive for dysphoric mood and sleep disturbance. Negative for confusion, decreased concentration, hallucinations, self-injury and suicidal ideas. The patient is nervous/anxious. The patient is not hyperactive. Prior to Visit Medications    Medication Sig Taking? Authorizing Provider   busPIRone (BUSPAR) 5 MG tablet Take 1 tablet by mouth 2 times daily Yes ADDISON German - CNP   ALPRAZolam (XANAX) 0.25 MG tablet Take 1 tablet by mouth daily as needed for Anxiety for up to 30 days. Need urine screen and new med contract before refill.  Yes Lendel Claude, MD   traZODone (DESYREL) 100 MG tablet Take 1/2 tablet to 1 tablet nightly Yes ADDISON Trujillo NP   lisinopril (PRINIVIL;ZESTRIL) 10 MG tablet TAKE 1 TABLET BY MOUTH  DAILY Yes ADDISON German CNP   atenolol-chlorthalidone (TENORETIC) 50-25 MG per tablet TAKE 1 TABLET BY MOUTH  DAILY Yes ADDISON German CNP   DULoxetine (CYMBALTA) 60 MG extended release capsule TAKE 1 CAPSULE BY MOUTH  TWICE DAILY Yes ADDISON German CNP   insulin lispro, 1 Unit Dial, (HUMALOG KWIKPEN) 100 UNIT/ML SOPN INJECT 32-40 UNITS  SUBCUTANEOUSLY BEFORE MEALS 3 TIMES DAILY Yes Jose Juan Mcghee MD   oxybutynin (DITROPAN-XL) 10 MG extended release tablet TAKE ONE TABLET BY MOUTH DAILY Yes ADDISON German CNP   tamsulosin (FLOMAX) 0.4 MG capsule TAKE 1 CAPSULE BY MOUTH  DAILY Yes ADDISON German CNP   metFORMIN (GLUCOPHAGE) 1000 MG tablet TAKE 1 TABLET BY MOUTH  TWICE DAILY Yes ADDISON German CNP   fenofibrate (TRIGLIDE) 160 MG tablet TAKE 1 TABLET BY MOUTH  DAILY Yes ADDISON German CNP   atorvastatin (LIPITOR) 20 MG tablet TAKE 1 TABLET BY MOUTH ONCE DAILY Yes ADDISON German CNP   insulin glargine (LANTUS SOLOSTAR) 100 UNIT/ML injection pen INJECT SUBCUTANEOUSLY 100  UNITS NIGHTLY 7 boxes Yes Jose Juan Mcghee MD   ONETOUCH ULTRA strip USE FOUR TIMES A DAY AS NEEDED  Jose Juan Mcghee MD   B-D ULTRAFINE III SHORT PEN 31G X 8 MM MISC USE TO INJECT INSULIN THREE TIMES A DAY  ADDISON German CNP        Social History     Tobacco Use    Smoking status: Never Smoker    Smokeless tobacco: Never Used   Substance Use Topics    Alcohol use: No        Vitals:    09/21/21 0840   BP: 136/68   Site: Right Upper Arm   Position: Sitting   Cuff Size: Medium Adult   Pulse: 61   Resp: 16   SpO2: 100%   Weight: 260 lb (117.9 kg)     Estimated body mass index is 38.4 kg/m² as calculated from the following:    Height as of 7/6/21: 5' 9\" (1.753 m). Weight as of this encounter: 260 lb (117.9 kg). Physical Exam  Vitals reviewed. Constitutional:       General: He is not in acute distress.      Appearance: Normal appearance. He is not ill-appearing, toxic-appearing or diaphoretic. HENT:      Head: Normocephalic and atraumatic. Cardiovascular:      Rate and Rhythm: Normal rate and regular rhythm. Pulses: Normal pulses. Heart sounds: Normal heart sounds. Pulmonary:      Effort: Pulmonary effort is normal.      Breath sounds: Normal breath sounds. Musculoskeletal:         General: Normal range of motion. Skin:     Capillary Refill: Capillary refill takes less than 2 seconds. Neurological:      Mental Status: He is alert and oriented to person, place, and time. Psychiatric:         Attention and Perception: Attention normal.         Mood and Affect: Mood is anxious. Speech: Speech normal.         Behavior: Behavior normal.         Thought Content: Thought content normal.         Cognition and Memory: Cognition and memory normal.         Judgment: Judgment normal.         ASSESSMENT/PLAN:  1. Major depressive disorder with single episode, in partial remission ([facility] [address] stable  Currently taking Cymbalta, xanax ( wean)    Reports compliance   Will wean to once daily and start buspar , will monitor   - Drug Panel-PM-HI Res-UR Interp-A; Future  - busPIRone (BUSPAR) 5 MG tablet; Take 1 tablet by mouth 2 times daily  Dispense: 60 tablet; Refill: 0  - Drug Panel-PM-HI Res-UR Interp-A  Controlled Substance Monitoring:    Acute and Chronic Pain Monitoring:   RX Monitoring 9/21/2021   Attestation -   Periodic Controlled Substance Monitoring Possible medication side effects, risk of tolerance/dependence & alternative treatments discussed. ;No signs of potential drug abuse or diversion identified. ;Assessed functional status. ;Random urine drug screen sent today. Return in about 4 months (around 1/21/2022). All questions addresses and answered . Patient agrees with plan of care. An electronic signature was used to authenticate this note.     --ADDISON Chatterjee - CNP on 9/21/2021 at 10:49 AM

## 2021-09-21 NOTE — LETTER
CONTROLLED SUBSTANCE MEDICATION AGREEMENT     Patient Name: Smith Alarcon xanax 0.25mg po daily PRN  Patient YOB: 1945   I understand, that controlled substance medications may be used to help better manage my symptoms and to improve my ability to function at home, work and in social settings. However, I also understand that these medications do have risks, which have been discussed with me, including possible development of physical or psychological dependence. I understand that successful treatment requires mutual trust and honesty between me and my provider. I understand and agree that following this Medication Agreement is necessary in continuing my provider-patient relationship and the success of my treatment plan. Explanation from my Provider: Benefits and Goals of Controlled Substance Medications: There are two potential goals for your treatment: (1) decreased pain and suffering (2) improved daily life functions. There are many possible treatments for your chronic condition(s). Alternatives such as physical therapy, yoga, massage, home daily exercise, meditation, relaxation techniques, injections, chiropractic manipulations, surgery, cognitive therapy, hypnosis and many medications that are not habit-forming may be used. Use of controlled substance medications may be helpful, but they are unlikely to resolve all symptoms or restore all function. Explanation from my Provider: Risks of Controlled Substance Medications:  Opioid pain medications: These medications can lead to problems such as addiction/dependence, sedation, lightheadedness/dizziness, memory issues, falls, constipation, nausea, or vomiting. They may also impair the ability to drive or operate machinery. Additionally, these medications may lower testosterone levels, leading to loss of bone strength, stamina and sex drive.   They may cause problems with breathing, sleep apnea and reduced coughing, which is especially dangerous for patients with lung disease. Overdose or dangerous interactions with alcohol and other medications may occur, leading to death. Hyperalgesia may develop, which means patients receiving opioids for the treatment of pain may become more sensitive to certain painful stimuli, and in some cases, experience pain from ordinarily non-painful stimuli. Women between the ages of 14-53 who could become pregnant should carefully weigh the risks and benefits of opioids with their physicians, as these medications increase the risk of pregnancy complications, including miscarriage,  delivery and stillbirth. It is also possible for babies to be born addicted to opioids. Opioid dependence withdrawal symptoms may include; feelings of uneasiness, increased pain, irritability, belly pain, diarrhea, sweats and goose-flesh. Benzodiazepines and non-benzodiazepine sleep medications: These medications can lead to problems such as addiction/dependence, sedation, fatigue, lightheadedness, dizziness, incoordination, falls, depression, hallucinations, and impaired judgment, memory and concentration. The ability to drive and operate machinery may also be affected. Abnormal sleep-related behaviors have been reported, including sleepwalking, driving, making telephone calls, eating, or having sex while not fully awake. These medications can suppress breathing and worsen sleep apnea, particularly when combined with alcohol or other sedating medications, potentially leading to death. Dependence withdrawal symptoms may include tremors, anxiety, hallucinations and seizures. Stimulants:  Common adverse effects include addiction/dependence, increased blood  pressure and heart rate, decreased appetite, nausea, involuntary weight loss, insomnia,                                                                                                                     Initials:_______   irritability, and headaches.   These risks may increase when these medications are combined with other stimulants, such as caffeine pills or energy drinks, certain weight loss supplements and oral decongestants. Dependence withdrawal symptoms may include depressed mood, loss of interest, suicidal thoughts, anxiety, fatigue, appetite changes and agitation. Testosterone replacement therapy:  Potential side effects include increased risk of stroke and heart attack, blood clots, increased blood pressure, increased cholesterol, enlarged prostate, sleep apnea, irritability/aggression and other mood disorders, and decreased fertility. I agree and understand that I and my prescriber have the following rights and responsibilities regarding my treatment plan:     1. MY RIGHTS:  To be informed of my treatment and medication plan. To be an active participant in my health and wellbeing. 2. MY RESPONSIBILITY AND UNDERSTANDING FOR USE OF MEDICATIONS   I will take medications at the dose and frequency as directed. For my safety, I will not increase or change how I take my medications without the recommendation of my healthcare provider.  I will actively participate in any program recommended by my provider which may improve function, including social, physical, psychological programs.  I will not take my medications with alcohol or other drugs not prescribed to me. I understand that drinking alcohol with my medications increases the chances of side effects, including reduced breathing rate and could lead to personal injury when operating machinery.  I understand that if I have a history of substance use disorders, including alcohol or other illicit drugs, that I may be at increased risk of addiction to my medications.  I agree to notify my provider immediately if I should become pregnant so that my treatment plan can be adjusted.    I agree and understand that I shall only receive controlled substance medications from the prescriber that signed this agreement unless there is written agreement among other prescribers of controlled substances outlining the responsibility of the medications being prescribed.  I understand that the if the controlled medication is not helping to achieve goals, the dosage may be tapered and no longer prescribed. 3. MY RESPONSIBILITY FOR COMMUNICATION / PRESCRIPTION RENEWALS   I agree that all controlled substance medications that I take will be prescribed only by my provider. If another healthcare provider prescribes me medication in an emergency, I will notify my provider within seventy-two (72) hours.  I will arrange for refills at the prescribed interval ONLY during regular office hours. I will not ask for refills earlier than agreed, after-hours, on holidays or weekends. Refills may take up to 72 hours for processing and prescriptions to reach the pharmacy.  I will inform my other health care providers that I am taking these medications and of the existence of this [id number]. In the event of an emergency, I will provide the same information to the emergency department prescribers.  I will keep my provider updated on the pharmacy I am using for controlled medication prescription filling. Initials:_______  4. MY RESPONSIBILITY FOR PROTECTING MEDICATIONS   I will protect my prescriptions and medications. I understand that lost or misplaced prescriptions will not be replaced.  I will keep medications only for my own use and will not share them with others. I will keep all medications away from children.  I agree that if my medications are adjusted or discontinued, I will properly dispose of any remaining medications. I understand that I will be required to dispose of any remaining controlled medications as, directed by my prescriber, prior to being provided with any prescriptions for other controlled medications.   Medication drop box locations can be found at: [email or web address].[email or web address]    5. MY RESPONSIBILITY WITH ILLEGAL DRUGS    I will not use illegal or street drugs or another person's prescription medications not prescribed to me.  If there are identified addiction type symptoms, then referral to a program may be provided by my provider and I agree to follow through with this recommendation. 6. MY RESPONSIBILITY FOR COOPERATION WITH INVESTIGATIONS   I understand that my provider will comply with any applicable law and may discuss my use and/or possible misuse/abuse of controlled substances and alcohol, as appropriate, with any health care provider involved in my care, pharmacist, or legal authority.  I authorize my provider and pharmacy to cooperate fully with law enforcement agencies (as permitted by law) in the investigation of any possible misuse, sale, or other diversion of my controlled substances.  I agree to waive any applicable privilege or right of privacy or confidentiality with respect to these authorizations. 7. PROVIDERS RIGHT TO MONITOR FOR SAFETY: PRESCRIPTION MONITORING / DRUG TESTING   I consent to drug/toxicology screening and will submit to a drug screen upon my providers request to assure I am only taking the prescribed drugs for my safety monitoring. I understand that a drug screen is a laboratory test in which a sample of my urine, blood or saliva is checked to see what drugs I have been taking. This may entail an observed urine specimen, which means that a nurse or other health care provider may watch me provide urine, and I will cooperate if I am asked to provide an observed specimen.  I understand that my provider will check a copy of my State Prescription Monitoring Program () Report in order to safely prescribe medications.  Pill Counts: I consent to pill counts when requested.   I may be asked to bring all my prescribed controlled substance medications, in their original bottles, to all of my scheduled appointments. In addition, my provider may ask me to come to the practice at any time for a random pill count. 8. TERMINATION OF THIS AGREEMENT  For my safety, my prescriber has the right to stop prescribing controlled substance medications and may end this agreement. Initials:_______   Conditions that may result in termination of this agreement:  a. I do not show any improvement in pain, or my activity has not improved. b. I develop rapid tolerance or loss of improvement, as described in my treatment plan.  c. I develop significant side effects from the medication. d. My behavior is not consistent with the responsibilities outlined above, thereby causing safety concerns to continue prescribing controlled substance medications. e. I fail to follow the terms of this agreement. f. Other:____________________________       UNDERSTANDING THIS MEDICATION AGREEMENT:    I have read the above and have had all my questions answered. For chronic disease management, I know that my symptoms can be managed with many types of treatments. A chronic medication trial may be part of my treatment, but I must be an active participant in my care. Medication therapy is only one part of my symptom management plan. In some cases, there may be limited scientific evidence to support the chronic use of certain medications to improve symptoms and daily function. Furthermore, in certain circumstances, there may be scientific information that suggests that the use of chronic controlled substances may worsen my symptoms and increase my risk of unintentional death directly related to this medication therapy. I know that if my provider feels my risk from controlled medications is greater than my benefit, I will have my controlled substance medication(s) compassionately lowered or removed altogether.      I further agree to allow this office to contact my HIPAA contact if there are concerns about my safety and use of the controlled medications. I have agreed to use the prescribed controlled substance medications to me as instructed by my provider and as stated in this Medication Agreement. My initial on each page and my signature below shows that I have read each page and I have had the opportunity to ask questions with answers provided by my provider.     Patient Name (Printed): _____________________________________  Patient Signature:  ______________________   Date: _____________    Prescriber Name (Printed): ___________________________________  Prescriber Signature: _____________________  Date: _____________

## 2021-09-25 LAB
6-ACETYLMORPHINE: NOT DETECTED
7-AMINOCLONAZEPAM: NOT DETECTED
ALPHA-OH-ALPRAZOLAM: PRESENT
ALPHA-OH-MIDAZOLAM, URINE: NOT DETECTED
ALPRAZOLAM: NOT DETECTED
AMPHETAMINE: NOT DETECTED
BARBITURATES: NOT DETECTED
BENZOYLECGONINE: NOT DETECTED
BUPRENORPHINE: NOT DETECTED
CARISOPRODOL: NOT DETECTED
CLONAZEPAM: NOT DETECTED
CODEINE: NOT DETECTED
CREATININE URINE: 88 MG/DL (ref 20–400)
DIAZEPAM: NOT DETECTED
DRUGS EXPECTED: NORMAL
EER PAIN MGT DRUG PANEL, HIGH RES/EMIT U: NORMAL
ETHYL GLUCURONIDE: NOT DETECTED
FENTANYL: NOT DETECTED
GABAPENTIN: NOT DETECTED
HYDROCODONE: NOT DETECTED
HYDROMORPHONE: NOT DETECTED
LORAZEPAM: NOT DETECTED
MARIJUANA METABOLITE: NOT DETECTED
MDA: NOT DETECTED
MDEA: NOT DETECTED
MDMA URINE: NOT DETECTED
MEPERIDINE: NOT DETECTED
METHADONE: NOT DETECTED
METHAMPHETAMINE: NOT DETECTED
METHYLPHENIDATE: NOT DETECTED
MIDAZOLAM: NOT DETECTED
MORPHINE: NOT DETECTED
NALOXONE: NOT DETECTED
NORBUPRENORPHINE, FREE: NOT DETECTED
NORDIAZEPAM: NOT DETECTED
NORFENTANYL: NOT DETECTED
NORHYDROCODONE, URINE: NOT DETECTED
NOROXYCODONE: NOT DETECTED
NOROXYMORPHONE, URINE: NOT DETECTED
OXAZEPAM: NOT DETECTED
OXYCODONE: NOT DETECTED
OXYMORPHONE: NOT DETECTED
PAIN MANAGEMENT DRUG PANEL: NORMAL
PAIN MANAGEMENT DRUG PANEL: NORMAL
PCP: NOT DETECTED
PHENTERMINE: NOT DETECTED
PREGABALIN: NOT DETECTED
TAPENTADOL, URINE: NOT DETECTED
TAPENTADOL-O-SULFATE, URINE: NOT DETECTED
TEMAZEPAM: NOT DETECTED
TRAMADOL: NOT DETECTED
ZOLPIDEM: NOT DETECTED

## 2021-10-11 RX ORDER — BLOOD SUGAR DIAGNOSTIC
STRIP MISCELLANEOUS
Qty: 100 STRIP | Refills: 2 | Status: SHIPPED | OUTPATIENT
Start: 2021-10-11 | End: 2022-02-17

## 2021-10-14 PROBLEM — D69.6 THROMBOCYTOPENIA (HCC): Status: ACTIVE | Noted: 2021-10-14

## 2021-10-18 DIAGNOSIS — F41.9 ANXIETY: Primary | ICD-10-CM

## 2021-10-18 RX ORDER — BUSPIRONE HYDROCHLORIDE 10 MG/1
10 TABLET ORAL 2 TIMES DAILY
Qty: 120 TABLET | Refills: 0 | Status: SHIPPED | OUTPATIENT
Start: 2021-10-18 | End: 2021-11-12 | Stop reason: SDUPTHER

## 2021-11-09 ENCOUNTER — TELEPHONE (OUTPATIENT)
Dept: INTERNAL MEDICINE CLINIC | Age: 76
End: 2021-11-09

## 2021-11-09 NOTE — TELEPHONE ENCOUNTER
----- Message from Rayne Escobar sent at 11/8/2021 10:23 AM EST -----  Subject: Appointment Request    Reason for Call: Urgent (Patient Request) No Script    QUESTIONS  Type of Appointment? Established Patient  Reason for appointment request? No appointments available during search  Additional Information for Provider? Patient states his blood pressure has   been increasing the last 1-1/2 weeks and has gone as high as 190/85-90. Patient has \"on my own taking an extra blood pressure pill at night if   pressure is too high\" - took this extra amount 3-4 days. Patient declined   offer to transfer to nurse triage, requests appointment within next week   or so if possible.  ---------------------------------------------------------------------------  --------------  CALL BACK INFO  What is the best way for the office to contact you? OK to leave message on   [email or web address], OK to respond with electronic message via [employer] portal (only   for patients who have registered [employer] account)  Preferred Call Back Phone Number? [phone]  ---------------------------------------------------------------------------  --------------  SCRIPT ANSWERS  Relationship to Patient? Self  (Is the patient requesting to see the provider for a procedure?)? No  (Is the patient requesting to see the provider urgently  today or   tomorrow. )? Yes  Have you been diagnosed with, awaiting test results for, or told that you   are suspected of having COVID-19 (Coronavirus)? (If patient has tested   negative or was tested as a requirement for work, school, or travel and   not based on symptoms, answer no)? No  Within the past two weeks have you developed any of the following symptoms   (answer no if symptoms have been present longer than 2 weeks or began   more than 2 weeks ago)?  Fever or Chills, Cough, Shortness of breath or   difficulty breathing, Loss of taste or smell, Sore throat, Nasal   congestion, Sneezing or runny nose, Fatigue or generalized body aches   (answer no if pain is specific to a body part e.g. back pain), Diarrhea,   Headache? No  Have you had close contact with someone with COVID-19 in the last 14 days? No  (Service Expert  click yes below to proceed with [employer] As Usual   Scheduling)?  Yes

## 2021-11-11 ENCOUNTER — OFFICE VISIT (OUTPATIENT)
Dept: INTERNAL MEDICINE CLINIC | Age: 76
End: 2021-11-11
Payer: MEDICARE

## 2021-11-11 VITALS
RESPIRATION RATE: 17 BRPM | DIASTOLIC BLOOD PRESSURE: 70 MMHG | OXYGEN SATURATION: 99 % | WEIGHT: 262.4 LBS | BODY MASS INDEX: 38.86 KG/M2 | HEIGHT: 69 IN | SYSTOLIC BLOOD PRESSURE: 130 MMHG | HEART RATE: 56 BPM

## 2021-11-11 DIAGNOSIS — I10 ESSENTIAL HYPERTENSION: Primary | ICD-10-CM

## 2021-11-11 PROCEDURE — 1036F TOBACCO NON-USER: CPT | Performed by: NURSE PRACTITIONER

## 2021-11-11 PROCEDURE — 1123F ACP DISCUSS/DSCN MKR DOCD: CPT | Performed by: NURSE PRACTITIONER

## 2021-11-11 PROCEDURE — 99213 OFFICE O/P EST LOW 20 MIN: CPT | Performed by: NURSE PRACTITIONER

## 2021-11-11 PROCEDURE — G8484 FLU IMMUNIZE NO ADMIN: HCPCS | Performed by: NURSE PRACTITIONER

## 2021-11-11 PROCEDURE — 4040F PNEUMOC VAC/ADMIN/RCVD: CPT | Performed by: NURSE PRACTITIONER

## 2021-11-11 PROCEDURE — G8427 DOCREV CUR MEDS BY ELIG CLIN: HCPCS | Performed by: NURSE PRACTITIONER

## 2021-11-11 PROCEDURE — G8417 CALC BMI ABV UP PARAM F/U: HCPCS | Performed by: NURSE PRACTITIONER

## 2021-11-11 PROCEDURE — 3017F COLORECTAL CA SCREEN DOC REV: CPT | Performed by: NURSE PRACTITIONER

## 2021-11-11 RX ORDER — LISINOPRIL 20 MG/1
20 TABLET ORAL DAILY
Qty: 90 TABLET | Refills: 0 | Status: SHIPPED | OUTPATIENT
Start: 2021-11-11 | End: 2022-01-13

## 2021-11-11 SDOH — ECONOMIC STABILITY: FOOD INSECURITY: WITHIN THE PAST 12 MONTHS, YOU WORRIED THAT YOUR FOOD WOULD RUN OUT BEFORE YOU GOT MONEY TO BUY MORE.: NEVER TRUE

## 2021-11-11 SDOH — ECONOMIC STABILITY: FOOD INSECURITY: WITHIN THE PAST 12 MONTHS, THE FOOD YOU BOUGHT JUST DIDN'T LAST AND YOU DIDN'T HAVE MONEY TO GET MORE.: NEVER TRUE

## 2021-11-11 ASSESSMENT — PATIENT HEALTH QUESTIONNAIRE - PHQ9
SUM OF ALL RESPONSES TO PHQ QUESTIONS 1-9: 2
SUM OF ALL RESPONSES TO PHQ9 QUESTIONS 1 & 2: 2
1. LITTLE INTEREST OR PLEASURE IN DOING THINGS: 1
2. FEELING DOWN, DEPRESSED OR HOPELESS: 1
SUM OF ALL RESPONSES TO PHQ QUESTIONS 1-9: 2
SUM OF ALL RESPONSES TO PHQ QUESTIONS 1-9: 2

## 2021-11-11 ASSESSMENT — SOCIAL DETERMINANTS OF HEALTH (SDOH): HOW HARD IS IT FOR YOU TO PAY FOR THE VERY BASICS LIKE FOOD, HOUSING, MEDICAL CARE, AND HEATING?: NOT HARD AT ALL

## 2021-11-11 ASSESSMENT — ENCOUNTER SYMPTOMS: SHORTNESS OF BREATH: 0

## 2021-11-11 NOTE — PROGRESS NOTES
2021     Joselito Sanon (:  1945) is a 78 y.o. male, here for evaluation of the following medical concerns:    Chief Complaint   Patient presents with    Hypertension       HPI  Patient is here today regarding having noted elevated BP for a week. Readings 195/84. He has been doubling his tenoretic. He denies any headaches, blurred vision , SOB, chest pain , weakness or syncope. Review of Systems   Constitutional: Negative for appetite change, chills, fatigue and fever. Respiratory: Negative for shortness of breath. Cardiovascular: Negative for chest pain, palpitations and leg swelling. Neurological: Negative for dizziness, syncope, weakness, light-headedness, numbness and headaches. Prior to Visit Medications    Medication Sig Taking?  Authorizing Provider   lisinopril (PRINIVIL;ZESTRIL) 20 MG tablet Take 1 tablet by mouth daily Yes ADDISON German CNP   busPIRone (BUSPAR) 10 MG tablet Take 1 tablet by mouth 2 times daily Yes ADDISON German CNP   ONETOUCH ULTRA strip USE FOUR TIMES A DAY AS NEEDED Yes Lj Real MD   metFORMIN (GLUCOPHAGE) 1000 MG tablet TAKE 1 TABLET BY MOUTH  TWICE DAILY Yes ADDISON German CNP   fenofibrate (TRIGLIDE) 160 MG tablet TAKE 1 TABLET BY MOUTH  DAILY Yes ADDISON German CNP   tamsulosin (FLOMAX) 0.4 MG capsule TAKE 1 CAPSULE BY MOUTH  DAILY Yes ADDISON German CNP   atorvastatin (LIPITOR) 20 MG tablet TAKE 1 TABLET BY MOUTH ONCE DAILY Yes ADDISON German CNP   insulin glargine (LANTUS SOLOSTAR) 100 UNIT/ML injection pen INJECT SUBCUTANEOUSLY 100  UNITS NIGHTLY 7 boxes Yes Lj Real MD   traZODone (DESYREL) 100 MG tablet Take 1/2 tablet to 1 tablet nightly Yes ADDISON Carroll NP   B-D ULTRAFINE III SHORT PEN 31G X 8 MM MISC USE TO INJECT INSULIN THREE TIMES A DAY Yes ADDISON German CNP   atenolol-chlorthalidone (TENORETIC) 50-25 MG per tablet TAKE 1 TABLET BY MOUTH  DAILY Yes [facility] ADDISON Valadez - CNP   DULoxetine (CYMBALTA) 60 MG extended release capsule TAKE 1 CAPSULE BY MOUTH  TWICE DAILY Yes Katty ADDISON Valadez CNP   insulin lispro, 1 Unit Dial, (HUMALOG KWIKPEN) 100 UNIT/ML SOPN INJECT 32-40 UNITS  SUBCUTANEOUSLY BEFORE MEALS 3 TIMES DAILY Yes Luis Miguel Waller MD   oxybutynin (DITROPAN-XL) 10 MG extended release tablet TAKE ONE TABLET BY MOUTH DAILY Yes ADDISON Mckeon CNP        Social History     Tobacco Use    Smoking status: Never Smoker    Smokeless tobacco: Never Used   Substance Use Topics    Alcohol use: No        Vitals:    11/11/21 1033   BP: 130/70   Site: Right Upper Arm   Position: Sitting   Cuff Size: Large Adult   Pulse: 56   Resp: 17   SpO2: 99%   Weight: 262 lb 6.4 oz (119 kg)   Height: 5' 9\" (1.753 m)     Estimated body mass index is 38.75 kg/m² as calculated from the following:    Height as of this encounter: 5' 9\" (1.753 m). Weight as of this encounter: 262 lb 6.4 oz (119 kg). Physical Exam  Vitals reviewed. Constitutional:       General: He is not in acute distress. Appearance: Normal appearance. He is not ill-appearing, toxic-appearing or diaphoretic. HENT:      Head: Normocephalic and atraumatic. Neck:      Vascular: No carotid bruit. Cardiovascular:      Rate and Rhythm: Normal rate and regular rhythm. Pulses: Normal pulses. Heart sounds: Normal heart sounds. Pulmonary:      Effort: Pulmonary effort is normal.      Breath sounds: Normal breath sounds. Musculoskeletal:         General: Normal range of motion. Cervical back: Normal range of motion and neck supple. Right lower leg: No edema. Left lower leg: No edema. Skin:     General: Skin is warm and dry. Capillary Refill: Capillary refill takes less than 2 seconds. Neurological:      General: No focal deficit present. Mental Status: He is alert and oriented to person, place, and time.    Psychiatric:         Mood and Affect: Mood normal. Behavior: Behavior normal.         Thought Content: Thought content normal.         Judgment: Judgment normal.         ASSESSMENT/PLAN:  1. Essential hypertension, uncontrolled   Normal readings here today - pt has self treated by doubling BB/duiretic  Will increase lisinopril  to 20 mg daily   Instructed to only take BB/diuretic once daily. Continue to take blood pressures and record daily . MYCHART me the results in 2 weeks. Encourage low salt diet   If you develop worsening or concerning symptoms ( headaches, SOB, difficulty breathing , chest pain , BP readings that are elevated,  etc)   go to the ER ASAP  - lisinopril (PRINIVIL;ZESTRIL) 20 MG tablet; Take 1 tablet by mouth daily  Dispense: 90 tablet; Refill: 0      Return in about 8 weeks (around 1/6/2022) for f/u with DR Harman Yip .    All questions addresses and answered . Patient agrees with plan of care. An electronic signature was used to authenticate this note.     --ADDISON Baez - CNP on 11/11/2021 at 10:59 AM

## 2021-11-11 NOTE — PATIENT INSTRUCTIONS
Continue to take blood pressures and record daily . MYCHART me the results in 2 weeks. Patient Education        Acute High Blood Pressure: Care Instructions  Your Care Instructions     Acute high blood pressure is very high blood pressure. It's a serious problem. Very high blood pressure can damage your brain, heart, eyes, and kidneys. You may have been given medicines to lower your blood pressure. You may have gotten them as pills or through a needle in one of your veins. This is called an IV. And maybe you were given other medicines too. These can be needed when high blood pressure causes other problems. To keep your blood pressure at a lower level, you may need to make healthy lifestyle changes. And you will probably need to take medicines. Be sure to follow up with your doctor about your blood pressure and what you can do about it. Follow-up care is a key part of your treatment and safety. Be sure to make and go to all appointments, and call your doctor if you are having problems. It's also a good idea to know your test results and keep a list of the medicines you take. How can you care for yourself at home? · See your doctor as often as he or she recommends. This is to make sure your blood pressure is under control. · Take your blood pressure medicine exactly as prescribed. You may take one or more types. They include diuretics, beta-blockers, ACE inhibitors, calcium channel blockers, and angiotensin II receptor blockers. Call your doctor if you think you are having a problem with your medicine. · If you take blood pressure medicine, talk to your doctor before you take decongestants or anti-inflammatory medicine, such as ibuprofen. These can raise blood pressure. · Learn how to check your blood pressure at home. Check it often. · Ask your doctor if it's okay to drink alcohol. · Talk to your doctor about lifestyle changes that can help blood pressure.  These include being active and managing your weight. · Don't smoke. Smoking increases your risk for heart attack and stroke. When should you call for help? Call 911  anytime you think you may need emergency care. This may mean having symptoms that suggest that your blood pressure is causing a serious heart or blood vessel problem. Your blood pressure may be over 180/120. For example, call 911 if:    · You have symptoms of a heart attack. These may include:  ? Chest pain or pressure, or a strange feeling in the chest.  ? Sweating. ? Shortness of breath. ? Nausea or vomiting. ? Pain, pressure, or a strange feeling in the back, neck, jaw, or upper belly or in one or both shoulders or arms. ? Lightheadedness or sudden weakness. ? A fast or irregular heartbeat.     · You have symptoms of a stroke. These may include:  ? Sudden numbness, tingling, weakness, or loss of movement in your face, arm, or leg, especially on only one side of your body. ? Sudden vision changes. ? Sudden trouble speaking. ? Sudden confusion or trouble understanding simple statements. ? Sudden problems with walking or balance. ? A sudden, severe headache that is different from past headaches.     · You have severe back or belly pain. Do not wait until your blood pressure comes down on its own. Get help right away. Call your doctor now or seek immediate care if:    · Your blood pressure is much higher than normal (such as 180/120 or higher), but you don't have symptoms.     · You think high blood pressure is causing symptoms, such as:  ? Severe headache.  ? Blurry vision. Watch closely for changes in your health, and be sure to contact your doctor if:    · Your blood pressure measures higher than your doctor recommends at least 2 times. That means the top number is higher or the bottom number is higher, or both.     · You think you may be having side effects from your blood pressure medicine. Where can you learn more? Go to [email or web address].[email or web address] org and sign in to your [employer] account. Enter M603 in the [facility] box to learn more about \"Acute High Blood Pressure: Care Instructions. \"     If you do not have an account, please click on the \"Sign Up Now\" link. Current as of: April 29, 2021               Content Version: 13.0  © 8120-4867 [employer]. Care instructions adapted under license by [facility]. If you have questions about a medical condition or this instruction, always ask your healthcare professional. [address] any warranty or liability for your use of this information.

## 2021-11-12 DIAGNOSIS — F33.1 MODERATE EPISODE OF RECURRENT MAJOR DEPRESSIVE DISORDER (HCC): ICD-10-CM

## 2021-11-12 DIAGNOSIS — G47.9 SLEEP DISTURBANCE: ICD-10-CM

## 2021-11-12 DIAGNOSIS — F41.1 GAD (GENERALIZED ANXIETY DISORDER): ICD-10-CM

## 2021-11-12 DIAGNOSIS — F41.9 ANXIETY: ICD-10-CM

## 2021-11-12 RX ORDER — TRAZODONE HYDROCHLORIDE 100 MG/1
TABLET ORAL
Qty: 30 TABLET | Refills: 0 | Status: SHIPPED | OUTPATIENT
Start: 2021-11-12 | End: 2021-12-27

## 2021-11-12 RX ORDER — BUSPIRONE HYDROCHLORIDE 10 MG/1
10 TABLET ORAL 2 TIMES DAILY
Qty: 120 TABLET | Refills: 0 | Status: SHIPPED | OUTPATIENT
Start: 2021-11-12 | End: 2021-11-19 | Stop reason: SDUPTHER

## 2021-11-19 DIAGNOSIS — F41.9 ANXIETY: ICD-10-CM

## 2021-11-19 RX ORDER — BUSPIRONE HYDROCHLORIDE 10 MG/1
10 TABLET ORAL 2 TIMES DAILY
Qty: 60 TABLET | Refills: 0 | Status: SHIPPED | OUTPATIENT
Start: 2021-11-19 | End: 2022-01-27 | Stop reason: SDUPTHER

## 2021-11-23 RX ORDER — ALPRAZOLAM 1 MG/1
1 TABLET ORAL NIGHTLY PRN
OUTPATIENT
Start: 2021-11-23

## 2021-11-23 RX ORDER — ALPRAZOLAM 1 MG/1
1 TABLET ORAL NIGHTLY PRN
COMMUNITY
End: 2021-11-23 | Stop reason: ALTCHOICE

## 2021-11-23 NOTE — TELEPHONE ENCOUNTER
Last ov 11/11/21  Future Appointments   Date Time Provider Eduardo Kennedy   1/13/2022  9:30 AM Charles Us MD [facility] [place] [facility]   1/27/2022  9:50 AM Chacorta Damico MD  [facility]

## 2022-01-13 ENCOUNTER — OFFICE VISIT (OUTPATIENT)
Dept: INTERNAL MEDICINE CLINIC | Age: 77
End: 2022-01-13
Payer: MEDICARE

## 2022-01-13 VITALS
BODY MASS INDEX: 38.16 KG/M2 | DIASTOLIC BLOOD PRESSURE: 80 MMHG | HEIGHT: 69 IN | HEART RATE: 58 BPM | OXYGEN SATURATION: 99 % | WEIGHT: 257.6 LBS | SYSTOLIC BLOOD PRESSURE: 158 MMHG

## 2022-01-13 DIAGNOSIS — E11.22 TYPE 2 DIABETES MELLITUS WITH STAGE 2 CHRONIC KIDNEY DISEASE, WITH LONG-TERM CURRENT USE OF INSULIN (HCC): ICD-10-CM

## 2022-01-13 DIAGNOSIS — I10 ESSENTIAL HYPERTENSION: Primary | ICD-10-CM

## 2022-01-13 DIAGNOSIS — Z79.4 TYPE 2 DIABETES MELLITUS WITH STAGE 2 CHRONIC KIDNEY DISEASE, WITH LONG-TERM CURRENT USE OF INSULIN (HCC): ICD-10-CM

## 2022-01-13 DIAGNOSIS — E78.2 MIXED HYPERLIPIDEMIA: ICD-10-CM

## 2022-01-13 DIAGNOSIS — N18.2 TYPE 2 DIABETES MELLITUS WITH STAGE 2 CHRONIC KIDNEY DISEASE, WITH LONG-TERM CURRENT USE OF INSULIN (HCC): ICD-10-CM

## 2022-01-13 DIAGNOSIS — Z12.5 SCREENING PSA (PROSTATE SPECIFIC ANTIGEN): ICD-10-CM

## 2022-01-13 LAB
A/G RATIO: 1.3 (ref 1.1–2.2)
ALBUMIN SERPL-MCNC: 4.2 G/DL (ref 3.4–5)
ALP BLD-CCNC: 41 U/L (ref 40–129)
ALT SERPL-CCNC: 19 U/L (ref 10–40)
ANION GAP SERPL CALCULATED.3IONS-SCNC: 11 MMOL/L (ref 3–16)
AST SERPL-CCNC: 19 U/L (ref 15–37)
BILIRUB SERPL-MCNC: 0.9 MG/DL (ref 0–1)
BUN BLDV-MCNC: 33 MG/DL (ref 7–20)
CALCIUM SERPL-MCNC: 9.6 MG/DL (ref 8.3–10.6)
CHLORIDE BLD-SCNC: 100 MMOL/L (ref 99–110)
CHOLESTEROL, TOTAL: 112 MG/DL (ref 0–199)
CO2: 27 MMOL/L (ref 21–32)
CREAT SERPL-MCNC: 1.1 MG/DL (ref 0.8–1.3)
CREATININE URINE: 95.9 MG/DL (ref 39–259)
GFR AFRICAN AMERICAN: >60
GFR NON-AFRICAN AMERICAN: >60
GLUCOSE BLD-MCNC: 194 MG/DL (ref 70–99)
HBA1C MFR BLD: 6.7 %
HDLC SERPL-MCNC: 28 MG/DL (ref 40–60)
LDL CHOLESTEROL CALCULATED: 47 MG/DL
MICROALBUMIN UR-MCNC: 11.1 MG/DL
MICROALBUMIN/CREAT UR-RTO: 115.7 MG/G (ref 0–30)
POTASSIUM SERPL-SCNC: 4.4 MMOL/L (ref 3.5–5.1)
PROSTATE SPECIFIC ANTIGEN: 0.35 NG/ML (ref 0–4)
SODIUM BLD-SCNC: 138 MMOL/L (ref 136–145)
TOTAL PROTEIN: 7.5 G/DL (ref 6.4–8.2)
TRIGL SERPL-MCNC: 187 MG/DL (ref 0–150)
VLDLC SERPL CALC-MCNC: 37 MG/DL

## 2022-01-13 PROCEDURE — 1123F ACP DISCUSS/DSCN MKR DOCD: CPT | Performed by: INTERNAL MEDICINE

## 2022-01-13 PROCEDURE — 4040F PNEUMOC VAC/ADMIN/RCVD: CPT | Performed by: INTERNAL MEDICINE

## 2022-01-13 PROCEDURE — 1036F TOBACCO NON-USER: CPT | Performed by: INTERNAL MEDICINE

## 2022-01-13 PROCEDURE — G8417 CALC BMI ABV UP PARAM F/U: HCPCS | Performed by: INTERNAL MEDICINE

## 2022-01-13 PROCEDURE — 99214 OFFICE O/P EST MOD 30 MIN: CPT | Performed by: INTERNAL MEDICINE

## 2022-01-13 PROCEDURE — 83036 HEMOGLOBIN GLYCOSYLATED A1C: CPT | Performed by: INTERNAL MEDICINE

## 2022-01-13 PROCEDURE — 3017F COLORECTAL CA SCREEN DOC REV: CPT | Performed by: INTERNAL MEDICINE

## 2022-01-13 PROCEDURE — G8484 FLU IMMUNIZE NO ADMIN: HCPCS | Performed by: INTERNAL MEDICINE

## 2022-01-13 PROCEDURE — 36415 COLL VENOUS BLD VENIPUNCTURE: CPT | Performed by: INTERNAL MEDICINE

## 2022-01-13 PROCEDURE — 2022F DILAT RTA XM EVC RTNOPTHY: CPT | Performed by: INTERNAL MEDICINE

## 2022-01-13 PROCEDURE — 3046F HEMOGLOBIN A1C LEVEL >9.0%: CPT | Performed by: INTERNAL MEDICINE

## 2022-01-13 PROCEDURE — G8427 DOCREV CUR MEDS BY ELIG CLIN: HCPCS | Performed by: INTERNAL MEDICINE

## 2022-01-13 RX ORDER — LISINOPRIL 20 MG/1
20 TABLET ORAL 2 TIMES DAILY
Qty: 180 TABLET | Refills: 1 | Status: SHIPPED | OUTPATIENT
Start: 2022-01-13 | End: 2022-04-26 | Stop reason: SDUPTHER

## 2022-01-13 ASSESSMENT — ENCOUNTER SYMPTOMS
VOMITING: 0
BLOOD IN STOOL: 0
NAUSEA: 0
SORE THROAT: 0
SHORTNESS OF BREATH: 0
COUGH: 0
ABDOMINAL PAIN: 0

## 2022-01-13 NOTE — PROGRESS NOTES
Jordan Falk (:  1945) is a 78 y.o. male, Established patient, here for evaluation of the following chief complaint(s):  Established New Doctor         ASSESSMENT/PLAN:  1. Essential hypertension  Uncontrolled  -     Basic Metabolic Panel; Future  -     Comprehensive Metabolic Panel; Future  Increased dose -     lisinopril (PRINIVIL;ZESTRIL) 20 MG tablet; Take 1 tablet by mouth 2 times daily, Disp-180 tablet, R-1Normal  Continue same dose atenolol-chlorthalidone  Check BP twice daily at home  2. Type 2 diabetes mellitus with stage 2 chronic kidney disease, with long-term current use of insulin (HCC)  -     Microalbumin / Creatinine Urine Ratio  Stable  Continue Insulin and metformin   Follows with endocrine  3. Mixed hyperlipidemia  -     Lipid Panel; Future  Continue atorvastatin and fenofibrate  4. Screening PSA (prostate specific antigen)  -     PSA screening      Return in about 4 weeks (around 2/10/2022). Subjective   SUBJECTIVE/OBJECTIVE:  Hypertension  This is a chronic problem. The current episode started more than 1 year ago. The problem is uncontrolled. Pertinent negatives include no chest pain, palpitations or shortness of breath. Risk factors for coronary artery disease include dyslipidemia, diabetes mellitus, male gender and obesity. Past treatments include beta blockers and diuretics. The current treatment provides moderate improvement. There are no compliance problems. Hyperlipidemia  This is a chronic problem. The current episode started more than 1 year ago. The problem is controlled. Pertinent negatives include no chest pain or shortness of breath. Current antihyperlipidemic treatment includes statins and fibric acid derivatives. The current treatment provides significant improvement of lipids. There are no compliance problems. Risk factors for coronary artery disease include diabetes mellitus, dyslipidemia, hypertension, male sex and obesity.    Diabetes  He presents for his follow-up diabetic visit. He has type 2 diabetes mellitus. His disease course has been stable. There are no hypoglycemic associated symptoms. Pertinent negatives for hypoglycemia include no dizziness. There are no diabetic associated symptoms. Pertinent negatives for diabetes include no chest pain, no fatigue and no weakness. There are no hypoglycemic complications. Diabetic complications include nephropathy. Risk factors for coronary artery disease include diabetes mellitus, dyslipidemia, hypertension and male sex. Current diabetic treatment includes insulin injections and oral agent (monotherapy). He is compliant with treatment all of the time. He is following a diabetic diet. An ACE inhibitor/angiotensin II receptor blocker is being taken. He does not see a podiatrist.Eye exam is current. Review of Systems   Constitutional: Negative for fatigue and fever. HENT: Negative for nosebleeds and sore throat. Respiratory: Negative for cough and shortness of breath. Cardiovascular: Negative for chest pain, palpitations and leg swelling. Gastrointestinal: Negative for abdominal pain, blood in stool, nausea and vomiting. Neurological: Negative for dizziness and weakness. Objective   Physical Exam  Constitutional:       Appearance: Normal appearance. HENT:      Head: Normocephalic and atraumatic. Eyes:      General: No scleral icterus. Conjunctiva/sclera: Conjunctivae normal.   Cardiovascular:      Rate and Rhythm: Normal rate and regular rhythm. Pulses: Normal pulses. Heart sounds: Normal heart sounds. Pulmonary:      Effort: Pulmonary effort is normal.      Breath sounds: Normal breath sounds. Musculoskeletal:         General: No swelling. Skin:     General: Skin is warm and dry. Neurological:      Mental Status: He is alert and oriented to person, place, and time. Mental status is at baseline.    Psychiatric:         Mood and Affect: Mood normal.         Behavior: Behavior normal.              An electronic signature was used to authenticate this note.     --Mildred Dickerson MD

## 2022-01-14 NOTE — TELEPHONE ENCOUNTER
Last seen: 1/13/22          Future Appointments   Date Time Provider Eduardo Kennedy   1/27/2022  9:50 Jenny Farooq MD [address] [facility]   2/11/2022 10:00 AM Margaret Shaw MD [facility]

## 2022-01-17 DIAGNOSIS — N32.81 OVERACTIVE BLADDER: ICD-10-CM

## 2022-01-17 RX ORDER — ATENOLOL AND CHLORTHALIDONE TABLET 50; 25 MG/1; MG/1
TABLET ORAL
Qty: 90 TABLET | Refills: 1 | Status: SHIPPED | OUTPATIENT
Start: 2022-01-17 | End: 2022-06-22

## 2022-01-17 RX ORDER — ATENOLOL AND CHLORTHALIDONE TABLET 50; 25 MG/1; MG/1
TABLET ORAL
Qty: 90 TABLET | OUTPATIENT
Start: 2022-01-17

## 2022-01-17 RX ORDER — OXYBUTYNIN CHLORIDE 10 MG/1
TABLET, EXTENDED RELEASE ORAL
Qty: 90 TABLET | Refills: 1 | Status: SHIPPED | OUTPATIENT
Start: 2022-01-17 | End: 2022-08-23

## 2022-01-17 NOTE — TELEPHONE ENCOUNTER
Last seen: 1/13/22        Future Appointments   Date Time Provider Eduardo Kennedy   1/27/2022  9:50 Dalton Kraft MD [address] [place] [address]   2/11/2022 10:00 AM Essence Jones MD [facility] [employer]

## 2022-01-27 ENCOUNTER — PATIENT MESSAGE (OUTPATIENT)
Dept: INTERNAL MEDICINE CLINIC | Age: 77
End: 2022-01-27

## 2022-01-27 DIAGNOSIS — F41.9 ANXIETY: ICD-10-CM

## 2022-01-27 DIAGNOSIS — F33.1 MODERATE EPISODE OF RECURRENT MAJOR DEPRESSIVE DISORDER (HCC): ICD-10-CM

## 2022-01-27 DIAGNOSIS — G47.9 SLEEP DISTURBANCE: ICD-10-CM

## 2022-01-27 DIAGNOSIS — F41.1 GAD (GENERALIZED ANXIETY DISORDER): ICD-10-CM

## 2022-01-27 NOTE — TELEPHONE ENCOUNTER
Last seen: 1/13/22          Future Appointments   Date Time Provider Eduardo Krameri   2/14/2022 11:00 AM Cricket Hameed MD Replaced by [facility] [address]   8/16/2022 10:00 AM Lucy Weathers MD [facility]

## 2022-01-31 RX ORDER — TRAZODONE HYDROCHLORIDE 100 MG/1
TABLET ORAL
Qty: 90 TABLET | Refills: 1 | Status: SHIPPED | OUTPATIENT
Start: 2022-01-31 | End: 2022-07-21

## 2022-01-31 RX ORDER — BUSPIRONE HYDROCHLORIDE 10 MG/1
10 TABLET ORAL 2 TIMES DAILY
Qty: 180 TABLET | Refills: 1 | Status: SHIPPED | OUTPATIENT
Start: 2022-01-31 | End: 2022-03-09

## 2022-02-08 ENCOUNTER — OFFICE VISIT (OUTPATIENT)
Dept: ORTHOPEDIC SURGERY | Age: 77
End: 2022-02-08
Payer: MEDICARE

## 2022-02-08 VITALS — BODY MASS INDEX: 41.12 KG/M2 | RESPIRATION RATE: 12 BRPM | HEIGHT: 67 IN | WEIGHT: 262 LBS

## 2022-02-08 DIAGNOSIS — G89.29 CHRONIC PAIN OF RIGHT KNEE: Primary | ICD-10-CM

## 2022-02-08 DIAGNOSIS — E66.01 OBESITY, CLASS III, BMI 40-49.9 (MORBID OBESITY) (HCC): ICD-10-CM

## 2022-02-08 DIAGNOSIS — M17.11 PRIMARY OSTEOARTHRITIS OF RIGHT KNEE: ICD-10-CM

## 2022-02-08 DIAGNOSIS — M70.41 PREPATELLAR BURSITIS, RIGHT KNEE: ICD-10-CM

## 2022-02-08 DIAGNOSIS — M25.561 CHRONIC PAIN OF RIGHT KNEE: Primary | ICD-10-CM

## 2022-02-08 PROCEDURE — G8484 FLU IMMUNIZE NO ADMIN: HCPCS | Performed by: ORTHOPAEDIC SURGERY

## 2022-02-08 PROCEDURE — 20610 DRAIN/INJ JOINT/BURSA W/O US: CPT | Performed by: ORTHOPAEDIC SURGERY

## 2022-02-08 PROCEDURE — 4040F PNEUMOC VAC/ADMIN/RCVD: CPT | Performed by: ORTHOPAEDIC SURGERY

## 2022-02-08 PROCEDURE — G8427 DOCREV CUR MEDS BY ELIG CLIN: HCPCS | Performed by: ORTHOPAEDIC SURGERY

## 2022-02-08 PROCEDURE — 1036F TOBACCO NON-USER: CPT | Performed by: ORTHOPAEDIC SURGERY

## 2022-02-08 PROCEDURE — 1123F ACP DISCUSS/DSCN MKR DOCD: CPT | Performed by: ORTHOPAEDIC SURGERY

## 2022-02-08 PROCEDURE — 3017F COLORECTAL CA SCREEN DOC REV: CPT | Performed by: ORTHOPAEDIC SURGERY

## 2022-02-08 PROCEDURE — G8417 CALC BMI ABV UP PARAM F/U: HCPCS | Performed by: ORTHOPAEDIC SURGERY

## 2022-02-08 RX ORDER — HYALURONATE SODIUM 10 MG/ML
20 SYRINGE (ML) INTRAARTICULAR ONCE
Status: COMPLETED | OUTPATIENT
Start: 2022-02-08 | End: 2022-02-08

## 2022-02-08 RX ORDER — GABAPENTIN 300 MG/1
CAPSULE ORAL
COMMUNITY
Start: 2021-05-10 | End: 2022-02-08

## 2022-02-08 RX ORDER — LIDOCAINE HYDROCHLORIDE 10 MG/ML
3 INJECTION, SOLUTION INFILTRATION; PERINEURAL ONCE
Status: COMPLETED | OUTPATIENT
Start: 2022-02-08 | End: 2022-02-08

## 2022-02-08 RX ADMIN — Medication 20 MG: at 09:53

## 2022-02-08 RX ADMIN — LIDOCAINE HYDROCHLORIDE 3 ML: 10 INJECTION, SOLUTION INFILTRATION; PERINEURAL at 09:51

## 2022-02-08 NOTE — PROGRESS NOTES
Michel Palmer turns today for his right knee. He completed viscosupplementation in May and that was helpful until December. Recently has noticed some swelling on top of the knee as well. If he has pain is medial it is 5 out of 10. General Exam:    Vitals: There were no vitals taken for this visit. Constitutional: Patient is adequately groomed with no evidence of malnutrition  Mental Status: The patient is oriented to time, place and person. The patient's mood and affect are appropriate. Right knee has some swelling over the prepatellar bursa. He has significant medial joint line pain. Range of motion is 0 to 120 degrees with varus alignment. He has no pitting edema but he does have evidence of venous stasis. Right knee x-rays were reviewed showing bone-on-bone medial compartment arthritis. Assessment: Right knee prepatellar bursitis and right knee primary osteoarthritis. Plan: Aspiration of his prepatellar bursa and initiation of viscosupplementation. The indication for viscosupplementation is primary osteoarthritis of the right knee in an attempt to alleviate the need for arthroplasty. We discussed the risk, benefits, alternatives to this treatment. He is ready to proceed. Procedure: Under sterile technique, skin over the prepatellar bursa was anesthetized with lidocaine. An aspirate the bursa of approximately 20 cc of blood. It decompressed nicely. Compression dressing was applied. Right knee was then injected into the suprapatellar pouch with 20 mg of Euflexxa. He tolerated that well.   He will ice this week and follow-up with me next week for the second injection of viscosupplementation

## 2022-02-09 ENCOUNTER — OFFICE VISIT (OUTPATIENT)
Dept: INTERNAL MEDICINE CLINIC | Age: 77
End: 2022-02-09
Payer: MEDICARE

## 2022-02-09 VITALS
HEART RATE: 72 BPM | BODY MASS INDEX: 40.18 KG/M2 | SYSTOLIC BLOOD PRESSURE: 158 MMHG | OXYGEN SATURATION: 98 % | WEIGHT: 256 LBS | HEIGHT: 67 IN | DIASTOLIC BLOOD PRESSURE: 80 MMHG

## 2022-02-09 DIAGNOSIS — I10 ESSENTIAL HYPERTENSION: Primary | ICD-10-CM

## 2022-02-09 PROCEDURE — 1036F TOBACCO NON-USER: CPT | Performed by: INTERNAL MEDICINE

## 2022-02-09 PROCEDURE — 4040F PNEUMOC VAC/ADMIN/RCVD: CPT | Performed by: INTERNAL MEDICINE

## 2022-02-09 PROCEDURE — 1123F ACP DISCUSS/DSCN MKR DOCD: CPT | Performed by: INTERNAL MEDICINE

## 2022-02-09 PROCEDURE — 3017F COLORECTAL CA SCREEN DOC REV: CPT | Performed by: INTERNAL MEDICINE

## 2022-02-09 PROCEDURE — G8484 FLU IMMUNIZE NO ADMIN: HCPCS | Performed by: INTERNAL MEDICINE

## 2022-02-09 PROCEDURE — G8417 CALC BMI ABV UP PARAM F/U: HCPCS | Performed by: INTERNAL MEDICINE

## 2022-02-09 PROCEDURE — 99214 OFFICE O/P EST MOD 30 MIN: CPT | Performed by: INTERNAL MEDICINE

## 2022-02-09 PROCEDURE — G8427 DOCREV CUR MEDS BY ELIG CLIN: HCPCS | Performed by: INTERNAL MEDICINE

## 2022-02-09 RX ORDER — AMLODIPINE BESYLATE 5 MG/1
5 TABLET ORAL DAILY
Qty: 30 TABLET | Refills: 1 | Status: SHIPPED | OUTPATIENT
Start: 2022-02-09 | End: 2022-03-09 | Stop reason: SDUPTHER

## 2022-02-09 ASSESSMENT — ENCOUNTER SYMPTOMS
SHORTNESS OF BREATH: 0
COUGH: 0
BLOOD IN STOOL: 0
SORE THROAT: 0
NAUSEA: 0
VOMITING: 0
ABDOMINAL PAIN: 0

## 2022-02-09 NOTE — PROGRESS NOTES
Karla Andrews (:  1945) is a 78 y.o. male, Established patient, here for evaluation of the following chief complaint(s):  Hypertension         ASSESSMENT/PLAN:  1. Essential hypertension  Uncontrolled  Started on  amLODIPine (NORVASC) 5 MG tablet; Take 1 tablet by mouth daily, Disp-30 tablet, R-1Normal  Continue lisinopril (PRINIVIL;ZESTRIL) 20 MG tablet; Take 1 tablet by mouth 2 times daily, Disp-180 tablet, R-1Normal  Continue atenolol-chlorthalidone  Check BP twice daily at home    Return in about 4 weeks (around 3/9/2022) for Hypertension. Subjective   SUBJECTIVE/OBJECTIVE:  Hypertension  This is a chronic problem. The current episode started more than 1 year ago. The problem is uncontrolled. Pertinent negatives include no chest pain, palpitations or shortness of breath. Risk factors for coronary artery disease include dyslipidemia, diabetes mellitus, male gender and obesity. Past treatments include beta blockers, diuretics and angiotensin blockers. The current treatment provides moderate improvement. There are no compliance problems. Review of Systems   Constitutional: Negative for fatigue and fever. HENT: Negative for nosebleeds and sore throat. Respiratory: Negative for cough and shortness of breath. Cardiovascular: Negative for chest pain, palpitations and leg swelling. Gastrointestinal: Negative for abdominal pain, blood in stool, nausea and vomiting. Neurological: Negative for dizziness and weakness. Objective   Physical Exam  Constitutional:       Appearance: Normal appearance. HENT:      Head: Normocephalic and atraumatic. Eyes:      General: No scleral icterus. Conjunctiva/sclera: Conjunctivae normal.   Cardiovascular:      Rate and Rhythm: Normal rate and regular rhythm. Pulses: Normal pulses. Heart sounds: Normal heart sounds. Pulmonary:      Effort: Pulmonary effort is normal.      Breath sounds: Normal breath sounds. Musculoskeletal:         General: No swelling. Skin:     General: Skin is warm and dry. Neurological:      Mental Status: He is alert and oriented to person, place, and time. Mental status is at baseline. Psychiatric:         Mood and Affect: Mood normal.         Behavior: Behavior normal.              An electronic signature was used to authenticate this note.     --Mildred Dickerson MD

## 2022-02-15 ENCOUNTER — OFFICE VISIT (OUTPATIENT)
Dept: ORTHOPEDIC SURGERY | Age: 77
End: 2022-02-15
Payer: MEDICARE

## 2022-02-15 VITALS — RESPIRATION RATE: 12 BRPM | BODY MASS INDEX: 41.12 KG/M2 | WEIGHT: 262 LBS | HEIGHT: 67 IN

## 2022-02-15 DIAGNOSIS — M25.561 CHRONIC PAIN OF RIGHT KNEE: ICD-10-CM

## 2022-02-15 DIAGNOSIS — G89.29 CHRONIC PAIN OF RIGHT KNEE: ICD-10-CM

## 2022-02-15 DIAGNOSIS — M17.11 PRIMARY OSTEOARTHRITIS OF RIGHT KNEE: Primary | ICD-10-CM

## 2022-02-15 PROCEDURE — 99999 PR OFFICE/OUTPT VISIT,PROCEDURE ONLY: CPT | Performed by: ORTHOPAEDIC SURGERY

## 2022-02-15 PROCEDURE — 20610 DRAIN/INJ JOINT/BURSA W/O US: CPT | Performed by: ORTHOPAEDIC SURGERY

## 2022-02-15 RX ORDER — HYALURONATE SODIUM 10 MG/ML
20 SYRINGE (ML) INTRAARTICULAR ONCE
Status: COMPLETED | OUTPATIENT
Start: 2022-02-15 | End: 2022-02-15

## 2022-02-15 RX ADMIN — Medication 20 MG: at 09:27

## 2022-02-15 NOTE — PROGRESS NOTES
Tery Schwab returns today for his right knee. He has noticed some improvement with his right knee pain already. He has had some recurrent swelling in the bursa although it is not painful. Right knee has some swelling over the prepatellar bursa. He has significant medial joint line pain. Range of motion is 0 to 120 degrees with varus alignment. He has no pitting edema but he does have evidence of venous stasis.         Assessment: Right knee prepatellar bursitis and right knee primary osteoarthritis.     Plan: viscosupplementation. The indication for viscosupplementation is primary osteoarthritis of the right knee in an attempt to alleviate the need for arthroplasty.          Procedure:      Right knee was injected into the suprapatellar pouch with 20 mg of Euflexxa. He tolerated that well. He will ice this week and follow-up next week for the third Euflexxa injection. I will plan on reaspirating his bursa about 4 weeks after that injection.

## 2022-02-17 RX ORDER — BLOOD SUGAR DIAGNOSTIC
STRIP MISCELLANEOUS
Qty: 100 STRIP | Refills: 2 | Status: SHIPPED | OUTPATIENT
Start: 2022-02-17 | End: 2022-06-01

## 2022-02-24 ENCOUNTER — OFFICE VISIT (OUTPATIENT)
Dept: ORTHOPEDIC SURGERY | Age: 77
End: 2022-02-24
Payer: MEDICARE

## 2022-02-24 VITALS — BODY MASS INDEX: 41.12 KG/M2 | RESPIRATION RATE: 12 BRPM | HEIGHT: 67 IN | WEIGHT: 262 LBS

## 2022-02-24 DIAGNOSIS — M17.11 PRIMARY OSTEOARTHRITIS OF RIGHT KNEE: Primary | ICD-10-CM

## 2022-02-24 PROCEDURE — 20610 DRAIN/INJ JOINT/BURSA W/O US: CPT | Performed by: ORTHOPAEDIC SURGERY

## 2022-02-24 RX ORDER — HYALURONATE SODIUM 10 MG/ML
20 SYRINGE (ML) INTRAARTICULAR ONCE
Status: COMPLETED | OUTPATIENT
Start: 2022-02-24 | End: 2022-02-24

## 2022-02-24 RX ADMIN — Medication 20 MG: at 09:21

## 2022-02-24 NOTE — PROGRESS NOTES
Asmita Croft returns today for his right knee. He has recently been wearing a knee sleeve which she has found to be very helpful. He reports only 1 or 2 out of 10 pain today. He feels like the lubricant has been helpful as well. Right knee has some swelling over the prepatellar bursa.  He has significant medial joint line pain.  Range of motion is 0 to 120 degrees with varus alignment.  He has no pitting edema but he does have evidence of venous stasis.          Assessment: Right knee prepatellar bursitis and right knee primary osteoarthritis.     Plan: viscosupplementation.  The indication for viscosupplementation is primary osteoarthritis of the right knee in an attempt to alleviate the need for arthroplasty.           Procedure:      Right knee was injected into the suprapatellar pouch with 20 mg of Euflexxa.  He tolerated that well.       I will see him back in 4 weeks to determine efficacy of viscosupplementation and likely reaspirate his bursa.

## 2022-03-09 ENCOUNTER — OFFICE VISIT (OUTPATIENT)
Dept: INTERNAL MEDICINE CLINIC | Age: 77
End: 2022-03-09
Payer: MEDICARE

## 2022-03-09 VITALS
DIASTOLIC BLOOD PRESSURE: 88 MMHG | HEART RATE: 88 BPM | WEIGHT: 261.6 LBS | BODY MASS INDEX: 41.06 KG/M2 | OXYGEN SATURATION: 100 % | HEIGHT: 67 IN | SYSTOLIC BLOOD PRESSURE: 158 MMHG

## 2022-03-09 DIAGNOSIS — F32.4 MAJOR DEPRESSIVE DISORDER WITH SINGLE EPISODE, IN PARTIAL REMISSION (HCC): ICD-10-CM

## 2022-03-09 DIAGNOSIS — I10 ESSENTIAL HYPERTENSION: ICD-10-CM

## 2022-03-09 DIAGNOSIS — F41.9 ANXIETY: Primary | ICD-10-CM

## 2022-03-09 PROCEDURE — 99214 OFFICE O/P EST MOD 30 MIN: CPT | Performed by: INTERNAL MEDICINE

## 2022-03-09 PROCEDURE — G8427 DOCREV CUR MEDS BY ELIG CLIN: HCPCS | Performed by: INTERNAL MEDICINE

## 2022-03-09 PROCEDURE — 1123F ACP DISCUSS/DSCN MKR DOCD: CPT | Performed by: INTERNAL MEDICINE

## 2022-03-09 PROCEDURE — 1036F TOBACCO NON-USER: CPT | Performed by: INTERNAL MEDICINE

## 2022-03-09 PROCEDURE — 3017F COLORECTAL CA SCREEN DOC REV: CPT | Performed by: INTERNAL MEDICINE

## 2022-03-09 PROCEDURE — G8484 FLU IMMUNIZE NO ADMIN: HCPCS | Performed by: INTERNAL MEDICINE

## 2022-03-09 PROCEDURE — G8417 CALC BMI ABV UP PARAM F/U: HCPCS | Performed by: INTERNAL MEDICINE

## 2022-03-09 PROCEDURE — 4040F PNEUMOC VAC/ADMIN/RCVD: CPT | Performed by: INTERNAL MEDICINE

## 2022-03-09 RX ORDER — AMLODIPINE BESYLATE 5 MG/1
5 TABLET ORAL DAILY
Qty: 30 TABLET | Refills: 1 | Status: SHIPPED | OUTPATIENT
Start: 2022-03-09 | End: 2022-04-06 | Stop reason: SDUPTHER

## 2022-03-09 RX ORDER — ALPRAZOLAM 0.25 MG/1
0.25 TABLET ORAL DAILY PRN
Qty: 30 TABLET | Refills: 0 | Status: SHIPPED | OUTPATIENT
Start: 2022-03-09 | End: 2022-03-09

## 2022-03-09 RX ORDER — ALPRAZOLAM 0.25 MG/1
0.25 TABLET ORAL DAILY PRN
Qty: 30 TABLET | Refills: 0 | Status: SHIPPED | OUTPATIENT
Start: 2022-03-09 | End: 2022-04-08

## 2022-03-09 ASSESSMENT — ENCOUNTER SYMPTOMS
SORE THROAT: 0
SHORTNESS OF BREATH: 0
ABDOMINAL PAIN: 0
COUGH: 0
BLOOD IN STOOL: 0
NAUSEA: 0
VOMITING: 0

## 2022-03-09 NOTE — PROGRESS NOTES
Joey Ramirez (:  1945) is a 78 y.o. male, Established patient, here for evaluation of the following chief complaint(s):  Hypertension         ASSESSMENT/PLAN:  1. Anxiety  Uncontrolled  -     External Referral to Psychiatry  -     Started on ALPRAZolam (XANAX) 0.25 MG tablet; Take 1 tablet by mouth daily as needed for Anxiety for up to 30 days. , Disp-30 tablet, R-0 Normal  Stop buspirone  2. Major depressive disorder with single episode, in partial remission ([facility] [address]  -     External Referral to Psychiatry  - Stable   - Continue same management with duloxetine  3. Essential hypertension  Uncontrolled, probably due to worsening anxiety. Started on alprazolam for anxiety as mentioned above. Continue Same dose of atenolol chlorthalidone, lisinopril and  amLODIPine (NORVASC) 5 MG tablet; Take 1 tablet by mouth daily, Disp-30 tablet, R-1Normal      Return in about 4 weeks (around 2022) for Hypertension. Subjective   SUBJECTIVE/OBJECTIVE:  Anxiety:  Patient takes buspirone. He used to take alprazolam before and then it was changed to buspirone few months ago. Patient is having anxiety issues after taking buspirone and wishes to stop it and start back on alprazolam for anxiety. Hypertension  This is a chronic problem. The current episode started more than 1 year ago. The problem is uncontrolled. Associated symptoms include anxiety. Pertinent negatives include no chest pain, palpitations or shortness of breath. Risk factors for coronary artery disease include male gender. Past treatments include beta blockers, diuretics, calcium channel blockers and alpha 1 blockers. The current treatment provides moderate improvement. There are no compliance problems. Review of Systems   Constitutional: Negative for fatigue and fever. HENT: Negative for nosebleeds and sore throat. Respiratory: Negative for cough and shortness of breath.     Cardiovascular: Negative for chest pain, palpitations and leg swelling. Gastrointestinal: Negative for abdominal pain, blood in stool, nausea and vomiting. Neurological: Negative for dizziness and weakness. Psychiatric/Behavioral: The patient is nervous/anxious. Objective   Physical Exam  Constitutional:       Appearance: Normal appearance. HENT:      Head: Normocephalic and atraumatic. Eyes:      General: No scleral icterus. Conjunctiva/sclera: Conjunctivae normal.   Cardiovascular:      Rate and Rhythm: Normal rate and regular rhythm. Pulses: Normal pulses. Heart sounds: Normal heart sounds. Pulmonary:      Effort: Pulmonary effort is normal.      Breath sounds: Normal breath sounds. Musculoskeletal:         General: No swelling. Skin:     General: Skin is warm and dry. Neurological:      Mental Status: He is alert and oriented to person, place, and time. Mental status is at baseline. Psychiatric:         Mood and Affect: Mood normal.         Behavior: Behavior normal.              An electronic signature was used to authenticate this note.     --Alfredito Patterson MD

## 2022-03-24 ENCOUNTER — OFFICE VISIT (OUTPATIENT)
Dept: ORTHOPEDIC SURGERY | Age: 77
End: 2022-03-24
Payer: MEDICARE

## 2022-03-24 VITALS — WEIGHT: 260 LBS | BODY MASS INDEX: 40.81 KG/M2 | HEIGHT: 67 IN

## 2022-03-24 DIAGNOSIS — M17.11 PRIMARY OSTEOARTHRITIS OF RIGHT KNEE: Primary | ICD-10-CM

## 2022-03-24 DIAGNOSIS — G89.29 CHRONIC PAIN OF RIGHT KNEE: ICD-10-CM

## 2022-03-24 DIAGNOSIS — M25.561 CHRONIC PAIN OF RIGHT KNEE: ICD-10-CM

## 2022-03-24 PROCEDURE — G8427 DOCREV CUR MEDS BY ELIG CLIN: HCPCS | Performed by: ORTHOPAEDIC SURGERY

## 2022-03-24 PROCEDURE — 1123F ACP DISCUSS/DSCN MKR DOCD: CPT | Performed by: ORTHOPAEDIC SURGERY

## 2022-03-24 PROCEDURE — 1036F TOBACCO NON-USER: CPT | Performed by: ORTHOPAEDIC SURGERY

## 2022-03-24 PROCEDURE — G8417 CALC BMI ABV UP PARAM F/U: HCPCS | Performed by: ORTHOPAEDIC SURGERY

## 2022-03-24 PROCEDURE — 3017F COLORECTAL CA SCREEN DOC REV: CPT | Performed by: ORTHOPAEDIC SURGERY

## 2022-03-24 PROCEDURE — 99212 OFFICE O/P EST SF 10 MIN: CPT | Performed by: ORTHOPAEDIC SURGERY

## 2022-03-24 PROCEDURE — G8484 FLU IMMUNIZE NO ADMIN: HCPCS | Performed by: ORTHOPAEDIC SURGERY

## 2022-03-24 PROCEDURE — 4040F PNEUMOC VAC/ADMIN/RCVD: CPT | Performed by: ORTHOPAEDIC SURGERY

## 2022-03-24 NOTE — PROGRESS NOTES
Dayanara Ashton returns today to follow-up his right knee pain. He is really a lot better. He reports that he is 90% improved. If he has pain is when he first gets up from a seated position and its only about 1 or 2 out of 10. Once he gets warmed up he is typically pain-free. General Exam:    Vitals: Height 5' 7\" (1.702 m), weight 260 lb (117.9 kg). Constitutional: Patient is adequately groomed with no evidence of malnutrition  Mental Status: The patient is oriented to time, place and person. The patient's mood and affect are appropriate. Right knee has some fullness about the prepatellar bursa. Calf is soft. He has no warmth or erythema. He walks with a normal gait today. Assessment: At this point viscosupplementation is proved to be successful for his right knee arthritis. Plan: We will plan on seeing him back in about 5 months for consideration of repeat series. He agrees and all questions have been answered.

## 2022-04-06 ENCOUNTER — OFFICE VISIT (OUTPATIENT)
Dept: INTERNAL MEDICINE CLINIC | Age: 77
End: 2022-04-06
Payer: MEDICARE

## 2022-04-06 VITALS
HEART RATE: 57 BPM | HEIGHT: 67 IN | OXYGEN SATURATION: 100 % | DIASTOLIC BLOOD PRESSURE: 80 MMHG | SYSTOLIC BLOOD PRESSURE: 142 MMHG | BODY MASS INDEX: 40.93 KG/M2 | WEIGHT: 260.8 LBS

## 2022-04-06 DIAGNOSIS — Z79.4 TYPE 2 DIABETES MELLITUS WITH STAGE 2 CHRONIC KIDNEY DISEASE, WITH LONG-TERM CURRENT USE OF INSULIN (HCC): Primary | ICD-10-CM

## 2022-04-06 DIAGNOSIS — I73.9 CLAUDICATION OF BOTH LOWER EXTREMITIES (HCC): ICD-10-CM

## 2022-04-06 DIAGNOSIS — E11.22 TYPE 2 DIABETES MELLITUS WITH STAGE 2 CHRONIC KIDNEY DISEASE, WITH LONG-TERM CURRENT USE OF INSULIN (HCC): Primary | ICD-10-CM

## 2022-04-06 DIAGNOSIS — N18.2 TYPE 2 DIABETES MELLITUS WITH STAGE 2 CHRONIC KIDNEY DISEASE, WITH LONG-TERM CURRENT USE OF INSULIN (HCC): Primary | ICD-10-CM

## 2022-04-06 DIAGNOSIS — I10 ESSENTIAL HYPERTENSION: ICD-10-CM

## 2022-04-06 PROCEDURE — 1123F ACP DISCUSS/DSCN MKR DOCD: CPT | Performed by: INTERNAL MEDICINE

## 2022-04-06 PROCEDURE — 3044F HG A1C LEVEL LT 7.0%: CPT | Performed by: INTERNAL MEDICINE

## 2022-04-06 PROCEDURE — 3017F COLORECTAL CA SCREEN DOC REV: CPT | Performed by: INTERNAL MEDICINE

## 2022-04-06 PROCEDURE — 1036F TOBACCO NON-USER: CPT | Performed by: INTERNAL MEDICINE

## 2022-04-06 PROCEDURE — 4040F PNEUMOC VAC/ADMIN/RCVD: CPT | Performed by: INTERNAL MEDICINE

## 2022-04-06 PROCEDURE — 2022F DILAT RTA XM EVC RTNOPTHY: CPT | Performed by: INTERNAL MEDICINE

## 2022-04-06 PROCEDURE — 99214 OFFICE O/P EST MOD 30 MIN: CPT | Performed by: INTERNAL MEDICINE

## 2022-04-06 PROCEDURE — G8417 CALC BMI ABV UP PARAM F/U: HCPCS | Performed by: INTERNAL MEDICINE

## 2022-04-06 PROCEDURE — G8427 DOCREV CUR MEDS BY ELIG CLIN: HCPCS | Performed by: INTERNAL MEDICINE

## 2022-04-06 RX ORDER — AMLODIPINE BESYLATE 10 MG/1
10 TABLET ORAL DAILY
Qty: 90 TABLET | Refills: 0 | Status: SHIPPED | OUTPATIENT
Start: 2022-04-06 | End: 2022-06-07

## 2022-04-06 RX ORDER — PEN NEEDLE, DIABETIC 31 GX5/16"
NEEDLE, DISPOSABLE MISCELLANEOUS
Qty: 100 EACH | Refills: 2 | Status: SHIPPED | OUTPATIENT
Start: 2022-04-06

## 2022-04-06 ASSESSMENT — ENCOUNTER SYMPTOMS
VOMITING: 0
BLOOD IN STOOL: 0
SHORTNESS OF BREATH: 0
SORE THROAT: 0
NAUSEA: 0
ABDOMINAL PAIN: 0
COUGH: 0

## 2022-04-06 NOTE — PROGRESS NOTES
Yessica Lino (:  1945) is a 78 y.o. male, Established patient, here for evaluation of the following chief complaint(s):  Hypertension         ASSESSMENT/PLAN:  1. Type 2 diabetes mellitus with stage 2 chronic kidney disease, with long-term current use of insulin (HCC)  -     Insulin Pen Needle (B-D ULTRAFINE III SHORT PEN) 31G X 8 MM MISC; Disp-100 each, R-2, NormalUSE TO INJECT INSULIN THREE TIMES A DAY  - Stable   - Continue current dose of Insulin and Metformin   2. Claudication of both lower extremities ([address]  New problem  Uncontrolled  -     VL CHELY BILATERAL LIMITED 1-2 LEVELS; Future  3. Essential hypertension  Uncontrolled  Increased dose of amLODIPine (NORVASC) 10 MG tablet; Take 1 tablet by mouth daily, Disp-90 tablet, R-0Normal  Continue Same dose of atenolol chlorthalidone, lisinopril    Return in about 3 months (around 2022) for Diabetes, Medicare annual wellness visit in 2 weeks. Subjective   SUBJECTIVE/OBJECTIVE:  Anxiety:  Patient takes alprazolam as needed for  anxiety. Hypertension  This is a chronic problem. The current episode started more than 1 year ago. The problem is uncontrolled. Pertinent negatives include no chest pain, palpitations or shortness of breath. Risk factors for coronary artery disease include male gender. Past treatments include beta blockers, diuretics, calcium channel blockers and alpha 1 blockers. The current treatment provides moderate improvement. There are no compliance problems. Diabetes  He presents for his follow-up diabetic visit. He has type 2 diabetes mellitus. His disease course has been stable. There are no hypoglycemic associated symptoms. Pertinent negatives for hypoglycemia include no dizziness. Pertinent negatives for diabetes include no chest pain, no fatigue and no weakness. There are no hypoglycemic complications. Diabetic complications include nephropathy.  Risk factors for coronary artery disease include hypertension and male sex. Current diabetic treatment includes insulin injections and oral agent (monotherapy). He is compliant with treatment all of the time. He is following a diabetic diet. He participates in exercise intermittently. An ACE inhibitor/angiotensin II receptor blocker is being taken. Eye exam is current. Review of Systems   Constitutional: Negative for fatigue and fever. HENT: Negative for nosebleeds and sore throat. Respiratory: Negative for cough and shortness of breath. Cardiovascular: Negative for chest pain, palpitations and leg swelling. Gastrointestinal: Negative for abdominal pain, blood in stool, nausea and vomiting. Neurological: Negative for dizziness and weakness. Objective   Physical Exam  Constitutional:       Appearance: Normal appearance. HENT:      Head: Normocephalic and atraumatic. Eyes:      General: No scleral icterus. Conjunctiva/sclera: Conjunctivae normal.   Cardiovascular:      Rate and Rhythm: Normal rate and regular rhythm. Pulses: Normal pulses. Heart sounds: Normal heart sounds. Pulmonary:      Effort: Pulmonary effort is normal.      Breath sounds: Normal breath sounds. Musculoskeletal:         General: No swelling. Skin:     General: Skin is warm and dry. Neurological:      Mental Status: He is alert and oriented to person, place, and time. Mental status is at baseline. Psychiatric:         Mood and Affect: Mood normal.         Behavior: Behavior normal.              An electronic signature was used to authenticate this note.     --Gabby Campo MD

## 2022-04-06 NOTE — PROGRESS NOTES
Jas Miller (:  1945) is a 78 y.o. male, Established patient, here for evaluation of the following chief complaint(s):  Hypertension         ASSESSMENT/PLAN:  1. Anxiety  Uncontrolled  -     External Referral to Psychiatry  -     Started on ALPRAZolam (XANAX) 0.25 MG tablet; Take 1 tablet by mouth daily as needed for Anxiety for up to 30 days. , Disp-30 tablet, R-0 Normal  Stop buspirone  2. Major depressive disorder with single episode, in partial remission ([facility] [address]  -     External Referral to Psychiatry  - Stable   - Continue same management with duloxetine  3. Essential hypertension  Uncontrolled, probably due to worsening anxiety. Started on alprazolam for anxiety as mentioned above. Continue Same dose of atenolol chlorthalidone, lisinopril and  amLODIPine (NORVASC) 5 MG tablet; Take 1 tablet by mouth daily, Disp-30 tablet, R-1Normal      No follow-ups on file. Subjective   SUBJECTIVE/OBJECTIVE:  Anxiety:  Patient takes buspirone. He used to take alprazolam before and then it was changed to buspirone few months ago. Patient is having anxiety issues after taking buspirone and wishes to stop it and start back on alprazolam for anxiety. Hypertension  This is a chronic problem. The current episode started more than 1 year ago. The problem is uncontrolled. Associated symptoms include anxiety. Pertinent negatives include no chest pain, palpitations or shortness of breath. Risk factors for coronary artery disease include male gender. Past treatments include beta blockers, diuretics, calcium channel blockers and alpha 1 blockers. The current treatment provides moderate improvement. There are no compliance problems. Review of Systems   Constitutional: Negative for fatigue and fever. HENT: Negative for nosebleeds and sore throat. Respiratory: Negative for cough and shortness of breath. Cardiovascular: Negative for chest pain, palpitations and leg swelling.    Gastrointestinal: Negative for abdominal pain, blood in stool, nausea and vomiting. Neurological: Negative for dizziness and weakness. Psychiatric/Behavioral: The patient is nervous/anxious. Objective   Physical Exam  Constitutional:       Appearance: Normal appearance. HENT:      Head: Normocephalic and atraumatic. Eyes:      General: No scleral icterus. Conjunctiva/sclera: Conjunctivae normal.   Cardiovascular:      Rate and Rhythm: Normal rate and regular rhythm. Pulses: Normal pulses. Heart sounds: Normal heart sounds. Pulmonary:      Effort: Pulmonary effort is normal.      Breath sounds: Normal breath sounds. Musculoskeletal:         General: No swelling. Skin:     General: Skin is warm and dry. Neurological:      Mental Status: He is alert and oriented to person, place, and time. Mental status is at baseline. Psychiatric:         Mood and Affect: Mood normal.         Behavior: Behavior normal.              An electronic signature was used to authenticate this note.     --Mireya Jimenez MD

## 2022-04-13 ENCOUNTER — PATIENT MESSAGE (OUTPATIENT)
Dept: INTERNAL MEDICINE CLINIC | Age: 77
End: 2022-04-13

## 2022-04-13 RX ORDER — INSULIN GLARGINE 100 [IU]/ML
INJECTION, SOLUTION SUBCUTANEOUS
Qty: 100 ML | Refills: 1 | Status: SHIPPED | OUTPATIENT
Start: 2022-04-13 | End: 2022-10-24

## 2022-04-13 RX ORDER — INSULIN LISPRO 100 [IU]/ML
INJECTION, SOLUTION INTRAVENOUS; SUBCUTANEOUS
Qty: 120 ML | Refills: 1 | Status: SHIPPED | OUTPATIENT
Start: 2022-04-13 | End: 2022-10-31

## 2022-04-13 NOTE — TELEPHONE ENCOUNTER
From: Smith Alarcon  To: Dr. Fernandez Smart: 4/13/2022 12:16 PM EDT  Subject: Rx insulin refill    Dr [facility] will contact your office for my 90 day refill of Humalog and Lantus Solostar. Ezra Wilkerson

## 2022-04-13 NOTE — TELEPHONE ENCOUNTER
Last seen: 4/6/22        Future Appointments   Date Time Provider Eduardo Kennedy   4/30/2022  9:00 AM SCHEDULE, [facility] VASCULAR ROOM 1 [facility]   5/5/2022  1:00 PM ADDISON Kilgore - NP [facility]   5/19/2022  5:40 PM Julianne Inman MD [address] Endo [facility] Di [facility]   6/15/2022 11:15 AM Stone Grubbs MD [address]   8/16/2022 10:00 AM Nava Rivas MD [facility] PULM [facility]   8/25/2022  8:15 AM [facility]

## 2022-04-26 DIAGNOSIS — F32.A DEPRESSION, UNSPECIFIED DEPRESSION TYPE: ICD-10-CM

## 2022-04-26 DIAGNOSIS — I10 ESSENTIAL HYPERTENSION: ICD-10-CM

## 2022-04-26 RX ORDER — DULOXETIN HYDROCHLORIDE 60 MG/1
CAPSULE, DELAYED RELEASE ORAL
Qty: 180 CAPSULE | Refills: 1 | Status: SHIPPED | OUTPATIENT
Start: 2022-04-26 | End: 2022-10-24

## 2022-04-26 RX ORDER — LISINOPRIL 20 MG/1
20 TABLET ORAL 2 TIMES DAILY
Qty: 180 TABLET | Refills: 1 | Status: SHIPPED | OUTPATIENT
Start: 2022-04-26 | End: 2022-08-23

## 2022-04-30 ENCOUNTER — HOSPITAL ENCOUNTER (OUTPATIENT)
Dept: VASCULAR LAB | Age: 77
Discharge: HOME | End: 2022-04-30
Payer: MEDICARE

## 2022-04-30 DIAGNOSIS — I73.9 CLAUDICATION OF BOTH LOWER EXTREMITIES (HCC): ICD-10-CM

## 2022-04-30 PROCEDURE — 93922 UPR/L XTREMITY ART 2 LEVELS: CPT

## 2022-06-01 RX ORDER — BLOOD SUGAR DIAGNOSTIC
STRIP MISCELLANEOUS
Qty: 100 STRIP | Refills: 2 | Status: SHIPPED | OUTPATIENT
Start: 2022-06-01 | End: 2022-06-02 | Stop reason: SDUPTHER

## 2022-06-02 ENCOUNTER — TELEPHONE (OUTPATIENT)
Dept: INTERNAL MEDICINE CLINIC | Age: 77
End: 2022-06-02

## 2022-06-02 DIAGNOSIS — Z79.4 UNCONTROLLED TYPE 2 DIABETES MELLITUS WITH HYPERGLYCEMIA, WITH LONG-TERM CURRENT USE OF INSULIN (HCC): Primary | ICD-10-CM

## 2022-06-02 DIAGNOSIS — E11.65 UNCONTROLLED TYPE 2 DIABETES MELLITUS WITH HYPERGLYCEMIA, WITH LONG-TERM CURRENT USE OF INSULIN (HCC): Primary | ICD-10-CM

## 2022-06-02 RX ORDER — BLOOD SUGAR DIAGNOSTIC
STRIP MISCELLANEOUS
Qty: 100 STRIP | Refills: 0 | Status: SHIPPED | OUTPATIENT
Start: 2022-06-02 | End: 2022-07-06

## 2022-06-02 NOTE — TELEPHONE ENCOUNTER
Pt called and said that he needs a diagnosis code in order to receive his glucose test strips and said that when he saw  he said that he would have no issue filling that for him

## 2022-06-02 NOTE — TELEPHONE ENCOUNTER
Sent to wrong provider, script filled yest pended. Please resend need dx code. Thank you. Per Jamila Villalba has not seen Dr Caroline Michelle in a while and Dr Jean Pierre Devine should fill strips discussed at visit.

## 2022-06-07 DIAGNOSIS — I10 ESSENTIAL HYPERTENSION: ICD-10-CM

## 2022-06-07 RX ORDER — AMLODIPINE BESYLATE 10 MG/1
10 TABLET ORAL DAILY
Qty: 90 TABLET | Refills: 3 | Status: SHIPPED | OUTPATIENT
Start: 2022-06-07 | End: 2022-09-05

## 2022-06-07 NOTE — TELEPHONE ENCOUNTER
Future Appointments   Date Time Provider Eduardo Kennedy   6/15/2022 11:15 AM Joaquin Hinojosa MD [address]   7/21/2022  9:00 AM Gael Schirmer, APRN - NP [facility]   8/16/2022 10:00 AM [facility]   8/25/2022  8:15 AM MD Gabriela An [address] [facility]     LAST APPT ON [phone]

## 2022-06-13 ENCOUNTER — OFFICE VISIT (OUTPATIENT)
Dept: ORTHOPEDIC SURGERY | Age: 77
End: 2022-06-13
Payer: MEDICARE

## 2022-06-13 VITALS — WEIGHT: 260 LBS | HEIGHT: 67 IN | BODY MASS INDEX: 40.81 KG/M2

## 2022-06-13 DIAGNOSIS — M17.11 PRIMARY OSTEOARTHRITIS OF RIGHT KNEE: Primary | ICD-10-CM

## 2022-06-13 DIAGNOSIS — M25.561 CHRONIC PAIN OF RIGHT KNEE: ICD-10-CM

## 2022-06-13 DIAGNOSIS — G89.29 CHRONIC PAIN OF RIGHT KNEE: ICD-10-CM

## 2022-06-13 PROCEDURE — G8427 DOCREV CUR MEDS BY ELIG CLIN: HCPCS | Performed by: ORTHOPAEDIC SURGERY

## 2022-06-13 PROCEDURE — 1036F TOBACCO NON-USER: CPT | Performed by: ORTHOPAEDIC SURGERY

## 2022-06-13 PROCEDURE — 3017F COLORECTAL CA SCREEN DOC REV: CPT | Performed by: ORTHOPAEDIC SURGERY

## 2022-06-13 PROCEDURE — 1123F ACP DISCUSS/DSCN MKR DOCD: CPT | Performed by: ORTHOPAEDIC SURGERY

## 2022-06-13 PROCEDURE — 99213 OFFICE O/P EST LOW 20 MIN: CPT | Performed by: ORTHOPAEDIC SURGERY

## 2022-06-13 PROCEDURE — G8417 CALC BMI ABV UP PARAM F/U: HCPCS | Performed by: ORTHOPAEDIC SURGERY

## 2022-06-13 NOTE — PROGRESS NOTES
Candis Lainez returns today for his right knee. About 4 months ago he completed viscosupplementation. He has been very helpful from a pain standpoint and pain is only about 1 out of 10. He is discouraged by what he perceives to be swelling. He is inquiring as to whether he can take oral anti-inflammatory. History: Patient's relevant past family, medical, and social history are reviewed as part of today's visit. ROS of pertinent positives and negatives as above; otherwise negative. General Exam:    Vitals: Height 5' 7\" (1.702 m), weight 260 lb (117.9 kg). Constitutional: Patient is adequately groomed with no evidence of malnutrition  Mental Status: The patient is oriented to time, place and person. The patient's mood and affect are appropriate. Gait:  Patient walks with a hesitant gait lymphatic: The lymphatic examination bilaterally reveals all areas to be without enlargement or induration. Vascular: Examination reveals no swelling or calf tenderness. Peripheral pulses are palpable and 2+. Neurological: The patient has good coordination. There is no weakness or sensory deficit. Skin:    Head/Neck: inspection reveals no rashes, ulcerations or lesions. Trunk:  inspection reveals no rashes, ulcerations or lesions. Right Lower Extremity: inspection reveals no rashes, ulcerations or lesions. Left Lower Extremity: inspection reveals no rashes, ulcerations or lesions. He has varus alignment of the right knee. He has no drainable effusion. He has mild tenderness over the medial joint line. He has no prepatellar bursitis. Range of motion is 5 to about 110 degrees. I reviewed his x-rays which show bone-on-bone medial compartment disease of the right knee with varus alignment. Recent hemoglobin A1c was 6.7    Assessment: Osteoarthritic change right knee.     He has multiple medical problems including history of cirrhosis as well as diabetes and coronary atherosclerosis and chronic kidney

## 2022-06-15 ENCOUNTER — OFFICE VISIT (OUTPATIENT)
Dept: INTERNAL MEDICINE CLINIC | Age: 77
End: 2022-06-15
Payer: MEDICARE

## 2022-06-15 VITALS
SYSTOLIC BLOOD PRESSURE: 124 MMHG | HEART RATE: 57 BPM | BODY MASS INDEX: 40.64 KG/M2 | DIASTOLIC BLOOD PRESSURE: 74 MMHG | OXYGEN SATURATION: 96 % | TEMPERATURE: 98.4 F | WEIGHT: 259.5 LBS

## 2022-06-15 DIAGNOSIS — Z13.31 POSITIVE DEPRESSION SCREENING: ICD-10-CM

## 2022-06-15 DIAGNOSIS — I10 ESSENTIAL HYPERTENSION: ICD-10-CM

## 2022-06-15 DIAGNOSIS — Z79.4 TYPE 2 DIABETES MELLITUS WITH STAGE 2 CHRONIC KIDNEY DISEASE, WITH LONG-TERM CURRENT USE OF INSULIN (HCC): Primary | ICD-10-CM

## 2022-06-15 DIAGNOSIS — E11.22 TYPE 2 DIABETES MELLITUS WITH STAGE 2 CHRONIC KIDNEY DISEASE, WITH LONG-TERM CURRENT USE OF INSULIN (HCC): Primary | ICD-10-CM

## 2022-06-15 DIAGNOSIS — F32.1 CURRENT MODERATE EPISODE OF MAJOR DEPRESSIVE DISORDER WITHOUT PRIOR EPISODE (HCC): ICD-10-CM

## 2022-06-15 DIAGNOSIS — N18.2 TYPE 2 DIABETES MELLITUS WITH STAGE 2 CHRONIC KIDNEY DISEASE, WITH LONG-TERM CURRENT USE OF INSULIN (HCC): Primary | ICD-10-CM

## 2022-06-15 DIAGNOSIS — E78.2 MIXED HYPERLIPIDEMIA: ICD-10-CM

## 2022-06-15 LAB — HBA1C MFR BLD: 6.1 %

## 2022-06-15 PROCEDURE — 99214 OFFICE O/P EST MOD 30 MIN: CPT | Performed by: INTERNAL MEDICINE

## 2022-06-15 PROCEDURE — 1036F TOBACCO NON-USER: CPT | Performed by: INTERNAL MEDICINE

## 2022-06-15 PROCEDURE — 3044F HG A1C LEVEL LT 7.0%: CPT | Performed by: INTERNAL MEDICINE

## 2022-06-15 PROCEDURE — G8417 CALC BMI ABV UP PARAM F/U: HCPCS | Performed by: INTERNAL MEDICINE

## 2022-06-15 PROCEDURE — 3017F COLORECTAL CA SCREEN DOC REV: CPT | Performed by: INTERNAL MEDICINE

## 2022-06-15 PROCEDURE — 2022F DILAT RTA XM EVC RTNOPTHY: CPT | Performed by: INTERNAL MEDICINE

## 2022-06-15 PROCEDURE — 1123F ACP DISCUSS/DSCN MKR DOCD: CPT | Performed by: INTERNAL MEDICINE

## 2022-06-15 PROCEDURE — 83036 HEMOGLOBIN GLYCOSYLATED A1C: CPT | Performed by: INTERNAL MEDICINE

## 2022-06-15 PROCEDURE — G8427 DOCREV CUR MEDS BY ELIG CLIN: HCPCS | Performed by: INTERNAL MEDICINE

## 2022-06-15 ASSESSMENT — ENCOUNTER SYMPTOMS
BLOOD IN STOOL: 0
SHORTNESS OF BREATH: 0
COUGH: 0
VOMITING: 0
ABDOMINAL PAIN: 0
SORE THROAT: 0
NAUSEA: 0

## 2022-06-15 ASSESSMENT — PATIENT HEALTH QUESTIONNAIRE - PHQ9
5. POOR APPETITE OR OVEREATING: 2
SUM OF ALL RESPONSES TO PHQ QUESTIONS 1-9: 16
SUM OF ALL RESPONSES TO PHQ9 QUESTIONS 1 & 2: 6
3. TROUBLE FALLING OR STAYING ASLEEP: 3
2. FEELING DOWN, DEPRESSED OR HOPELESS: 3
4. FEELING TIRED OR HAVING LITTLE ENERGY: 3
SUM OF ALL RESPONSES TO PHQ QUESTIONS 1-9: 16
SUM OF ALL RESPONSES TO PHQ QUESTIONS 1-9: 16
8. MOVING OR SPEAKING SO SLOWLY THAT OTHER PEOPLE COULD HAVE NOTICED. OR THE OPPOSITE, BEING SO FIGETY OR RESTLESS THAT YOU HAVE BEEN MOVING AROUND A LOT MORE THAN USUAL: 0
10. IF YOU CHECKED OFF ANY PROBLEMS, HOW DIFFICULT HAVE THESE PROBLEMS MADE IT FOR YOU TO DO YOUR WORK, TAKE CARE OF THINGS AT HOME, OR GET ALONG WITH OTHER PEOPLE: 2
6. FEELING BAD ABOUT YOURSELF - OR THAT YOU ARE A FAILURE OR HAVE LET YOURSELF OR YOUR FAMILY DOWN: 0
1. LITTLE INTEREST OR PLEASURE IN DOING THINGS: 3
7. TROUBLE CONCENTRATING ON THINGS, SUCH AS READING THE NEWSPAPER OR WATCHING TELEVISION: 2
9. THOUGHTS THAT YOU WOULD BE BETTER OFF DEAD, OR OF HURTING YOURSELF: 0
SUM OF ALL RESPONSES TO PHQ QUESTIONS 1-9: 16

## 2022-06-15 NOTE — PROGRESS NOTES
Priyanka Brush (:  1945) is a 78 y.o. male, Established patient, here for evaluation of the following chief complaint(s):  Hypertension, Diabetes, and Hyperlipidemia         ASSESSMENT/PLAN:  1. Type 2 diabetes mellitus with stage 2 chronic kidney disease, with long-term current use of insulin ([facility])  -      DIABETES FOOT EXAM  - Stable   - Continue current dose of Insulin and Metformin   2. Essential hypertension  - Stable   - Continue current dose of amLODIPine, atenolol chlorthalidone, lisinopril    3. Mixed hyperlipidemia:  - Stable   - Continue current dose of atorvastatin and fenofibrate. 4. Current moderate episode of major depressive disorder without prior episode ([address]  Worsening  -     External Referral To Psychology  Continue current dose of Cymbalta and trazodone at night for sleep. Patient has an upcoming appointment with his psychiatrist in July. 5. Positive depression screening  -     External Referral To Psychology  Continue current dose of Cymbalta and trazodone at night for sleep. Patient has an upcoming appointment with his psychiatrist in July. Return in about 3 months (around 9/15/2022) for Diabetes, Medicare annual wellness visit in 1 week. Subjective   SUBJECTIVE/OBJECTIVE:  Hypertension  This is a chronic problem. The current episode started more than 1 year ago. The problem is uncontrolled. Pertinent negatives include no chest pain, palpitations or shortness of breath. Risk factors for coronary artery disease include male gender. Past treatments include beta blockers, diuretics, calcium channel blockers and alpha 1 blockers. The current treatment provides moderate improvement. There are no compliance problems. Diabetes  He presents for his follow-up diabetic visit. He has type 2 diabetes mellitus. His disease course has been stable. There are no hypoglycemic associated symptoms. Pertinent negatives for hypoglycemia include no dizziness.  Pertinent negatives for diabetes include no chest pain, no fatigue and no weakness. There are no hypoglycemic complications. Diabetic complications include nephropathy. Risk factors for coronary artery disease include hypertension and male sex. Current diabetic treatment includes insulin injections and oral agent (monotherapy). He is compliant with treatment all of the time. He is following a diabetic diet. He participates in exercise intermittently. An ACE inhibitor/angiotensin II receptor blocker is being taken. Eye exam is current. Hyperlipidemia  This is a chronic problem. The current episode started more than 1 year ago. The problem is controlled. Exacerbating diseases include diabetes. Pertinent negatives include no chest pain or shortness of breath. Current antihyperlipidemic treatment includes statins and fibric acid derivatives. The current treatment provides significant improvement of lipids. There are no compliance problems. Risk factors for coronary artery disease include diabetes mellitus and dyslipidemia. Review of Systems   Constitutional: Negative for fatigue and fever. HENT: Negative for nosebleeds and sore throat. Respiratory: Negative for cough and shortness of breath. Cardiovascular: Negative for chest pain, palpitations and leg swelling. Gastrointestinal: Negative for abdominal pain, blood in stool, nausea and vomiting. Neurological: Negative for dizziness and weakness. Objective   Physical Exam  Constitutional:       Appearance: Normal appearance. HENT:      Head: Normocephalic and atraumatic. Eyes:      General: No scleral icterus. Conjunctiva/sclera: Conjunctivae normal.   Cardiovascular:      Rate and Rhythm: Normal rate and regular rhythm. Pulses: Normal pulses. Heart sounds: Normal heart sounds. Pulmonary:      Effort: Pulmonary effort is normal.      Breath sounds: Normal breath sounds. Musculoskeletal:         General: No swelling.    Skin:     General: Skin is warm and dry. Neurological:      Mental Status: He is alert and oriented to person, place, and time. Mental status is at baseline. Psychiatric:         Mood and Affect: Mood normal.         Behavior: Behavior normal.          Visual inspection:  Deformity/amputation: absent  Skin lesions/pre-ulcerative calluses: absent  Edema: right- trace, left- trace    Sensory exam:  Monofilament sensation: normal  (minimum of 5 random plantar locations tested, avoiding callused areas - > 1 area with absence of sensation is + for neuropathy)    Plus at least one of the following:  Pulses: normal,   Proprioception: Intact  Vibration (128 Hz): Intact      An electronic signature was used to authenticate this note. --Janna Mitchell MD   PHQ-9 score today: (PHQ-9 Total Score: 16), additional evaluation and assessment performed, follow-up plan includes but not limited to: Medication management and Referral to /Specialist  for evaluation and management.

## 2022-06-22 RX ORDER — ATENOLOL AND CHLORTHALIDONE TABLET 50; 25 MG/1; MG/1
TABLET ORAL
Qty: 90 TABLET | Refills: 1 | Status: SHIPPED | OUTPATIENT
Start: 2022-06-22 | End: 2022-10-28

## 2022-07-05 DIAGNOSIS — Z79.4 UNCONTROLLED TYPE 2 DIABETES MELLITUS WITH HYPERGLYCEMIA, WITH LONG-TERM CURRENT USE OF INSULIN (HCC): ICD-10-CM

## 2022-07-05 DIAGNOSIS — E11.65 UNCONTROLLED TYPE 2 DIABETES MELLITUS WITH HYPERGLYCEMIA, WITH LONG-TERM CURRENT USE OF INSULIN (HCC): ICD-10-CM

## 2022-07-06 RX ORDER — BLOOD SUGAR DIAGNOSTIC
STRIP MISCELLANEOUS
Qty: 100 STRIP | Refills: 1 | Status: SHIPPED | OUTPATIENT
Start: 2022-07-06 | End: 2022-09-14 | Stop reason: SDUPTHER

## 2022-07-06 NOTE — TELEPHONE ENCOUNTER
Last office visit :06/15/2022    Future Appointments   Date Time Provider Eduardo Krameri   7/21/2022  9:00 AM Polo Bernard 89 NP [facility]   8/16/2022 10:00 AM [facility] Quintana [facility]   9/14/2022 11:15 AM Edi Lloyd MD [address]   9/14/2022 11:30 AM Edi Lloyd MD [facility] [employer]

## 2022-07-21 ENCOUNTER — OFFICE VISIT (OUTPATIENT)
Dept: PSYCHIATRY | Age: 77
End: 2022-07-21
Payer: MEDICARE

## 2022-07-21 DIAGNOSIS — F41.1 GAD (GENERALIZED ANXIETY DISORDER): ICD-10-CM

## 2022-07-21 DIAGNOSIS — F33.1 MODERATE EPISODE OF RECURRENT MAJOR DEPRESSIVE DISORDER (HCC): ICD-10-CM

## 2022-07-21 DIAGNOSIS — G47.9 SLEEP DISTURBANCE: ICD-10-CM

## 2022-07-21 PROCEDURE — 1123F ACP DISCUSS/DSCN MKR DOCD: CPT | Performed by: NURSE PRACTITIONER

## 2022-07-21 PROCEDURE — G8427 DOCREV CUR MEDS BY ELIG CLIN: HCPCS | Performed by: NURSE PRACTITIONER

## 2022-07-21 PROCEDURE — 1036F TOBACCO NON-USER: CPT | Performed by: NURSE PRACTITIONER

## 2022-07-21 PROCEDURE — G8417 CALC BMI ABV UP PARAM F/U: HCPCS | Performed by: NURSE PRACTITIONER

## 2022-07-21 PROCEDURE — 3017F COLORECTAL CA SCREEN DOC REV: CPT | Performed by: NURSE PRACTITIONER

## 2022-07-21 PROCEDURE — 99213 OFFICE O/P EST LOW 20 MIN: CPT | Performed by: NURSE PRACTITIONER

## 2022-07-21 RX ORDER — BUSPIRONE HYDROCHLORIDE 10 MG/1
10 TABLET ORAL 3 TIMES DAILY
Qty: 270 TABLET | Refills: 0 | Status: SHIPPED | OUTPATIENT
Start: 2022-07-21 | End: 2022-10-19

## 2022-07-21 RX ORDER — TRAZODONE HYDROCHLORIDE 50 MG/1
TABLET ORAL
Qty: 60 TABLET | Refills: 2 | Status: SHIPPED | OUTPATIENT
Start: 2022-07-21 | End: 2022-09-21

## 2022-07-21 ASSESSMENT — ANXIETY QUESTIONNAIRES
6. BECOMING EASILY ANNOYED OR IRRITABLE: 3-NEARLY EVERY DAY
5. BEING SO RESTLESS THAT IT IS HARD TO SIT STILL: 1-SEVERAL DAYS
GAD7 TOTAL SCORE: 17
1. FEELING NERVOUS, ANXIOUS, OR ON EDGE: 3
3. WORRYING TOO MUCH ABOUT DIFFERENT THINGS: 3-NEARLY EVERY DAY
2. NOT BEING ABLE TO STOP OR CONTROL WORRYING: 3-NEARLY EVERY DAY
4. TROUBLE RELAXING: 3-NEARLY EVERY DAY
7. FEELING AFRAID AS IF SOMETHING AWFUL MIGHT HAPPEN: 1-SEVERAL DAYS

## 2022-07-21 ASSESSMENT — PATIENT HEALTH QUESTIONNAIRE - PHQ9
SUM OF ALL RESPONSES TO PHQ9 QUESTIONS 1 & 2: 3
8. MOVING OR SPEAKING SO SLOWLY THAT OTHER PEOPLE COULD HAVE NOTICED. OR THE OPPOSITE, BEING SO FIGETY OR RESTLESS THAT YOU HAVE BEEN MOVING AROUND A LOT MORE THAN USUAL: 0
10. IF YOU CHECKED OFF ANY PROBLEMS, HOW DIFFICULT HAVE THESE PROBLEMS MADE IT FOR YOU TO DO YOUR WORK, TAKE CARE OF THINGS AT HOME, OR GET ALONG WITH OTHER PEOPLE: 1
2. FEELING DOWN, DEPRESSED OR HOPELESS: 1
SUM OF ALL RESPONSES TO PHQ QUESTIONS 1-9: 13
1. LITTLE INTEREST OR PLEASURE IN DOING THINGS: 2
6. FEELING BAD ABOUT YOURSELF - OR THAT YOU ARE A FAILURE OR HAVE LET YOURSELF OR YOUR FAMILY DOWN: 1
3. TROUBLE FALLING OR STAYING ASLEEP: 2
5. POOR APPETITE OR OVEREATING: 3
SUM OF ALL RESPONSES TO PHQ QUESTIONS 1-9: 13
7. TROUBLE CONCENTRATING ON THINGS, SUCH AS READING THE NEWSPAPER OR WATCHING TELEVISION: 2
SUM OF ALL RESPONSES TO PHQ QUESTIONS 1-9: 13
SUM OF ALL RESPONSES TO PHQ QUESTIONS 1-9: 13
4. FEELING TIRED OR HAVING LITTLE ENERGY: 2
9. THOUGHTS THAT YOU WOULD BE BETTER OFF DEAD, OR OF HURTING YOURSELF: 0

## 2022-07-21 NOTE — PROGRESS NOTES
PSYCHIATRY PROGRESS NOTE    Dell Ariza  1945  07/21/22      CC:   Chief Complaint   Patient presents with    Anxiety    Follow-up       HPI:   Dell Ariza is a 78 y.o. male with h/o Anxiety, Depression who p/t clinic for follow up. Subjective/Interval Hx: Patient seems to be struggling with daily anxiety still. Discussed thoroughly how he takes his medications. Has weaned himself off of Xanax. Always an anxious, on edge person. Anxiety gets worse at night and turns to irritability. Denies SI/HI. Perhaps sleeping too much, making him groggy. Going to decrease Trazodone. Location:  Mind  Severity: Mod  Context:  As above. Modifiers: Some improvements with additional meds  Quality: Anxious, irritable     Past Psychiatric History:              Prior hospitalizations: Denies              Prior diagnoses: Anxiety, Depression              Outpatient Treatment:                          Psychiatrist: Dr. Thelma Colon                          Therapist: Has seen therapist, unsure of names. (did not enjoy therapy)              Suicide Attempts: Denies              Hx SH:  Denies     Past Psychopharmacologic Trials (including response/reactions): Wellbutrin  Clomipramine  Effexor  Celexa        TANISHA 7 SCORE 7/21/2022 3/18/2021 2/18/2021 1/14/2021   TANISHA-7 Total Score 17 10 13 13     Interpretation of TANISHA-7 score: 5-9 = mild anxiety, 10-14 = moderate anxiety,   15+ = severe anxiety. Recommend referral to behavioral health for scores 10 or greater.     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/2010    GBS (Guillain Collins syndrome) ([address] 1990's    History of colonic polyps     History of colonic polyps     Hyperlipidemia     Hypertension     Liver cirrhosis secondary to [facility])     Long term (current) use of insulin ([address] 10/10/2019    Obesity     Presence of intraocular lens 10/10/2019    Psychophysiological insomnia 5/15/2018    Retinal hemorrhage, 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 with mild nonproliferative diabetic retinopathy without macular edema, bilateral ([address] 10/10/2019    Type 2 diabetes mellitus with proteinuria ([address] 11/19/2019    Type II or unspecified type diabetes mellitus without mention of complication, not stated as uncontrolled     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     Past Surgical History:   Procedure Laterality Date    CATARACT REMOVAL Left 2010       Family History   Problem Relation Age of Onset    Alzheimer's Disease Mother     Heart Attack Father     Anxiety Disorder Sister     Other Son         Pulmonary Embolism         PCP: Libertad Pichardo MD      Allergies:    Allergies   Allergen Reactions    Seasonal          Current Medications:   Current Outpatient Medications on File Prior to Visit   Medication Sig Dispense Refill    blood glucose test strips (ONETOUCH ULTRA) strip USE TO TEST FOUR TIMES A DAY AS NEEDED 100 strip 1    atenolol-chlorthalidone (TENORETIC) 50-25 MG per tablet TAKE 1 TABLET BY MOUTH  DAILY 90 tablet 1    amLODIPine (NORVASC) 10 MG tablet TAKE 1 TABLET BY MOUTH  DAILY 90 tablet 3    lisinopril (PRINIVIL;ZESTRIL) 20 MG tablet Take 1 tablet by mouth 2 times daily 180 tablet 1    DULoxetine (CYMBALTA) 60 MG extended release capsule TAKE 1 CAPSULE BY MOUTH  TWICE DAILY 180 capsule 1    insulin lispro, 1 Unit Dial, (HUMALOG KWIKPEN) 100 UNIT/ML SOPN INJECT 32-40 UNITS  SUBCUTANEOUSLY BEFORE MEALS 3 TIMES DAILY 120 mL 1    insulin glargine (LANTUS SOLOSTAR) 100 UNIT/ML injection pen INJECT SUBCUTANEOUSLY 100  UNITS NIGHTLY 7 boxes 100 mL 1    Insulin Pen Needle (B-D ULTRAFINE III SHORT PEN) 31G X 8 MM MISC USE TO INJECT INSULIN THREE TIMES A  each 2    oxybutynin (DITROPAN-XL) 10 MG extended release tablet TAKE ONE TABLET BY MOUTH DAILY 90 tablet 1    metFORMIN (GLUCOPHAGE) 1000 MG tablet TAKE 1 TABLET BY MOUTH  TWICE DAILY 180 tablet 3    fenofibrate (TRIGLIDE) 160 MG tablet TAKE 1 TABLET BY MOUTH  DAILY 90 tablet 3    tamsulosin (FLOMAX) 0.4 MG capsule TAKE 1 CAPSULE BY MOUTH  DAILY 90 capsule 3    atorvastatin (LIPITOR) 20 MG tablet TAKE 1 TABLET BY MOUTH ONCE DAILY 90 tablet 3     No current facility-administered medications on file prior to visit. Controlled Substance Monitoring:  PDMP Monitoring:    Last PDMP Taz as Reviewed [facility]):  Review User Review Instant Review Result   MADDIE MAJOR 7/21/2022  9:11 AM Reviewed PDMP [1]     Last Controlled Substance Monitoring Documentation      [address] Office Visit from 9/21/2021 in [facility]   Periodic Controlled Substance Monitoring Possible medication side effects, risk of tolerance/dependence & alternative treatments discussed., No signs of potential drug abuse or diversion identified. , Assessed functional status. , Random urine drug screen sent today.  filed at 09/21/2021 1045          Urine Drug Screenings (1 yr)       Drug Panel-PM-HI Res-UR Interp-A  Collected: 9/21/2021  9:15 AM (Final result)   Narrative: Referred out by:  [facility]  [address] [place], [place] [address]   Phone [phone]             Drug Panel-PM-HI Res-UR Interp-A  Collected: 3/5/2020 10:10 AM (Final result)   Narrative: Referred out by:  [facility]  [address] [place], [place] [address]   Phone [phone]             Drug Panel-PM-HI Res-UR Interp-A  Collected: 1/16/2018 10:05 AM (Final result)   Narrative: Referred out by:  [facility]  [address] [place], [place] [address] [address]   Phone [phone]             Drug Panel-PM-HI Res-UR Interp-A  Collected: 3/8/2016 10:36 AM (Final result)                  Medication Contract and Consent for Opioid Use Documents Filed       Patient Documents       Type of Document Status Date Received Received By Description    Medication Contract [Status Missing]  LETTY ZUNIGA 12/7/15, Medication Agreement    Medication Contract Received  Lesley ZUNIGA 1/16/18, Medication Agreement    Medication Contract [Status Missing]  LETTY ZUNIGA 8/17/15, Medication Agreement    Medication Contract Received 3/5/2020 11:20 AM ENDY ADDISON 3/5/2020 medication contract    Medication Contract Received 9/22/2021 10:20 AM ENDY ADDISON Medication Contract                       OBJECTIVE:  Vitals: Wt Readings from Last 3 Encounters:   06/15/22 259 lb 8 oz (117.7 kg)   06/13/22 260 lb (117.9 kg)   04/06/22 260 lb 12.8 oz (118.3 kg)   ROS: Denies trouble with fever, rash, headache, vision changes, chest pain, shortness of breath, nausea, extremity pain, weakness, dysuria.       Mental Status Exam:     Appearance    alert, cooperative  Muscle strength/tone: no atrophy or abnormal movements  Gait/station: normal  Impulsive behavior No  Speech    spontaneous, normal rate and normal volume  Mood    Anxious  Affect    Anxiety affect Congruent to thought content and mood  Thought Content    intact, no delusions voiced  Thought Process    linear   Associations    logical connections  Perceptions: denies AH/VH, does not appear preoccupied with the internal environment, no delusions  Insight    Good  Judgment    Intact  Orientation    oriented to person, place, time, and general circumstances  Memory    recent and remote memory intact  Attention/Concentration    intact  Ability to understand instructions Yes  Ability to respond meaningfully Yes  Language:  [address] Knowledge/Intelligence:  Average  SI:   no suicidal ideation  HI: Denies HI    Labs:     Office Visit on 06/15/2022   Component Date Value    Hemoglobin A1C 06/15/2022 6.1    Orders Only on 02/08/2022   Component Date Value    Visual Acuity Distance R* 02/08/2022 20/30     Visual Acuity Distance L* 02/08/2022 20/25     Intraocular Pressure Eye 02/08/2022                      Value:10  9      Diabetic Retinopathy 02/08/2022 POSITIVE - TREATMENT NEEDED     Cataracts 02/08/2022 NEGATIVE     Glaucoma 02/08/2022 NEGATIVE        Last Drug screen:  Lab Results   Component Value Date/Time    MDMA Not Detected 09/21/2021 09:15 AM         Imaging: no head imaging on file      ASSESSMENT AND PLAN     Diagnosis Orders   1. TANISHA (generalized anxiety disorder)  traZODone (DESYREL) 50 MG tablet      2. Moderate episode of recurrent major depressive disorder (HCC)  traZODone (DESYREL) 50 MG tablet      3. Sleep disturbance  traZODone (DESYREL) 50 MG tablet          1. Safety: NO Imminent risk of danger to/self/others based on the factors considered below. Appropriate for outpatient level of care. Safety plan includes: 911, PES, hotlines, and interventions discussed today. Risk factors: Age <25 or >49, male gender, depressed mood, social isolation, no outpatient services in place, and no collateral information to support safety. Protective factors: denies suicidal ideation, does not have lethal plan, does not have access to guns or weapons, patient is didier for safety, no prior suicide attempts, no family h/o suicide, no substance abuse, patient has social or family support, no active psychosis or cognitive dysfunction, physically healthy, compliant with recommended medications,  and patient is future oriented. 2. Psychiatric Plan     MDD, recurrent, moderate, Generalized Anxiety Disorder, Sleep disturbance:     -CONTINUE Cymbalta 60 mg BID for depression/anxiety sx.  Patient unsure if this medication is actually working at this point but will not titrate off and switch.     -Decrease Trazodone 50-nightly for sleep/mood augmentation. Side effects discussed. R/B discussed. - Increase Buspar to 10 mg TID. He takes at 8:30am and 8:00pm. I do not think he is getting the coverage he needs with this. Going to take morning, afternoon, early evening to see if this curbs his afternoon, evening anxiety. R/B/Se discussed. Patient agreeable.     -Labs: reviewed in Epic.      -Declines therapy at this time     -OARRS reviewed, c/w history  -R/b/se/a d/w pt who consents. 3. Medical  -Following with ADDISON Bowden - CNP     4. Substance  -No active issues. 5. RTC - 2 months    Anahy Waters, [address] [place] [address], Ne Nurse Practitioner    On this date 7/21/2022 I have spent 20 minutes reviewing previous notes, test results and face to face with the patient discussing the diagnosis and importance of compliance with the treatment plan as well as documenting on the day of the visit.

## 2022-08-16 ENCOUNTER — OFFICE VISIT (OUTPATIENT)
Dept: PULMONOLOGY | Age: 77
End: 2022-08-16
Payer: MEDICARE

## 2022-08-16 VITALS
WEIGHT: 254 LBS | RESPIRATION RATE: 21 BRPM | TEMPERATURE: 97.3 F | DIASTOLIC BLOOD PRESSURE: 70 MMHG | HEIGHT: 67 IN | OXYGEN SATURATION: 98 % | BODY MASS INDEX: 39.87 KG/M2 | SYSTOLIC BLOOD PRESSURE: 115 MMHG | HEART RATE: 59 BPM

## 2022-08-16 DIAGNOSIS — G47.33 OSA (OBSTRUCTIVE SLEEP APNEA): ICD-10-CM

## 2022-08-16 DIAGNOSIS — G47.8 SLEEP PARALYSIS: ICD-10-CM

## 2022-08-16 PROCEDURE — 3017F COLORECTAL CA SCREEN DOC REV: CPT | Performed by: INTERNAL MEDICINE

## 2022-08-16 PROCEDURE — G8427 DOCREV CUR MEDS BY ELIG CLIN: HCPCS | Performed by: INTERNAL MEDICINE

## 2022-08-16 PROCEDURE — 99214 OFFICE O/P EST MOD 30 MIN: CPT | Performed by: INTERNAL MEDICINE

## 2022-08-16 PROCEDURE — 1123F ACP DISCUSS/DSCN MKR DOCD: CPT | Performed by: INTERNAL MEDICINE

## 2022-08-16 PROCEDURE — G8417 CALC BMI ABV UP PARAM F/U: HCPCS | Performed by: INTERNAL MEDICINE

## 2022-08-16 PROCEDURE — 1036F TOBACCO NON-USER: CPT | Performed by: INTERNAL MEDICINE

## 2022-08-16 ASSESSMENT — SLEEP AND FATIGUE QUESTIONNAIRES
HOW LIKELY ARE YOU TO NOD OFF OR FALL ASLEEP WHEN YOU ARE A PASSENGER IN A CAR FOR AN HOUR WITHOUT A BREAK: 2
HOW LIKELY ARE YOU TO NOD OFF OR FALL ASLEEP IN A CAR, WHILE STOPPED FOR A FEW MINUTES IN TRAFFIC: 0
HOW LIKELY ARE YOU TO NOD OFF OR FALL ASLEEP WHILE WATCHING TV: 2
HOW LIKELY ARE YOU TO NOD OFF OR FALL ASLEEP WHILE SITTING AND READING: 1
HOW LIKELY ARE YOU TO NOD OFF OR FALL ASLEEP WHILE SITTING AND TALKING TO SOMEONE: 0
HOW LIKELY ARE YOU TO NOD OFF OR FALL ASLEEP WHILE SITTING QUIETLY AFTER LUNCH WITHOUT ALCOHOL: 0
HOW LIKELY ARE YOU TO NOD OFF OR FALL ASLEEP WHILE SITTING INACTIVE IN A PUBLIC PLACE: 0
HOW LIKELY ARE YOU TO NOD OFF OR FALL ASLEEP WHILE LYING DOWN TO REST IN THE AFTERNOON WHEN CIRCUMSTANCES PERMIT: 0
ESS TOTAL SCORE: 5

## 2022-08-16 NOTE — ASSESSMENT & PLAN NOTE
His device was registered with [employer] for recall. He was not completed last time. Reasons not clear at this time. Discussed risk. No adjustment in pressure at this time.

## 2022-08-16 NOTE — PROGRESS NOTES
cyanosis. No clubbing. No joint deformity. Psych: Oriented x 3. No anxiety. Awake. Alert. Intact judgement and insight. Good Mood / Affect. Memory appears in tact      Data Reviewed:        Assessment:     KIMMIE  Fatigue  Depression  Sleep paralysis  Insomnia    Plan:      Problem List Items Addressed This Visit       Sleep paralysis      Continues to have issus with sleep paralysis but rare. Discussed mask and wakening. This may improve with new device. KIMMIE (obstructive sleep apnea)     His device was registered with [employer] for recall. He was not completed last time. Reasons not clear at this time. Discussed risk. No adjustment in pressure at this time.

## 2022-08-16 NOTE — ASSESSMENT & PLAN NOTE
Continues to have issus with sleep paralysis but rare. Discussed mask and wakening. This may improve with new device.

## 2022-08-21 DIAGNOSIS — I10 ESSENTIAL HYPERTENSION: ICD-10-CM

## 2022-08-21 DIAGNOSIS — N32.81 OVERACTIVE BLADDER: ICD-10-CM

## 2022-08-22 NOTE — TELEPHONE ENCOUNTER
Last office visit : 06/15/2022    Future Appointments   Date Time Provider Eduardo Kennedy   9/14/2022 11:15 AM Dang Bray MD [address] 9/14/2022 11:30 AM Dnag Bray MD [address] 9/22/2022  1:00 PM ADDISON Gonzales - [facility]   8/21/2023 10:00 AM [facility] Bowers MD [facility]

## 2022-08-23 RX ORDER — LISINOPRIL 20 MG/1
TABLET ORAL
Qty: 180 TABLET | Refills: 1 | Status: SHIPPED | OUTPATIENT
Start: 2022-08-23

## 2022-08-23 RX ORDER — OXYBUTYNIN CHLORIDE 10 MG/1
TABLET, EXTENDED RELEASE ORAL
Qty: 90 TABLET | Refills: 1 | Status: SHIPPED | OUTPATIENT
Start: 2022-08-23

## 2022-09-14 ENCOUNTER — OFFICE VISIT (OUTPATIENT)
Dept: INTERNAL MEDICINE CLINIC | Age: 77
End: 2022-09-14
Payer: MEDICARE

## 2022-09-14 VITALS
SYSTOLIC BLOOD PRESSURE: 145 MMHG | TEMPERATURE: 97.3 F | BODY MASS INDEX: 38.06 KG/M2 | WEIGHT: 257 LBS | OXYGEN SATURATION: 98 % | HEIGHT: 69 IN | DIASTOLIC BLOOD PRESSURE: 70 MMHG

## 2022-09-14 DIAGNOSIS — Z00.00 MEDICARE ANNUAL WELLNESS VISIT, SUBSEQUENT: Primary | ICD-10-CM

## 2022-09-14 DIAGNOSIS — E11.22 TYPE 2 DIABETES MELLITUS WITH STAGE 2 CHRONIC KIDNEY DISEASE, WITH LONG-TERM CURRENT USE OF INSULIN (HCC): ICD-10-CM

## 2022-09-14 DIAGNOSIS — E78.2 MIXED HYPERLIPIDEMIA: ICD-10-CM

## 2022-09-14 DIAGNOSIS — D69.6 THROMBOCYTOPENIA (HCC): ICD-10-CM

## 2022-09-14 DIAGNOSIS — N32.81 OVERACTIVE BLADDER: ICD-10-CM

## 2022-09-14 DIAGNOSIS — E66.01 CLASS 2 SEVERE OBESITY DUE TO EXCESS CALORIES WITH SERIOUS COMORBIDITY AND BODY MASS INDEX (BMI) OF 37.0 TO 37.9 IN ADULT (HCC): ICD-10-CM

## 2022-09-14 DIAGNOSIS — F33.1 MODERATE EPISODE OF RECURRENT MAJOR DEPRESSIVE DISORDER (HCC): ICD-10-CM

## 2022-09-14 DIAGNOSIS — K75.81 LIVER CIRRHOSIS SECONDARY TO NASH (HCC): ICD-10-CM

## 2022-09-14 DIAGNOSIS — K74.60 LIVER CIRRHOSIS SECONDARY TO NASH (HCC): ICD-10-CM

## 2022-09-14 DIAGNOSIS — I10 ESSENTIAL HYPERTENSION: ICD-10-CM

## 2022-09-14 DIAGNOSIS — N18.2 TYPE 2 DIABETES MELLITUS WITH STAGE 2 CHRONIC KIDNEY DISEASE, WITH LONG-TERM CURRENT USE OF INSULIN (HCC): ICD-10-CM

## 2022-09-14 DIAGNOSIS — Z79.4 TYPE 2 DIABETES MELLITUS WITH STAGE 2 CHRONIC KIDNEY DISEASE, WITH LONG-TERM CURRENT USE OF INSULIN (HCC): ICD-10-CM

## 2022-09-14 LAB — HBA1C MFR BLD: 6.4 %

## 2022-09-14 PROCEDURE — 3017F COLORECTAL CA SCREEN DOC REV: CPT | Performed by: INTERNAL MEDICINE

## 2022-09-14 PROCEDURE — G0439 PPPS, SUBSEQ VISIT: HCPCS | Performed by: INTERNAL MEDICINE

## 2022-09-14 PROCEDURE — 3044F HG A1C LEVEL LT 7.0%: CPT | Performed by: INTERNAL MEDICINE

## 2022-09-14 PROCEDURE — G8427 DOCREV CUR MEDS BY ELIG CLIN: HCPCS | Performed by: INTERNAL MEDICINE

## 2022-09-14 PROCEDURE — 99214 OFFICE O/P EST MOD 30 MIN: CPT | Performed by: INTERNAL MEDICINE

## 2022-09-14 PROCEDURE — 83036 HEMOGLOBIN GLYCOSYLATED A1C: CPT | Performed by: INTERNAL MEDICINE

## 2022-09-14 PROCEDURE — 1036F TOBACCO NON-USER: CPT | Performed by: INTERNAL MEDICINE

## 2022-09-14 PROCEDURE — G8417 CALC BMI ABV UP PARAM F/U: HCPCS | Performed by: INTERNAL MEDICINE

## 2022-09-14 PROCEDURE — 2022F DILAT RTA XM EVC RTNOPTHY: CPT | Performed by: INTERNAL MEDICINE

## 2022-09-14 PROCEDURE — 1123F ACP DISCUSS/DSCN MKR DOCD: CPT | Performed by: INTERNAL MEDICINE

## 2022-09-14 RX ORDER — BLOOD SUGAR DIAGNOSTIC
STRIP MISCELLANEOUS
Qty: 300 STRIP | Refills: 1 | Status: SHIPPED | OUTPATIENT
Start: 2022-09-14

## 2022-09-14 ASSESSMENT — PATIENT HEALTH QUESTIONNAIRE - PHQ9
SUM OF ALL RESPONSES TO PHQ QUESTIONS 1-9: 8
7. TROUBLE CONCENTRATING ON THINGS, SUCH AS READING THE NEWSPAPER OR WATCHING TELEVISION: 3
2. FEELING DOWN, DEPRESSED OR HOPELESS: 0
4. FEELING TIRED OR HAVING LITTLE ENERGY: 0
SUM OF ALL RESPONSES TO PHQ QUESTIONS 1-9: 8
2. FEELING DOWN, DEPRESSED OR HOPELESS: 0
SUM OF ALL RESPONSES TO PHQ QUESTIONS 1-9: 8
SUM OF ALL RESPONSES TO PHQ9 QUESTIONS 1 & 2: 0
1. LITTLE INTEREST OR PLEASURE IN DOING THINGS: 0
8. MOVING OR SPEAKING SO SLOWLY THAT OTHER PEOPLE COULD HAVE NOTICED. OR THE OPPOSITE, BEING SO FIGETY OR RESTLESS THAT YOU HAVE BEEN MOVING AROUND A LOT MORE THAN USUAL: 0
4. FEELING TIRED OR HAVING LITTLE ENERGY: 0
7. TROUBLE CONCENTRATING ON THINGS, SUCH AS READING THE NEWSPAPER OR WATCHING TELEVISION: 3
10. IF YOU CHECKED OFF ANY PROBLEMS, HOW DIFFICULT HAVE THESE PROBLEMS MADE IT FOR YOU TO DO YOUR WORK, TAKE CARE OF THINGS AT HOME, OR GET ALONG WITH OTHER PEOPLE: 0
SUM OF ALL RESPONSES TO PHQ QUESTIONS 1-9: 8
1. LITTLE INTEREST OR PLEASURE IN DOING THINGS: 0
9. THOUGHTS THAT YOU WOULD BE BETTER OFF DEAD, OR OF HURTING YOURSELF: 0
3. TROUBLE FALLING OR STAYING ASLEEP: 3
SUM OF ALL RESPONSES TO PHQ9 QUESTIONS 1 & 2: 0
SUM OF ALL RESPONSES TO PHQ QUESTIONS 1-9: 8
5. POOR APPETITE OR OVEREATING: 2
SUM OF ALL RESPONSES TO PHQ QUESTIONS 1-9: 8
6. FEELING BAD ABOUT YOURSELF - OR THAT YOU ARE A FAILURE OR HAVE LET YOURSELF OR YOUR FAMILY DOWN: 0
SUM OF ALL RESPONSES TO PHQ QUESTIONS 1-9: 8
SUM OF ALL RESPONSES TO PHQ QUESTIONS 1-9: 8
6. FEELING BAD ABOUT YOURSELF - OR THAT YOU ARE A FAILURE OR HAVE LET YOURSELF OR YOUR FAMILY DOWN: 0
5. POOR APPETITE OR OVEREATING: 2
3. TROUBLE FALLING OR STAYING ASLEEP: 3
8. MOVING OR SPEAKING SO SLOWLY THAT OTHER PEOPLE COULD HAVE NOTICED. OR THE OPPOSITE, BEING SO FIGETY OR RESTLESS THAT YOU HAVE BEEN MOVING AROUND A LOT MORE THAN USUAL: 0
10. IF YOU CHECKED OFF ANY PROBLEMS, HOW DIFFICULT HAVE THESE PROBLEMS MADE IT FOR YOU TO DO YOUR WORK, TAKE CARE OF THINGS AT HOME, OR GET ALONG WITH OTHER PEOPLE: 0

## 2022-09-14 ASSESSMENT — LIFESTYLE VARIABLES
HOW OFTEN DO YOU HAVE A DRINK CONTAINING ALCOHOL: NEVER
HOW MANY STANDARD DRINKS CONTAINING ALCOHOL DO YOU HAVE ON A TYPICAL DAY: PATIENT DOES NOT DRINK

## 2022-09-14 NOTE — PROGRESS NOTES
Medicare Annual Wellness Visit    Rossi Nice is here for Follow-up and Medicare AWV    Assessment & Plan   Medicare annual wellness visit, subsequent    Type 2 diabetes mellitus with stage 2 chronic kidney disease, with long-term current use of insulin ([facility] [address]  -Point-of-care hemoglobin A1c. Continue current dose of Insulin and Metformin   Essential hypertension  Uncontrolled. - Continue current dose of amLODIPine, atenolol chlorthalidone, lisinopril  Recommended to check home blood pressure daily and bring record during next follow-up. Mixed hyperlipidemia  - Stable   - Continue current dose of atorvastatin and fenofibrate. Moderate episode of recurrent major depressive disorder (HCC)  Not well controlled. Continue current dose of Cymbalta and trazodone at night. Patient wishes to address this issue with his psychiatrist.  Liver cirrhosis secondary to WYLIE ([facility] [address]  - Stable   Avoid nephrotoxins including alcohol intake. Thrombocytopenia (HCC)  Platelet count is stable. Repeat CBC during next follow-up with other lab tests. Overactive bladder  - Stable   - Continue current dose of oxybutynin. Class II severe obesity due to excess calories with serious comorbidity and body mass index of 37-37.9 in adult(HCC):  Uncontrolled. Advised patient to exercise regularly as tolerated and follow diabetic diet. Recommendations for Preventive Services Due: see orders and patient instructions/AVS.  Recommended screening schedule for the next 5-10 years is provided to the patient in written form: see Patient Instructions/AVS.     Return in 3 months (on 12/14/2022) for Diabetes, Medicare Annual Wellness Visit in 1 year. Subjective       Patient's complete Health Risk Assessment and screening values have been reviewed and are found in Flowsheets. The following problems were reviewed today and where indicated follow up appointments were made and/or referrals ordered.     Positive Risk Factor Screenings with Interventions:      Depression:  PHQ-2 Score: 0  PHQ-9 Total Score: 8    Severity:1-4 = minimal depression, 5-9 = mild depression, 10-14 = moderate depression, 15-19 = moderately severe depression, 20-27 = severe depression  Depression Interventions:  Patient declines any further evaluation/treatment for this issue          General Health and ACP:  General  In general, how would you say your health is?: Fair  In the past 7 days, have you experienced any of the following: New or Increased Pain, New or Increased Fatigue, Loneliness, Social Isolation, Stress or Anger?: (!) Yes  Select all that apply: (!) Loneliness  Do you get the social and emotional support that you need?: Yes  Do you have a Living Will?: (!) No    Advance Directives       Power of [address] [place] [address] Will ACP-Advance Directive ACP-Power of     Not on File Not on File Not on File Not on File          General Health Risk Interventions:  Loneliness: Patient follows with psychiatrist and is taking medications for depression. No Living Will: Advance Care Planning addressed with patient today    Health Habits/Nutrition:  Physical Activity: Inactive    Days of Exercise per Week: 0 days    Minutes of Exercise per Session: 0 min     Have you lost any weight without trying in the past 3 months?: No  Body mass index: (!) 37.95  Have you seen the dentist within the past year?: Yes  Health Habits/Nutrition Interventions:  Inadequate physical activity:  patient agrees to exercise for at least 150 minutes/week             Objective   Vitals:    09/14/22 1135 09/14/22 1159   BP: (!) 148/68 (!) 145/70   Site: Left Upper Arm    Position: Sitting    Cuff Size: Large Adult    Temp: 97.3 °F (36.3 °C)    TempSrc: Temporal    SpO2: 98%    Weight: 257 lb (116.6 kg)    Height: 5' 9\" (1.753 m)       Body mass index is 37.95 kg/m².       General Appearance: alert and oriented to person, place and time, well developed and well- nourished, in no acute distress  Skin: warm and dry, bilateral lower extremity discoloration  Head: normocephalic and atraumatic  Eyes: conjunctivae normal  Pulmonary/Chest: clear to auscultation bilaterally- no wheezes, rales or rhonchi, normal air movement, no respiratory distress  Cardiovascular: normal rate, regular rhythm, normal S1 and S2, no murmurs, rubs, clicks, or gallops  Extremities: bilateral pedal edema present  Neurologic:no cranial nerve deficit, gait, coordination and speech normal       Allergies   Allergen Reactions    Seasonal      Prior to Visit Medications    Medication Sig Taking? Authorizing Provider   blood glucose test strips (ONETOUCH ULTRA) strip USE TO TEST FOUR TIMES A DAY AS NEEDED Yes Dede Lesches, MD   oxybutynin (DITROPAN-XL) 10 MG extended release tablet TAKE 1 TABLET BY MOUTH  DAILY Yes Dede Lesches, MD   lisinopril (PRINIVIL;ZESTRIL) 20 MG tablet TAKE 1 TABLET BY MOUTH  TWICE DAILY Yes Dede Lesches, MD   busPIRone (BUSPAR) 10 MG tablet Take 1 tablet by mouth in the morning and 1 tablet at noon and 1 tablet before bedtime.  Yes ADDISON Courtney NP   traZODone (DESYREL) 50 MG tablet Take 1-2 tablets nightly Yes ADDISON Courtney NP   atenolol-chlorthalidone (TENORETIC) 50-25 MG per tablet TAKE 1 TABLET BY MOUTH  DAILY Yes Dede Lesches, MD   DULoxetine (CYMBALTA) 60 MG extended release capsule TAKE 1 CAPSULE BY MOUTH  TWICE DAILY Yes Dede Lesches, MD   insulin lispro, 1 Unit Dial, (HUMALOG KWIKPEN) 100 UNIT/ML SOPN INJECT 32-40 UNITS  SUBCUTANEOUSLY BEFORE MEALS 3 TIMES DAILY Yes Dede Lesches, MD   insulin glargine (LANTUS SOLOSTAR) 100 UNIT/ML injection pen INJECT SUBCUTANEOUSLY 100  UNITS NIGHTLY 7 boxes Yes Dede Lesches, MD   Insulin Pen Needle (B-D ULTRAFINE III SHORT PEN) 31G X 8 MM MISC USE TO INJECT INSULIN THREE TIMES A DAY Yes Dede Lesches, MD   metFORMIN (GLUCOPHAGE) 1000 MG tablet TAKE 1 TABLET BY MOUTH  TWICE DAILY Yes Jasmin Reid ADDISON Valadez - CNP   fenofibrate (TRIGLIDE) 160 MG tablet TAKE 1 TABLET BY MOUTH  DAILY Yes ADDISON German - CNP   tamsulosin (FLOMAX) 0.4 MG capsule TAKE 1 CAPSULE BY MOUTH  DAILY Yes ADDISON German - CNP   atorvastatin (LIPITOR) 20 MG tablet TAKE 1 TABLET BY MOUTH ONCE DAILY Yes ADDISON German - CNP   amLODIPine (NORVASC) 10 MG tablet TAKE 1 TABLET BY MOUTH  DAILY  Leanne Mendoza MD       [facility] (Including outside providers/suppliers regularly involved in providing care):   Patient Care Team:  Dang Bray MD as PCP - General (Internal Medicine)  Dang Bray MD as PCP - [facility]  Ara Hebert MD as Consulting Physician (Psychiatry)     Reviewed and updated this visit:  Tobacco  Allergies  Meds  Problems  Med Hx  Surg Hx  Soc Hx  Fam Hx

## 2022-09-14 NOTE — PATIENT INSTRUCTIONS
Follow lifestyle changes with DASH diet and exercise. Check BP daily and bring the record during follow up. Personalized Preventive Plan for Praveen Segura - 9/14/2022  Medicare offers a range of preventive health benefits. Some of the tests and screenings are paid in full while other may be subject to a deductible, co-insurance, and/or copay. Some of these benefits include a comprehensive review of your medical history including lifestyle, illnesses that may run in your family, and various assessments and screenings as appropriate. After reviewing your medical record and screening and assessments performed today your provider may have ordered immunizations, labs, imaging, and/or referrals for you. A list of these orders (if applicable) as well as your Preventive Care list are included within your After Visit Summary for your review. Other Preventive Recommendations:    A preventive eye exam performed by an eye specialist is recommended every 1-2 years to screen for glaucoma; cataracts, macular degeneration, and other eye disorders. A preventive dental visit is recommended every 6 months. Try to get at least 150 minutes of exercise per week or 10,000 steps per day on a pedometer . Order or download the FREE \"Exercise & Physical Activity: Your Everyday Guide\" from [employer]. Call 0-793.980.3688 or search [employer] online. You need 5775-1377 mg of calcium and 4417-6823 IU of vitamin D per day. It is possible to meet your calcium requirement with diet alone, but a vitamin D supplement is usually necessary to meet this goal.  When exposed to the sun, use a sunscreen that protects against both UVA and UVB radiation with an SPF of 30 or greater. Reapply every 2 to 3 hours or after sweating, drying off with a towel, or swimming. Always wear a seat belt when traveling in a car. Always wear a helmet when riding a bicycle or motorcycle.

## 2022-10-22 DIAGNOSIS — F32.A DEPRESSION, UNSPECIFIED DEPRESSION TYPE: ICD-10-CM

## 2022-10-24 RX ORDER — DULOXETIN HYDROCHLORIDE 60 MG/1
CAPSULE, DELAYED RELEASE ORAL
Qty: 180 CAPSULE | Refills: 1 | Status: SHIPPED | OUTPATIENT
Start: 2022-10-24

## 2022-10-24 RX ORDER — INSULIN GLARGINE 100 [IU]/ML
INJECTION, SOLUTION SUBCUTANEOUS
Qty: 90 ML | Refills: 1 | Status: SHIPPED | OUTPATIENT
Start: 2022-10-24

## 2022-10-24 NOTE — TELEPHONE ENCOUNTER
Last ov 09 14 22  Future Appointments   Date Time Provider Eduardo Kennedy   12/15/2022  2:00 PM Adali Heath, APRN - NP [facility]   12/21/2022  9:30 AM Wilfred Griggs MD [facility] [address]   8/21/2023 10:00 AM Hassell Spurling, [facility]

## 2022-10-26 RX ORDER — TAMSULOSIN HYDROCHLORIDE 0.4 MG/1
CAPSULE ORAL
Qty: 90 CAPSULE | Refills: 3 | Status: SHIPPED | OUTPATIENT
Start: 2022-10-26 | End: 2022-11-10 | Stop reason: SDUPTHER

## 2022-10-27 ENCOUNTER — HOSPITAL ENCOUNTER (OUTPATIENT)
Dept: ULTRASOUND IMAGING | Age: 77
Discharge: HOME | End: 2022-10-27
Payer: MEDICARE

## 2022-10-27 DIAGNOSIS — K74.60 HEPATIC CIRRHOSIS, UNSPECIFIED HEPATIC CIRRHOSIS TYPE, UNSPECIFIED WHETHER ASCITES PRESENT (HCC): ICD-10-CM

## 2022-10-27 LAB
A/G RATIO: 1.5 (ref 1.1–2.2)
ALBUMIN SERPL-MCNC: 4.5 G/DL (ref 3.4–5)
ALP BLD-CCNC: 40 U/L (ref 40–129)
ALT SERPL-CCNC: 20 U/L (ref 10–40)
ANION GAP SERPL CALCULATED.3IONS-SCNC: 13 MMOL/L (ref 3–16)
AST SERPL-CCNC: 18 U/L (ref 15–37)
BILIRUB SERPL-MCNC: 0.8 MG/DL (ref 0–1)
BUN BLDV-MCNC: 37 MG/DL (ref 7–20)
CALCIUM SERPL-MCNC: 9.5 MG/DL (ref 8.3–10.6)
CHLORIDE BLD-SCNC: 102 MMOL/L (ref 99–110)
CO2: 27 MMOL/L (ref 21–32)
CREAT SERPL-MCNC: 1.1 MG/DL (ref 0.8–1.3)
GFR SERPL CREATININE-BSD FRML MDRD: >60 ML/MIN/{1.73_M2}
GLUCOSE BLD-MCNC: 146 MG/DL (ref 70–99)
INR BLD: 1.16 (ref 0.87–1.14)
POTASSIUM SERPL-SCNC: 4.2 MMOL/L (ref 3.5–5.1)
PROTHROMBIN TIME: 14.7 SEC (ref 11.7–14.5)
SODIUM BLD-SCNC: 142 MMOL/L (ref 136–145)
TOTAL PROTEIN: 7.6 G/DL (ref 6.4–8.2)

## 2022-10-27 PROCEDURE — 76700 US EXAM ABDOM COMPLETE: CPT

## 2022-10-28 RX ORDER — ATENOLOL AND CHLORTHALIDONE TABLET 50; 25 MG/1; MG/1
TABLET ORAL
Qty: 90 TABLET | Refills: 3 | Status: SHIPPED | OUTPATIENT
Start: 2022-10-28

## 2022-10-29 LAB — AFP: 1.9 UG/L

## 2022-10-31 RX ORDER — INSULIN LISPRO 100 [IU]/ML
INJECTION, SOLUTION INTRAVENOUS; SUBCUTANEOUS
Qty: 120 ML | Refills: 3 | Status: SHIPPED | OUTPATIENT
Start: 2022-10-31

## 2022-11-08 DIAGNOSIS — G47.9 SLEEP DISTURBANCE: ICD-10-CM

## 2022-11-08 DIAGNOSIS — F41.1 GAD (GENERALIZED ANXIETY DISORDER): ICD-10-CM

## 2022-11-08 DIAGNOSIS — F33.1 MODERATE EPISODE OF RECURRENT MAJOR DEPRESSIVE DISORDER (HCC): ICD-10-CM

## 2022-11-08 RX ORDER — TRAZODONE HYDROCHLORIDE 50 MG/1
TABLET ORAL
Qty: 60 TABLET | Refills: 11 | Status: SHIPPED | OUTPATIENT
Start: 2022-11-08

## 2022-11-08 RX ORDER — BUSPIRONE HYDROCHLORIDE 10 MG/1
TABLET ORAL
Qty: 270 TABLET | Refills: 1 | Status: SHIPPED | OUTPATIENT
Start: 2022-11-08

## 2022-11-10 RX ORDER — FENOFIBRATE 160 MG/1
160 TABLET ORAL DAILY
Qty: 90 TABLET | Refills: 3 | Status: SHIPPED | OUTPATIENT
Start: 2022-11-10 | End: 2022-11-15 | Stop reason: SDUPTHER

## 2022-11-10 RX ORDER — TAMSULOSIN HYDROCHLORIDE 0.4 MG/1
CAPSULE ORAL
Qty: 90 CAPSULE | Refills: 3 | Status: SHIPPED | OUTPATIENT
Start: 2022-11-10 | End: 2022-11-15 | Stop reason: SDUPTHER

## 2022-11-10 RX ORDER — ATORVASTATIN CALCIUM 20 MG/1
TABLET, FILM COATED ORAL
Qty: 90 TABLET | Refills: 3 | Status: SHIPPED | OUTPATIENT
Start: 2022-11-10 | End: 2022-11-15 | Stop reason: SDUPTHER

## 2022-11-15 RX ORDER — ATORVASTATIN CALCIUM 20 MG/1
TABLET, FILM COATED ORAL
Qty: 90 TABLET | Refills: 3 | Status: SHIPPED | OUTPATIENT
Start: 2022-11-15

## 2022-11-15 RX ORDER — TAMSULOSIN HYDROCHLORIDE 0.4 MG/1
CAPSULE ORAL
Qty: 90 CAPSULE | Refills: 3 | Status: SHIPPED | OUTPATIENT
Start: 2022-11-15

## 2022-11-15 RX ORDER — FENOFIBRATE 160 MG/1
160 TABLET ORAL DAILY
Qty: 90 TABLET | Refills: 3 | Status: SHIPPED | OUTPATIENT
Start: 2022-11-15

## 2022-11-21 ENCOUNTER — TELEPHONE (OUTPATIENT)
Dept: PSYCHIATRY | Age: 77
End: 2022-11-21

## 2022-11-21 NOTE — TELEPHONE ENCOUNTER
Attempted to call patient to cancel an reschedule his appointment on 12-15-22 with [facility]. Number on file not in service. I will also send a [employer] message.

## 2022-12-02 DIAGNOSIS — E11.22 TYPE 2 DIABETES MELLITUS WITH STAGE 2 CHRONIC KIDNEY DISEASE, WITH LONG-TERM CURRENT USE OF INSULIN (HCC): ICD-10-CM

## 2022-12-02 DIAGNOSIS — N18.2 TYPE 2 DIABETES MELLITUS WITH STAGE 2 CHRONIC KIDNEY DISEASE, WITH LONG-TERM CURRENT USE OF INSULIN (HCC): ICD-10-CM

## 2022-12-02 DIAGNOSIS — Z79.4 TYPE 2 DIABETES MELLITUS WITH STAGE 2 CHRONIC KIDNEY DISEASE, WITH LONG-TERM CURRENT USE OF INSULIN (HCC): ICD-10-CM

## 2022-12-02 NOTE — TELEPHONE ENCOUNTER
Last office visit 9/14/22        Future Appointments   Date Time Provider Eduardo Marcia   12/21/2022  9:30 AM Warden Mattie MD [address]   8/21/2023 10:00 AM Ozzy Clements MD [address] [place] [address]

## 2022-12-03 RX ORDER — PEN NEEDLE, DIABETIC 31 GX5/16"
NEEDLE, DISPOSABLE MISCELLANEOUS
Qty: 90 EACH | Refills: 3 | Status: SHIPPED | OUTPATIENT
Start: 2022-12-03

## 2022-12-16 ENCOUNTER — TELEPHONE (OUTPATIENT)
Dept: INTERNAL MEDICINE CLINIC | Age: 77
End: 2022-12-16

## 2022-12-21 ENCOUNTER — OFFICE VISIT (OUTPATIENT)
Dept: INTERNAL MEDICINE CLINIC | Age: 77
End: 2022-12-21
Payer: MEDICARE

## 2022-12-21 VITALS
DIASTOLIC BLOOD PRESSURE: 71 MMHG | WEIGHT: 255 LBS | OXYGEN SATURATION: 98 % | TEMPERATURE: 98.3 F | BODY MASS INDEX: 37.66 KG/M2 | HEART RATE: 55 BPM | SYSTOLIC BLOOD PRESSURE: 169 MMHG

## 2022-12-21 DIAGNOSIS — Z11.59 NEED FOR HEPATITIS C SCREENING TEST: ICD-10-CM

## 2022-12-21 DIAGNOSIS — E78.2 MIXED HYPERLIPIDEMIA: ICD-10-CM

## 2022-12-21 DIAGNOSIS — N18.2 TYPE 2 DIABETES MELLITUS WITH STAGE 2 CHRONIC KIDNEY DISEASE, WITH LONG-TERM CURRENT USE OF INSULIN (HCC): Primary | ICD-10-CM

## 2022-12-21 DIAGNOSIS — Z79.4 TYPE 2 DIABETES MELLITUS WITH STAGE 2 CHRONIC KIDNEY DISEASE, WITH LONG-TERM CURRENT USE OF INSULIN (HCC): Primary | ICD-10-CM

## 2022-12-21 DIAGNOSIS — Z12.5 SCREENING PSA (PROSTATE SPECIFIC ANTIGEN): ICD-10-CM

## 2022-12-21 DIAGNOSIS — I10 ESSENTIAL HYPERTENSION: ICD-10-CM

## 2022-12-21 DIAGNOSIS — E11.22 TYPE 2 DIABETES MELLITUS WITH STAGE 2 CHRONIC KIDNEY DISEASE, WITH LONG-TERM CURRENT USE OF INSULIN (HCC): Primary | ICD-10-CM

## 2022-12-21 LAB
A/G RATIO: 1.2 (ref 1.1–2.2)
ALBUMIN SERPL-MCNC: 3.8 G/DL (ref 3.4–5)
ALP BLD-CCNC: 47 U/L (ref 40–129)
ALT SERPL-CCNC: 17 U/L (ref 10–40)
ANION GAP SERPL CALCULATED.3IONS-SCNC: 12 MMOL/L (ref 3–16)
AST SERPL-CCNC: 18 U/L (ref 15–37)
BILIRUB SERPL-MCNC: 0.7 MG/DL (ref 0–1)
BUN BLDV-MCNC: 37 MG/DL (ref 7–20)
CALCIUM SERPL-MCNC: 9.3 MG/DL (ref 8.3–10.6)
CHLORIDE BLD-SCNC: 101 MMOL/L (ref 99–110)
CHOLESTEROL, TOTAL: 104 MG/DL (ref 0–199)
CO2: 24 MMOL/L (ref 21–32)
CREAT SERPL-MCNC: 1.2 MG/DL (ref 0.8–1.3)
ESTIMATED AVERAGE GLUCOSE: 128.4 MG/DL
GFR SERPL CREATININE-BSD FRML MDRD: >60 ML/MIN/{1.73_M2}
GLUCOSE BLD-MCNC: 193 MG/DL (ref 70–99)
HBA1C MFR BLD: 6.1 %
HDLC SERPL-MCNC: 24 MG/DL (ref 40–60)
HEPATITIS C ANTIBODY INTERPRETATION: NORMAL
LDL CHOLESTEROL CALCULATED: 40 MG/DL
POTASSIUM SERPL-SCNC: 4.4 MMOL/L (ref 3.5–5.1)
PROSTATE SPECIFIC ANTIGEN: 0.38 NG/ML (ref 0–4)
SODIUM BLD-SCNC: 137 MMOL/L (ref 136–145)
TOTAL PROTEIN: 7 G/DL (ref 6.4–8.2)
TRIGL SERPL-MCNC: 199 MG/DL (ref 0–150)
TSH REFLEX: 3.93 UIU/ML (ref 0.27–4.2)
VLDLC SERPL CALC-MCNC: 40 MG/DL

## 2022-12-21 PROCEDURE — 3078F DIAST BP <80 MM HG: CPT | Performed by: INTERNAL MEDICINE

## 2022-12-21 PROCEDURE — 3074F SYST BP LT 130 MM HG: CPT | Performed by: INTERNAL MEDICINE

## 2022-12-21 PROCEDURE — 3044F HG A1C LEVEL LT 7.0%: CPT | Performed by: INTERNAL MEDICINE

## 2022-12-21 PROCEDURE — 1036F TOBACCO NON-USER: CPT | Performed by: INTERNAL MEDICINE

## 2022-12-21 PROCEDURE — G8427 DOCREV CUR MEDS BY ELIG CLIN: HCPCS | Performed by: INTERNAL MEDICINE

## 2022-12-21 PROCEDURE — 36415 COLL VENOUS BLD VENIPUNCTURE: CPT | Performed by: INTERNAL MEDICINE

## 2022-12-21 PROCEDURE — 2022F DILAT RTA XM EVC RTNOPTHY: CPT | Performed by: INTERNAL MEDICINE

## 2022-12-21 PROCEDURE — G8484 FLU IMMUNIZE NO ADMIN: HCPCS | Performed by: INTERNAL MEDICINE

## 2022-12-21 PROCEDURE — 99214 OFFICE O/P EST MOD 30 MIN: CPT | Performed by: INTERNAL MEDICINE

## 2022-12-21 PROCEDURE — G8417 CALC BMI ABV UP PARAM F/U: HCPCS | Performed by: INTERNAL MEDICINE

## 2022-12-21 PROCEDURE — 1123F ACP DISCUSS/DSCN MKR DOCD: CPT | Performed by: INTERNAL MEDICINE

## 2022-12-21 PROCEDURE — 3017F COLORECTAL CA SCREEN DOC REV: CPT | Performed by: INTERNAL MEDICINE

## 2022-12-21 RX ORDER — HYDRALAZINE HYDROCHLORIDE 10 MG/1
10 TABLET, FILM COATED ORAL 3 TIMES DAILY
Qty: 90 TABLET | Refills: 1 | Status: SHIPPED | OUTPATIENT
Start: 2022-12-21 | End: 2023-01-20

## 2022-12-21 SDOH — ECONOMIC STABILITY: FOOD INSECURITY: WITHIN THE PAST 12 MONTHS, YOU WORRIED THAT YOUR FOOD WOULD RUN OUT BEFORE YOU GOT MONEY TO BUY MORE.: NEVER TRUE

## 2022-12-21 SDOH — ECONOMIC STABILITY: FOOD INSECURITY: WITHIN THE PAST 12 MONTHS, THE FOOD YOU BOUGHT JUST DIDN'T LAST AND YOU DIDN'T HAVE MONEY TO GET MORE.: NEVER TRUE

## 2022-12-21 ASSESSMENT — ENCOUNTER SYMPTOMS
VOMITING: 0
BLOOD IN STOOL: 0
SORE THROAT: 0
COUGH: 0
ABDOMINAL PAIN: 0
NAUSEA: 0
SHORTNESS OF BREATH: 0

## 2022-12-21 ASSESSMENT — SOCIAL DETERMINANTS OF HEALTH (SDOH): HOW HARD IS IT FOR YOU TO PAY FOR THE VERY BASICS LIKE FOOD, HOUSING, MEDICAL CARE, AND HEATING?: NOT HARD AT ALL

## 2022-12-21 NOTE — PROGRESS NOTES
Tenisha Hanna (:  1945) is a 78 y.o. male, Established patient, here for evaluation of the following chief complaint(s):  3 Month Follow-Up and Blood Pressure Check (Says that it has been high here lately)         ASSESSMENT/PLAN:  1. Type 2 diabetes mellitus with stage 2 chronic kidney disease, with long-term current use of insulin (HCC)  -     Hemoglobin A1C  - Stable   - Continue current dose of Insulin and Metformin     2. Essential hypertension  Uncontrolled. Starting on hydralazine for blood pressure control  - Continue current dose of amLODIPine, atenolol chlorthalidone, lisinopril  Recommended to follow lifestyle changes with DASH diet and exercise. Check BP daily and bring the record during follow up. Follows with psychiatry for anxiety treatment  -     Comprehensive Metabolic Panel  -     TSH with Reflex  -     hydrALAZINE (APRESOLINE) 10 MG tablet; Take 1 tablet by mouth 3 times daily, Disp-90 tablet, R-1Normal    3. Mixed hyperlipidemia:  - Stable   - Continue current dose of atorvastatin and fenofibrate.  -     Lipid Panel  -     TSH with Reflex    4. Need for hepatitis C screening test  -     Hepatitis C Antibody    5. Screening PSA (prostate specific antigen)  -     PSA Screening      Return in about 4 weeks (around 2023) for Hypertension. Subjective   SUBJECTIVE/OBJECTIVE:  Hypertension  This is a chronic problem. The current episode started more than 1 year ago. The problem is uncontrolled. Pertinent negatives include no chest pain, palpitations or shortness of breath. Risk factors for coronary artery disease include male gender. Past treatments include beta blockers, diuretics, calcium channel blockers and alpha 1 blockers. The current treatment provides moderate improvement. There are no compliance problems. Diabetes  He presents for his follow-up diabetic visit. He has type 2 diabetes mellitus. His disease course has been stable.  There are no hypoglycemic associated symptoms. Pertinent negatives for hypoglycemia include no dizziness. Pertinent negatives for diabetes include no chest pain, no fatigue and no weakness. There are no hypoglycemic complications. Diabetic complications include nephropathy. Risk factors for coronary artery disease include hypertension and male sex. Current diabetic treatment includes insulin injections and oral agent (monotherapy). He is compliant with treatment all of the time. He is following a diabetic diet. He participates in exercise intermittently. An ACE inhibitor/angiotensin II receptor blocker is being taken. Eye exam is current. Hyperlipidemia  This is a chronic problem. The current episode started more than 1 year ago. The problem is controlled. Exacerbating diseases include diabetes. Pertinent negatives include no chest pain or shortness of breath. Current antihyperlipidemic treatment includes statins and fibric acid derivatives. The current treatment provides significant improvement of lipids. There are no compliance problems. Risk factors for coronary artery disease include diabetes mellitus and dyslipidemia. Review of Systems   Constitutional:  Negative for fatigue and fever. HENT:  Negative for nosebleeds and sore throat. Respiratory:  Negative for cough and shortness of breath. Cardiovascular:  Negative for chest pain, palpitations and leg swelling. Gastrointestinal:  Negative for abdominal pain, blood in stool, nausea and vomiting. Neurological:  Negative for dizziness and weakness. Objective   Physical Exam  Constitutional:       Appearance: Normal appearance. HENT:      Head: Normocephalic and atraumatic. Eyes:      General: No scleral icterus. Conjunctiva/sclera: Conjunctivae normal.   Cardiovascular:      Rate and Rhythm: Normal rate and regular rhythm. Pulses: Normal pulses. Heart sounds: Normal heart sounds.    Pulmonary:      Effort: Pulmonary effort is normal.      Breath sounds: Normal breath sounds. Musculoskeletal:         General: No swelling. Skin:     General: Skin is warm and dry. Neurological:      Mental Status: He is alert and oriented to person, place, and time. Mental status is at baseline. Psychiatric:         Mood and Affect: Mood normal.         Behavior: Behavior normal.              An electronic signature was used to authenticate this note.     --Mani Ledesma MD

## 2023-01-12 ENCOUNTER — OFFICE VISIT (OUTPATIENT)
Dept: PSYCHIATRY | Age: 78
End: 2023-01-12
Payer: MEDICARE

## 2023-01-12 VITALS
WEIGHT: 262 LBS | HEIGHT: 69 IN | SYSTOLIC BLOOD PRESSURE: 142 MMHG | DIASTOLIC BLOOD PRESSURE: 78 MMHG | BODY MASS INDEX: 38.8 KG/M2

## 2023-01-12 DIAGNOSIS — F41.1 GAD (GENERALIZED ANXIETY DISORDER): Primary | ICD-10-CM

## 2023-01-12 DIAGNOSIS — F33.1 MODERATE EPISODE OF RECURRENT MAJOR DEPRESSIVE DISORDER (HCC): ICD-10-CM

## 2023-01-12 DIAGNOSIS — G47.9 SLEEP DISTURBANCE: ICD-10-CM

## 2023-01-12 PROCEDURE — 3077F SYST BP >= 140 MM HG: CPT | Performed by: NURSE PRACTITIONER

## 2023-01-12 PROCEDURE — 3017F COLORECTAL CA SCREEN DOC REV: CPT | Performed by: NURSE PRACTITIONER

## 2023-01-12 PROCEDURE — G8417 CALC BMI ABV UP PARAM F/U: HCPCS | Performed by: NURSE PRACTITIONER

## 2023-01-12 PROCEDURE — G8484 FLU IMMUNIZE NO ADMIN: HCPCS | Performed by: NURSE PRACTITIONER

## 2023-01-12 PROCEDURE — G8428 CUR MEDS NOT DOCUMENT: HCPCS | Performed by: NURSE PRACTITIONER

## 2023-01-12 PROCEDURE — 99213 OFFICE O/P EST LOW 20 MIN: CPT | Performed by: NURSE PRACTITIONER

## 2023-01-12 PROCEDURE — 3078F DIAST BP <80 MM HG: CPT | Performed by: NURSE PRACTITIONER

## 2023-01-12 PROCEDURE — 1036F TOBACCO NON-USER: CPT | Performed by: NURSE PRACTITIONER

## 2023-01-12 PROCEDURE — 1123F ACP DISCUSS/DSCN MKR DOCD: CPT | Performed by: NURSE PRACTITIONER

## 2023-01-12 RX ORDER — TEMAZEPAM 15 MG/1
15 CAPSULE ORAL NIGHTLY PRN
Qty: 14 CAPSULE | Refills: 0 | Status: SHIPPED | OUTPATIENT
Start: 2023-01-12 | End: 2023-01-12

## 2023-01-12 RX ORDER — TEMAZEPAM 15 MG/1
15 CAPSULE ORAL NIGHTLY PRN
Qty: 30 CAPSULE | Refills: 0 | Status: SHIPPED | OUTPATIENT
Start: 2023-01-12 | End: 2023-02-11

## 2023-01-12 ASSESSMENT — PATIENT HEALTH QUESTIONNAIRE - PHQ9
SUM OF ALL RESPONSES TO PHQ QUESTIONS 1-9: 13
SUM OF ALL RESPONSES TO PHQ9 QUESTIONS 1 & 2: 4
6. FEELING BAD ABOUT YOURSELF - OR THAT YOU ARE A FAILURE OR HAVE LET YOURSELF OR YOUR FAMILY DOWN: 1
8. MOVING OR SPEAKING SO SLOWLY THAT OTHER PEOPLE COULD HAVE NOTICED. OR THE OPPOSITE, BEING SO FIGETY OR RESTLESS THAT YOU HAVE BEEN MOVING AROUND A LOT MORE THAN USUAL: 0
9. THOUGHTS THAT YOU WOULD BE BETTER OFF DEAD, OR OF HURTING YOURSELF: 0
4. FEELING TIRED OR HAVING LITTLE ENERGY: 2
5. POOR APPETITE OR OVEREATING: 2
SUM OF ALL RESPONSES TO PHQ QUESTIONS 1-9: 13
7. TROUBLE CONCENTRATING ON THINGS, SUCH AS READING THE NEWSPAPER OR WATCHING TELEVISION: 1
2. FEELING DOWN, DEPRESSED OR HOPELESS: 2
3. TROUBLE FALLING OR STAYING ASLEEP: 3
10. IF YOU CHECKED OFF ANY PROBLEMS, HOW DIFFICULT HAVE THESE PROBLEMS MADE IT FOR YOU TO DO YOUR WORK, TAKE CARE OF THINGS AT HOME, OR GET ALONG WITH OTHER PEOPLE: 1
1. LITTLE INTEREST OR PLEASURE IN DOING THINGS: 2

## 2023-01-12 ASSESSMENT — ANXIETY QUESTIONNAIRES
5. BEING SO RESTLESS THAT IT IS HARD TO SIT STILL: 0
6. BECOMING EASILY ANNOYED OR IRRITABLE: 1
2. NOT BEING ABLE TO STOP OR CONTROL WORRYING: 2
1. FEELING NERVOUS, ANXIOUS, OR ON EDGE: 3
GAD7 TOTAL SCORE: 11
IF YOU CHECKED OFF ANY PROBLEMS ON THIS QUESTIONNAIRE, HOW DIFFICULT HAVE THESE PROBLEMS MADE IT FOR YOU TO DO YOUR WORK, TAKE CARE OF THINGS AT HOME, OR GET ALONG WITH OTHER PEOPLE: SOMEWHAT DIFFICULT
4. TROUBLE RELAXING: 2
3. WORRYING TOO MUCH ABOUT DIFFERENT THINGS: 2
7. FEELING AFRAID AS IF SOMETHING AWFUL MIGHT HAPPEN: 1

## 2023-01-12 NOTE — PROGRESS NOTES
PSYCHIATRY PROGRESS NOTE    Evangelina Judd  1945  01/12/23      CC:   Chief Complaint   Patient presents with    Follow-up       HPI:   Evangelina Judd is a 78 y.o. male with h/o ADHD, Anxiety, Depression who p/t clinic for follow up. Subjective/Interval Hx: Continuing to struggle with significant insomnia. Only sleeping a few hours. 100 mg of Trazodone is too much, makes him feel groggy the next day. 50 mg is not enough, cannot sleep. Struggling with a lot of anxiety but unsure if it is r/t lack of sleep. Holidays good. Denies SI. Denies SA. Location:  [facility]  Severity: Mod  Context:  As above. Modifiers: No improvement with Buspar increase and Trazodone increase  Quality: Anxious, sleep disturbance, mood lability with lack of sleep     Past Psychiatric History:              Prior hospitalizations: Denies              Prior diagnoses: Anxiety, Depression              Outpatient Treatment:                          Psychiatrist: Dr. Sabiha Ramon                          Therapist: Has seen therapist, unsure of names. (did not enjoy therapy)              Suicide Attempts: Denies              Hx SH:  Denies     Past Psychopharmacologic Trials (including response/reactions): Wellbutrin  Clomipramine  Effexor  Celexa      TNAISHA 7 SCORE 1/12/2023 7/21/2022 3/18/2021 2/18/2021 1/14/2021   TANISHA-7 Total Score 11 - - - -   TANISHA-7 Total Score - 17 10 13 13     Interpretation of TANISHA-7 score: 5-9 = mild anxiety, 10-14 = moderate anxiety,   15+ = severe anxiety. Recommend referral to behavioral health for scores 10 or greater.     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/2010    GBS (Guillain [place] syndrome) ([address] 1990's    History of colonic polyps     History of colonic polyps     Hyperlipidemia     Hypertension     Liver cirrhosis secondary to [facility])     Long term (current) use of insulin ([address] 10/10/2019    Obesity     Presence of intraocular lens 10/10/2019    Psychophysiological insomnia 5/15/2018    Retinal hemorrhage, 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 with mild nonproliferative diabetic retinopathy without macular edema, bilateral ([address] 10/10/2019    Type 2 diabetes mellitus with proteinuria ([address] 11/19/2019    Type II or unspecified type diabetes mellitus without mention of complication, not stated as uncontrolled     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     Past Surgical History:   Procedure Laterality Date    CATARACT REMOVAL Left 2010       Family History   Problem Relation Age of Onset    Alzheimer's Disease Mother     Heart Attack Father     Anxiety Disorder Sister     Other Son         Pulmonary Embolism         PCP: Kaden Sidhu MD      Allergies:    Allergies   Allergen Reactions    Seasonal          Current Medications:   Current Outpatient Medications on File Prior to Visit   Medication Sig Dispense Refill    hydrALAZINE (APRESOLINE) 10 MG tablet Take 1 tablet by mouth 3 times daily 90 tablet 1    Insulin Pen Needle ([employer] PEN NEEDLES 31G) 31G X 8 MM MISC USE TO INJECT INSULIN THREE TIMES A DAY 90 each 3    atorvastatin (LIPITOR) 20 MG tablet TAKE 1 TABLET BY MOUTH ONCE DAILY 90 tablet 3    fenofibrate (TRIGLIDE) 160 MG tablet Take 1 tablet by mouth daily 90 tablet 3    metFORMIN (GLUCOPHAGE) 1000 MG tablet TAKE 1 TABLET BY MOUTH  TWICE DAILY 180 tablet 3    tamsulosin (FLOMAX) 0.4 MG capsule TAKE 1 CAPSULE BY MOUTH  DAILY 90 capsule 3    busPIRone (BUSPAR) 10 MG tablet TAKE 1 TABLET BY MOUTH IN THE MORNING , 1 TABLET AT  NOON AND 1 TABLET BEFORE  BEDTIME 270 tablet 1    traZODone (DESYREL) 50 MG tablet TAKE 1 TO 2 TABLETS BY  MOUTH AT NIGHT 60 tablet 11    insulin lispro, 1 Unit Dial, (HUMALOG KWIKPEN) 100 UNIT/ML SOPN INJECT 32 TO 40 UNITS  SUBCUTANEOUSLY BEFORE MEALS 3 TIMES DAILY 120 mL 3    atenolol-chlorthalidone (TENORETIC) 50-25 MG per tablet TAKE 1 TABLET BY MOUTH  DAILY 90 tablet 3    insulin glargine (LANTUS SOLOSTAR) 100 UNIT/ML injection pen INJECT SUBCUTANEOUSLY 100  UNITS AT NIGHT 90 mL 1    DULoxetine (CYMBALTA) 60 MG extended release capsule TAKE 1 CAPSULE BY MOUTH  TWICE DAILY 180 capsule 1    blood glucose test strips (ONETOUCH ULTRA) strip USE TO TEST FOUR TIMES A DAY AS NEEDED 300 strip 1    oxybutynin (DITROPAN-XL) 10 MG extended release tablet TAKE 1 TABLET BY MOUTH  DAILY 90 tablet 1    lisinopril (PRINIVIL;ZESTRIL) 20 MG tablet TAKE 1 TABLET BY MOUTH  TWICE DAILY 180 tablet 1    amLODIPine (NORVASC) 10 MG tablet TAKE 1 TABLET BY MOUTH  DAILY 90 tablet 3     No current facility-administered medications on file prior to visit. Controlled Substance Monitoring:  PDMP Monitoring:    Last PDMP [place] as Reviewed:  Review User Review Instant Review Result   MADDIE MAJOR 1/12/2023  9:54 AM Reviewed PDMP [1]     Last Controlled Substance Monitoring Documentation      [address] Office Visit from 9/21/2021 in [facility]   Periodic Controlled Substance Monitoring Possible medication side effects, risk of tolerance/dependence & alternative treatments discussed., No signs of potential drug abuse or diversion identified. , Assessed functional status. , Random urine drug screen sent today.  filed at 09/21/2021 1045          Urine Drug Screenings (1 yr)       Drug Panel-PM-HI Res-UR Interp-A  Collected: 9/21/2021  9:15 AM (Final result)   Narrative: Referred out by:  [place] [address] [address]   Phone [phone] Drug Panel-PM-HI Res-UR Interp-A  Collected: 3/5/2020 10:10 AM (Final result)   Narrative: Referred out by:  [facility]  [address] [place], [place] [address]   Phone [phone]             Drug Panel-PM-HI Res-UR Interp-A  Collected: 1/16/2018 10:05 AM (Final result)   Narrative: Referred out by:  [facility]  [address] [place], [place] [address]   Phone [phone]             Drug Panel-PM-HI Res-UR Interp-A  Collected: 3/8/2016 10:36 AM (Final result)                  Medication Contract and Consent for Opioid Use Documents Filed       Patient Documents       Type of Document Status Date Received Received By Description    Medication Contract [Status Missing]  LETTY ZUNIGA 12/7/15, Medication Agreement    Medication Contract Received  LETTY ZUNIGA 1/16/18, Medication Agreement    Medication Contract [Status Missing]  LETTY ZUNIGA 8/17/15, Medication Agreement    Medication Contract Received 3/5/2020 11:20 AM ENDY ADDISON 3/5/2020 medication contract    Medication Contract Received 9/22/2021 10:20 AM ENDY ADDISON Medication Contract                     OBJECTIVE:  Vitals: Wt Readings from Last 3 Encounters:   01/12/23 262 lb (118.8 kg)   12/21/22 255 lb (115.7 kg)   09/14/22 257 lb (116.6 kg)       Vitals:    01/12/23 0901   BP: (!) 142/78   Site: Right Upper Arm   Position: Sitting   Cuff Size: Large Adult   Weight: 262 lb (118.8 kg)   Height: 5' 9\" (1.753 m)     ROS: Denies trouble with fever, rash, headache, vision changes, chest pain, shortness of breath, nausea, extremity pain, weakness, dysuria.       Mental Status Exam:     Appearance    alert, cooperative  Muscle strength/tone: no atrophy or abnormal movements  Gait/station: normal  Impulsive behavior No  Speech    spontaneous, normal rate and normal volume  Mood    Anxious  Affect    Anxiety affect Congruent to thought content and mood  Thought Content intact, no delusions voiced  Thought Process    linear   Associations    logical connections  Perceptions: denies AH/VH, does not appear preoccupied with the internal environment, no delusions  Insight    Good  Judgment    Intact  Orientation    oriented to person, place, time, and general circumstances  Memory    recent and remote memory intact  Attention/Concentration    intact  Ability to understand instructions Yes  Ability to respond meaningfully Yes  Language:  [address] of Knowledge/Intelligence:  Average  SI:   no suicidal ideation  HI: Denies HI    Labs:     Office Visit on 12/21/2022   Component Date Value    Cholesterol, Total 12/21/2022 104     Triglycerides 12/21/2022 199 (A)     HDL 12/21/2022 24 (A)     LDL Calculated 12/21/2022 40     VLDL Cholesterol Calcula* 12/21/2022 40     Sodium 12/21/2022 137     Potassium 12/21/2022 4.4     Chloride 12/21/2022 101     CO2 12/21/2022 24     Anion Gap 12/21/2022 12     Glucose 12/21/2022 193 (A)     BUN 12/21/2022 37 (A)     Creatinine 12/21/2022 1.2     Est, Glom Filt Rate 12/21/2022 >60     Calcium 12/21/2022 9.3     Total Protein 12/21/2022 7.0     Albumin 12/21/2022 3.8     Albumin/Globulin Ratio 12/21/2022 1.2     Total Bilirubin 12/21/2022 0.7     Alkaline Phosphatase 12/21/2022 47     ALT 12/21/2022 17     AST 12/21/2022 18     TSH 12/21/2022 3.93     Hemoglobin A1C 12/21/2022 6.1     eAG 12/21/2022 128.4     PSA 12/21/2022 0.38     Hep C Ab Interp 12/21/2022 Non-reactive    Orders Only on 10/27/2022   Component Date Value    AFP (Alpha Fetoprotein) 10/27/2022 1.9    Orders Only on 10/27/2022   Component Date Value    Sodium 10/27/2022 142     Potassium 10/27/2022 4.2     Chloride 10/27/2022 102     CO2 10/27/2022 27     Anion Gap 10/27/2022 13     Glucose 10/27/2022 146 (A)     BUN 10/27/2022 37 (A)     Creatinine 10/27/2022 1.1     Est, Glom Filt Rate 10/27/2022 >60     Calcium 10/27/2022 9.5     Total Protein 10/27/2022 7.6     Albumin 10/27/2022 4.5     Albumin/Globulin Ratio 10/27/2022 1.5     Total Bilirubin 10/27/2022 0.8     Alkaline Phosphatase 10/27/2022 40     ALT 10/27/2022 20     AST 10/27/2022 18    Orders Only on 10/27/2022   Component Date Value    Protime 10/27/2022 14.7 (A)     INR 10/27/2022 1.16 (A)    Office Visit on 09/14/2022   Component Date Value    Hemoglobin A1C 09/14/2022 6.4        Last Drug screen:  Lab Results   Component Value Date/Time    MDMA Not Detected 09/21/2021 09:15 AM       Imaging: no head imaging on file      ASSESSMENT AND PLAN     Diagnosis Orders   1. TANISHA (generalized anxiety disorder)        2. Sleep disturbance  temazepam (RESTORIL) 15 MG capsule    DISCONTINUED: temazepam (RESTORIL) 15 MG capsule      3. Moderate episode of recurrent major depressive disorder ([facility]            1. Safety: NO Imminent risk of danger to/self/others based on the factors considered below. Appropriate for outpatient level of care. Safety plan includes: 911, PES, hotlines, and interventions discussed today. Risk factors: Age <25 or >49, male gender, depressed mood, social isolation, no outpatient services in place, and no collateral information to support safety. Protective factors: denies suicidal ideation, does not have lethal plan, does not have access to guns or weapons, patient is didier for safety, no prior suicide attempts, no family h/o suicide, no substance abuse, patient has social or family support, no active psychosis or cognitive dysfunction, physically healthy, compliant with recommended medications,  and patient is future oriented. 2. Psychiatric Plan     MDD, recurrent, moderate, Generalized Anxiety Disorder, Sleep disturbance:     -CONTINUE Cymbalta 60 mg BID for depression/anxiety sx. Patient unsure if this medication is actually working at this point but will not titrate off and switch.     -Hold Trazodone 50-nightly for sleep/mood augmentation. Side effects discussed. R/B discussed.  Trial Temazepam 15 mg capsule nightly for insomnia, sleep related anxiety. R/b/se discussed. - Continue Buspar to 10 mg TID. Unsure if this is really helping to control anxiety but difficult to assess with sleep issues. R/B/Se discussed. Patient agreeable.      -Labs: reviewed in Epic.      -Declines therapy at this time     -OARRS reviewed, c/w history  -R/b/se/a d/w pt who consents. 3. Medical  -Following with ADDISON Mast - CNP     4. Substance  -No active issues.      5. RTC - 1 month     Vance Ruano, [address] [place] [address], Ne Nurse Practitioner

## 2023-01-18 ENCOUNTER — OFFICE VISIT (OUTPATIENT)
Dept: INTERNAL MEDICINE CLINIC | Age: 78
End: 2023-01-18
Payer: MEDICARE

## 2023-01-18 VITALS
BODY MASS INDEX: 39.13 KG/M2 | DIASTOLIC BLOOD PRESSURE: 62 MMHG | WEIGHT: 264.2 LBS | OXYGEN SATURATION: 95 % | HEART RATE: 61 BPM | RESPIRATION RATE: 16 BRPM | SYSTOLIC BLOOD PRESSURE: 130 MMHG | HEIGHT: 69 IN | TEMPERATURE: 97.6 F

## 2023-01-18 DIAGNOSIS — E11.22 TYPE 2 DIABETES MELLITUS WITH STAGE 2 CHRONIC KIDNEY DISEASE, WITH LONG-TERM CURRENT USE OF INSULIN (HCC): Primary | ICD-10-CM

## 2023-01-18 DIAGNOSIS — Z79.4 TYPE 2 DIABETES MELLITUS WITH STAGE 2 CHRONIC KIDNEY DISEASE, WITH LONG-TERM CURRENT USE OF INSULIN (HCC): Primary | ICD-10-CM

## 2023-01-18 DIAGNOSIS — N18.2 TYPE 2 DIABETES MELLITUS WITH STAGE 2 CHRONIC KIDNEY DISEASE, WITH LONG-TERM CURRENT USE OF INSULIN (HCC): Primary | ICD-10-CM

## 2023-01-18 DIAGNOSIS — I10 ESSENTIAL HYPERTENSION: ICD-10-CM

## 2023-01-18 DIAGNOSIS — E78.2 MIXED HYPERLIPIDEMIA: ICD-10-CM

## 2023-01-18 PROCEDURE — G8417 CALC BMI ABV UP PARAM F/U: HCPCS | Performed by: INTERNAL MEDICINE

## 2023-01-18 PROCEDURE — 99214 OFFICE O/P EST MOD 30 MIN: CPT | Performed by: INTERNAL MEDICINE

## 2023-01-18 PROCEDURE — 3017F COLORECTAL CA SCREEN DOC REV: CPT | Performed by: INTERNAL MEDICINE

## 2023-01-18 PROCEDURE — 3078F DIAST BP <80 MM HG: CPT | Performed by: INTERNAL MEDICINE

## 2023-01-18 PROCEDURE — G8427 DOCREV CUR MEDS BY ELIG CLIN: HCPCS | Performed by: INTERNAL MEDICINE

## 2023-01-18 PROCEDURE — 1123F ACP DISCUSS/DSCN MKR DOCD: CPT | Performed by: INTERNAL MEDICINE

## 2023-01-18 PROCEDURE — 3075F SYST BP GE 130 - 139MM HG: CPT | Performed by: INTERNAL MEDICINE

## 2023-01-18 PROCEDURE — G8484 FLU IMMUNIZE NO ADMIN: HCPCS | Performed by: INTERNAL MEDICINE

## 2023-01-18 PROCEDURE — 2022F DILAT RTA XM EVC RTNOPTHY: CPT | Performed by: INTERNAL MEDICINE

## 2023-01-18 PROCEDURE — 3046F HEMOGLOBIN A1C LEVEL >9.0%: CPT | Performed by: INTERNAL MEDICINE

## 2023-01-18 PROCEDURE — 1036F TOBACCO NON-USER: CPT | Performed by: INTERNAL MEDICINE

## 2023-01-18 RX ORDER — HYDRALAZINE HYDROCHLORIDE 10 MG/1
10 TABLET, FILM COATED ORAL 3 TIMES DAILY
Qty: 270 TABLET | Refills: 0 | Status: SHIPPED | OUTPATIENT
Start: 2023-01-18 | End: 2023-04-18

## 2023-01-18 ASSESSMENT — ENCOUNTER SYMPTOMS
VOMITING: 0
ABDOMINAL PAIN: 0
BLOOD IN STOOL: 0
SORE THROAT: 0
NAUSEA: 0
COUGH: 0
SHORTNESS OF BREATH: 0

## 2023-01-18 NOTE — PROGRESS NOTES
Priyanka Brush (:  1945) is a 78 y.o. male, Established patient, here for evaluation of the following chief complaint(s):  Follow-up and Hypertension         ASSESSMENT/PLAN:  1. Type 2 diabetes mellitus with stage 2 chronic kidney disease, with long-term current use of insulin (HCC)  - Stable   - Continue current dose of Insulin and Metformin     2. Essential hypertension  - Stable   - Continue current dose of hydralazine ,amLODIPine, atenolo,l chlorthalidone, lisinopril  Recommended to follow lifestyle changes with DASH diet and exercise. Check BP daily and bring the record during follow up. Follows with psychiatry for anxiety treatment  -     hydrALAZINE (APRESOLINE) 10 MG tablet; Take 1 tablet by mouth 3 times daily, Disp-270 tablet, R-1Normal    3. Mixed hyperlipidemia:  - Stable   - Continue current dose of atorvastatin and fenofibrate. Return in about 3 months (around 2023) for Hypertension. Subjective   SUBJECTIVE/OBJECTIVE:  Hypertension  This is a chronic problem. The current episode started more than 1 year ago. The problem is controlled. Pertinent negatives include no chest pain, palpitations or shortness of breath. Risk factors for coronary artery disease include male gender. Past treatments include beta blockers, diuretics, calcium channel blockers and alpha 1 blockers. The current treatment provides moderate improvement. There are no compliance problems. Diabetes  He presents for his follow-up diabetic visit. He has type 2 diabetes mellitus. His disease course has been stable. There are no hypoglycemic associated symptoms. Pertinent negatives for hypoglycemia include no dizziness. Pertinent negatives for diabetes include no chest pain, no fatigue and no weakness. There are no hypoglycemic complications. Diabetic complications include nephropathy. Risk factors for coronary artery disease include hypertension and male sex.  Current diabetic treatment includes insulin injections and oral agent (monotherapy). He is compliant with treatment all of the time. He is following a diabetic diet. He participates in exercise intermittently. An ACE inhibitor/angiotensin II receptor blocker is being taken. Eye exam is current. Hyperlipidemia  This is a chronic problem. The current episode started more than 1 year ago. The problem is controlled. Exacerbating diseases include diabetes. Pertinent negatives include no chest pain or shortness of breath. Current antihyperlipidemic treatment includes statins and fibric acid derivatives. The current treatment provides significant improvement of lipids. There are no compliance problems. Risk factors for coronary artery disease include diabetes mellitus and dyslipidemia. Review of Systems   Constitutional:  Negative for fatigue and fever. HENT:  Negative for nosebleeds and sore throat. Respiratory:  Negative for cough and shortness of breath. Cardiovascular:  Negative for chest pain, palpitations and leg swelling. Gastrointestinal:  Negative for abdominal pain, blood in stool, nausea and vomiting. Neurological:  Negative for dizziness and weakness. Objective   Physical Exam  Constitutional:       Appearance: Normal appearance. HENT:      Head: Normocephalic and atraumatic. Eyes:      General: No scleral icterus. Conjunctiva/sclera: Conjunctivae normal.   Cardiovascular:      Rate and Rhythm: Normal rate and regular rhythm. Pulses: Normal pulses. Heart sounds: Normal heart sounds. Pulmonary:      Effort: Pulmonary effort is normal.      Breath sounds: Normal breath sounds. Musculoskeletal:         General: No swelling. Right lower leg: No edema. Left lower leg: No edema. Skin:     General: Skin is warm and dry. Neurological:      Mental Status: He is alert and oriented to person, place, and time. Mental status is at baseline.    Psychiatric:         Mood and Affect: Mood normal. Behavior: Behavior normal.              An electronic signature was used to authenticate this note.     --Cosimo Buerger, MD

## 2023-02-06 ENCOUNTER — PATIENT MESSAGE (OUTPATIENT)
Dept: INTERNAL MEDICINE CLINIC | Age: 78
End: 2023-02-06

## 2023-02-06 DIAGNOSIS — I10 ESSENTIAL HYPERTENSION: ICD-10-CM

## 2023-02-06 NOTE — TELEPHONE ENCOUNTER
From: Connie Crowder  To: Dr. Baez Figures: 2/6/2023 12:01 AM EST  Subject: Blood pressure status    Dr. Tatiana Gaffney.. at last appointment you kept hydralazine 10mg the same,however my blood pressure has been way out of control with systolic ranging 134 to 535,KMN diastolic not as out of line but still reads 80 to 90 at times. Heart rate has been 68 to 70. My question is do you want to increase hydralazine or any of the other medications before I come in for my next appt in 2 months,or do we stay the same. I have been watching my diet and trying to do minimal exercise since I have a bone on bone right knee,but I still have high readings. Since I was in last I brought my blood pressure machine in and they were similar using your smaller cuff. It was a little lower using a larger cuff,however except for that one time a larger cuff was never used. .My arm measures out at 15 inches and my cuff can be used up to 17 inches so I do think that my blood pressure is very high and not related to the cuff. .please inform me on My Chart about what to do. ..[place] Clarity

## 2023-02-07 DIAGNOSIS — I10 ESSENTIAL HYPERTENSION: ICD-10-CM

## 2023-02-07 DIAGNOSIS — N32.81 OVERACTIVE BLADDER: ICD-10-CM

## 2023-02-07 RX ORDER — OXYBUTYNIN CHLORIDE 10 MG/1
TABLET, EXTENDED RELEASE ORAL
Qty: 90 TABLET | Refills: 3 | Status: SHIPPED | OUTPATIENT
Start: 2023-02-07

## 2023-02-07 RX ORDER — LISINOPRIL 20 MG/1
TABLET ORAL
Qty: 180 TABLET | Refills: 3 | Status: SHIPPED | OUTPATIENT
Start: 2023-02-07

## 2023-02-09 RX ORDER — HYDRALAZINE HYDROCHLORIDE 10 MG/1
20 TABLET, FILM COATED ORAL 3 TIMES DAILY
Qty: 540 TABLET | Refills: 0
Start: 2023-02-09 | End: 2023-05-10

## 2023-03-14 ENCOUNTER — PATIENT MESSAGE (OUTPATIENT)
Dept: INTERNAL MEDICINE CLINIC | Age: 78
End: 2023-03-14

## 2023-03-15 RX ORDER — HYDRALAZINE HYDROCHLORIDE 25 MG/1
25 TABLET, FILM COATED ORAL 3 TIMES DAILY
Qty: 270 TABLET | Refills: 0 | Status: SHIPPED | OUTPATIENT
Start: 2023-03-15 | End: 2023-06-13

## 2023-03-15 NOTE — TELEPHONE ENCOUNTER
From: Justin Quiñones  To: Dr. Hood Cole: 3/14/2023 6:20 PM EDT  Subject: Rx refill    ..per your request I have been taking blood pressure readings 3 times daily and systolic has most of the time been around 160. Since I have been taking hydralazine 10mg and doubling up to 20mg 3 times a day I will need a refills on this for 6 a day or 540 tablets for 3 months. If you choose to up dose to a 25mg tablet at 3 times a day I will need 270 tablets for 90 days. Whatever you decide please fax a prescription to my mail order at [address] [place] [address] Dr. Sy.Javan Homans

## 2023-04-19 ENCOUNTER — OFFICE VISIT (OUTPATIENT)
Dept: INTERNAL MEDICINE CLINIC | Age: 78
End: 2023-04-19

## 2023-04-19 VITALS
WEIGHT: 262 LBS | OXYGEN SATURATION: 97 % | DIASTOLIC BLOOD PRESSURE: 64 MMHG | HEART RATE: 59 BPM | TEMPERATURE: 97.6 F | RESPIRATION RATE: 16 BRPM | SYSTOLIC BLOOD PRESSURE: 151 MMHG | HEIGHT: 68 IN | BODY MASS INDEX: 39.71 KG/M2

## 2023-04-19 DIAGNOSIS — Z13.6 SCREENING FOR HEART DISEASE: ICD-10-CM

## 2023-04-19 DIAGNOSIS — Z79.4 TYPE 2 DIABETES MELLITUS WITH STAGE 2 CHRONIC KIDNEY DISEASE, WITH LONG-TERM CURRENT USE OF INSULIN (HCC): Primary | ICD-10-CM

## 2023-04-19 DIAGNOSIS — E11.22 TYPE 2 DIABETES MELLITUS WITH STAGE 2 CHRONIC KIDNEY DISEASE, WITH LONG-TERM CURRENT USE OF INSULIN (HCC): Primary | ICD-10-CM

## 2023-04-19 DIAGNOSIS — E78.2 MIXED HYPERLIPIDEMIA: ICD-10-CM

## 2023-04-19 DIAGNOSIS — E66.01 CLASS 2 SEVERE OBESITY DUE TO EXCESS CALORIES WITH SERIOUS COMORBIDITY AND BODY MASS INDEX (BMI) OF 39.0 TO 39.9 IN ADULT (HCC): ICD-10-CM

## 2023-04-19 DIAGNOSIS — I10 ESSENTIAL HYPERTENSION: ICD-10-CM

## 2023-04-19 DIAGNOSIS — N18.2 TYPE 2 DIABETES MELLITUS WITH STAGE 2 CHRONIC KIDNEY DISEASE, WITH LONG-TERM CURRENT USE OF INSULIN (HCC): Primary | ICD-10-CM

## 2023-04-19 LAB — HBA1C MFR BLD: 6.2 %

## 2023-04-19 RX ORDER — HYDRALAZINE HYDROCHLORIDE 25 MG/1
25 TABLET, FILM COATED ORAL EVERY 6 HOURS
Qty: 360 TABLET | Refills: 1 | Status: SHIPPED | OUTPATIENT
Start: 2023-04-19 | End: 2023-07-18

## 2023-04-19 SDOH — ECONOMIC STABILITY: INCOME INSECURITY: HOW HARD IS IT FOR YOU TO PAY FOR THE VERY BASICS LIKE FOOD, HOUSING, MEDICAL CARE, AND HEATING?: NOT HARD AT ALL

## 2023-04-19 SDOH — ECONOMIC STABILITY: HOUSING INSECURITY
IN THE LAST 12 MONTHS, WAS THERE A TIME WHEN YOU DID NOT HAVE A STEADY PLACE TO SLEEP OR SLEPT IN A SHELTER (INCLUDING NOW)?: NO

## 2023-04-19 SDOH — ECONOMIC STABILITY: FOOD INSECURITY: WITHIN THE PAST 12 MONTHS, YOU WORRIED THAT YOUR FOOD WOULD RUN OUT BEFORE YOU GOT MONEY TO BUY MORE.: NEVER TRUE

## 2023-04-19 SDOH — ECONOMIC STABILITY: FOOD INSECURITY: WITHIN THE PAST 12 MONTHS, THE FOOD YOU BOUGHT JUST DIDN'T LAST AND YOU DIDN'T HAVE MONEY TO GET MORE.: NEVER TRUE

## 2023-04-19 ASSESSMENT — ENCOUNTER SYMPTOMS
NAUSEA: 0
COUGH: 0
ABDOMINAL PAIN: 0
SORE THROAT: 0
SHORTNESS OF BREATH: 0
BLOOD IN STOOL: 0
VOMITING: 0

## 2023-04-19 NOTE — PROGRESS NOTES
Jessica Wen (:  1945) is a 70 y.o. male, Established patient, here for evaluation of the following chief complaint(s):  Follow-up Chronic Condition, Diabetes, and Hypertension         ASSESSMENT/PLAN:  1. Type 2 diabetes mellitus with stage 2 chronic kidney disease, with long-term current use of insulin (HCC)  - Stable   - Continue current dose of Insulin and Metformin   Starting on semaglutide for weight loss and diabetes control.  -     Semaglutide,0.25 or 0.5MG/DOS, 2 MG/3ML SOPN; Inject 0.25 mg into the skin every 7 days, Disp-3 mL, R-1Normal  -     POCT glycosylated hemoglobin (Hb A1C)    2. Essential hypertension  -Uncontrolled. -Increasing the dose of hydralazine to 25 mg every 6 hours daily  - Continue current dose of amLODIPine, atenolol, chlorthalidone, lisinopril  Recommended to follow lifestyle changes with low-sodium diet and regular exercise exercise. Check BP daily and bring the record during follow up. -     hydrALAZINE (APRESOLINE) 25 MG tablet; Take 1 tablet by mouth in the morning and 1 tablet at noon and 1 tablet in the evening and 1 tablet before bedtime. , Disp-360 tablet, R-1Normal    3. Mixed hyperlipidemia:  - Stable   - Continue current dose of atorvastatin and fenofibrate. 4. Class 2 severe obesity due to excess calories with serious comorbidity and body mass index (BMI) of 39.0 to 39.9 in [facility])  Starting on semaglutide for weight loss and diabetes control. 5. Screening for heart disease  Patient would like to get screened for heart diseases, referral provided to cardiology. -     Tisha Phillip MD, Cardiology, [facility]          Return in about 4 weeks (around 2023). Subjective   SUBJECTIVE/OBJECTIVE:  Hypertension  This is a chronic problem. The current episode started more than 1 year ago. The problem is uncontrolled. Pertinent negatives include no chest pain, palpitations or shortness of breath.  Risk factors for coronary artery

## 2023-04-20 ENCOUNTER — TELEPHONE (OUTPATIENT)
Dept: CARDIOLOGY CLINIC | Age: 78
End: 2023-04-20

## 2023-04-20 NOTE — TELEPHONE ENCOUNTER
SCREENING QUESTIONS:       Do you have a history of cardiovascular disease, this includes any of the following- heart stent and/or open-heart surgery, stroke or abdominal aortic aneurysm? No (If the answer is yes please do not schedule)     Are you currently following up with a cardiologist? No     If no, would you like our office to contact you? No        Do you have a family history of abdominal aortic aneurysm, heart attack or stroke? Yes  FATHER  Do you have high cholesterol? No    Do you have high blood pressure? Yes    Do you have diabetes? Yes    Do you currently smoke or are you a former smoker? No      WRAP UP:     PLEASE CHECK EACH ONE     Referred by: PCP DR. KIRAN [facility]    [x] Scheduled on 4/27/2023 at 12PM at the [place] office    [x] Instructions given to patient- Nothing to eat or drink 8 hrs prior to appointment and wear loose, comfortable clothing    [x] Not scheduled due to previous history themselves    [] Sent to RN pool, has a cardiac history and is not following a cardiologist, would like a follow call

## 2023-04-27 ENCOUNTER — PROCEDURE VISIT (OUTPATIENT)
Dept: CARDIOLOGY CLINIC | Age: 78
End: 2023-04-27

## 2023-04-27 DIAGNOSIS — Z13.6 ENCOUNTER FOR SCREENING FOR CARDIOVASCULAR DISORDERS: Primary | ICD-10-CM

## 2023-05-16 DIAGNOSIS — I10 ESSENTIAL HYPERTENSION: ICD-10-CM

## 2023-05-16 RX ORDER — HYDRALAZINE HYDROCHLORIDE 25 MG/1
TABLET, FILM COATED ORAL
Qty: 270 TABLET | Refills: 3 | Status: SHIPPED | OUTPATIENT
Start: 2023-05-16 | End: 2023-05-17

## 2023-05-16 RX ORDER — INSULIN GLARGINE 100 [IU]/ML
INJECTION, SOLUTION SUBCUTANEOUS
Qty: 90 ML | Refills: 3 | Status: SHIPPED | OUTPATIENT
Start: 2023-05-16

## 2023-05-17 ENCOUNTER — OFFICE VISIT (OUTPATIENT)
Dept: INTERNAL MEDICINE CLINIC | Age: 78
End: 2023-05-17

## 2023-05-17 VITALS
DIASTOLIC BLOOD PRESSURE: 72 MMHG | TEMPERATURE: 98.6 F | HEART RATE: 64 BPM | WEIGHT: 262 LBS | BODY MASS INDEX: 39.84 KG/M2 | SYSTOLIC BLOOD PRESSURE: 164 MMHG | OXYGEN SATURATION: 97 %

## 2023-05-17 DIAGNOSIS — I10 UNCONTROLLED HYPERTENSION: Primary | ICD-10-CM

## 2023-05-17 DIAGNOSIS — Z79.4 TYPE 2 DIABETES MELLITUS WITH STAGE 2 CHRONIC KIDNEY DISEASE, WITH LONG-TERM CURRENT USE OF INSULIN (HCC): ICD-10-CM

## 2023-05-17 DIAGNOSIS — F41.9 ANXIETY: ICD-10-CM

## 2023-05-17 DIAGNOSIS — E11.22 TYPE 2 DIABETES MELLITUS WITH STAGE 2 CHRONIC KIDNEY DISEASE, WITH LONG-TERM CURRENT USE OF INSULIN (HCC): ICD-10-CM

## 2023-05-17 DIAGNOSIS — N18.2 TYPE 2 DIABETES MELLITUS WITH STAGE 2 CHRONIC KIDNEY DISEASE, WITH LONG-TERM CURRENT USE OF INSULIN (HCC): ICD-10-CM

## 2023-05-17 RX ORDER — BUSPIRONE HYDROCHLORIDE 10 MG/1
TABLET ORAL
Qty: 270 TABLET | Refills: 1 | Status: SHIPPED | OUTPATIENT
Start: 2023-05-17

## 2023-05-17 RX ORDER — HYDRALAZINE HYDROCHLORIDE 50 MG/1
50 TABLET, FILM COATED ORAL EVERY 8 HOURS
Qty: 270 TABLET | Refills: 1 | Status: SHIPPED | OUTPATIENT
Start: 2023-05-17 | End: 2023-08-15

## 2023-05-17 ASSESSMENT — ENCOUNTER SYMPTOMS
VOMITING: 0
SORE THROAT: 0
SHORTNESS OF BREATH: 0
ABDOMINAL PAIN: 0
COUGH: 0
NAUSEA: 0
BLOOD IN STOOL: 0

## 2023-05-17 NOTE — PROGRESS NOTES
Devon Michael (:  1945) is a 70 y.o. male, Established patient, here for evaluation of the following chief complaint(s):  Blood Pressure Check (Says that bp runs from 150-160) and Hypertension         ASSESSMENT/PLAN:  1. Uncontrolled hypertension  Chronic, uncontrolled. Increasing dose of hydralazine to 50 mg 3 times daily. Continue current dose of amlodipine, atenolol, chlorthalidone and lisinopril. Advised patient to follow lifestyle changes with DASH diet and regular exercise. Continue monitoring blood pressure at home twice daily. Referring to nephrology for further management. -     hydrALAZINE (APRESOLINE) 50 MG tablet; Take 1 tablet by mouth in the morning and 1 tablet at noon and 1 tablet in the evening., Disp-270 tablet, R-1Requesting 1 year supplyNormal  -     AFL(Epic) - Benita Eddy MD, Nephrology, [facility]  2. Anxiety  - Stable   - Continue current dose of buspirone  -     busPIRone (BUSPAR) 10 MG tablet; TAKE 1 TABLET BY MOUTH IN  THE MORNING , 1 TABLET AT  NOON AND 1 TABLET BEFORE  BEDTIME, Disp-270 tablet, R-1Normal  3. Type 2 diabetes mellitus with stage 2 chronic kidney disease, with long-term current use of insulin (HCC)  - Stable   - Continue current dose of insulin and metformin  Advised patient to start semaglutide as prescribed for weight loss and sugar control        Return in about 4 weeks (around 2023) for Hypertension. Subjective   SUBJECTIVE/OBJECTIVE:  Hypertension  This is a chronic problem. The current episode started more than 1 year ago. The problem is uncontrolled. Pertinent negatives include no chest pain, palpitations or shortness of breath. Risk factors for coronary artery disease include male gender. Past treatments include beta blockers, diuretics, calcium channel blockers and alpha 1 blockers. The current treatment provides moderate improvement. There are no compliance problems.     Diabetes  He presents for his follow-up diabetic

## 2023-07-12 ENCOUNTER — OFFICE VISIT (OUTPATIENT)
Dept: INTERNAL MEDICINE CLINIC | Age: 78
End: 2023-07-12

## 2023-07-12 VITALS
HEART RATE: 56 BPM | DIASTOLIC BLOOD PRESSURE: 86 MMHG | WEIGHT: 261 LBS | TEMPERATURE: 97.9 F | BODY MASS INDEX: 39.68 KG/M2 | SYSTOLIC BLOOD PRESSURE: 131 MMHG | OXYGEN SATURATION: 96 %

## 2023-07-12 DIAGNOSIS — I10 ESSENTIAL HYPERTENSION: ICD-10-CM

## 2023-07-12 DIAGNOSIS — N32.81 OVERACTIVE BLADDER: ICD-10-CM

## 2023-07-12 DIAGNOSIS — N18.2 TYPE 2 DIABETES MELLITUS WITH STAGE 2 CHRONIC KIDNEY DISEASE, WITH LONG-TERM CURRENT USE OF INSULIN (HCC): Primary | ICD-10-CM

## 2023-07-12 DIAGNOSIS — F41.9 ANXIETY: ICD-10-CM

## 2023-07-12 DIAGNOSIS — R20.2 PARESTHESIA OF UPPER AND LOWER EXTREMITY: ICD-10-CM

## 2023-07-12 DIAGNOSIS — E11.22 TYPE 2 DIABETES MELLITUS WITH STAGE 2 CHRONIC KIDNEY DISEASE, WITH LONG-TERM CURRENT USE OF INSULIN (HCC): Primary | ICD-10-CM

## 2023-07-12 DIAGNOSIS — F32.A DEPRESSION, UNSPECIFIED DEPRESSION TYPE: ICD-10-CM

## 2023-07-12 DIAGNOSIS — E78.2 MIXED HYPERLIPIDEMIA: ICD-10-CM

## 2023-07-12 DIAGNOSIS — N40.0 BENIGN PROSTATIC HYPERPLASIA, UNSPECIFIED WHETHER LOWER URINARY TRACT SYMPTOMS PRESENT: ICD-10-CM

## 2023-07-12 DIAGNOSIS — Z79.4 TYPE 2 DIABETES MELLITUS WITH STAGE 2 CHRONIC KIDNEY DISEASE, WITH LONG-TERM CURRENT USE OF INSULIN (HCC): Primary | ICD-10-CM

## 2023-07-12 ASSESSMENT — ENCOUNTER SYMPTOMS
ABDOMINAL PAIN: 0
COUGH: 0
NAUSEA: 0
VOMITING: 0
BLOOD IN STOOL: 0
SORE THROAT: 0
SHORTNESS OF BREATH: 0

## 2023-07-12 NOTE — PROGRESS NOTES
Liliana Gutiérrez (:  1945) is a 70 y.o. male, Established patient, here for evaluation of the following chief complaint(s):  Hypertension, Diabetes, and Numbness (In fingers been going for some time just never mentioned it)         ASSESSMENT/PLAN:  1. Type 2 diabetes mellitus with stage 2 chronic kidney disease, with long-term current use of insulin (HCC)  - Stable   - Continue current dose of insulin and metformin  -     Hemoglobin A1C; Future  2. Essential hypertension  - Stable   - Continue current dose of amlodipine, hydralazine, Adderall, chlorthalidone and lisinopril. Patient follows with nephrology for management. 3. Mixed hyperlipidemia  - Stable   - Continue current dose of atorvastatin  4. Depression, unspecified depression type  - Stable   - Continue current dose of Cymbalta  5. Anxiety  - Stable   - Continue current dose of buspirone  6. Overactive bladder  - Stable   - Continue current dose of oxybutynin  7. Benign prostatic hyperplasia, unspecified whether lower urinary tract symptoms present  - Stable   - Continue current dose of tamsulosin  8. Paresthesia of upper and lower extremity  New problem. Patient complains of tingling sensation in his hands and legs. Ordered vitamin B12 for evaluation, will treat based on result. -     Vitamin B12 & Folate; Future    Return in about 3 months (around 10/12/2023). Subjective   SUBJECTIVE/OBJECTIVE:  Hypertension  This is a chronic problem. The current episode started more than 1 year ago. The problem is controlled. Pertinent negatives include no chest pain, palpitations or shortness of breath. Risk factors for coronary artery disease include male gender. Past treatments include beta blockers, diuretics, calcium channel blockers, alpha 1 blockers and direct vasodilators. The current treatment provides significant improvement. There are no compliance problems. Diabetes  He presents for his follow-up diabetic visit.  He has type 2 diabetes

## 2023-07-26 ENCOUNTER — NURSE ONLY (OUTPATIENT)
Dept: INTERNAL MEDICINE CLINIC | Age: 78
End: 2023-07-26
Payer: MEDICARE

## 2023-07-26 DIAGNOSIS — N18.2 TYPE 2 DIABETES MELLITUS WITH STAGE 2 CHRONIC KIDNEY DISEASE, WITH LONG-TERM CURRENT USE OF INSULIN (HCC): ICD-10-CM

## 2023-07-26 DIAGNOSIS — E11.22 TYPE 2 DIABETES MELLITUS WITH STAGE 2 CHRONIC KIDNEY DISEASE, WITH LONG-TERM CURRENT USE OF INSULIN (HCC): ICD-10-CM

## 2023-07-26 DIAGNOSIS — R20.2 PARESTHESIA OF UPPER AND LOWER EXTREMITY: ICD-10-CM

## 2023-07-26 DIAGNOSIS — Z79.4 TYPE 2 DIABETES MELLITUS WITH STAGE 2 CHRONIC KIDNEY DISEASE, WITH LONG-TERM CURRENT USE OF INSULIN (HCC): ICD-10-CM

## 2023-07-26 LAB
EST. AVERAGE GLUCOSE BLD GHB EST-MCNC: 108.3 MG/DL
FOLATE SERPL-MCNC: 12.63 NG/ML (ref 4.78–24.2)
HBA1C MFR BLD: 5.4 %
VIT B12 SERPL-MCNC: 689 PG/ML (ref 211–911)

## 2023-07-26 PROCEDURE — 99999 PR OFFICE/OUTPT VISIT,PROCEDURE ONLY: CPT | Performed by: INTERNAL MEDICINE

## 2023-07-26 PROCEDURE — 36415 COLL VENOUS BLD VENIPUNCTURE: CPT | Performed by: INTERNAL MEDICINE

## 2023-08-04 ENCOUNTER — HOSPITAL ENCOUNTER (OUTPATIENT)
Age: 78
Discharge: HOME | End: 2023-08-04
Payer: MEDICARE

## 2023-08-04 DIAGNOSIS — I15.0 RENOVASCULAR HYPERTENSION: ICD-10-CM

## 2023-08-04 LAB
ANION GAP SERPL CALCULATED.3IONS-SCNC: 11 MMOL/L (ref 3–16)
BILIRUB UR QL STRIP.AUTO: NEGATIVE
BUN SERPL-MCNC: 54 MG/DL (ref 7–20)
CALCIUM SERPL-MCNC: 9.3 MG/DL (ref 8.3–10.6)
CHLORIDE SERPL-SCNC: 104 MMOL/L (ref 99–110)
CLARITY UR: CLEAR
CO2 SERPL-SCNC: 23 MMOL/L (ref 21–32)
COLOR UR: YELLOW
CREAT SERPL-MCNC: 1.4 MG/DL (ref 0.8–1.3)
CREAT UR-MCNC: 77 MG/DL (ref 39–259)
EPI CELLS #/AREA URNS AUTO: 0 /HPF (ref 0–5)
GFR SERPLBLD CREATININE-BSD FMLA CKD-EPI: 52 ML/MIN/{1.73_M2}
GLUCOSE SERPL-MCNC: 131 MG/DL (ref 70–99)
GLUCOSE UR STRIP.AUTO-MCNC: NEGATIVE MG/DL
HGB UR QL STRIP.AUTO: NEGATIVE
HYALINE CASTS #/AREA URNS AUTO: 1 /LPF (ref 0–8)
KETONES UR STRIP.AUTO-MCNC: NEGATIVE MG/DL
LEUKOCYTE ESTERASE UR QL STRIP.AUTO: NEGATIVE
MICROALBUMIN UR DL<=1MG/L-MCNC: 3.8 MG/DL
MICROALBUMIN/CREAT UR: 49.4 MG/G (ref 0–30)
NITRITE UR QL STRIP.AUTO: NEGATIVE
PH UR STRIP.AUTO: 5.5 [PH] (ref 5–8)
POTASSIUM SERPL-SCNC: 4 MMOL/L (ref 3.5–5.1)
PROT UR STRIP.AUTO-MCNC: NEGATIVE MG/DL
RBC CLUMPS #/AREA URNS AUTO: 0 /HPF (ref 0–4)
SODIUM SERPL-SCNC: 138 MMOL/L (ref 136–145)
SP GR UR STRIP.AUTO: 1.02 (ref 1–1.03)
UA DIPSTICK W REFLEX MICRO PNL UR: NORMAL
URN SPEC COLLECT METH UR: NORMAL
UROBILINOGEN UR STRIP-ACNC: 0.2 E.U./DL
WBC #/AREA URNS AUTO: 0 /HPF (ref 0–5)

## 2023-08-04 PROCEDURE — 80048 BASIC METABOLIC PNL TOTAL CA: CPT

## 2023-08-04 PROCEDURE — 81001 URINALYSIS AUTO W/SCOPE: CPT

## 2023-08-04 PROCEDURE — 82088 ASSAY OF ALDOSTERONE: CPT

## 2023-08-04 PROCEDURE — 84244 ASSAY OF RENIN: CPT

## 2023-08-04 PROCEDURE — 82043 UR ALBUMIN QUANTITATIVE: CPT

## 2023-08-04 PROCEDURE — 82570 ASSAY OF URINE CREATININE: CPT

## 2023-08-04 PROCEDURE — 83835 ASSAY OF METANEPHRINES: CPT

## 2023-08-04 PROCEDURE — 36415 COLL VENOUS BLD VENIPUNCTURE: CPT

## 2023-08-07 LAB
ALDOST SERPL-MCNC: 8.7 NG/DL
ALDOST/RENIN PLAS-RTO: 1.1 RATIO
ANNOTATION COMMENT IMP: NORMAL
METANEPHS SERPL-SCNC: <0.1 NMOL/L (ref 0–0.49)
NORMETANEPHRINE SERPL-SCNC: 0.64 NMOL/L (ref 0–0.89)
RENIN PLAS-CCNC: 7.7 NG/ML/HR

## 2023-08-11 ENCOUNTER — HOSPITAL ENCOUNTER (OUTPATIENT)
Dept: VASCULAR LAB | Age: 78
Discharge: HOME | End: 2023-08-11
Payer: MEDICARE

## 2023-08-11 DIAGNOSIS — I15.0 RENOVASCULAR HYPERTENSION: ICD-10-CM

## 2023-08-11 PROCEDURE — 93975 VASCULAR STUDY: CPT

## 2023-08-21 ENCOUNTER — OFFICE VISIT (OUTPATIENT)
Dept: PULMONOLOGY | Age: 78
End: 2023-08-21
Payer: MEDICARE

## 2023-08-21 ENCOUNTER — HOSPITAL ENCOUNTER (OUTPATIENT)
Dept: MRI IMAGING | Age: 78
Discharge: HOME | End: 2023-08-21
Attending: INTERNAL MEDICINE
Payer: MEDICARE

## 2023-08-21 VITALS
DIASTOLIC BLOOD PRESSURE: 70 MMHG | RESPIRATION RATE: 16 BRPM | OXYGEN SATURATION: 97 % | HEART RATE: 68 BPM | SYSTOLIC BLOOD PRESSURE: 124 MMHG | HEIGHT: 68 IN | WEIGHT: 256.2 LBS | BODY MASS INDEX: 38.83 KG/M2 | TEMPERATURE: 97.4 F

## 2023-08-21 DIAGNOSIS — R79.89 HIGH SERUM RENIN: ICD-10-CM

## 2023-08-21 DIAGNOSIS — G47.8 SLEEP PARALYSIS: ICD-10-CM

## 2023-08-21 DIAGNOSIS — G47.33 OSA (OBSTRUCTIVE SLEEP APNEA): ICD-10-CM

## 2023-08-21 DIAGNOSIS — E66.01 MORBID OBESITY WITH BMI OF 40.0-44.9, ADULT (HCC): ICD-10-CM

## 2023-08-21 DIAGNOSIS — N28.1 COMPLEX RENAL CYST: ICD-10-CM

## 2023-08-21 PROCEDURE — 74183 MRI ABD W/O CNTR FLWD CNTR: CPT

## 2023-08-21 PROCEDURE — 3078F DIAST BP <80 MM HG: CPT | Performed by: INTERNAL MEDICINE

## 2023-08-21 PROCEDURE — 1036F TOBACCO NON-USER: CPT | Performed by: INTERNAL MEDICINE

## 2023-08-21 PROCEDURE — 6360000004 HC RX CONTRAST MEDICATION: Performed by: INTERNAL MEDICINE

## 2023-08-21 PROCEDURE — G8427 DOCREV CUR MEDS BY ELIG CLIN: HCPCS | Performed by: INTERNAL MEDICINE

## 2023-08-21 PROCEDURE — G8417 CALC BMI ABV UP PARAM F/U: HCPCS | Performed by: INTERNAL MEDICINE

## 2023-08-21 PROCEDURE — A9577 INJ MULTIHANCE: HCPCS | Performed by: INTERNAL MEDICINE

## 2023-08-21 PROCEDURE — 3074F SYST BP LT 130 MM HG: CPT | Performed by: INTERNAL MEDICINE

## 2023-08-21 PROCEDURE — 99214 OFFICE O/P EST MOD 30 MIN: CPT | Performed by: INTERNAL MEDICINE

## 2023-08-21 PROCEDURE — 1123F ACP DISCUSS/DSCN MKR DOCD: CPT | Performed by: INTERNAL MEDICINE

## 2023-08-21 RX ADMIN — GADOBENATE DIMEGLUMINE 20 ML: 529 INJECTION, SOLUTION INTRAVENOUS at 17:48

## 2023-08-21 ASSESSMENT — SLEEP AND FATIGUE QUESTIONNAIRES
HOW LIKELY ARE YOU TO NOD OFF OR FALL ASLEEP WHILE LYING DOWN TO REST IN THE AFTERNOON WHEN CIRCUMSTANCES PERMIT: 1
ESS TOTAL SCORE: 8
HOW LIKELY ARE YOU TO NOD OFF OR FALL ASLEEP WHILE SITTING INACTIVE IN A PUBLIC PLACE: 1
HOW LIKELY ARE YOU TO NOD OFF OR FALL ASLEEP WHILE WATCHING TV: 1
HOW LIKELY ARE YOU TO NOD OFF OR FALL ASLEEP WHEN YOU ARE A PASSENGER IN A CAR FOR AN HOUR WITHOUT A BREAK: 2
HOW LIKELY ARE YOU TO NOD OFF OR FALL ASLEEP IN A CAR, WHILE STOPPED FOR A FEW MINUTES IN TRAFFIC: 1
HOW LIKELY ARE YOU TO NOD OFF OR FALL ASLEEP WHILE SITTING QUIETLY AFTER LUNCH WITHOUT ALCOHOL: 1
HOW LIKELY ARE YOU TO NOD OFF OR FALL ASLEEP WHILE SITTING AND TALKING TO SOMEONE: 0
HOW LIKELY ARE YOU TO NOD OFF OR FALL ASLEEP WHILE SITTING AND READING: 1

## 2023-08-21 NOTE — ASSESSMENT & PLAN NOTE
Titration is not required during this visit. PAP download information:  Usage > 4 hours  100 %   Pressure setting  autobipap cwp    AHI with usage  3.7     See media tab for full download data. Ana Quintana  is deriving benefit from PAP demonstrated by improved [place], AHI, symptoms.

## 2023-08-29 DIAGNOSIS — N28.1 COMPLEX RENAL CYST: ICD-10-CM

## 2023-08-29 DIAGNOSIS — R79.89 HIGH SERUM RENIN: ICD-10-CM

## 2023-08-29 LAB
ANION GAP SERPL CALCULATED.3IONS-SCNC: 10 MMOL/L (ref 3–16)
BUN SERPL-MCNC: 42 MG/DL (ref 7–20)
CALCIUM SERPL-MCNC: 9.7 MG/DL (ref 8.3–10.6)
CHLORIDE SERPL-SCNC: 103 MMOL/L (ref 99–110)
CO2 SERPL-SCNC: 26 MMOL/L (ref 21–32)
CREAT SERPL-MCNC: 1.3 MG/DL (ref 0.8–1.3)
GFR SERPLBLD CREATININE-BSD FMLA CKD-EPI: 57 ML/MIN/{1.73_M2}
GLUCOSE SERPL-MCNC: 190 MG/DL (ref 70–99)
POTASSIUM SERPL-SCNC: 4.3 MMOL/L (ref 3.5–5.1)
SODIUM SERPL-SCNC: 139 MMOL/L (ref 136–145)

## 2023-09-08 DIAGNOSIS — I10 ESSENTIAL HYPERTENSION: ICD-10-CM

## 2023-09-09 LAB
ANION GAP SERPL CALCULATED.3IONS-SCNC: 10 MMOL/L (ref 3–16)
BUN SERPL-MCNC: 62 MG/DL (ref 7–20)
CALCIUM SERPL-MCNC: 9.8 MG/DL (ref 8.3–10.6)
CHLORIDE SERPL-SCNC: 104 MMOL/L (ref 99–110)
CO2 SERPL-SCNC: 23 MMOL/L (ref 21–32)
CREAT SERPL-MCNC: 1.5 MG/DL (ref 0.8–1.3)
GFR SERPLBLD CREATININE-BSD FMLA CKD-EPI: 48 ML/MIN/{1.73_M2}
GLUCOSE SERPL-MCNC: 199 MG/DL (ref 70–99)
POTASSIUM SERPL-SCNC: 4.9 MMOL/L (ref 3.5–5.1)
SODIUM SERPL-SCNC: 137 MMOL/L (ref 136–145)

## 2023-10-03 DIAGNOSIS — I10 UNCONTROLLED HYPERTENSION: ICD-10-CM

## 2023-10-03 RX ORDER — HYDRALAZINE HYDROCHLORIDE 50 MG/1
TABLET, FILM COATED ORAL
Qty: 270 TABLET | Refills: 3 | OUTPATIENT
Start: 2023-10-03

## 2023-10-03 RX ORDER — TAMSULOSIN HYDROCHLORIDE 0.4 MG/1
CAPSULE ORAL
Qty: 90 CAPSULE | Refills: 3 | Status: SHIPPED | OUTPATIENT
Start: 2023-10-03

## 2023-10-03 RX ORDER — ATENOLOL AND CHLORTHALIDONE TABLET 50; 25 MG/1; MG/1
TABLET ORAL
Qty: 90 TABLET | Refills: 3 | Status: SHIPPED | OUTPATIENT
Start: 2023-10-03

## 2023-10-25 ENCOUNTER — OFFICE VISIT (OUTPATIENT)
Dept: INTERNAL MEDICINE CLINIC | Age: 78
End: 2023-10-25

## 2023-10-25 VITALS
WEIGHT: 254.4 LBS | HEART RATE: 65 BPM | TEMPERATURE: 98.6 F | DIASTOLIC BLOOD PRESSURE: 69 MMHG | OXYGEN SATURATION: 97 % | BODY MASS INDEX: 38.68 KG/M2 | SYSTOLIC BLOOD PRESSURE: 134 MMHG

## 2023-10-25 DIAGNOSIS — E78.2 MIXED HYPERLIPIDEMIA: ICD-10-CM

## 2023-10-25 DIAGNOSIS — N32.81 OVERACTIVE BLADDER: ICD-10-CM

## 2023-10-25 DIAGNOSIS — F41.9 ANXIETY: ICD-10-CM

## 2023-10-25 DIAGNOSIS — N18.31 TYPE 2 DIABETES MELLITUS WITH STAGE 3A CHRONIC KIDNEY DISEASE, WITH LONG-TERM CURRENT USE OF INSULIN (HCC): Primary | ICD-10-CM

## 2023-10-25 DIAGNOSIS — N40.0 BENIGN PROSTATIC HYPERPLASIA, UNSPECIFIED WHETHER LOWER URINARY TRACT SYMPTOMS PRESENT: ICD-10-CM

## 2023-10-25 DIAGNOSIS — I10 ESSENTIAL HYPERTENSION: ICD-10-CM

## 2023-10-25 DIAGNOSIS — Z79.4 TYPE 2 DIABETES MELLITUS WITH STAGE 3A CHRONIC KIDNEY DISEASE, WITH LONG-TERM CURRENT USE OF INSULIN (HCC): Primary | ICD-10-CM

## 2023-10-25 DIAGNOSIS — F33.1 MODERATE EPISODE OF RECURRENT MAJOR DEPRESSIVE DISORDER (HCC): ICD-10-CM

## 2023-10-25 DIAGNOSIS — E11.22 TYPE 2 DIABETES MELLITUS WITH STAGE 3A CHRONIC KIDNEY DISEASE, WITH LONG-TERM CURRENT USE OF INSULIN (HCC): Primary | ICD-10-CM

## 2023-10-25 LAB — HBA1C MFR BLD: 6.2 %

## 2023-10-25 RX ORDER — SEMAGLUTIDE 1.34 MG/ML
0.25 INJECTION, SOLUTION SUBCUTANEOUS
Qty: 3 ML | Refills: 0 | Status: SHIPPED | OUTPATIENT
Start: 2023-10-25 | End: 2023-11-24

## 2023-10-25 ASSESSMENT — ENCOUNTER SYMPTOMS
COUGH: 0
NAUSEA: 0
VOMITING: 0
BLOOD IN STOOL: 0
ABDOMINAL PAIN: 0
SORE THROAT: 0
SHORTNESS OF BREATH: 0

## 2023-10-25 NOTE — PROGRESS NOTES
General: No scleral icterus. Conjunctiva/sclera: Conjunctivae normal.   Cardiovascular:      Rate and Rhythm: Normal rate and regular rhythm. Pulses: Normal pulses. Heart sounds: Normal heart sounds. Pulmonary:      Effort: Pulmonary effort is normal.      Breath sounds: Normal breath sounds. Musculoskeletal:         General: No swelling. Skin:     General: Skin is warm and dry. Neurological:      Mental Status: He is alert and oriented to person, place, and time. Mental status is at baseline. Psychiatric:         Mood and Affect: Mood normal.         Behavior: Behavior normal.                An electronic signature was used to authenticate this note.     --Shay Lloyd MD

## 2023-11-15 RX ORDER — BLOOD SUGAR DIAGNOSTIC
STRIP MISCELLANEOUS
Qty: 300 STRIP | Refills: 1 | Status: SHIPPED | OUTPATIENT
Start: 2023-11-15

## 2023-11-19 DIAGNOSIS — F41.9 ANXIETY: ICD-10-CM

## 2023-11-20 RX ORDER — FENOFIBRATE 160 MG/1
160 TABLET ORAL DAILY
Qty: 90 TABLET | Refills: 3 | Status: SHIPPED | OUTPATIENT
Start: 2023-11-20

## 2023-11-20 RX ORDER — ATORVASTATIN CALCIUM 20 MG/1
TABLET, FILM COATED ORAL
Qty: 90 TABLET | Refills: 3 | Status: SHIPPED | OUTPATIENT
Start: 2023-11-20

## 2023-11-20 RX ORDER — HYDRALAZINE HYDROCHLORIDE 50 MG/1
TABLET, FILM COATED ORAL
Qty: 270 TABLET | Refills: 3 | Status: SHIPPED | OUTPATIENT
Start: 2023-11-20

## 2023-11-20 RX ORDER — BUSPIRONE HYDROCHLORIDE 10 MG/1
TABLET ORAL
Qty: 270 TABLET | Refills: 1 | Status: SHIPPED | OUTPATIENT
Start: 2023-11-20

## 2023-11-23 DIAGNOSIS — Z79.4 TYPE 2 DIABETES MELLITUS WITH STAGE 3A CHRONIC KIDNEY DISEASE, WITH LONG-TERM CURRENT USE OF INSULIN (HCC): ICD-10-CM

## 2023-11-23 DIAGNOSIS — E11.22 TYPE 2 DIABETES MELLITUS WITH STAGE 3A CHRONIC KIDNEY DISEASE, WITH LONG-TERM CURRENT USE OF INSULIN (HCC): ICD-10-CM

## 2023-11-23 DIAGNOSIS — N18.31 TYPE 2 DIABETES MELLITUS WITH STAGE 3A CHRONIC KIDNEY DISEASE, WITH LONG-TERM CURRENT USE OF INSULIN (HCC): ICD-10-CM

## 2023-11-26 RX ORDER — SEMAGLUTIDE 0.68 MG/ML
INJECTION, SOLUTION SUBCUTANEOUS
Qty: 3 ML | Refills: 7 | Status: SHIPPED | OUTPATIENT
Start: 2023-11-26

## 2023-12-04 RX ORDER — INSULIN LISPRO 100 [IU]/ML
INJECTION, SOLUTION INTRAVENOUS; SUBCUTANEOUS
Qty: 120 ML | Refills: 3 | Status: SHIPPED | OUTPATIENT
Start: 2023-12-04

## 2023-12-04 NOTE — TELEPHONE ENCOUNTER
Future Appointments   Date Time Provider [address]   1/31/2024  9:15 AM Clinton Briseno MD [facility]   4/25/2024 12:20 PM Christina Ghosh MD [facility]   8/5/2024 10:00 AM William Spicer MD [facility] PUL MMA         Last appt 10/25/23

## 2024-01-15 DIAGNOSIS — G47.9 SLEEP DISTURBANCE: ICD-10-CM

## 2024-01-15 DIAGNOSIS — F33.1 MODERATE EPISODE OF RECURRENT MAJOR DEPRESSIVE DISORDER (HCC): ICD-10-CM

## 2024-01-15 DIAGNOSIS — F41.1 GAD (GENERALIZED ANXIETY DISORDER): ICD-10-CM

## 2024-01-16 RX ORDER — TRAZODONE HYDROCHLORIDE 50 MG/1
TABLET ORAL
Qty: 60 TABLET | Refills: 11 | Status: SHIPPED | OUTPATIENT
Start: 2024-01-16

## 2024-01-20 DIAGNOSIS — N32.81 OVERACTIVE BLADDER: ICD-10-CM

## 2024-01-20 DIAGNOSIS — I10 ESSENTIAL HYPERTENSION: ICD-10-CM

## 2024-01-22 RX ORDER — LISINOPRIL 20 MG/1
TABLET ORAL
Qty: 180 TABLET | Refills: 3 | Status: SHIPPED | OUTPATIENT
Start: 2024-01-22

## 2024-01-22 RX ORDER — OXYBUTYNIN CHLORIDE 10 MG/1
TABLET, EXTENDED RELEASE ORAL
Qty: 90 TABLET | Refills: 3 | Status: SHIPPED | OUTPATIENT
Start: 2024-01-22

## 2024-01-24 DIAGNOSIS — N18.31 STAGE 3A CHRONIC KIDNEY DISEASE (HCC): ICD-10-CM

## 2024-01-24 DIAGNOSIS — I10 ESSENTIAL HYPERTENSION: ICD-10-CM

## 2024-01-25 LAB
ANION GAP SERPL CALCULATED.3IONS-SCNC: 10 MMOL/L (ref 3–16)
BUN SERPL-MCNC: 60 MG/DL (ref 7–20)
CALCIUM SERPL-MCNC: 9.7 MG/DL (ref 8.3–10.6)
CHLORIDE SERPL-SCNC: 102 MMOL/L (ref 99–110)
CO2 SERPL-SCNC: 24 MMOL/L (ref 21–32)
CREAT SERPL-MCNC: 1.6 MG/DL (ref 0.8–1.3)
GFR SERPLBLD CREATININE-BSD FMLA CKD-EPI: 44 ML/MIN/{1.73_M2}
GLUCOSE SERPL-MCNC: 80 MG/DL (ref 70–99)
POTASSIUM SERPL-SCNC: 5.8 MMOL/L (ref 3.5–5.1)
SODIUM SERPL-SCNC: 136 MMOL/L (ref 136–145)

## 2024-01-27 RX ORDER — CHLORTHALIDONE 25 MG/1
12.5 TABLET ORAL DAILY
Qty: 15 TABLET | Refills: 0 | Status: SHIPPED | OUTPATIENT
Start: 2024-01-27 | End: 2024-02-26

## 2024-02-02 DIAGNOSIS — N18.31 STAGE 3A CHRONIC KIDNEY DISEASE (HCC): ICD-10-CM

## 2024-02-02 DIAGNOSIS — I10 ESSENTIAL HYPERTENSION: ICD-10-CM

## 2024-02-02 LAB
ANION GAP SERPL CALCULATED.3IONS-SCNC: 11 MMOL/L (ref 3–16)
BUN SERPL-MCNC: 59 MG/DL (ref 7–20)
CALCIUM SERPL-MCNC: 9.5 MG/DL (ref 8.3–10.6)
CHLORIDE SERPL-SCNC: 97 MMOL/L (ref 99–110)
CO2 SERPL-SCNC: 25 MMOL/L (ref 21–32)
CREAT SERPL-MCNC: 1.6 MG/DL (ref 0.8–1.3)
GFR SERPLBLD CREATININE-BSD FMLA CKD-EPI: 44 ML/MIN/{1.73_M2}
GLUCOSE SERPL-MCNC: 105 MG/DL (ref 70–99)
POTASSIUM SERPL-SCNC: 5.8 MMOL/L (ref 3.5–5.1)
SODIUM SERPL-SCNC: 133 MMOL/L (ref 136–145)

## 2024-02-05 NOTE — RESULT ENCOUNTER NOTE
Potassium still high   Reduce dose of Spironolactone to 12.5 mg   Monitor BP (bring results to coming appt)  Low potassium diet  Recheck BMP in 10 days

## 2024-02-13 DIAGNOSIS — I10 ESSENTIAL HYPERTENSION: ICD-10-CM

## 2024-02-13 DIAGNOSIS — E87.5 HYPERKALEMIA: ICD-10-CM

## 2024-02-13 LAB
ANION GAP SERPL CALCULATED.3IONS-SCNC: 13 MMOL/L (ref 3–16)
BUN SERPL-MCNC: 46 MG/DL (ref 7–20)
CALCIUM SERPL-MCNC: 9.6 MG/DL (ref 8.3–10.6)
CHLORIDE SERPL-SCNC: 102 MMOL/L (ref 99–110)
CO2 SERPL-SCNC: 26 MMOL/L (ref 21–32)
CREAT SERPL-MCNC: 1.7 MG/DL (ref 0.8–1.3)
GFR SERPLBLD CREATININE-BSD FMLA CKD-EPI: 41 ML/MIN/{1.73_M2}
GLUCOSE SERPL-MCNC: 123 MG/DL (ref 70–99)
POTASSIUM SERPL-SCNC: 4.4 MMOL/L (ref 3.5–5.1)
SODIUM SERPL-SCNC: 141 MMOL/L (ref 136–145)

## 2024-02-18 DIAGNOSIS — E11.22 TYPE 2 DIABETES MELLITUS WITH STAGE 3A CHRONIC KIDNEY DISEASE, WITH LONG-TERM CURRENT USE OF INSULIN (HCC): ICD-10-CM

## 2024-02-18 DIAGNOSIS — N18.31 TYPE 2 DIABETES MELLITUS WITH STAGE 3A CHRONIC KIDNEY DISEASE, WITH LONG-TERM CURRENT USE OF INSULIN (HCC): ICD-10-CM

## 2024-02-18 DIAGNOSIS — Z79.4 TYPE 2 DIABETES MELLITUS WITH STAGE 3A CHRONIC KIDNEY DISEASE, WITH LONG-TERM CURRENT USE OF INSULIN (HCC): ICD-10-CM

## 2024-02-19 NOTE — TELEPHONE ENCOUNTER
Future Appointments   Date Time Provider Department Center   2/21/2024 10:30 AM Narciso Nino MD [facility] [place] [facility]   2/29/2024  2:30 PM Mari Bennett MD [facility]   8/5/2024 10:00 AM Ceferino Murcia MD  PULM [facility]         Last appt 10/25/23

## 2024-02-21 ENCOUNTER — OFFICE VISIT (OUTPATIENT)
Dept: INTERNAL MEDICINE CLINIC | Age: 79
End: 2024-02-21

## 2024-02-21 VITALS
HEART RATE: 59 BPM | DIASTOLIC BLOOD PRESSURE: 60 MMHG | WEIGHT: 254 LBS | SYSTOLIC BLOOD PRESSURE: 140 MMHG | HEIGHT: 68 IN | BODY MASS INDEX: 38.49 KG/M2 | OXYGEN SATURATION: 96 %

## 2024-02-21 DIAGNOSIS — F33.1 MODERATE EPISODE OF RECURRENT MAJOR DEPRESSIVE DISORDER (HCC): ICD-10-CM

## 2024-02-21 DIAGNOSIS — Z12.5 SCREENING PSA (PROSTATE SPECIFIC ANTIGEN): ICD-10-CM

## 2024-02-21 DIAGNOSIS — I10 ESSENTIAL HYPERTENSION: ICD-10-CM

## 2024-02-21 DIAGNOSIS — E11.22 TYPE 2 DIABETES MELLITUS WITH STAGE 3A CHRONIC KIDNEY DISEASE, WITH LONG-TERM CURRENT USE OF INSULIN (HCC): Primary | ICD-10-CM

## 2024-02-21 DIAGNOSIS — N18.31 TYPE 2 DIABETES MELLITUS WITH STAGE 3A CHRONIC KIDNEY DISEASE, WITH LONG-TERM CURRENT USE OF INSULIN (HCC): Primary | ICD-10-CM

## 2024-02-21 DIAGNOSIS — Z79.4 TYPE 2 DIABETES MELLITUS WITH STAGE 3A CHRONIC KIDNEY DISEASE, WITH LONG-TERM CURRENT USE OF INSULIN (HCC): Primary | ICD-10-CM

## 2024-02-21 DIAGNOSIS — N40.0 BENIGN PROSTATIC HYPERPLASIA, UNSPECIFIED WHETHER LOWER URINARY TRACT SYMPTOMS PRESENT: ICD-10-CM

## 2024-02-21 DIAGNOSIS — N32.81 OVERACTIVE BLADDER: ICD-10-CM

## 2024-02-21 DIAGNOSIS — D69.6 THROMBOCYTOPENIA (HCC): ICD-10-CM

## 2024-02-21 DIAGNOSIS — E78.2 MIXED HYPERLIPIDEMIA: ICD-10-CM

## 2024-02-21 DIAGNOSIS — K75.81 LIVER CIRRHOSIS SECONDARY TO NASH (HCC): ICD-10-CM

## 2024-02-21 DIAGNOSIS — F41.9 ANXIETY: ICD-10-CM

## 2024-02-21 DIAGNOSIS — K74.60 LIVER CIRRHOSIS SECONDARY TO NASH (HCC): ICD-10-CM

## 2024-02-21 PROBLEM — I73.9 CLAUDICATION OF BOTH LOWER EXTREMITIES (HCC): Status: ACTIVE | Noted: 2024-02-21

## 2024-02-21 LAB
ALBUMIN SERPL-MCNC: 3.9 G/DL (ref 3.4–5)
ALBUMIN/GLOB SERPL: 1.3 {RATIO} (ref 1.1–2.2)
ALP SERPL-CCNC: 35 U/L (ref 40–129)
ALT SERPL-CCNC: 18 U/L (ref 10–40)
ANION GAP SERPL CALCULATED.3IONS-SCNC: 10 MMOL/L (ref 3–16)
AST SERPL-CCNC: 17 U/L (ref 15–37)
BASOPHILS # BLD: 0 K/UL (ref 0–0.2)
BASOPHILS NFR BLD: 0.3 %
BILIRUB SERPL-MCNC: 0.5 MG/DL (ref 0–1)
BUN SERPL-MCNC: 41 MG/DL (ref 7–20)
CALCIUM SERPL-MCNC: 9.4 MG/DL (ref 8.3–10.6)
CHLORIDE SERPL-SCNC: 104 MMOL/L (ref 99–110)
CHOLEST SERPL-MCNC: 116 MG/DL (ref 0–199)
CO2 SERPL-SCNC: 25 MMOL/L (ref 21–32)
CREAT SERPL-MCNC: 1.4 MG/DL (ref 0.8–1.3)
DEPRECATED RDW RBC AUTO: 14.3 % (ref 12.4–15.4)
EOSINOPHIL # BLD: 0.1 K/UL (ref 0–0.6)
EOSINOPHIL NFR BLD: 2.5 %
GFR SERPLBLD CREATININE-BSD FMLA CKD-EPI: 52 ML/MIN/{1.73_M2}
GLUCOSE SERPL-MCNC: 158 MG/DL (ref 70–99)
HCT VFR BLD AUTO: 31.2 % (ref 40.5–52.5)
HDLC SERPL-MCNC: 27 MG/DL (ref 40–60)
HGB BLD-MCNC: 10.8 G/DL (ref 13.5–17.5)
LDLC SERPL CALC-MCNC: 59 MG/DL
LYMPHOCYTES # BLD: 0.7 K/UL (ref 1–5.1)
LYMPHOCYTES NFR BLD: 17.7 %
MCH RBC QN AUTO: 29.8 PG (ref 26–34)
MCHC RBC AUTO-ENTMCNC: 34.6 G/DL (ref 31–36)
MCV RBC AUTO: 85.9 FL (ref 80–100)
MONOCYTES # BLD: 0.2 K/UL (ref 0–1.3)
MONOCYTES NFR BLD: 6.6 %
NEUTROPHILS # BLD: 2.7 K/UL (ref 1.7–7.7)
NEUTROPHILS NFR BLD: 72.9 %
PLATELET # BLD AUTO: 107 K/UL (ref 135–450)
PMV BLD AUTO: 8.1 FL (ref 5–10.5)
POTASSIUM SERPL-SCNC: 5.1 MMOL/L (ref 3.5–5.1)
PROT SERPL-MCNC: 6.8 G/DL (ref 6.4–8.2)
PSA SERPL DL<=0.01 NG/ML-MCNC: 0.28 NG/ML (ref 0–4)
RBC # BLD AUTO: 3.63 M/UL (ref 4.2–5.9)
SODIUM SERPL-SCNC: 139 MMOL/L (ref 136–145)
TRIGL SERPL-MCNC: 151 MG/DL (ref 0–150)
TSH SERPL DL<=0.005 MIU/L-ACNC: 3.44 UIU/ML (ref 0.27–4.2)
VLDLC SERPL CALC-MCNC: 30 MG/DL
WBC # BLD AUTO: 3.7 K/UL (ref 4–11)

## 2024-02-21 ASSESSMENT — ENCOUNTER SYMPTOMS
COUGH: 0
BLOOD IN STOOL: 0
ABDOMINAL PAIN: 0
SHORTNESS OF BREATH: 0
NAUSEA: 0
VOMITING: 0
SORE THROAT: 0

## 2024-02-21 ASSESSMENT — PATIENT HEALTH QUESTIONNAIRE - PHQ9
SUM OF ALL RESPONSES TO PHQ9 QUESTIONS 1 & 2: 1
9. THOUGHTS THAT YOU WOULD BE BETTER OFF DEAD, OR OF HURTING YOURSELF: 0
SUM OF ALL RESPONSES TO PHQ QUESTIONS 1-9: 3
1. LITTLE INTEREST OR PLEASURE IN DOING THINGS: 0
10. IF YOU CHECKED OFF ANY PROBLEMS, HOW DIFFICULT HAVE THESE PROBLEMS MADE IT FOR YOU TO DO YOUR WORK, TAKE CARE OF THINGS AT HOME, OR GET ALONG WITH OTHER PEOPLE: 0
3. TROUBLE FALLING OR STAYING ASLEEP: 1
6. FEELING BAD ABOUT YOURSELF - OR THAT YOU ARE A FAILURE OR HAVE LET YOURSELF OR YOUR FAMILY DOWN: 0
5. POOR APPETITE OR OVEREATING: 0
4. FEELING TIRED OR HAVING LITTLE ENERGY: 1
2. FEELING DOWN, DEPRESSED OR HOPELESS: 1
8. MOVING OR SPEAKING SO SLOWLY THAT OTHER PEOPLE COULD HAVE NOTICED. OR THE OPPOSITE, BEING SO FIGETY OR RESTLESS THAT YOU HAVE BEEN MOVING AROUND A LOT MORE THAN USUAL: 0
7. TROUBLE CONCENTRATING ON THINGS, SUCH AS READING THE NEWSPAPER OR WATCHING TELEVISION: 0

## 2024-02-21 NOTE — PROGRESS NOTES
Greg Luna (:  1945) is a 78 y.o. male, Established patient, here for evaluation of the following chief complaint(s):  3 Month Follow-Up and Hypertension         ASSESSMENT/PLAN:  1. Type 2 diabetes mellitus with stage 3a chronic kidney disease, with long-term current use of insulin (HCC)  - Chronic, stable  Patient did not tolerate Ozempic  Current dose of insulin and metformin  -     Hemoglobin A1C    2. Essential hypertension  - Stable   - Continue current dose of spironolactone, amlodipine, hydralazine, Atenolol, chlorthalidone and lisinopril.  Patient follows with nephrology for hypertension and CKD management.  -     Comprehensive Metabolic Panel  -     TSH with Reflex    3. Mixed hyperlipidemia  - Stable   - Continue current dose of atorvastatin  -     Lipid Panel    4. Moderate episode of recurrent major depressive disorder (HCC)  - Stable   - Continue current dose of Cymbalta    5.Anxiety  - Stable   - Continue current dose of buspirone    6. Overactive bladder  - Stable   - Continue current dose of oxybutynin    7. Benign prostatic hyperplasia, unspecified whether lower urinary tract symptoms present  - Stable   - Continue current dose of tamsulosin    8. Screening PSA (prostate specific antigen)  -     PSA Screening    9. 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 Thrombocytopenia (HCC)  Stable, probably secondary to cirrhosis  -     CBC with Auto Differential      Return in about 3 months (around 2024).         Subjective   SUBJECTIVE/OBJECTIVE:  Hypertension  This is a chronic problem. The current episode started more than 1 year ago. The problem is controlled. Pertinent negatives include no chest pain, palpitations or shortness of breath. Risk factors for coronary artery disease include male gender. Past treatments include beta blockers, diuretics, calcium channel blockers, alpha 1 blockers and direct vasodilators. The current treatment

## 2024-02-22 DIAGNOSIS — D64.9 ANEMIA, UNSPECIFIED TYPE: ICD-10-CM

## 2024-02-22 DIAGNOSIS — D64.9 ANEMIA, UNSPECIFIED TYPE: Primary | ICD-10-CM

## 2024-02-22 LAB
EST. AVERAGE GLUCOSE BLD GHB EST-MCNC: 128.4 MG/DL
FERRITIN SERPL IA-MCNC: 1047 NG/ML (ref 30–400)
FOLATE SERPL-MCNC: 12.98 NG/ML (ref 4.78–24.2)
HBA1C MFR BLD: 6.1 %
IRON SATN MFR SERPL: 31 % (ref 20–50)
IRON SERPL-MCNC: 100 UG/DL (ref 59–158)
TIBC SERPL-MCNC: 325 UG/DL (ref 260–445)
VIT B12 SERPL-MCNC: 828 PG/ML (ref 211–911)

## 2024-02-26 DIAGNOSIS — R79.89 ELEVATED FERRITIN LEVEL: Primary | ICD-10-CM

## 2024-02-29 ENCOUNTER — TELEPHONE (OUTPATIENT)
Dept: INTERNAL MEDICINE CLINIC | Age: 79
End: 2024-02-29

## 2024-02-29 NOTE — TELEPHONE ENCOUNTER
Patient has elevated ferritin level with anemia.  Please check with the lab about hemochromatosis gene mutation test results.  We can refer patient to hematology for further evaluation.

## 2024-02-29 NOTE — TELEPHONE ENCOUNTER
----- Message from Greg Luna sent at 2/27/2024  4:53 PM EST -----  Regarding: Labs  Contact: [phone]  …noticed that RBC,WBC,and platelet counts are very low and Ferritin level is high.Do I need to do anything to improve these counts or is everything ok to leave as is.At times I do feel light-headed and wondering if any of these counts might be related to this?..\".gratefully,Greg Luna

## 2024-03-01 NOTE — TELEPHONE ENCOUNTER
Pt notified, made an appointment to discuss hematology. And needs to have hemochromatosis level checked at this visit. Was not collected.

## 2024-03-21 ENCOUNTER — OFFICE VISIT (OUTPATIENT)
Dept: INTERNAL MEDICINE CLINIC | Age: 79
End: 2024-03-21

## 2024-03-21 VITALS
OXYGEN SATURATION: 98 % | HEIGHT: 68 IN | SYSTOLIC BLOOD PRESSURE: 118 MMHG | DIASTOLIC BLOOD PRESSURE: 80 MMHG | HEART RATE: 60 BPM | WEIGHT: 255 LBS | BODY MASS INDEX: 38.65 KG/M2

## 2024-03-21 DIAGNOSIS — I10 ESSENTIAL HYPERTENSION: ICD-10-CM

## 2024-03-21 DIAGNOSIS — R79.89 ELEVATED FERRITIN LEVEL: Primary | ICD-10-CM

## 2024-03-21 DIAGNOSIS — R42 ORTHOSTATIC LIGHTHEADEDNESS: ICD-10-CM

## 2024-03-21 DIAGNOSIS — D64.9 ANEMIA, UNSPECIFIED TYPE: ICD-10-CM

## 2024-03-21 PROBLEM — I73.9 CLAUDICATION OF BOTH LOWER EXTREMITIES (HCC): Status: RESOLVED | Noted: 2024-02-21 | Resolved: 2024-03-21

## 2024-03-21 RX ORDER — KETOCONAZOLE 20 MG/G
CREAM TOPICAL
COMMUNITY
Start: 2023-12-13

## 2024-03-21 ASSESSMENT — ENCOUNTER SYMPTOMS
COUGH: 0
SHORTNESS OF BREATH: 0
BLOOD IN STOOL: 0
SORE THROAT: 0
NAUSEA: 0
ABDOMINAL PAIN: 0
VOMITING: 0

## 2024-03-21 NOTE — TELEPHONE ENCOUNTER
Medication:    alendronate (FOSAMAX) 70 MG tablet    passed protocol.   Last office visit date: 2/6/2024  Next appointment scheduled?: No   Number of refills given: Sent to PCP to review per PCP.     Request xanax last filled 8/2/21                              Last ov 7/6/21                                                     Next ov 1/11/22    Report reviewed and patient had a refill of alprazolam No. 60 0.25 mg on 8/2/2021 and 6/24/2021 and 5/24/2021 and 4/20/2021. Patient needs an updated drug screen and medication contract. He needs to make an appointment and will give him 30 tablets to take 1 daily until evaluated at the appointment.

## 2024-03-21 NOTE — PROGRESS NOTES
Gerg Luna (:  1945) is a 79 y.o. male, Established patient, here for evaluation of the following chief complaint(s):  Follow-up, Blood Work, and Hypertension         ASSESSMENT/PLAN:  1. Elevated ferritin level  Chronic problem, worsening.  Patient recent ferritin level was above 1000.  Patient states that his previous workup for hemochromatosis was negative and he would like to see hematology for further management.  Referral provided.   -     Keith Rouse [place], [place] [facility] [place]  2. Anemia, unspecified type  Chronic, worsening.  Probably due to CKD.  Referring to hematology further management.  -     Keith Rouse MD, [place] [facility] [place]  3. Orthostatic lightheadedness  Chronic problem  Probably due to  orthostatic hypotension.  Advised patient to increase water intake and move slowly after waiting for several seconds after getting up from a sitting or lying down position.  4. Essential hypertension  - Stable   - Continue current dose of Aldactone, lisinopril, hydralazine, Jardiance, atenolol - chlorthalidone and amlodipine  Follows with nephrology    Return in about 3 months (around 2024).         Subjective   SUBJECTIVE/OBJECTIVE:  Hypertension  This is a chronic problem. The current episode started more than 1 year ago. The problem is controlled. Pertinent negatives include no chest pain, palpitations or shortness of breath. Risk factors for coronary artery disease include male gender. Past treatments include beta blockers, diuretics, calcium channel blockers, alpha 1 blockers, direct vasodilators and ACE inhibitors. The current treatment provides significant improvement. There are no compliance problems.        Review of Systems   Constitutional:  Negative for fatigue and fever.   HENT:  Negative for nosebleeds and sore throat.    Respiratory:  Negative for cough and shortness of breath.    Cardiovascular:  Negative for

## 2024-03-22 NOTE — PROGRESS NOTES
occasionally errors remain. Please disregard these errors. They will be corrected as they are noted. no

## 2024-04-03 DIAGNOSIS — N18.2 TYPE 2 DIABETES MELLITUS WITH STAGE 2 CHRONIC KIDNEY DISEASE, WITH LONG-TERM CURRENT USE OF INSULIN (HCC): ICD-10-CM

## 2024-04-03 DIAGNOSIS — E11.22 TYPE 2 DIABETES MELLITUS WITH STAGE 2 CHRONIC KIDNEY DISEASE, WITH LONG-TERM CURRENT USE OF INSULIN (HCC): ICD-10-CM

## 2024-04-03 DIAGNOSIS — Z79.4 TYPE 2 DIABETES MELLITUS WITH STAGE 2 CHRONIC KIDNEY DISEASE, WITH LONG-TERM CURRENT USE OF INSULIN (HCC): ICD-10-CM

## 2024-04-04 RX ORDER — PEN NEEDLE, DIABETIC 31 GX5/16"
NEEDLE, DISPOSABLE MISCELLANEOUS
Qty: 100 EACH | Refills: 1 | Status: SHIPPED | OUTPATIENT
Start: 2024-04-04

## 2024-04-20 DIAGNOSIS — I10 ESSENTIAL HYPERTENSION: ICD-10-CM

## 2024-04-20 DIAGNOSIS — F32.A DEPRESSION, UNSPECIFIED DEPRESSION TYPE: ICD-10-CM

## 2024-04-22 RX ORDER — DULOXETIN HYDROCHLORIDE 60 MG/1
CAPSULE, DELAYED RELEASE ORAL
Qty: 180 CAPSULE | Refills: 1 | Status: SHIPPED | OUTPATIENT
Start: 2024-04-22

## 2024-04-22 RX ORDER — AMLODIPINE BESYLATE 10 MG/1
10 TABLET ORAL DAILY
Qty: 90 TABLET | Refills: 1 | Status: SHIPPED | OUTPATIENT
Start: 2024-04-22 | End: 2024-10-19

## 2024-04-22 NOTE — TELEPHONE ENCOUNTER
Future Appointments   Date Time Provider Department Center   6/26/2024 10:30 AM Narciso Nino MD [facility] [place] [facility]   7/11/2024  3:45 PM Mari Bennett MD [facility]   8/5/2024 10:00 AM Ceferino Murcia MD  PULM [facility]       Last appt 3/21/24

## 2024-05-30 RX ORDER — INSULIN GLARGINE 100 [IU]/ML
INJECTION, SOLUTION SUBCUTANEOUS
Qty: 90 ML | Refills: 3 | Status: SHIPPED | OUTPATIENT
Start: 2024-05-30

## 2024-05-30 NOTE — TELEPHONE ENCOUNTER
Future Appointments   Date Time Provider Department Center   6/26/2024 10:30 AM Narciso Nino MD [facility] [place] [facility]   7/11/2024  3:45 PM Mari Bennett MD [facility]   8/5/2024 10:00 AM Ceferino Murcia MD  [facility]       Last appt 3/21/24

## 2024-06-17 NOTE — TELEPHONE ENCOUNTER
Medication:   Requested Prescriptions     Pending Prescriptions Disp Refills    ONETOUCH ULTRA strip [Pharmacy Med Name: ONETOUCH ULTRA TEST STRIP] 300 strip 1     Sig: USE ONE STRIP TO TEST FOUR TIMES A DAY AS NEEDED       Last Filled:      Patient Phone Number: [phone] (home)     Last appt: 3/21/2024   Next appt: 6/26/2024  Future Appointments   Date Time Provider Department Center   6/26/2024 10:30 AM Narciso Nino MD [facility] [place] [facility]   8/5/2024 10:00 AM Ceferino Murcia MD [facility]   9/12/2024  1:00 PM Mari Bennett MD [facility]

## 2024-06-18 RX ORDER — BLOOD SUGAR DIAGNOSTIC
STRIP MISCELLANEOUS
Qty: 300 STRIP | Refills: 1 | Status: SHIPPED | OUTPATIENT
Start: 2024-06-18

## 2024-06-26 ENCOUNTER — OFFICE VISIT (OUTPATIENT)
Dept: INTERNAL MEDICINE CLINIC | Age: 79
End: 2024-06-26

## 2024-06-26 VITALS
SYSTOLIC BLOOD PRESSURE: 126 MMHG | DIASTOLIC BLOOD PRESSURE: 57 MMHG | OXYGEN SATURATION: 98 % | BODY MASS INDEX: 38.95 KG/M2 | HEART RATE: 52 BPM | WEIGHT: 257 LBS | HEIGHT: 68 IN

## 2024-06-26 DIAGNOSIS — E11.22 TYPE 2 DIABETES MELLITUS WITH STAGE 3B CHRONIC KIDNEY DISEASE, WITH LONG-TERM CURRENT USE OF INSULIN (HCC): Primary | ICD-10-CM

## 2024-06-26 DIAGNOSIS — I10 ESSENTIAL HYPERTENSION: ICD-10-CM

## 2024-06-26 DIAGNOSIS — F41.9 ANXIETY: ICD-10-CM

## 2024-06-26 DIAGNOSIS — E78.2 MIXED HYPERLIPIDEMIA: ICD-10-CM

## 2024-06-26 DIAGNOSIS — N40.0 BENIGN PROSTATIC HYPERPLASIA, UNSPECIFIED WHETHER LOWER URINARY TRACT SYMPTOMS PRESENT: ICD-10-CM

## 2024-06-26 DIAGNOSIS — F33.1 MODERATE EPISODE OF RECURRENT MAJOR DEPRESSIVE DISORDER (HCC): ICD-10-CM

## 2024-06-26 DIAGNOSIS — N32.81 OVERACTIVE BLADDER: ICD-10-CM

## 2024-06-26 DIAGNOSIS — Z79.4 TYPE 2 DIABETES MELLITUS WITH STAGE 3B CHRONIC KIDNEY DISEASE, WITH LONG-TERM CURRENT USE OF INSULIN (HCC): Primary | ICD-10-CM

## 2024-06-26 DIAGNOSIS — N18.32 TYPE 2 DIABETES MELLITUS WITH STAGE 3B CHRONIC KIDNEY DISEASE, WITH LONG-TERM CURRENT USE OF INSULIN (HCC): Primary | ICD-10-CM

## 2024-06-26 LAB — HBA1C MFR BLD: 6.9 %

## 2024-06-26 RX ORDER — ACYCLOVIR 400 MG/1
TABLET ORAL
Qty: 2 EACH | Refills: 3 | Status: SHIPPED | OUTPATIENT
Start: 2024-06-26 | End: 2024-06-26

## 2024-06-26 SDOH — ECONOMIC STABILITY: FOOD INSECURITY: WITHIN THE PAST 12 MONTHS, THE FOOD YOU BOUGHT JUST DIDN'T LAST AND YOU DIDN'T HAVE MONEY TO GET MORE.: NEVER TRUE

## 2024-06-26 SDOH — ECONOMIC STABILITY: FOOD INSECURITY: WITHIN THE PAST 12 MONTHS, YOU WORRIED THAT YOUR FOOD WOULD RUN OUT BEFORE YOU GOT MONEY TO BUY MORE.: NEVER TRUE

## 2024-06-26 SDOH — ECONOMIC STABILITY: INCOME INSECURITY: HOW HARD IS IT FOR YOU TO PAY FOR THE VERY BASICS LIKE FOOD, HOUSING, MEDICAL CARE, AND HEATING?: NOT HARD AT ALL

## 2024-06-26 ASSESSMENT — ENCOUNTER SYMPTOMS
COUGH: 0
SORE THROAT: 0
SHORTNESS OF BREATH: 0
ABDOMINAL PAIN: 0
VOMITING: 0
NAUSEA: 1
BLOOD IN STOOL: 0

## 2024-06-26 NOTE — PROGRESS NOTES
Objective   Physical Exam  Constitutional:       Appearance: Normal appearance.   HENT:      Head: Normocephalic and atraumatic.   Eyes:      General: No scleral icterus.     Conjunctiva/sclera: Conjunctivae normal.   Cardiovascular:      Rate and Rhythm: Normal rate and regular rhythm.      Pulses: Normal pulses.      Heart sounds: Normal heart sounds.   Pulmonary:      Effort: Pulmonary effort is normal.      Breath sounds: Normal breath sounds.   Musculoskeletal:         General: No swelling.   Skin:     General: Skin is warm and dry.   Neurological:      Mental Status: He is alert and oriented to person, place, and time. Mental status is at baseline.   Psychiatric:         Mood and Affect: Mood normal.         Behavior: Behavior normal.                An electronic signature was used to authenticate this note.    --VALERIA LARSON MD

## 2024-06-26 NOTE — PATIENT INSTRUCTIONS
Reduce the dose of hydralazine to 2 times daily. Monitor BP twice daily and call us if readings are less than 100/60 or greater than 140/90.

## 2024-07-12 DIAGNOSIS — Z79.4 TYPE 2 DIABETES MELLITUS WITH STAGE 3A CHRONIC KIDNEY DISEASE, WITH LONG-TERM CURRENT USE OF INSULIN (HCC): ICD-10-CM

## 2024-07-12 DIAGNOSIS — E11.22 TYPE 2 DIABETES MELLITUS WITH STAGE 3A CHRONIC KIDNEY DISEASE, WITH LONG-TERM CURRENT USE OF INSULIN (HCC): ICD-10-CM

## 2024-07-12 DIAGNOSIS — N18.31 TYPE 2 DIABETES MELLITUS WITH STAGE 3A CHRONIC KIDNEY DISEASE, WITH LONG-TERM CURRENT USE OF INSULIN (HCC): ICD-10-CM

## 2024-07-12 NOTE — TELEPHONE ENCOUNTER
Future Appointments   Date Time Provider Department Center   8/5/2024 10:00 AM Ceferino Murcia MD  PUL[facility]   9/12/2024  1:00 PM Mari Bennett MD [facility]   9/26/2024  9:45 AM Talib Wellington MD [facility] [place] [facility]     Last appt 06/21/24

## 2024-07-21 ENCOUNTER — HOSPITAL ENCOUNTER (INPATIENT)
Age: 79
LOS: 7 days | Discharge: HOME HEALTH | DRG: 252 | End: 2024-07-28
Attending: INTERNAL MEDICINE | Admitting: INTERNAL MEDICINE
Payer: MEDICARE

## 2024-07-21 ENCOUNTER — APPOINTMENT (OUTPATIENT)
Dept: GENERAL RADIOLOGY | Age: 79
DRG: 252 | End: 2024-07-21
Payer: MEDICARE

## 2024-07-21 DIAGNOSIS — I73.9 PAD (PERIPHERAL ARTERY DISEASE) (HCC): ICD-10-CM

## 2024-07-21 DIAGNOSIS — L03.116 LEFT LEG CELLULITIS: ICD-10-CM

## 2024-07-21 DIAGNOSIS — M86.9 OSTEOMYELITIS OF LEFT FOOT, UNSPECIFIED TYPE (HCC): Primary | ICD-10-CM

## 2024-07-21 LAB
ANION GAP SERPL CALCULATED.3IONS-SCNC: 14 MMOL/L (ref 3–16)
BASOPHILS # BLD: 0 K/UL (ref 0–0.2)
BASOPHILS NFR BLD: 0.2 %
BUN SERPL-MCNC: 58 MG/DL (ref 7–20)
CALCIUM SERPL-MCNC: 9.7 MG/DL (ref 8.3–10.6)
CHLORIDE SERPL-SCNC: 98 MMOL/L (ref 99–110)
CO2 SERPL-SCNC: 21 MMOL/L (ref 21–32)
CREAT SERPL-MCNC: 2 MG/DL (ref 0.8–1.3)
DEPRECATED RDW RBC AUTO: 15 % (ref 12.4–15.4)
EOSINOPHIL # BLD: 0 K/UL (ref 0–0.6)
EOSINOPHIL NFR BLD: 0.2 %
GFR SERPLBLD CREATININE-BSD FMLA CKD-EPI: 34 ML/MIN/{1.73_M2}
GLUCOSE BLD-MCNC: 285 MG/DL (ref 70–99)
GLUCOSE BLD-MCNC: 405 MG/DL (ref 70–99)
GLUCOSE SERPL-MCNC: 245 MG/DL (ref 70–99)
HCT VFR BLD AUTO: 32.2 % (ref 40.5–52.5)
HGB BLD-MCNC: 11.1 G/DL (ref 13.5–17.5)
LACTATE BLDV-SCNC: 1 MMOL/L (ref 0.4–1.9)
LACTATE BLDV-SCNC: 1.4 MMOL/L (ref 0.4–2)
LACTATE BLDV-SCNC: 2.3 MMOL/L (ref 0.4–1.9)
LYMPHOCYTES # BLD: 0.5 K/UL (ref 1–5.1)
LYMPHOCYTES NFR BLD: 6.3 %
MCH RBC QN AUTO: 29.2 PG (ref 26–34)
MCHC RBC AUTO-ENTMCNC: 34.3 G/DL (ref 31–36)
MCV RBC AUTO: 85.1 FL (ref 80–100)
MONOCYTES # BLD: 0.5 K/UL (ref 0–1.3)
MONOCYTES NFR BLD: 6.7 %
NEUTROPHILS # BLD: 6.7 K/UL (ref 1.7–7.7)
NEUTROPHILS NFR BLD: 86.6 %
PERFORMED ON: ABNORMAL
PERFORMED ON: ABNORMAL
PLATELET # BLD AUTO: 153 K/UL (ref 135–450)
PMV BLD AUTO: 7.9 FL (ref 5–10.5)
POTASSIUM SERPL-SCNC: 4.5 MMOL/L (ref 3.5–5.1)
RBC # BLD AUTO: 3.79 M/UL (ref 4.2–5.9)
SODIUM SERPL-SCNC: 133 MMOL/L (ref 136–145)
WBC # BLD AUTO: 7.7 K/UL (ref 4–11)

## 2024-07-21 PROCEDURE — 5A09457 ASSISTANCE WITH RESPIRATORY VENTILATION, 24-96 CONSECUTIVE HOURS, CONTINUOUS POSITIVE AIRWAY PRESSURE: ICD-10-PCS | Performed by: INTERNAL MEDICINE

## 2024-07-21 PROCEDURE — 6370000000 HC RX 637 (ALT 250 FOR IP): Performed by: INTERNAL MEDICINE

## 2024-07-21 PROCEDURE — 1200000000 HC SEMI PRIVATE

## 2024-07-21 PROCEDURE — 2580000003 HC RX 258: Performed by: INTERNAL MEDICINE

## 2024-07-21 PROCEDURE — 2580000003 HC RX 258: Performed by: PHYSICIAN ASSISTANT

## 2024-07-21 PROCEDURE — 80048 BASIC METABOLIC PNL TOTAL CA: CPT

## 2024-07-21 PROCEDURE — 96367 TX/PROPH/DG ADDL SEQ IV INF: CPT

## 2024-07-21 PROCEDURE — 6370000000 HC RX 637 (ALT 250 FOR IP): Performed by: NURSE PRACTITIONER

## 2024-07-21 PROCEDURE — 83605 ASSAY OF LACTIC ACID: CPT

## 2024-07-21 PROCEDURE — 73660 X-RAY EXAM OF TOE(S): CPT

## 2024-07-21 PROCEDURE — 36415 COLL VENOUS BLD VENIPUNCTURE: CPT

## 2024-07-21 PROCEDURE — 87040 BLOOD CULTURE FOR BACTERIA: CPT

## 2024-07-21 PROCEDURE — 85025 COMPLETE CBC W/AUTO DIFF WBC: CPT

## 2024-07-21 PROCEDURE — 94660 CPAP INITIATION&MGMT: CPT

## 2024-07-21 PROCEDURE — 96365 THER/PROPH/DIAG IV INF INIT: CPT

## 2024-07-21 PROCEDURE — 6360000002 HC RX W HCPCS: Performed by: INTERNAL MEDICINE

## 2024-07-21 PROCEDURE — 6360000002 HC RX W HCPCS: Performed by: PHYSICIAN ASSISTANT

## 2024-07-21 PROCEDURE — 99285 EMERGENCY DEPT VISIT HI MDM: CPT

## 2024-07-21 PROCEDURE — 83036 HEMOGLOBIN GLYCOSYLATED A1C: CPT

## 2024-07-21 RX ORDER — 0.9 % SODIUM CHLORIDE 0.9 %
1000 INTRAVENOUS SOLUTION INTRAVENOUS ONCE
Status: COMPLETED | OUTPATIENT
Start: 2024-07-21 | End: 2024-07-21

## 2024-07-21 RX ORDER — INSULIN LISPRO 100 [IU]/ML
15 INJECTION, SOLUTION INTRAVENOUS; SUBCUTANEOUS
Status: DISCONTINUED | OUTPATIENT
Start: 2024-07-21 | End: 2024-07-22

## 2024-07-21 RX ORDER — TRAZODONE HYDROCHLORIDE 50 MG/1
50 TABLET ORAL NIGHTLY
Status: DISCONTINUED | OUTPATIENT
Start: 2024-07-21 | End: 2024-07-28 | Stop reason: HOSPADM

## 2024-07-21 RX ORDER — INSULIN GLARGINE 100 [IU]/ML
50 INJECTION, SOLUTION SUBCUTANEOUS NIGHTLY
Status: DISCONTINUED | OUTPATIENT
Start: 2024-07-21 | End: 2024-07-22

## 2024-07-21 RX ORDER — TAMSULOSIN HYDROCHLORIDE 0.4 MG/1
0.4 CAPSULE ORAL DAILY
Status: DISCONTINUED | OUTPATIENT
Start: 2024-07-22 | End: 2024-07-28 | Stop reason: HOSPADM

## 2024-07-21 RX ORDER — ONDANSETRON 4 MG/1
4 TABLET, ORALLY DISINTEGRATING ORAL EVERY 8 HOURS PRN
Status: DISCONTINUED | OUTPATIENT
Start: 2024-07-21 | End: 2024-07-28 | Stop reason: HOSPADM

## 2024-07-21 RX ORDER — DEXTROSE MONOHYDRATE 100 MG/ML
INJECTION, SOLUTION INTRAVENOUS CONTINUOUS PRN
Status: DISCONTINUED | OUTPATIENT
Start: 2024-07-21 | End: 2024-07-28 | Stop reason: HOSPADM

## 2024-07-21 RX ORDER — SODIUM CHLORIDE 0.9 % (FLUSH) 0.9 %
5-40 SYRINGE (ML) INJECTION EVERY 12 HOURS SCHEDULED
Status: DISCONTINUED | OUTPATIENT
Start: 2024-07-21 | End: 2024-07-24 | Stop reason: SDUPTHER

## 2024-07-21 RX ORDER — SODIUM CHLORIDE 9 MG/ML
INJECTION, SOLUTION INTRAVENOUS CONTINUOUS
Status: ACTIVE | OUTPATIENT
Start: 2024-07-21 | End: 2024-07-22

## 2024-07-21 RX ORDER — ACETAMINOPHEN 650 MG/1
650 SUPPOSITORY RECTAL EVERY 6 HOURS PRN
Status: DISCONTINUED | OUTPATIENT
Start: 2024-07-21 | End: 2024-07-23 | Stop reason: ALTCHOICE

## 2024-07-21 RX ORDER — ACETAMINOPHEN 325 MG/1
650 TABLET ORAL EVERY 6 HOURS PRN
Status: DISCONTINUED | OUTPATIENT
Start: 2024-07-21 | End: 2024-07-23 | Stop reason: ALTCHOICE

## 2024-07-21 RX ORDER — SODIUM CHLORIDE 9 MG/ML
INJECTION, SOLUTION INTRAVENOUS PRN
Status: DISCONTINUED | OUTPATIENT
Start: 2024-07-21 | End: 2024-07-24 | Stop reason: SDUPTHER

## 2024-07-21 RX ORDER — HYDRALAZINE HYDROCHLORIDE 50 MG/1
50 TABLET, FILM COATED ORAL EVERY 8 HOURS SCHEDULED
Status: DISCONTINUED | OUTPATIENT
Start: 2024-07-21 | End: 2024-07-22

## 2024-07-21 RX ORDER — BUSPIRONE HYDROCHLORIDE 10 MG/1
10 TABLET ORAL 3 TIMES DAILY
Status: DISCONTINUED | OUTPATIENT
Start: 2024-07-21 | End: 2024-07-22

## 2024-07-21 RX ORDER — ATENOLOL 50 MG/1
50 TABLET ORAL DAILY
Status: DISCONTINUED | OUTPATIENT
Start: 2024-07-22 | End: 2024-07-28 | Stop reason: HOSPADM

## 2024-07-21 RX ORDER — SODIUM CHLORIDE 0.9 % (FLUSH) 0.9 %
5-40 SYRINGE (ML) INJECTION PRN
Status: DISCONTINUED | OUTPATIENT
Start: 2024-07-21 | End: 2024-07-24 | Stop reason: SDUPTHER

## 2024-07-21 RX ORDER — CHLORTHALIDONE 25 MG/1
25 TABLET ORAL DAILY
Status: DISCONTINUED | OUTPATIENT
Start: 2024-07-22 | End: 2024-07-28 | Stop reason: HOSPADM

## 2024-07-21 RX ORDER — ENOXAPARIN SODIUM 100 MG/ML
30 INJECTION SUBCUTANEOUS 2 TIMES DAILY
Status: DISPENSED | OUTPATIENT
Start: 2024-07-21 | End: 2024-07-25

## 2024-07-21 RX ORDER — INSULIN LISPRO 100 [IU]/ML
0-4 INJECTION, SOLUTION INTRAVENOUS; SUBCUTANEOUS NIGHTLY
Status: DISCONTINUED | OUTPATIENT
Start: 2024-07-21 | End: 2024-07-22 | Stop reason: SDUPTHER

## 2024-07-21 RX ORDER — POLYETHYLENE GLYCOL 3350 17 G/17G
17 POWDER, FOR SOLUTION ORAL DAILY PRN
Status: DISCONTINUED | OUTPATIENT
Start: 2024-07-21 | End: 2024-07-28 | Stop reason: HOSPADM

## 2024-07-21 RX ORDER — INSULIN LISPRO 100 [IU]/ML
4 INJECTION, SOLUTION INTRAVENOUS; SUBCUTANEOUS ONCE
Status: COMPLETED | OUTPATIENT
Start: 2024-07-21 | End: 2024-07-21

## 2024-07-21 RX ORDER — ATENOLOL AND CHLORTHALIDONE TABLET 50; 25 MG/1; MG/1
1 TABLET ORAL DAILY
Status: DISCONTINUED | OUTPATIENT
Start: 2024-07-21 | End: 2024-07-21 | Stop reason: CLARIF

## 2024-07-21 RX ORDER — ONDANSETRON 2 MG/ML
4 INJECTION INTRAMUSCULAR; INTRAVENOUS EVERY 6 HOURS PRN
Status: DISCONTINUED | OUTPATIENT
Start: 2024-07-21 | End: 2024-07-28 | Stop reason: HOSPADM

## 2024-07-21 RX ORDER — ATORVASTATIN CALCIUM 20 MG/1
20 TABLET, FILM COATED ORAL DAILY
Status: DISCONTINUED | OUTPATIENT
Start: 2024-07-22 | End: 2024-07-28 | Stop reason: HOSPADM

## 2024-07-21 RX ORDER — GLUCAGON 1 MG/ML
1 KIT INJECTION PRN
Status: DISCONTINUED | OUTPATIENT
Start: 2024-07-21 | End: 2024-07-28 | Stop reason: HOSPADM

## 2024-07-21 RX ORDER — OXYBUTYNIN CHLORIDE 10 MG/1
1 TABLET, EXTENDED RELEASE ORAL DAILY
Status: DISCONTINUED | OUTPATIENT
Start: 2024-07-22 | End: 2024-07-28 | Stop reason: HOSPADM

## 2024-07-21 RX ORDER — INSULIN LISPRO 100 [IU]/ML
0-8 INJECTION, SOLUTION INTRAVENOUS; SUBCUTANEOUS
Status: DISCONTINUED | OUTPATIENT
Start: 2024-07-21 | End: 2024-07-22

## 2024-07-21 RX ORDER — AMLODIPINE BESYLATE 10 MG/1
10 TABLET ORAL DAILY
Status: DISCONTINUED | OUTPATIENT
Start: 2024-07-22 | End: 2024-07-28 | Stop reason: HOSPADM

## 2024-07-21 RX ORDER — DULOXETIN HYDROCHLORIDE 60 MG/1
60 CAPSULE, DELAYED RELEASE ORAL 2 TIMES DAILY
Status: DISCONTINUED | OUTPATIENT
Start: 2024-07-21 | End: 2024-07-22

## 2024-07-21 RX ADMIN — INSULIN LISPRO 4 UNITS: 100 INJECTION, SOLUTION INTRAVENOUS; SUBCUTANEOUS at 22:13

## 2024-07-21 RX ADMIN — BUSPIRONE HYDROCHLORIDE 10 MG: 10 TABLET ORAL at 21:44

## 2024-07-21 RX ADMIN — INSULIN LISPRO 15 UNITS: 100 INJECTION, SOLUTION INTRAVENOUS; SUBCUTANEOUS at 18:58

## 2024-07-21 RX ADMIN — CEFEPIME 2000 MG: 2 INJECTION, POWDER, FOR SOLUTION INTRAVENOUS at 14:56

## 2024-07-21 RX ADMIN — HYDRALAZINE HYDROCHLORIDE 50 MG: 50 TABLET ORAL at 21:44

## 2024-07-21 RX ADMIN — SODIUM CHLORIDE 1000 ML: 9 INJECTION, SOLUTION INTRAVENOUS at 15:24

## 2024-07-21 RX ADMIN — INSULIN GLARGINE 50 UNITS: 100 INJECTION, SOLUTION SUBCUTANEOUS at 22:13

## 2024-07-21 RX ADMIN — ENOXAPARIN SODIUM 30 MG: 100 INJECTION SUBCUTANEOUS at 21:56

## 2024-07-21 RX ADMIN — SODIUM CHLORIDE: 9 INJECTION, SOLUTION INTRAVENOUS at 18:46

## 2024-07-21 RX ADMIN — INSULIN GLARGINE 50 UNITS: 100 INJECTION, SOLUTION SUBCUTANEOUS at 21:44

## 2024-07-21 RX ADMIN — TRAZODONE HYDROCHLORIDE 50 MG: 50 TABLET ORAL at 21:44

## 2024-07-21 RX ADMIN — DULOXETINE HYDROCHLORIDE 60 MG: 60 CAPSULE, DELAYED RELEASE ORAL at 21:44

## 2024-07-21 RX ADMIN — VANCOMYCIN HYDROCHLORIDE 1500 MG: 1.5 INJECTION, POWDER, LYOPHILIZED, FOR SOLUTION INTRAVENOUS at 15:32

## 2024-07-21 RX ADMIN — SODIUM CHLORIDE, PRESERVATIVE FREE 10 ML: 5 INJECTION INTRAVENOUS at 21:56

## 2024-07-21 ASSESSMENT — PAIN DESCRIPTION - PAIN TYPE: TYPE: ACUTE PAIN

## 2024-07-21 ASSESSMENT — PAIN DESCRIPTION - FREQUENCY: FREQUENCY: CONTINUOUS

## 2024-07-21 ASSESSMENT — PAIN DESCRIPTION - ORIENTATION: ORIENTATION: LEFT;LOWER

## 2024-07-21 ASSESSMENT — PAIN SCALES - GENERAL: PAINLEVEL_OUTOF10: 8

## 2024-07-21 ASSESSMENT — PAIN - FUNCTIONAL ASSESSMENT
PAIN_FUNCTIONAL_ASSESSMENT: PREVENTS OR INTERFERES WITH MANY ACTIVE NOT PASSIVE ACTIVITIES
PAIN_FUNCTIONAL_ASSESSMENT: NONE - DENIES PAIN
PAIN_FUNCTIONAL_ASSESSMENT: 0-10

## 2024-07-21 ASSESSMENT — PAIN DESCRIPTION - DESCRIPTORS: DESCRIPTORS: DISCOMFORT;PRESSURE

## 2024-07-21 ASSESSMENT — PAIN DESCRIPTION - LOCATION: LOCATION: LEG

## 2024-07-21 NOTE — ED NOTES
.ED SBAR report provider to DENILSON Acosta. Patient to be transported to Room 4258 via stretcher by transport tech. IV site clean, dry, and intact. MEWS score 1 and pain assessed as 0/10 and documented.

## 2024-07-21 NOTE — H&P
BY MOUTH DAILY 11/20/23   Narciso Nino MD   hydrALAZINE (APRESOLINE) 50 MG tablet TAKE 1 TABLET BY MOUTH IN THE  MORNING , 1 TABLET BY MOUTH AT  NOON, AND 1 TABLET BY MOUTH IN  THE EVENING 11/20/23   Narciso Nino MD   atorvastatin (LIPITOR) 20 MG tablet TAKE 1 TABLET BY MOUTH ONCE  DAILY 11/20/23   Narciso Nino MD   busPIRone (BUSPAR) 10 MG tablet TAKE 1 TABLET BY MOUTH IN THE  MORNING 1 TABLET BY MOUTH AT  NOON AND 1 TABLET BY MOUTH  BEFORE BEDTIME 11/20/23   Narciso Nino MD   tamsulosin (FLOMAX) 0.4 MG capsule TAKE 1 CAPSULE BY MOUTH DAILY 10/3/23   Narciso Nino MD   atenolol-chlorthalidone (TENORETIC) 50-25 MG per tablet TAKE 1 TABLET BY MOUTH DAILY 10/3/23   Narciso Nino MD   Handicap Placard MISC by Does not apply route Duration - 5 years 5/17/23   Narciso Nino MD       Allergies:  Seasonal    Social History:      The patient currently lives at home    TOBACCO:   reports that he has never smoked. He has never used smokeless tobacco.  ETOH:   reports no history of alcohol use.  E-cigarette/Vaping       Questions Responses    E-cigarette/Vaping Use Never User    Start Date     Passive Exposure No    Quit Date     Counseling Given Yes    Comments               Family History:      Reviewed and negative in regards to presenting illness/complaint.        Problem Relation Age of Onset    Alzheimer's Disease Mother     Heart Attack Father     Anxiety Disorder Sister     Other Son         Pulmonary Embolism       REVIEW OF SYSTEMS COMPLETED:   Pertinent positives as noted in the HPI. All other systems reviewed and negative.    PHYSICAL EXAM PERFORMED:    BP (!) 136/58   Pulse 61   Temp 98.2 °F (36.8 °C) (Oral)   Resp 14   Ht 1.753 m (5' 9\")   Wt 113.6 kg (250 lb 7.1 oz)   SpO2 96%   BMI 36.98 kg/m²     General appearance:  No apparent distress  HEENT:  Normal cephalic.  Respiratory:  Normal respiratory effort. Clear to

## 2024-07-22 LAB
ALBUMIN SERPL-MCNC: 3.2 G/DL (ref 3.4–5)
ALBUMIN/GLOB SERPL: 0.8 {RATIO} (ref 1.1–2.2)
ALP SERPL-CCNC: 63 U/L (ref 40–129)
ALT SERPL-CCNC: 18 U/L (ref 10–40)
ANION GAP SERPL CALCULATED.3IONS-SCNC: 10 MMOL/L (ref 3–16)
AST SERPL-CCNC: 19 U/L (ref 15–37)
BASOPHILS # BLD: 0 K/UL (ref 0–0.2)
BASOPHILS NFR BLD: 0.1 %
BILIRUB SERPL-MCNC: 0.7 MG/DL (ref 0–1)
BUN SERPL-MCNC: 56 MG/DL (ref 7–20)
CALCIUM SERPL-MCNC: 9.2 MG/DL (ref 8.3–10.6)
CHLORIDE SERPL-SCNC: 103 MMOL/L (ref 99–110)
CO2 SERPL-SCNC: 22 MMOL/L (ref 21–32)
CREAT SERPL-MCNC: 1.9 MG/DL (ref 0.8–1.3)
CRP SERPL-MCNC: 234.3 MG/L (ref 0–5.1)
DEPRECATED RDW RBC AUTO: 14.7 % (ref 12.4–15.4)
EOSINOPHIL # BLD: 0 K/UL (ref 0–0.6)
EOSINOPHIL NFR BLD: 0.7 %
ERYTHROCYTE [SEDIMENTATION RATE] IN BLOOD BY WESTERGREN METHOD: 72 MM/HR (ref 0–20)
EST. AVERAGE GLUCOSE BLD GHB EST-MCNC: 142.7 MG/DL
GFR SERPLBLD CREATININE-BSD FMLA CKD-EPI: 36 ML/MIN/{1.73_M2}
GLUCOSE BLD-MCNC: 147 MG/DL (ref 70–99)
GLUCOSE BLD-MCNC: 255 MG/DL (ref 70–99)
GLUCOSE BLD-MCNC: 261 MG/DL (ref 70–99)
GLUCOSE BLD-MCNC: 91 MG/DL (ref 70–99)
GLUCOSE SERPL-MCNC: 149 MG/DL (ref 70–99)
HBA1C MFR BLD: 6.6 %
HCT VFR BLD AUTO: 28.2 % (ref 40.5–52.5)
HGB BLD-MCNC: 9.7 G/DL (ref 13.5–17.5)
LACTATE BLDV-SCNC: 0.7 MMOL/L (ref 0.4–2)
LACTATE BLDV-SCNC: 0.7 MMOL/L (ref 0.4–2)
LACTATE BLDV-SCNC: 1.1 MMOL/L (ref 0.4–2)
LACTATE BLDV-SCNC: 1.2 MMOL/L (ref 0.4–2)
LYMPHOCYTES # BLD: 0.5 K/UL (ref 1–5.1)
LYMPHOCYTES NFR BLD: 7.5 %
MCH RBC QN AUTO: 28.3 PG (ref 26–34)
MCHC RBC AUTO-ENTMCNC: 34.2 G/DL (ref 31–36)
MCV RBC AUTO: 82.6 FL (ref 80–100)
MONOCYTES # BLD: 0.4 K/UL (ref 0–1.3)
MONOCYTES NFR BLD: 6.8 %
NEUTROPHILS # BLD: 5.2 K/UL (ref 1.7–7.7)
NEUTROPHILS NFR BLD: 84.9 %
PERFORMED ON: ABNORMAL
PERFORMED ON: NORMAL
PLATELET # BLD AUTO: 143 K/UL (ref 135–450)
PMV BLD AUTO: 7.7 FL (ref 5–10.5)
POTASSIUM SERPL-SCNC: 4.1 MMOL/L (ref 3.5–5.1)
PROT SERPL-MCNC: 7.2 G/DL (ref 6.4–8.2)
RBC # BLD AUTO: 3.42 M/UL (ref 4.2–5.9)
SODIUM SERPL-SCNC: 135 MMOL/L (ref 136–145)
VANCOMYCIN SERPL-MCNC: 10.2 UG/ML
WBC # BLD AUTO: 6.1 K/UL (ref 4–11)

## 2024-07-22 PROCEDURE — 6370000000 HC RX 637 (ALT 250 FOR IP)

## 2024-07-22 PROCEDURE — 94660 CPAP INITIATION&MGMT: CPT

## 2024-07-22 PROCEDURE — 6370000000 HC RX 637 (ALT 250 FOR IP): Performed by: INTERNAL MEDICINE

## 2024-07-22 PROCEDURE — 83605 ASSAY OF LACTIC ACID: CPT

## 2024-07-22 PROCEDURE — 80202 ASSAY OF VANCOMYCIN: CPT

## 2024-07-22 PROCEDURE — 85652 RBC SED RATE AUTOMATED: CPT

## 2024-07-22 PROCEDURE — 86140 C-REACTIVE PROTEIN: CPT

## 2024-07-22 PROCEDURE — 87075 CULTR BACTERIA EXCEPT BLOOD: CPT

## 2024-07-22 PROCEDURE — 6370000000 HC RX 637 (ALT 250 FOR IP): Performed by: STUDENT IN AN ORGANIZED HEALTH CARE EDUCATION/TRAINING PROGRAM

## 2024-07-22 PROCEDURE — 6360000002 HC RX W HCPCS: Performed by: INTERNAL MEDICINE

## 2024-07-22 PROCEDURE — 87186 SC STD MICRODIL/AGAR DIL: CPT

## 2024-07-22 PROCEDURE — 87070 CULTURE OTHR SPECIMN AEROBIC: CPT

## 2024-07-22 PROCEDURE — 87077 CULTURE AEROBIC IDENTIFY: CPT

## 2024-07-22 PROCEDURE — 0QBR0ZZ EXCISION OF LEFT TOE PHALANX, OPEN APPROACH: ICD-10-PCS | Performed by: STUDENT IN AN ORGANIZED HEALTH CARE EDUCATION/TRAINING PROGRAM

## 2024-07-22 PROCEDURE — 36415 COLL VENOUS BLD VENIPUNCTURE: CPT

## 2024-07-22 PROCEDURE — 1200000000 HC SEMI PRIVATE

## 2024-07-22 PROCEDURE — 85025 COMPLETE CBC W/AUTO DIFF WBC: CPT

## 2024-07-22 PROCEDURE — 2580000003 HC RX 258: Performed by: INTERNAL MEDICINE

## 2024-07-22 PROCEDURE — 87205 SMEAR GRAM STAIN: CPT

## 2024-07-22 PROCEDURE — 80053 COMPREHEN METABOLIC PANEL: CPT

## 2024-07-22 RX ORDER — INSULIN GLARGINE 100 [IU]/ML
50 INJECTION, SOLUTION SUBCUTANEOUS NIGHTLY
Status: DISCONTINUED | OUTPATIENT
Start: 2024-07-22 | End: 2024-07-23

## 2024-07-22 RX ORDER — INSULIN LISPRO 100 [IU]/ML
0-4 INJECTION, SOLUTION INTRAVENOUS; SUBCUTANEOUS
Status: DISCONTINUED | OUTPATIENT
Start: 2024-07-22 | End: 2024-07-28 | Stop reason: HOSPADM

## 2024-07-22 RX ORDER — HYDRALAZINE HYDROCHLORIDE 25 MG/1
25 TABLET, FILM COATED ORAL EVERY 8 HOURS SCHEDULED
Status: DISCONTINUED | OUTPATIENT
Start: 2024-07-22 | End: 2024-07-22

## 2024-07-22 RX ORDER — HYDRALAZINE HYDROCHLORIDE 50 MG/1
50 TABLET, FILM COATED ORAL 2 TIMES DAILY
Status: DISCONTINUED | OUTPATIENT
Start: 2024-07-22 | End: 2024-07-28 | Stop reason: HOSPADM

## 2024-07-22 RX ORDER — BUSPIRONE HYDROCHLORIDE 10 MG/1
10 TABLET ORAL 2 TIMES DAILY
Status: DISCONTINUED | OUTPATIENT
Start: 2024-07-22 | End: 2024-07-28 | Stop reason: HOSPADM

## 2024-07-22 RX ORDER — GABAPENTIN 300 MG/1
300 CAPSULE ORAL NIGHTLY
Status: DISCONTINUED | OUTPATIENT
Start: 2024-07-22 | End: 2024-07-28 | Stop reason: HOSPADM

## 2024-07-22 RX ORDER — INSULIN LISPRO 100 [IU]/ML
35 INJECTION, SOLUTION INTRAVENOUS; SUBCUTANEOUS
Status: DISCONTINUED | OUTPATIENT
Start: 2024-07-22 | End: 2024-07-28 | Stop reason: HOSPADM

## 2024-07-22 RX ORDER — INSULIN GLARGINE 100 [IU]/ML
40 INJECTION, SOLUTION SUBCUTANEOUS NIGHTLY
Status: DISCONTINUED | OUTPATIENT
Start: 2024-07-22 | End: 2024-07-23

## 2024-07-22 RX ORDER — INSULIN LISPRO 100 [IU]/ML
0-4 INJECTION, SOLUTION INTRAVENOUS; SUBCUTANEOUS NIGHTLY
Status: DISCONTINUED | OUTPATIENT
Start: 2024-07-22 | End: 2024-07-28 | Stop reason: HOSPADM

## 2024-07-22 RX ORDER — METRONIDAZOLE 500 MG/100ML
500 INJECTION, SOLUTION INTRAVENOUS EVERY 8 HOURS
Status: DISCONTINUED | OUTPATIENT
Start: 2024-07-22 | End: 2024-07-27

## 2024-07-22 RX ORDER — DULOXETIN HYDROCHLORIDE 60 MG/1
60 CAPSULE, DELAYED RELEASE ORAL DAILY
Status: DISCONTINUED | OUTPATIENT
Start: 2024-07-23 | End: 2024-07-28 | Stop reason: HOSPADM

## 2024-07-22 RX ADMIN — SODIUM CHLORIDE, PRESERVATIVE FREE 10 ML: 5 INJECTION INTRAVENOUS at 20:27

## 2024-07-22 RX ADMIN — SODIUM CHLORIDE: 9 INJECTION, SOLUTION INTRAVENOUS at 18:54

## 2024-07-22 RX ADMIN — TAMSULOSIN HYDROCHLORIDE 0.4 MG: 0.4 CAPSULE ORAL at 08:07

## 2024-07-22 RX ADMIN — ENOXAPARIN SODIUM 30 MG: 100 INJECTION SUBCUTANEOUS at 20:26

## 2024-07-22 RX ADMIN — SODIUM CHLORIDE, PRESERVATIVE FREE 10 ML: 5 INJECTION INTRAVENOUS at 08:09

## 2024-07-22 RX ADMIN — INSULIN GLARGINE 50 UNITS: 100 INJECTION, SOLUTION SUBCUTANEOUS at 20:27

## 2024-07-22 RX ADMIN — CHLORTHALIDONE 25 MG: 25 TABLET ORAL at 08:07

## 2024-07-22 RX ADMIN — ATORVASTATIN CALCIUM 20 MG: 20 TABLET, FILM COATED ORAL at 08:07

## 2024-07-22 RX ADMIN — HYDRALAZINE HYDROCHLORIDE 50 MG: 50 TABLET ORAL at 12:03

## 2024-07-22 RX ADMIN — INSULIN LISPRO 35 UNITS: 100 INJECTION, SOLUTION INTRAVENOUS; SUBCUTANEOUS at 17:09

## 2024-07-22 RX ADMIN — INSULIN GLARGINE 40 UNITS: 100 INJECTION, SOLUTION SUBCUTANEOUS at 20:28

## 2024-07-22 RX ADMIN — INSULIN LISPRO 15 UNITS: 100 INJECTION, SOLUTION INTRAVENOUS; SUBCUTANEOUS at 08:07

## 2024-07-22 RX ADMIN — METRONIDAZOLE 500 MG: 500 INJECTION, SOLUTION INTRAVENOUS at 23:41

## 2024-07-22 RX ADMIN — BUSPIRONE HYDROCHLORIDE 10 MG: 10 TABLET ORAL at 08:07

## 2024-07-22 RX ADMIN — CEFEPIME 2000 MG: 2 INJECTION, POWDER, FOR SOLUTION INTRAVENOUS at 15:20

## 2024-07-22 RX ADMIN — VANCOMYCIN HYDROCHLORIDE 1500 MG: 1.5 INJECTION, POWDER, LYOPHILIZED, FOR SOLUTION INTRAVENOUS at 08:17

## 2024-07-22 RX ADMIN — CEFEPIME 2000 MG: 2 INJECTION, POWDER, FOR SOLUTION INTRAVENOUS at 03:55

## 2024-07-22 RX ADMIN — AMLODIPINE BESYLATE 10 MG: 10 TABLET ORAL at 08:07

## 2024-07-22 RX ADMIN — HYDRALAZINE HYDROCHLORIDE 50 MG: 50 TABLET ORAL at 05:39

## 2024-07-22 RX ADMIN — DULOXETINE HYDROCHLORIDE 60 MG: 60 CAPSULE, DELAYED RELEASE ORAL at 08:07

## 2024-07-22 RX ADMIN — INSULIN LISPRO 2 UNITS: 100 INJECTION, SOLUTION INTRAVENOUS; SUBCUTANEOUS at 17:08

## 2024-07-22 RX ADMIN — HYDRALAZINE HYDROCHLORIDE 50 MG: 50 TABLET ORAL at 20:27

## 2024-07-22 RX ADMIN — SODIUM CHLORIDE: 9 INJECTION, SOLUTION INTRAVENOUS at 15:19

## 2024-07-22 RX ADMIN — OXYBUTYNIN CHLORIDE 10 MG: 10 TABLET, EXTENDED RELEASE ORAL at 08:07

## 2024-07-22 RX ADMIN — ATENOLOL 50 MG: 50 TABLET ORAL at 08:07

## 2024-07-22 RX ADMIN — BUSPIRONE HYDROCHLORIDE 10 MG: 10 TABLET ORAL at 20:27

## 2024-07-22 RX ADMIN — GABAPENTIN 300 MG: 300 CAPSULE ORAL at 20:27

## 2024-07-22 ASSESSMENT — PAIN SCALES - GENERAL: PAINLEVEL_OUTOF10: 0

## 2024-07-22 NOTE — CARE COORDINATION
Discharge Planning Note:    Chart reviewed and it appears that patient has minimal needs for discharge at this time. Risk Score 14 %     Primary Care Physician is Narciso Nino MD (last visit 6/26/2024)    Primary insurance is Medicare    Please notify case management if any discharge needs are identified.      Case management will continue to follow progress and update discharge plan as needed.   Rosie Baptiste RN Case Manager  [phone]

## 2024-07-22 NOTE — CARE COORDINATION
Wound care consulted for:  \"Osteomylities of the L greater toe\"  Pt sees Dr. Enio Perez outpatient. Dr. Edwrads to see inpatient. Will defer consult at this time. Will not continue to follow, please re-consult if needed.   Electronically signed by Kimberly Lopez RN CWOCN on 7/22/2024 at 9:04 AM

## 2024-07-23 PROBLEM — E11.628 TYPE 2 DIABETES MELLITUS WITH DIABETIC FOOT INFECTION (HCC): Status: ACTIVE | Noted: 2024-07-23

## 2024-07-23 PROBLEM — R79.82 CRP ELEVATED: Status: ACTIVE | Noted: 2024-07-23

## 2024-07-23 PROBLEM — L08.9 TYPE 2 DIABETES MELLITUS WITH DIABETIC FOOT INFECTION (HCC): Status: ACTIVE | Noted: 2024-07-23

## 2024-07-23 PROBLEM — R70.0 ELEVATED ERYTHROCYTE SEDIMENTATION RATE: Status: ACTIVE | Noted: 2024-07-23

## 2024-07-23 PROBLEM — K74.60 CIRRHOSIS OF LIVER WITHOUT ASCITES (HCC): Status: ACTIVE | Noted: 2024-07-23

## 2024-07-23 PROBLEM — N17.9 AKI (ACUTE KIDNEY INJURY) (HCC): Status: ACTIVE | Noted: 2024-07-23

## 2024-07-23 PROBLEM — L03.116 LEFT LEG CELLULITIS: Status: ACTIVE | Noted: 2024-07-23

## 2024-07-23 LAB
ANION GAP SERPL CALCULATED.3IONS-SCNC: 10 MMOL/L (ref 3–16)
BUN SERPL-MCNC: 38 MG/DL (ref 7–20)
CALCIUM SERPL-MCNC: 8.8 MG/DL (ref 8.3–10.6)
CHLORIDE SERPL-SCNC: 102 MMOL/L (ref 99–110)
CO2 SERPL-SCNC: 24 MMOL/L (ref 21–32)
CREAT SERPL-MCNC: 1.4 MG/DL (ref 0.8–1.3)
CREAT SERPL-MCNC: 1.6 MG/DL (ref 0.8–1.3)
ECHO BSA: 2.35 M2
EKG ATRIAL RATE: 66 BPM
EKG DIAGNOSIS: NORMAL
EKG P AXIS: 84 DEGREES
EKG P-R INTERVAL: 292 MS
EKG Q-T INTERVAL: 418 MS
EKG QRS DURATION: 100 MS
EKG QTC CALCULATION (BAZETT): 438 MS
EKG R AXIS: -24 DEGREES
EKG T AXIS: 26 DEGREES
EKG VENTRICULAR RATE: 66 BPM
GFR SERPLBLD CREATININE-BSD FMLA CKD-EPI: 44 ML/MIN/{1.73_M2}
GFR SERPLBLD CREATININE-BSD FMLA CKD-EPI: 52 ML/MIN/{1.73_M2}
GLUCOSE BLD-MCNC: 145 MG/DL (ref 70–99)
GLUCOSE BLD-MCNC: 146 MG/DL (ref 70–99)
GLUCOSE BLD-MCNC: 172 MG/DL (ref 70–99)
GLUCOSE BLD-MCNC: 187 MG/DL (ref 70–99)
GLUCOSE BLD-MCNC: 279 MG/DL (ref 70–99)
GLUCOSE SERPL-MCNC: 241 MG/DL (ref 70–99)
PERFORMED ON: ABNORMAL
POC ACT LR: 224 SEC
POTASSIUM SERPL-SCNC: 4.4 MMOL/L (ref 3.5–5.1)
SODIUM SERPL-SCNC: 136 MMOL/L (ref 136–145)
VANCOMYCIN SERPL-MCNC: 13 UG/ML

## 2024-07-23 PROCEDURE — 37228 HC TIB PER TERRITORY PLASTY: CPT | Performed by: STUDENT IN AN ORGANIZED HEALTH CARE EDUCATION/TRAINING PROGRAM

## 2024-07-23 PROCEDURE — 6360000002 HC RX W HCPCS: Performed by: INTERNAL MEDICINE

## 2024-07-23 PROCEDURE — 94760 N-INVAS EAR/PLS OXIMETRY 1: CPT

## 2024-07-23 PROCEDURE — 7100000010 HC PHASE II RECOVERY - FIRST 15 MIN: Performed by: STUDENT IN AN ORGANIZED HEALTH CARE EDUCATION/TRAINING PROGRAM

## 2024-07-23 PROCEDURE — 6360000002 HC RX W HCPCS

## 2024-07-23 PROCEDURE — 6370000000 HC RX 637 (ALT 250 FOR IP): Performed by: INTERNAL MEDICINE

## 2024-07-23 PROCEDURE — C1725 CATH, TRANSLUMIN NON-LASER: HCPCS | Performed by: STUDENT IN AN ORGANIZED HEALTH CARE EDUCATION/TRAINING PROGRAM

## 2024-07-23 PROCEDURE — C1769 GUIDE WIRE: HCPCS | Performed by: STUDENT IN AN ORGANIZED HEALTH CARE EDUCATION/TRAINING PROGRAM

## 2024-07-23 PROCEDURE — 99152 MOD SED SAME PHYS/QHP 5/>YRS: CPT | Performed by: STUDENT IN AN ORGANIZED HEALTH CARE EDUCATION/TRAINING PROGRAM

## 2024-07-23 PROCEDURE — 99223 1ST HOSP IP/OBS HIGH 75: CPT | Performed by: INTERNAL MEDICINE

## 2024-07-23 PROCEDURE — 2709999900 HC NON-CHARGEABLE SUPPLY: Performed by: STUDENT IN AN ORGANIZED HEALTH CARE EDUCATION/TRAINING PROGRAM

## 2024-07-23 PROCEDURE — 047S3ZZ DILATION OF LEFT POSTERIOR TIBIAL ARTERY, PERCUTANEOUS APPROACH: ICD-10-PCS | Performed by: STUDENT IN AN ORGANIZED HEALTH CARE EDUCATION/TRAINING PROGRAM

## 2024-07-23 PROCEDURE — C1760 CLOSURE DEV, VASC: HCPCS | Performed by: STUDENT IN AN ORGANIZED HEALTH CARE EDUCATION/TRAINING PROGRAM

## 2024-07-23 PROCEDURE — 80202 ASSAY OF VANCOMYCIN: CPT

## 2024-07-23 PROCEDURE — 047Q3ZZ DILATION OF LEFT ANTERIOR TIBIAL ARTERY, PERCUTANEOUS APPROACH: ICD-10-PCS | Performed by: STUDENT IN AN ORGANIZED HEALTH CARE EDUCATION/TRAINING PROGRAM

## 2024-07-23 PROCEDURE — C1887 CATHETER, GUIDING: HCPCS | Performed by: STUDENT IN AN ORGANIZED HEALTH CARE EDUCATION/TRAINING PROGRAM

## 2024-07-23 PROCEDURE — 7100000011 HC PHASE II RECOVERY - ADDTL 15 MIN: Performed by: STUDENT IN AN ORGANIZED HEALTH CARE EDUCATION/TRAINING PROGRAM

## 2024-07-23 PROCEDURE — 82565 ASSAY OF CREATININE: CPT

## 2024-07-23 PROCEDURE — 36415 COLL VENOUS BLD VENIPUNCTURE: CPT

## 2024-07-23 PROCEDURE — 93005 ELECTROCARDIOGRAM TRACING: CPT | Performed by: STUDENT IN AN ORGANIZED HEALTH CARE EDUCATION/TRAINING PROGRAM

## 2024-07-23 PROCEDURE — 2500000003 HC RX 250 WO HCPCS: Performed by: STUDENT IN AN ORGANIZED HEALTH CARE EDUCATION/TRAINING PROGRAM

## 2024-07-23 PROCEDURE — 94660 CPAP INITIATION&MGMT: CPT

## 2024-07-23 PROCEDURE — 75630 X-RAY AORTA LEG ARTERIES: CPT | Performed by: STUDENT IN AN ORGANIZED HEALTH CARE EDUCATION/TRAINING PROGRAM

## 2024-07-23 PROCEDURE — 75605 CONTRAST EXAM THORACIC AORTA: CPT | Performed by: STUDENT IN AN ORGANIZED HEALTH CARE EDUCATION/TRAINING PROGRAM

## 2024-07-23 PROCEDURE — 6370000000 HC RX 637 (ALT 250 FOR IP): Performed by: STUDENT IN AN ORGANIZED HEALTH CARE EDUCATION/TRAINING PROGRAM

## 2024-07-23 PROCEDURE — B41D1ZZ FLUOROSCOPY OF AORTA AND BILATERAL LOWER EXTREMITY ARTERIES USING LOW OSMOLAR CONTRAST: ICD-10-PCS | Performed by: STUDENT IN AN ORGANIZED HEALTH CARE EDUCATION/TRAINING PROGRAM

## 2024-07-23 PROCEDURE — 99153 MOD SED SAME PHYS/QHP EA: CPT | Performed by: STUDENT IN AN ORGANIZED HEALTH CARE EDUCATION/TRAINING PROGRAM

## 2024-07-23 PROCEDURE — 1200000000 HC SEMI PRIVATE

## 2024-07-23 PROCEDURE — 80048 BASIC METABOLIC PNL TOTAL CA: CPT

## 2024-07-23 PROCEDURE — 047U3ZZ DILATION OF LEFT PERONEAL ARTERY, PERCUTANEOUS APPROACH: ICD-10-PCS | Performed by: STUDENT IN AN ORGANIZED HEALTH CARE EDUCATION/TRAINING PROGRAM

## 2024-07-23 PROCEDURE — 6360000004 HC RX CONTRAST MEDICATION: Performed by: STUDENT IN AN ORGANIZED HEALTH CARE EDUCATION/TRAINING PROGRAM

## 2024-07-23 PROCEDURE — 6360000002 HC RX W HCPCS: Performed by: STUDENT IN AN ORGANIZED HEALTH CARE EDUCATION/TRAINING PROGRAM

## 2024-07-23 PROCEDURE — 6370000000 HC RX 637 (ALT 250 FOR IP)

## 2024-07-23 PROCEDURE — 2580000003 HC RX 258: Performed by: INTERNAL MEDICINE

## 2024-07-23 PROCEDURE — 85347 COAGULATION TIME ACTIVATED: CPT

## 2024-07-23 PROCEDURE — 37232 HC TIB PER TERR ADDL PLASTY: CPT | Performed by: STUDENT IN AN ORGANIZED HEALTH CARE EDUCATION/TRAINING PROGRAM

## 2024-07-23 PROCEDURE — C1894 INTRO/SHEATH, NON-LASER: HCPCS | Performed by: STUDENT IN AN ORGANIZED HEALTH CARE EDUCATION/TRAINING PROGRAM

## 2024-07-23 PROCEDURE — 93010 ELECTROCARDIOGRAM REPORT: CPT | Performed by: INTERNAL MEDICINE

## 2024-07-23 RX ORDER — FENTANYL CITRATE 50 UG/ML
INJECTION, SOLUTION INTRAMUSCULAR; INTRAVENOUS PRN
Status: DISCONTINUED | OUTPATIENT
Start: 2024-07-23 | End: 2024-07-23 | Stop reason: HOSPADM

## 2024-07-23 RX ORDER — HEPARIN SODIUM 1000 [USP'U]/ML
INJECTION, SOLUTION INTRAVENOUS; SUBCUTANEOUS PRN
Status: DISCONTINUED | OUTPATIENT
Start: 2024-07-23 | End: 2024-07-23 | Stop reason: HOSPADM

## 2024-07-23 RX ORDER — INSULIN GLARGINE 100 [IU]/ML
50 INJECTION, SOLUTION SUBCUTANEOUS NIGHTLY
Status: DISCONTINUED | OUTPATIENT
Start: 2024-07-23 | End: 2024-07-28 | Stop reason: HOSPADM

## 2024-07-23 RX ORDER — NITROGLYCERIN 20 MG/100ML
INJECTION INTRAVENOUS CONTINUOUS PRN
Status: COMPLETED | OUTPATIENT
Start: 2024-07-23 | End: 2024-07-23

## 2024-07-23 RX ORDER — SODIUM CHLORIDE 0.9 % (FLUSH) 0.9 %
5-40 SYRINGE (ML) INJECTION EVERY 12 HOURS SCHEDULED
Status: DISCONTINUED | OUTPATIENT
Start: 2024-07-23 | End: 2024-07-28 | Stop reason: HOSPADM

## 2024-07-23 RX ORDER — MIDAZOLAM HYDROCHLORIDE 1 MG/ML
INJECTION INTRAMUSCULAR; INTRAVENOUS PRN
Status: DISCONTINUED | OUTPATIENT
Start: 2024-07-23 | End: 2024-07-23 | Stop reason: HOSPADM

## 2024-07-23 RX ORDER — SODIUM CHLORIDE 0.9 % (FLUSH) 0.9 %
5-40 SYRINGE (ML) INJECTION PRN
Status: DISCONTINUED | OUTPATIENT
Start: 2024-07-23 | End: 2024-07-28 | Stop reason: HOSPADM

## 2024-07-23 RX ORDER — VANCOMYCIN 1.75 G/350ML
1250 INJECTION, SOLUTION INTRAVENOUS ONCE
Status: COMPLETED | OUTPATIENT
Start: 2024-07-23 | End: 2024-07-23

## 2024-07-23 RX ORDER — ACETAMINOPHEN 325 MG/1
650 TABLET ORAL EVERY 4 HOURS PRN
Status: DISCONTINUED | OUTPATIENT
Start: 2024-07-23 | End: 2024-07-28 | Stop reason: HOSPADM

## 2024-07-23 RX ORDER — SODIUM CHLORIDE 9 MG/ML
INJECTION, SOLUTION INTRAVENOUS PRN
Status: DISCONTINUED | OUTPATIENT
Start: 2024-07-23 | End: 2024-07-28 | Stop reason: HOSPADM

## 2024-07-23 RX ADMIN — INSULIN LISPRO 35 UNITS: 100 INJECTION, SOLUTION INTRAVENOUS; SUBCUTANEOUS at 12:39

## 2024-07-23 RX ADMIN — CEFEPIME 2000 MG: 2 INJECTION, POWDER, FOR SOLUTION INTRAVENOUS at 03:23

## 2024-07-23 RX ADMIN — ATENOLOL 50 MG: 50 TABLET ORAL at 12:39

## 2024-07-23 RX ADMIN — GABAPENTIN 300 MG: 300 CAPSULE ORAL at 21:18

## 2024-07-23 RX ADMIN — OXYBUTYNIN CHLORIDE 10 MG: 10 TABLET, EXTENDED RELEASE ORAL at 12:39

## 2024-07-23 RX ADMIN — AMLODIPINE BESYLATE 10 MG: 10 TABLET ORAL at 12:39

## 2024-07-23 RX ADMIN — INSULIN LISPRO 2 UNITS: 100 INJECTION, SOLUTION INTRAVENOUS; SUBCUTANEOUS at 12:41

## 2024-07-23 RX ADMIN — VANCOMYCIN 1250 MG: 1.75 INJECTION, SOLUTION INTRAVENOUS at 12:54

## 2024-07-23 RX ADMIN — INSULIN LISPRO 35 UNITS: 100 INJECTION, SOLUTION INTRAVENOUS; SUBCUTANEOUS at 18:32

## 2024-07-23 RX ADMIN — CHLORTHALIDONE 25 MG: 25 TABLET ORAL at 12:38

## 2024-07-23 RX ADMIN — BUSPIRONE HYDROCHLORIDE 10 MG: 10 TABLET ORAL at 21:18

## 2024-07-23 RX ADMIN — METRONIDAZOLE 500 MG: 500 INJECTION, SOLUTION INTRAVENOUS at 15:31

## 2024-07-23 RX ADMIN — INSULIN GLARGINE 50 UNITS: 100 INJECTION, SOLUTION SUBCUTANEOUS at 21:18

## 2024-07-23 RX ADMIN — METRONIDAZOLE 500 MG: 500 INJECTION, SOLUTION INTRAVENOUS at 21:23

## 2024-07-23 RX ADMIN — TRAZODONE HYDROCHLORIDE 50 MG: 50 TABLET ORAL at 21:18

## 2024-07-23 RX ADMIN — DULOXETINE HYDROCHLORIDE 60 MG: 60 CAPSULE, DELAYED RELEASE ORAL at 12:38

## 2024-07-23 RX ADMIN — CEFEPIME 2000 MG: 2 INJECTION, POWDER, FOR SOLUTION INTRAVENOUS at 15:33

## 2024-07-23 RX ADMIN — METRONIDAZOLE 500 MG: 500 INJECTION, SOLUTION INTRAVENOUS at 06:47

## 2024-07-23 RX ADMIN — ENOXAPARIN SODIUM 30 MG: 100 INJECTION SUBCUTANEOUS at 21:17

## 2024-07-23 RX ADMIN — BUSPIRONE HYDROCHLORIDE 10 MG: 10 TABLET ORAL at 12:38

## 2024-07-23 RX ADMIN — ATORVASTATIN CALCIUM 20 MG: 20 TABLET, FILM COATED ORAL at 12:39

## 2024-07-23 ASSESSMENT — PAIN SCALES - GENERAL: PAINLEVEL_OUTOF10: 0

## 2024-07-23 NOTE — SEDATION DOCUMENTATION
Pre-Sedation:  Pre-Sedation Documentation and Exam:  I have personally completed a history, physical exam & review of systems for this patient (see notes).    Prior History of Anesthesia Complications:   none    Modified Mallampati:  II (soft palate, uvula, fauces visible)    ASA Classification:  Class 3 - A patient with severe systemic disease that limits activity but is not incapacitating    Brando Scale:  Activity:  2 - Able to move 4 extremities voluntarily on command  Respiration:  2 - Able to breathe deeply and cough freely  Circulation:  2 - BP+/- 20mmHg of normal  Consciousness:  2 - Fully awake  Oxygen Saturation (color):  2 - Able to maintain oxygen saturation >92% on room air    Sedation/Anesthesia Plan:  Guard the patient's safety and welfare.  Minimize physical discomfort and pain.  Minimize negative psychological responses to treatment by providing sedation and analgesia and maximize the potential amnesia.  Patient to meet pre-procedure discharge plan.    Medication Planned:  midazolam intravenously and fentanyl intravenously    Patient is an appropriate candidate for plan of sedation:   yes

## 2024-07-23 NOTE — ACP (ADVANCE CARE PLANNING)
Advance Care Planning     Advance Care Planning Activator (Inpatient)  Conversation Note      Date of ACP Conversation: 7/23/2024     Conversation Conducted with: Patient with Decision Making Capacity    ACP Activator: OLGA LIDIA Weiss, W    Health Care Decision Maker:     Current Designated Health Care Decision Maker:     Primary Decision Maker: Romy Luna - Spouse - [phone]    Secondary Decision Maker: Cira Gil - Brother/Sister - [phone]    Care Preferences    Ventilation:  \"If you were in your present state of health and suddenly became very ill and were unable to breathe on your own, what would your preference be about the use of a ventilator (breathing machine) if it were available to you?\"      Would the patient desire the use of ventilator (breathing machine)?: yes    \"If your health worsens and it becomes clear that your chance of recovery is unlikely, what would your preference be about the use of a ventilator (breathing machine) if it were available to you?\"     Would the patient desire the use of ventilator (breathing machine)?: No      Resuscitation  \"CPR works best to restart the heart when there is a sudden event, like a heart attack, in someone who is otherwise healthy. Unfortunately, CPR does not typically restart the heart for people who have serious health conditions or who are very sick.\"    \"In the event your heart stopped as a result of an underlying serious health condition, would you want attempts to be made to restart your heart (answer \"yes\" for attempt to resuscitate) or would you prefer a natural death (answer \"no\" for do not attempt to resuscitate)?\" yes       [] Yes   [] No   Educated Patient / Decision Maker regarding differences between Advance Directives and portable DNR orders.    Length of ACP Conversation in minutes:      Conversation Outcomes:  ACP discussion completed    Follow-up plan:    [] Schedule follow-up conversation to continue planning  []

## 2024-07-23 NOTE — CARE COORDINATION
Case Management Assessment  Initial Evaluation    Date/Time of Evaluation: 7/23/2024 12:35 PM  Assessment Completed by: OLGA LIDIA Weiss, INOW    If patient is discharged prior to next notation, then this note serves as note for discharge by case management.    Patient Name: Greg Luna                   YOB: 1945  Diagnosis: Osteomyelitis (HCC) [M86.9]  Left leg cellulitis [L03.116]  Osteomyelitis of left foot, unspecified type (HCC) [M86.9]                   Date / Time: 7/21/2024  1:44 PM    Patient Admission Status: Inpatient   Readmission Risk (Low < 19, Mod (19-27), High > 27): Readmission Risk Score: 15    Current PCP: Narciso Nino MD  PCP verified by CM? Yes    Chart Reviewed: Yes      History Provided by: Patient  Patient Orientation: Alert and Oriented    Patient Cognition: Alert    Hospitalization in the last 30 days (Readmission):  No    If yes, Readmission Assessment in CM Navigator will be completed.    Advance Directives:      Code Status: Full Code   Patient's Primary Decision Maker is: Legal Next of Kin    Primary Decision Maker: Romy Luna - Spouse - [phone]    Secondary Decision Maker: LouiseCira - Brother/Sister - [phone]    Discharge Planning:    Patient lives with: Spouse/Significant Other Type of Home: House  Primary Care Giver: Self  Patient Support Systems include: Spouse/Significant Other, Family Members   Current Financial resources: Medicare  Current community resources: None  Current services prior to admission: C-pap, Durable Medical Equipment            Current DME: Walker            Type of Home Care services:  Nursing Services    ADLS  Prior functional level: Independent in ADLs/IADLs  Current functional level: Independent in ADLs/IADLs    PT AM-PAC:   /24  OT AM-PAC:   /24    Family can provide assistance at DC: Yes  Would you like Case Management to discuss the discharge plan with any other family members/significant others, and if

## 2024-07-23 NOTE — CONSULTS
Clinical Pharmacy Note  Vancomycin Consult    Greg Luna is a 79 y.o. male ordered vancomycin for osteomyelitis; consult received from Dr. Billings to manage therapy. Also receiving cefepime.    Allergies:  Seasonal     Temp max:  Temp (24hrs), Av.1 °F (36.7 °C), Min:97.6 °F (36.4 °C), Max:98.3 °F (36.8 °C)      Recent Labs     24  1421   WBC 7.7       Recent Labs     24  1421   BUN 58*   CREATININE 2.0*         Intake/Output Summary (Last 24 hours) at 2024 1900  Last data filed at 2024 1736  Gross per 24 hour   Intake 1250 ml   Output --   Net 1250 ml       Culture Results:  Pending    Ht Readings from Last 1 Encounters:   24 1.753 m (5' 9\")        Wt Readings from Last 1 Encounters:   24 113.6 kg (250 lb 7.1 oz)         Estimated Creatinine Clearance: 38 mL/min (A) (based on SCr of 2 mg/dL (H)).    Assessment/Plan:  Day # 1 of vancomycin.  Patient with CKD, slightly elevated creatinine at 2.0 mg/dL. Received vancomycin 1500mg IV x 1 in the ED.    Will order a random level with morning labs to assist with subsequent dosing. Might need to continue intermittent dosing.    Thank you for the consult.     Viet Britt RPH  2024 7:02 PM    
Department of Podiatry Consult Note  Attending       Reason for Consult:  Foot ulcer  Requesting Physician:  MD Manuelito    CHIEF COMPLAINT:  Painful ulcer L foot    HISTORY OF PRESENT ILLNESS:                The patient is a 79 y.o. male with significant past medical history of DM2 with neuropathy, CKD, HTN, HLD,  who is consulted for management of ulceration of L hallux. Patient reports the ulcer has been present for a few months, has been being treated outpatient weekly with debridement with Dr. Perez, my partner, and then over the weekend noticed new swelling of the foot, and admits he felt somewhat nauseated, so presented to the ED. States since arriving and being placed on IV abx feels better. Denies significant pain.     Past Medical History:        Diagnosis Date    Anxiety     BPH (benign prostatic hyperplasia)     Depression     Diabetes with proteinuria     Disorder of iron metabolism 4/27/2010    GBS (Guillain [place] syndrome) (Formerly [facility]) 1990's    History of colonic polyps     History of colonic polyps     Hyperlipidemia     Hypertension     Liver cirrhosis secondary to WYLIE ([facility])     Long term (current) use of insulin ([facility]) 10/10/2019    Obesity     Presence of intraocular lens 10/10/2019    Psychophysiological insomnia 5/15/2018    Retinal hemorrhage, 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 with mild nonproliferative diabetic retinopathy without macular edema, bilateral ([facility]) 10/10/2019    Type 2 diabetes mellitus with proteinuria ([facility]) 11/19/2019    Type II or unspecified type diabetes mellitus without mention of complication, not stated as uncontrolled     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     Past Surgical History:        Procedure Laterality Date    CATARACT REMOVAL Left 2010     Current Medications:    Current Facility-Administered Medications: hydrALAZINE (APRESOLINE) tablet 50 mg, 50 mg, Oral, BID  busPIRone (BUSPAR) tablet 10 mg, 10 mg, Oral, BID  [START ON 7/23/2024] DULoxetine 
[facility]   CONSULTATION        Chief Complaint   Patient presents with    Leg Swelling    Leg Pain     Lower left leg pain and swelling 4 days ago. Left great toe nail may be infected and pt is seeing a podiatrist. Last visited the podiatrist Wednesday and the pain and swelling started the next day.            History of Present Illness:  Greg Luna is a 79 y.o. patient who presented to the hospital with complaints of leg pain. I have been asked to provide consultation regarding further management and testing.    Patient with history of non-healing ulcer of the LLE  Discussed case with Dr. Edwards patient was evaluated for concerns of PAD  No palpable PT pulse on my assessment and thready DP    Past Medical History:   has a past medical history of Anxiety, BPH (benign prostatic hyperplasia), Depression, Diabetes with proteinuria, Disorder of iron metabolism, GBS (Guillain [place] syndrome) (HCC), History of colonic polyps, History of colonic polyps, Hyperlipidemia, Hypertension, Liver cirrhosis secondary to WYLIE (HCC), Long term (current) use of insulin (HCC), Obesity, Presence of intraocular lens, Psychophysiological insomnia, Retinal hemorrhage, bilateral, Type 2 diabetes mellitus with mild nonproliferative diabetic retinopathy without macular edema, bilateral (HCC), Type 2 diabetes mellitus with proteinuria (HCC), Type II or unspecified type diabetes mellitus without mention of complication, not stated as uncontrolled, and Vitreous degeneration of right eye.    Surgical History:   has a past surgical history that includes Cataract removal (Left, 2010).     Social History:   reports that he has never smoked. He has never used smokeless tobacco. He reports that he does not drink alcohol and does not use drugs.     Family History:  No family history of premature coronary artery disease, aortic disease, or valve disease.    Home Medications:  Were reviewed and are listed in nursing record. and/or listed 
WC    insulin lispro  0-4 Units SubCUTAneous TID WC    insulin lispro  0-4 Units SubCUTAneous Nightly    gabapentin  300 mg Oral Nightly    metroNIDAZOLE  500 mg IntraVENous Q8H    [Held by provider] amLODIPine  10 mg Oral Daily    atorvastatin  20 mg Oral Daily    oxyBUTYnin  1 tablet Oral Daily    tamsulosin  0.4 mg Oral Daily    traZODone  50 mg Oral Nightly    sodium chloride flush  5-40 mL IntraVENous 2 times per day    enoxaparin  30 mg SubCUTAneous BID    cefepime  2,000 mg IntraVENous Q12H    atenolol  50 mg Oral Daily    And    chlorthalidone  25 mg Oral Daily    vancomycin (VANCOCIN) intermittent dosing (placeholder)   Other RX Placeholder       Continuous Infusions:   sodium chloride      sodium chloride Stopped (07/22/24 1520)    dextrose         PRN Meds:  sodium chloride flush, sodium chloride, acetaminophen, sodium chloride flush, sodium chloride, ondansetron **OR** ondansetron, polyethylene glycol, glucose, dextrose bolus **OR** dextrose bolus, glucagon (rDNA), dextrose    Imaging:   XR TOE LEFT (MIN 2 VIEWS)   Final Result   Suspected osteomyelitis of the tuft of the distal phalanges of the 1st and   2nd toes.             All pertinent images and reports for the current Hospitalization were reviewed by me.    IMPRESSION:    Patient Active Problem List   Diagnosis Code    BPH (benign prostatic hyperplasia) N40.0    Anxiety F41.9    Liver cirrhosis secondary to WYLIE ([facility]) K75.81, K74.60    KIMMIE (obstructive sleep apnea) G47.33    Essential hypertension I10    Uncontrolled type 2 diabetes mellitus with hyperglycemia, with long-term current use of insulin ([facility]) E11.65, Z79.4    Mixed hyperlipidemia E78.2    Psychophysiological insomnia F51.04    Major depressive disorder with single episode, in partial remission ([facility]) F32.4    Coronary atherosclerosis I25.10    Morbid obesity with BMI of 40.0-44.9, adult ([facility]) E66.01, Z68.41    Sleep paralysis G47.8    Coagulation defect ([facility]) D68.9    Thrombocytopenia

## 2024-07-24 LAB
ANION GAP SERPL CALCULATED.3IONS-SCNC: 9 MMOL/L (ref 3–16)
BUN SERPL-MCNC: 36 MG/DL (ref 7–20)
CALCIUM SERPL-MCNC: 8.7 MG/DL (ref 8.3–10.6)
CHLORIDE SERPL-SCNC: 103 MMOL/L (ref 99–110)
CO2 SERPL-SCNC: 23 MMOL/L (ref 21–32)
CREAT SERPL-MCNC: 1.5 MG/DL (ref 0.8–1.3)
DEPRECATED RDW RBC AUTO: 14.9 % (ref 12.4–15.4)
GFR SERPLBLD CREATININE-BSD FMLA CKD-EPI: 48 ML/MIN/{1.73_M2}
GLUCOSE BLD-MCNC: 224 MG/DL (ref 70–99)
GLUCOSE BLD-MCNC: 226 MG/DL (ref 70–99)
GLUCOSE BLD-MCNC: 234 MG/DL (ref 70–99)
GLUCOSE BLD-MCNC: 291 MG/DL (ref 70–99)
GLUCOSE BLD-MCNC: 382 MG/DL (ref 70–99)
GLUCOSE SERPL-MCNC: 206 MG/DL (ref 70–99)
HCT VFR BLD AUTO: 28.3 % (ref 40.5–52.5)
HGB BLD-MCNC: 9.8 G/DL (ref 13.5–17.5)
MCH RBC QN AUTO: 28.7 PG (ref 26–34)
MCHC RBC AUTO-ENTMCNC: 34.4 G/DL (ref 31–36)
MCV RBC AUTO: 83.3 FL (ref 80–100)
PERFORMED ON: ABNORMAL
PLATELET # BLD AUTO: 143 K/UL (ref 135–450)
PMV BLD AUTO: 7.7 FL (ref 5–10.5)
POTASSIUM SERPL-SCNC: 4.2 MMOL/L (ref 3.5–5.1)
RBC # BLD AUTO: 3.4 M/UL (ref 4.2–5.9)
SODIUM SERPL-SCNC: 135 MMOL/L (ref 136–145)
VANCOMYCIN SERPL-MCNC: 13.8 UG/ML
WBC # BLD AUTO: 5.3 K/UL (ref 4–11)

## 2024-07-24 PROCEDURE — 6370000000 HC RX 637 (ALT 250 FOR IP): Performed by: STUDENT IN AN ORGANIZED HEALTH CARE EDUCATION/TRAINING PROGRAM

## 2024-07-24 PROCEDURE — 6370000000 HC RX 637 (ALT 250 FOR IP): Performed by: INTERNAL MEDICINE

## 2024-07-24 PROCEDURE — 85027 COMPLETE CBC AUTOMATED: CPT

## 2024-07-24 PROCEDURE — 6360000002 HC RX W HCPCS: Performed by: STUDENT IN AN ORGANIZED HEALTH CARE EDUCATION/TRAINING PROGRAM

## 2024-07-24 PROCEDURE — 94760 N-INVAS EAR/PLS OXIMETRY 1: CPT

## 2024-07-24 PROCEDURE — 80202 ASSAY OF VANCOMYCIN: CPT

## 2024-07-24 PROCEDURE — 36415 COLL VENOUS BLD VENIPUNCTURE: CPT

## 2024-07-24 PROCEDURE — 6360000002 HC RX W HCPCS: Performed by: INTERNAL MEDICINE

## 2024-07-24 PROCEDURE — 99233 SBSQ HOSP IP/OBS HIGH 50: CPT | Performed by: INTERNAL MEDICINE

## 2024-07-24 PROCEDURE — 1200000000 HC SEMI PRIVATE

## 2024-07-24 PROCEDURE — 2580000003 HC RX 258: Performed by: INTERNAL MEDICINE

## 2024-07-24 PROCEDURE — 6370000000 HC RX 637 (ALT 250 FOR IP)

## 2024-07-24 PROCEDURE — 94660 CPAP INITIATION&MGMT: CPT

## 2024-07-24 PROCEDURE — 80048 BASIC METABOLIC PNL TOTAL CA: CPT

## 2024-07-24 PROCEDURE — 2500000003 HC RX 250 WO HCPCS: Performed by: REGISTERED NURSE

## 2024-07-24 RX ORDER — VANCOMYCIN 1.75 G/350ML
1250 INJECTION, SOLUTION INTRAVENOUS ONCE
Status: DISCONTINUED | OUTPATIENT
Start: 2024-07-24 | End: 2024-07-24

## 2024-07-24 RX ADMIN — ENOXAPARIN SODIUM 30 MG: 100 INJECTION SUBCUTANEOUS at 10:38

## 2024-07-24 RX ADMIN — ATORVASTATIN CALCIUM 20 MG: 20 TABLET, FILM COATED ORAL at 10:33

## 2024-07-24 RX ADMIN — INSULIN GLARGINE 50 UNITS: 100 INJECTION, SOLUTION SUBCUTANEOUS at 21:16

## 2024-07-24 RX ADMIN — ANTI-FUNGAL POWDER MICONAZOLE NITRATE TALC FREE: 1.42 POWDER TOPICAL at 21:16

## 2024-07-24 RX ADMIN — TAMSULOSIN HYDROCHLORIDE 0.4 MG: 0.4 CAPSULE ORAL at 10:33

## 2024-07-24 RX ADMIN — METRONIDAZOLE 500 MG: 500 INJECTION, SOLUTION INTRAVENOUS at 16:13

## 2024-07-24 RX ADMIN — ANTI-FUNGAL POWDER MICONAZOLE NITRATE TALC FREE: 1.42 POWDER TOPICAL at 10:40

## 2024-07-24 RX ADMIN — BUSPIRONE HYDROCHLORIDE 10 MG: 10 TABLET ORAL at 21:17

## 2024-07-24 RX ADMIN — CEFEPIME 2000 MG: 2 INJECTION, POWDER, FOR SOLUTION INTRAVENOUS at 03:21

## 2024-07-24 RX ADMIN — BUSPIRONE HYDROCHLORIDE 10 MG: 10 TABLET ORAL at 10:34

## 2024-07-24 RX ADMIN — DULOXETINE HYDROCHLORIDE 60 MG: 60 CAPSULE, DELAYED RELEASE ORAL at 10:33

## 2024-07-24 RX ADMIN — ENOXAPARIN SODIUM 30 MG: 100 INJECTION SUBCUTANEOUS at 21:16

## 2024-07-24 RX ADMIN — CHLORTHALIDONE 25 MG: 25 TABLET ORAL at 10:38

## 2024-07-24 RX ADMIN — METRONIDAZOLE 500 MG: 500 INJECTION, SOLUTION INTRAVENOUS at 10:37

## 2024-07-24 RX ADMIN — INSULIN LISPRO 35 UNITS: 100 INJECTION, SOLUTION INTRAVENOUS; SUBCUTANEOUS at 17:48

## 2024-07-24 RX ADMIN — OXYBUTYNIN CHLORIDE 10 MG: 10 TABLET, EXTENDED RELEASE ORAL at 10:33

## 2024-07-24 RX ADMIN — METRONIDAZOLE 500 MG: 500 INJECTION, SOLUTION INTRAVENOUS at 22:56

## 2024-07-24 RX ADMIN — INSULIN LISPRO 35 UNITS: 100 INJECTION, SOLUTION INTRAVENOUS; SUBCUTANEOUS at 10:39

## 2024-07-24 RX ADMIN — INSULIN LISPRO 1 UNITS: 100 INJECTION, SOLUTION INTRAVENOUS; SUBCUTANEOUS at 10:39

## 2024-07-24 RX ADMIN — CEFEPIME 2000 MG: 2 INJECTION, POWDER, FOR SOLUTION INTRAVENOUS at 17:50

## 2024-07-24 RX ADMIN — ATENOLOL 50 MG: 50 TABLET ORAL at 10:33

## 2024-07-24 RX ADMIN — INSULIN GLARGINE 50 UNITS: 100 INJECTION, SOLUTION SUBCUTANEOUS at 21:17

## 2024-07-24 RX ADMIN — GABAPENTIN 300 MG: 300 CAPSULE ORAL at 21:17

## 2024-07-24 RX ADMIN — INSULIN LISPRO 1 UNITS: 100 INJECTION, SOLUTION INTRAVENOUS; SUBCUTANEOUS at 17:47

## 2024-07-25 ENCOUNTER — TELEPHONE (OUTPATIENT)
Dept: CARDIOLOGY CLINIC | Age: 79
End: 2024-07-25

## 2024-07-25 DIAGNOSIS — Z01.812 PRE-PROCEDURE LAB EXAM: ICD-10-CM

## 2024-07-25 DIAGNOSIS — R93.6 ABNORMAL FINDINGS ON DIAGNOSTIC IMAGING OF LIMBS: ICD-10-CM

## 2024-07-25 DIAGNOSIS — I10 ESSENTIAL (PRIMARY) HYPERTENSION: ICD-10-CM

## 2024-07-25 DIAGNOSIS — I73.9 PAD (PERIPHERAL ARTERY DISEASE) (HCC): Primary | ICD-10-CM

## 2024-07-25 LAB
ANION GAP SERPL CALCULATED.3IONS-SCNC: 10 MMOL/L (ref 3–16)
BACTERIA BLD CULT ORG #2: NORMAL
BACTERIA BLD CULT: NORMAL
BUN SERPL-MCNC: 32 MG/DL (ref 7–20)
CALCIUM SERPL-MCNC: 8.7 MG/DL (ref 8.3–10.6)
CHLORIDE SERPL-SCNC: 105 MMOL/L (ref 99–110)
CO2 SERPL-SCNC: 23 MMOL/L (ref 21–32)
CREAT SERPL-MCNC: 1.2 MG/DL (ref 0.8–1.3)
FERRITIN SERPL IA-MCNC: 2128 NG/ML (ref 30–400)
GFR SERPLBLD CREATININE-BSD FMLA CKD-EPI: 62 ML/MIN/{1.73_M2}
GLUCOSE BLD-MCNC: 139 MG/DL (ref 70–99)
GLUCOSE BLD-MCNC: 147 MG/DL (ref 70–99)
GLUCOSE BLD-MCNC: 151 MG/DL (ref 70–99)
GLUCOSE BLD-MCNC: 156 MG/DL (ref 70–99)
GLUCOSE SERPL-MCNC: 126 MG/DL (ref 70–99)
IRON SATN MFR SERPL: 15 % (ref 20–50)
IRON SERPL-MCNC: 29 UG/DL (ref 59–158)
PERFORMED ON: ABNORMAL
POTASSIUM SERPL-SCNC: 3.9 MMOL/L (ref 3.5–5.1)
SODIUM SERPL-SCNC: 138 MMOL/L (ref 136–145)
TIBC SERPL-MCNC: 190 UG/DL (ref 260–445)

## 2024-07-25 PROCEDURE — 83550 IRON BINDING TEST: CPT

## 2024-07-25 PROCEDURE — 99233 SBSQ HOSP IP/OBS HIGH 50: CPT | Performed by: INTERNAL MEDICINE

## 2024-07-25 PROCEDURE — 6370000000 HC RX 637 (ALT 250 FOR IP)

## 2024-07-25 PROCEDURE — 80048 BASIC METABOLIC PNL TOTAL CA: CPT

## 2024-07-25 PROCEDURE — 6360000002 HC RX W HCPCS: Performed by: INTERNAL MEDICINE

## 2024-07-25 PROCEDURE — 6360000002 HC RX W HCPCS: Performed by: STUDENT IN AN ORGANIZED HEALTH CARE EDUCATION/TRAINING PROGRAM

## 2024-07-25 PROCEDURE — 6370000000 HC RX 637 (ALT 250 FOR IP): Performed by: STUDENT IN AN ORGANIZED HEALTH CARE EDUCATION/TRAINING PROGRAM

## 2024-07-25 PROCEDURE — 83540 ASSAY OF IRON: CPT

## 2024-07-25 PROCEDURE — 94660 CPAP INITIATION&MGMT: CPT

## 2024-07-25 PROCEDURE — 2580000003 HC RX 258: Performed by: STUDENT IN AN ORGANIZED HEALTH CARE EDUCATION/TRAINING PROGRAM

## 2024-07-25 PROCEDURE — 36415 COLL VENOUS BLD VENIPUNCTURE: CPT

## 2024-07-25 PROCEDURE — 1200000000 HC SEMI PRIVATE

## 2024-07-25 PROCEDURE — 94760 N-INVAS EAR/PLS OXIMETRY 1: CPT

## 2024-07-25 PROCEDURE — 2580000003 HC RX 258: Performed by: INTERNAL MEDICINE

## 2024-07-25 PROCEDURE — 82728 ASSAY OF FERRITIN: CPT

## 2024-07-25 PROCEDURE — 6370000000 HC RX 637 (ALT 250 FOR IP): Performed by: INTERNAL MEDICINE

## 2024-07-25 RX ORDER — FERROUS SULFATE 324(65)MG
324 TABLET, DELAYED RELEASE (ENTERIC COATED) ORAL
Status: DISCONTINUED | OUTPATIENT
Start: 2024-07-26 | End: 2024-07-28 | Stop reason: HOSPADM

## 2024-07-25 RX ADMIN — CHLORTHALIDONE 25 MG: 25 TABLET ORAL at 09:37

## 2024-07-25 RX ADMIN — ANTI-FUNGAL POWDER MICONAZOLE NITRATE TALC FREE: 1.42 POWDER TOPICAL at 09:38

## 2024-07-25 RX ADMIN — TAMSULOSIN HYDROCHLORIDE 0.4 MG: 0.4 CAPSULE ORAL at 09:37

## 2024-07-25 RX ADMIN — ATORVASTATIN CALCIUM 20 MG: 20 TABLET, FILM COATED ORAL at 09:37

## 2024-07-25 RX ADMIN — TRAZODONE HYDROCHLORIDE 50 MG: 50 TABLET ORAL at 21:55

## 2024-07-25 RX ADMIN — AMLODIPINE BESYLATE 10 MG: 10 TABLET ORAL at 09:37

## 2024-07-25 RX ADMIN — GABAPENTIN 300 MG: 300 CAPSULE ORAL at 21:55

## 2024-07-25 RX ADMIN — BUSPIRONE HYDROCHLORIDE 10 MG: 10 TABLET ORAL at 09:37

## 2024-07-25 RX ADMIN — BUSPIRONE HYDROCHLORIDE 10 MG: 10 TABLET ORAL at 21:55

## 2024-07-25 RX ADMIN — ENOXAPARIN SODIUM 30 MG: 100 INJECTION SUBCUTANEOUS at 09:37

## 2024-07-25 RX ADMIN — ACETAMINOPHEN 325MG 650 MG: 325 TABLET ORAL at 05:56

## 2024-07-25 RX ADMIN — OXYBUTYNIN CHLORIDE 10 MG: 10 TABLET, EXTENDED RELEASE ORAL at 09:37

## 2024-07-25 RX ADMIN — DULOXETINE HYDROCHLORIDE 60 MG: 60 CAPSULE, DELAYED RELEASE ORAL at 09:36

## 2024-07-25 RX ADMIN — ANTI-FUNGAL POWDER MICONAZOLE NITRATE TALC FREE: 1.42 POWDER TOPICAL at 22:02

## 2024-07-25 RX ADMIN — CEFEPIME 2000 MG: 2 INJECTION, POWDER, FOR SOLUTION INTRAVENOUS at 06:00

## 2024-07-25 RX ADMIN — INSULIN LISPRO 35 UNITS: 100 INJECTION, SOLUTION INTRAVENOUS; SUBCUTANEOUS at 12:11

## 2024-07-25 RX ADMIN — SODIUM CHLORIDE, PRESERVATIVE FREE 10 ML: 5 INJECTION INTRAVENOUS at 09:38

## 2024-07-25 RX ADMIN — CEFEPIME 2000 MG: 2 INJECTION, POWDER, FOR SOLUTION INTRAVENOUS at 17:02

## 2024-07-25 RX ADMIN — METRONIDAZOLE 500 MG: 500 INJECTION, SOLUTION INTRAVENOUS at 06:01

## 2024-07-25 RX ADMIN — INSULIN GLARGINE 50 UNITS: 100 INJECTION, SOLUTION SUBCUTANEOUS at 21:55

## 2024-07-25 RX ADMIN — INSULIN LISPRO 35 UNITS: 100 INJECTION, SOLUTION INTRAVENOUS; SUBCUTANEOUS at 17:01

## 2024-07-25 RX ADMIN — ATENOLOL 50 MG: 50 TABLET ORAL at 09:37

## 2024-07-25 RX ADMIN — METRONIDAZOLE 500 MG: 500 INJECTION, SOLUTION INTRAVENOUS at 23:37

## 2024-07-25 RX ADMIN — INSULIN LISPRO 35 UNITS: 100 INJECTION, SOLUTION INTRAVENOUS; SUBCUTANEOUS at 09:36

## 2024-07-25 RX ADMIN — SODIUM CHLORIDE, PRESERVATIVE FREE 10 ML: 5 INJECTION INTRAVENOUS at 21:59

## 2024-07-25 RX ADMIN — METRONIDAZOLE 500 MG: 500 INJECTION, SOLUTION INTRAVENOUS at 14:48

## 2024-07-25 ASSESSMENT — PAIN SCALES - GENERAL
PAINLEVEL_OUTOF10: 5
PAINLEVEL_OUTOF10: 3
PAINLEVEL_OUTOF10: 1

## 2024-07-25 ASSESSMENT — PAIN DESCRIPTION - ORIENTATION
ORIENTATION: LEFT
ORIENTATION: LEFT;LOWER

## 2024-07-25 ASSESSMENT — PAIN DESCRIPTION - LOCATION
LOCATION: BACK
LOCATION: BACK

## 2024-07-25 ASSESSMENT — PAIN DESCRIPTION - DESCRIPTORS
DESCRIPTORS: ACHING
DESCRIPTORS: ACHING

## 2024-07-25 ASSESSMENT — PAIN DESCRIPTION - PAIN TYPE: TYPE: ACUTE PAIN

## 2024-07-25 ASSESSMENT — PAIN - FUNCTIONAL ASSESSMENT
PAIN_FUNCTIONAL_ASSESSMENT: ACTIVITIES ARE NOT PREVENTED
PAIN_FUNCTIONAL_ASSESSMENT: ACTIVITIES ARE NOT PREVENTED

## 2024-07-25 ASSESSMENT — PAIN DESCRIPTION - FREQUENCY: FREQUENCY: CONTINUOUS

## 2024-07-25 ASSESSMENT — PAIN DESCRIPTION - ONSET: ONSET: ON-GOING

## 2024-07-25 NOTE — CARE COORDINATION
Discharge Planning:    Per chart review, patient scheduled for L hallux amputation on 7/26/2024 in the afternoon. Patient would benefit from PT/OT evals afterwards to ensure safe dc plan.    At this time, patient plans to return home with family support when medically stable for discharge. ID following, states there is a possibility to switch to oral abx at dc, depending on sensitivities. [facility] Care following, in the event OP IVAB are needed. CM to continue to follow.    Electronically signed by MT MCKAY RN on 7/25/2024 at 3:31 PM

## 2024-07-25 NOTE — TELEPHONE ENCOUNTER
Spoke to Dr. Boyd patient is scheduled for amputation and after discharge will set up RLE angio in 2 weeks.

## 2024-07-25 NOTE — TELEPHONE ENCOUNTER
Patient currently inpatient at [facility] with planned surgery from Podiatry on Friday 7/26.  Per Dr. Boyd note,   Chronic limb ischemia  - No further ischemic evaluation this admission  - Patient access site dry and intact  - Continue antiplatelet therapy  - Continue High potency statin  - Will plan for staged intervention of RLE in 2-4 weeks    Currently patient does not have follow up scheduled, discharge disposition not determined at this time. Will forward to Dr. Boyd's RN for scheduling at appropriate time.

## 2024-07-26 ENCOUNTER — APPOINTMENT (OUTPATIENT)
Dept: GENERAL RADIOLOGY | Age: 79
DRG: 252 | End: 2024-07-26
Payer: MEDICARE

## 2024-07-26 ENCOUNTER — ANESTHESIA EVENT (OUTPATIENT)
Dept: OPERATING ROOM | Age: 79
End: 2024-07-26
Payer: MEDICARE

## 2024-07-26 ENCOUNTER — ANESTHESIA (OUTPATIENT)
Dept: OPERATING ROOM | Age: 79
End: 2024-07-26
Payer: MEDICARE

## 2024-07-26 LAB
CREAT SERPL-MCNC: 1.2 MG/DL (ref 0.8–1.3)
GFR SERPLBLD CREATININE-BSD FMLA CKD-EPI: 62 ML/MIN/{1.73_M2}
GLUCOSE BLD-MCNC: 163 MG/DL (ref 70–99)
GLUCOSE BLD-MCNC: 163 MG/DL (ref 70–99)
GLUCOSE BLD-MCNC: 217 MG/DL (ref 70–99)
GLUCOSE BLD-MCNC: 270 MG/DL (ref 70–99)
GLUCOSE BLD-MCNC: 308 MG/DL (ref 70–99)
PERFORMED ON: ABNORMAL

## 2024-07-26 PROCEDURE — 2580000003 HC RX 258

## 2024-07-26 PROCEDURE — 87077 CULTURE AEROBIC IDENTIFY: CPT

## 2024-07-26 PROCEDURE — 6360000002 HC RX W HCPCS: Performed by: INTERNAL MEDICINE

## 2024-07-26 PROCEDURE — 6370000000 HC RX 637 (ALT 250 FOR IP): Performed by: INTERNAL MEDICINE

## 2024-07-26 PROCEDURE — 94660 CPAP INITIATION&MGMT: CPT

## 2024-07-26 PROCEDURE — 7100000001 HC PACU RECOVERY - ADDTL 15 MIN: Performed by: STUDENT IN AN ORGANIZED HEALTH CARE EDUCATION/TRAINING PROGRAM

## 2024-07-26 PROCEDURE — 82565 ASSAY OF CREATININE: CPT

## 2024-07-26 PROCEDURE — 6370000000 HC RX 637 (ALT 250 FOR IP): Performed by: STUDENT IN AN ORGANIZED HEALTH CARE EDUCATION/TRAINING PROGRAM

## 2024-07-26 PROCEDURE — 7100000000 HC PACU RECOVERY - FIRST 15 MIN: Performed by: STUDENT IN AN ORGANIZED HEALTH CARE EDUCATION/TRAINING PROGRAM

## 2024-07-26 PROCEDURE — 3600000012 HC SURGERY LEVEL 2 ADDTL 15MIN: Performed by: STUDENT IN AN ORGANIZED HEALTH CARE EDUCATION/TRAINING PROGRAM

## 2024-07-26 PROCEDURE — 88305 TISSUE EXAM BY PATHOLOGIST: CPT

## 2024-07-26 PROCEDURE — 2500000003 HC RX 250 WO HCPCS

## 2024-07-26 PROCEDURE — 2580000003 HC RX 258: Performed by: INTERNAL MEDICINE

## 2024-07-26 PROCEDURE — 3600000002 HC SURGERY LEVEL 2 BASE: Performed by: STUDENT IN AN ORGANIZED HEALTH CARE EDUCATION/TRAINING PROGRAM

## 2024-07-26 PROCEDURE — 36415 COLL VENOUS BLD VENIPUNCTURE: CPT

## 2024-07-26 PROCEDURE — 99233 SBSQ HOSP IP/OBS HIGH 50: CPT | Performed by: INTERNAL MEDICINE

## 2024-07-26 PROCEDURE — 87070 CULTURE OTHR SPECIMN AEROBIC: CPT

## 2024-07-26 PROCEDURE — 6360000002 HC RX W HCPCS: Performed by: STUDENT IN AN ORGANIZED HEALTH CARE EDUCATION/TRAINING PROGRAM

## 2024-07-26 PROCEDURE — 2709999900 HC NON-CHARGEABLE SUPPLY: Performed by: STUDENT IN AN ORGANIZED HEALTH CARE EDUCATION/TRAINING PROGRAM

## 2024-07-26 PROCEDURE — 73630 X-RAY EXAM OF FOOT: CPT

## 2024-07-26 PROCEDURE — 6360000002 HC RX W HCPCS

## 2024-07-26 PROCEDURE — 2500000003 HC RX 250 WO HCPCS: Performed by: STUDENT IN AN ORGANIZED HEALTH CARE EDUCATION/TRAINING PROGRAM

## 2024-07-26 PROCEDURE — 3700000000 HC ANESTHESIA ATTENDED CARE: Performed by: STUDENT IN AN ORGANIZED HEALTH CARE EDUCATION/TRAINING PROGRAM

## 2024-07-26 PROCEDURE — 0Y6Q0Z3 DETACHMENT AT LEFT 1ST TOE, LOW, OPEN APPROACH: ICD-10-PCS | Performed by: STUDENT IN AN ORGANIZED HEALTH CARE EDUCATION/TRAINING PROGRAM

## 2024-07-26 PROCEDURE — 6370000000 HC RX 637 (ALT 250 FOR IP)

## 2024-07-26 PROCEDURE — 3700000001 HC ADD 15 MINUTES (ANESTHESIA): Performed by: STUDENT IN AN ORGANIZED HEALTH CARE EDUCATION/TRAINING PROGRAM

## 2024-07-26 PROCEDURE — 87205 SMEAR GRAM STAIN: CPT

## 2024-07-26 PROCEDURE — A4217 STERILE WATER/SALINE, 500 ML: HCPCS | Performed by: STUDENT IN AN ORGANIZED HEALTH CARE EDUCATION/TRAINING PROGRAM

## 2024-07-26 PROCEDURE — 2720000010 HC SURG SUPPLY STERILE: Performed by: STUDENT IN AN ORGANIZED HEALTH CARE EDUCATION/TRAINING PROGRAM

## 2024-07-26 PROCEDURE — 2580000003 HC RX 258: Performed by: STUDENT IN AN ORGANIZED HEALTH CARE EDUCATION/TRAINING PROGRAM

## 2024-07-26 PROCEDURE — 87075 CULTR BACTERIA EXCEPT BLOOD: CPT

## 2024-07-26 PROCEDURE — 1200000000 HC SEMI PRIVATE

## 2024-07-26 DEVICE — POWDER SURG CELLERATE RX 1 GM HYDROL COLLEGEN: Type: IMPLANTABLE DEVICE | Site: FOOT | Status: FUNCTIONAL

## 2024-07-26 RX ORDER — SODIUM CHLORIDE 0.9 % (FLUSH) 0.9 %
5-40 SYRINGE (ML) INJECTION PRN
Status: DISCONTINUED | OUTPATIENT
Start: 2024-07-26 | End: 2024-07-26 | Stop reason: HOSPADM

## 2024-07-26 RX ORDER — FENTANYL CITRATE 0.05 MG/ML
25 INJECTION, SOLUTION INTRAMUSCULAR; INTRAVENOUS EVERY 5 MIN PRN
Status: DISCONTINUED | OUTPATIENT
Start: 2024-07-26 | End: 2024-07-26 | Stop reason: HOSPADM

## 2024-07-26 RX ORDER — SODIUM CHLORIDE 9 MG/ML
INJECTION, SOLUTION INTRAVENOUS PRN
Status: DISCONTINUED | OUTPATIENT
Start: 2024-07-26 | End: 2024-07-26 | Stop reason: HOSPADM

## 2024-07-26 RX ORDER — MAGNESIUM HYDROXIDE 1200 MG/15ML
LIQUID ORAL CONTINUOUS PRN
Status: COMPLETED | OUTPATIENT
Start: 2024-07-26 | End: 2024-07-26

## 2024-07-26 RX ORDER — SODIUM CHLORIDE 9 MG/ML
INJECTION, SOLUTION INTRAVENOUS CONTINUOUS PRN
Status: DISCONTINUED | OUTPATIENT
Start: 2024-07-26 | End: 2024-07-26 | Stop reason: SDUPTHER

## 2024-07-26 RX ORDER — LIDOCAINE HYDROCHLORIDE 20 MG/ML
INJECTION, SOLUTION EPIDURAL; INFILTRATION; INTRACAUDAL; PERINEURAL PRN
Status: DISCONTINUED | OUTPATIENT
Start: 2024-07-26 | End: 2024-07-26 | Stop reason: SDUPTHER

## 2024-07-26 RX ORDER — PROPOFOL 10 MG/ML
INJECTION, EMULSION INTRAVENOUS PRN
Status: DISCONTINUED | OUTPATIENT
Start: 2024-07-26 | End: 2024-07-26 | Stop reason: SDUPTHER

## 2024-07-26 RX ORDER — NALOXONE HYDROCHLORIDE 0.4 MG/ML
INJECTION, SOLUTION INTRAMUSCULAR; INTRAVENOUS; SUBCUTANEOUS PRN
Status: DISCONTINUED | OUTPATIENT
Start: 2024-07-26 | End: 2024-07-26 | Stop reason: HOSPADM

## 2024-07-26 RX ORDER — SODIUM CHLORIDE 0.9 % (FLUSH) 0.9 %
5-40 SYRINGE (ML) INJECTION EVERY 12 HOURS SCHEDULED
Status: DISCONTINUED | OUTPATIENT
Start: 2024-07-26 | End: 2024-07-26 | Stop reason: HOSPADM

## 2024-07-26 RX ORDER — POVIDONE-IODINE 10 MG/G
OINTMENT TOPICAL
Status: COMPLETED | OUTPATIENT
Start: 2024-07-26 | End: 2024-07-26

## 2024-07-26 RX ORDER — ONDANSETRON 2 MG/ML
4 INJECTION INTRAMUSCULAR; INTRAVENOUS
Status: DISCONTINUED | OUTPATIENT
Start: 2024-07-26 | End: 2024-07-26 | Stop reason: HOSPADM

## 2024-07-26 RX ORDER — FENTANYL CITRATE 50 UG/ML
INJECTION, SOLUTION INTRAMUSCULAR; INTRAVENOUS PRN
Status: DISCONTINUED | OUTPATIENT
Start: 2024-07-26 | End: 2024-07-26 | Stop reason: SDUPTHER

## 2024-07-26 RX ADMIN — BUSPIRONE HYDROCHLORIDE 10 MG: 10 TABLET ORAL at 08:27

## 2024-07-26 RX ADMIN — METRONIDAZOLE 500 MG: 500 INJECTION, SOLUTION INTRAVENOUS at 22:19

## 2024-07-26 RX ADMIN — INSULIN LISPRO 4 UNITS: 100 INJECTION, SOLUTION INTRAVENOUS; SUBCUTANEOUS at 22:19

## 2024-07-26 RX ADMIN — CEFEPIME 2000 MG: 2 INJECTION, POWDER, FOR SOLUTION INTRAVENOUS at 02:39

## 2024-07-26 RX ADMIN — SODIUM CHLORIDE, PRESERVATIVE FREE 10 ML: 5 INJECTION INTRAVENOUS at 22:16

## 2024-07-26 RX ADMIN — FERROUS SULFATE TAB EC 324 MG (65 MG FE EQUIVALENT) 324 MG: 324 (65 FE) TABLET DELAYED RESPONSE at 22:12

## 2024-07-26 RX ADMIN — AMLODIPINE BESYLATE 10 MG: 10 TABLET ORAL at 08:26

## 2024-07-26 RX ADMIN — CHLORTHALIDONE 25 MG: 25 TABLET ORAL at 08:26

## 2024-07-26 RX ADMIN — CEFEPIME 2000 MG: 2 INJECTION, POWDER, FOR SOLUTION INTRAVENOUS at 15:33

## 2024-07-26 RX ADMIN — GABAPENTIN 300 MG: 300 CAPSULE ORAL at 22:12

## 2024-07-26 RX ADMIN — ANTI-FUNGAL POWDER MICONAZOLE NITRATE TALC FREE: 1.42 POWDER TOPICAL at 08:27

## 2024-07-26 RX ADMIN — LIDOCAINE HYDROCHLORIDE 100 MG: 20 INJECTION, SOLUTION EPIDURAL; INFILTRATION; INTRACAUDAL; PERINEURAL at 15:32

## 2024-07-26 RX ADMIN — DULOXETINE HYDROCHLORIDE 60 MG: 60 CAPSULE, DELAYED RELEASE ORAL at 08:26

## 2024-07-26 RX ADMIN — INSULIN GLARGINE 50 UNITS: 100 INJECTION, SOLUTION SUBCUTANEOUS at 22:20

## 2024-07-26 RX ADMIN — ATORVASTATIN CALCIUM 20 MG: 20 TABLET, FILM COATED ORAL at 22:12

## 2024-07-26 RX ADMIN — TAMSULOSIN HYDROCHLORIDE 0.4 MG: 0.4 CAPSULE ORAL at 08:26

## 2024-07-26 RX ADMIN — ANTI-FUNGAL POWDER MICONAZOLE NITRATE TALC FREE: 1.42 POWDER TOPICAL at 22:21

## 2024-07-26 RX ADMIN — ATENOLOL 50 MG: 50 TABLET ORAL at 08:25

## 2024-07-26 RX ADMIN — BUSPIRONE HYDROCHLORIDE 10 MG: 10 TABLET ORAL at 22:12

## 2024-07-26 RX ADMIN — SODIUM CHLORIDE: 9 INJECTION, SOLUTION INTRAVENOUS at 15:24

## 2024-07-26 RX ADMIN — INSULIN GLARGINE 50 UNITS: 100 INJECTION, SOLUTION SUBCUTANEOUS at 22:19

## 2024-07-26 RX ADMIN — INSULIN LISPRO 15 UNITS: 100 INJECTION, SOLUTION INTRAVENOUS; SUBCUTANEOUS at 08:26

## 2024-07-26 RX ADMIN — METRONIDAZOLE 500 MG: 500 INJECTION, SOLUTION INTRAVENOUS at 07:00

## 2024-07-26 RX ADMIN — PROPOFOL 130 MCG/KG/MIN: 10 INJECTION, EMULSION INTRAVENOUS at 15:32

## 2024-07-26 RX ADMIN — FENTANYL CITRATE 25 MCG: 50 INJECTION INTRAMUSCULAR; INTRAVENOUS at 15:42

## 2024-07-26 RX ADMIN — OXYBUTYNIN CHLORIDE 10 MG: 10 TABLET, EXTENDED RELEASE ORAL at 08:26

## 2024-07-26 RX ADMIN — PROPOFOL 60 MG: 10 INJECTION, EMULSION INTRAVENOUS at 15:31

## 2024-07-26 RX ADMIN — TRAZODONE HYDROCHLORIDE 50 MG: 50 TABLET ORAL at 22:12

## 2024-07-26 RX ADMIN — METRONIDAZOLE 500 MG: 500 INJECTION, SOLUTION INTRAVENOUS at 15:38

## 2024-07-26 ASSESSMENT — PAIN - FUNCTIONAL ASSESSMENT: PAIN_FUNCTIONAL_ASSESSMENT: 0-10

## 2024-07-26 NOTE — OP NOTE
Operative Note      Patient: Greg Luna  YOB: 1945  MRN: [id number]    Date of Procedure: 7/26/2024    Pre-Op Diagnosis Codes:     * Osteomyelitis of left foot, unspecified type (HCC) [M86.9]    Post-Op Diagnosis: Same       Procedure(s):  LEFT HALLUX AMPUTATION    Surgeon(s):  Karen Edwards DPM    Assistant:   * No surgical staff found *    Anesthesia: Monitor Anesthesia Care    Estimated Blood Loss (mL): less than 100     Complications: None    Specimens:   ID Type Source Tests Collected by Time Destination   1 : 1- Left Hallux Specimen Toe CULTURE, SURGICAL Karen Edwards DPM 7/26/2024 1550    A : A- Left Hallux Specimen Toe SURGICAL PATHOLOGY Karen Edwards DPM 7/26/2024 1550        Implants:  Implant Name Type Inv. Item Serial No.  Lot No. LRB No. Used Action   POWDER SURG CELLERATE RX 1 GM [facility] - [id number]  POWDER SURG CELLERATE RX 1 GM [facility]  [id number]  Left 1 Implanted         Drains:   Open Drain 07/26/24 Left Foot (Active)       Findings:  Infection Present At Time Of Surgery (PATOS) (choose all levels that have infection present):  - Organ Space infection (below fascia) present as evidenced by osteomyelitis  Other Findings: Proximal phalanx was noted to be gray in color without any sheen to the cartilage there was also hemorrhagic necrotic tissue plantar to the proximal phalanx.  Gas was encountered in this tissue plane.    Detailed Description of Procedure:   Patient was brought to the operating suite placed on the operating table in supine position.  Once monitored anesthesia care was administered a Ravi block was placed on the patient's left foot utilizing a total of 10 cc of one half Marcaine plain half percent one half lidocaine plain 1%.  At this time the left lower extremity was prepped and draped in normal aseptic fashion and a timeout was held confirming the patient, procedure, laterality, and was agreed upon by all.  No tourniquet

## 2024-07-26 NOTE — ANESTHESIA POSTPROCEDURE EVALUATION
[facility] Department of Anesthesiology  Post-Anesthesia Note       Name:  Greg Luna                                  Age:  79 y.o.  MRN:  [id number]     Last Vitals & Oxygen Saturation: BP (!) 142/60   Pulse 54   Temp 97.8 °F (36.6 °C) (Temporal)   Resp 18   Ht 1.753 m (5' 9\")   Wt 113.9 kg (251 lb 1.7 oz)   SpO2 99%   BMI 37.08 kg/m²   Patient Vitals for the past 4 hrs:   BP Temp Temp src Pulse Resp SpO2   07/26/24 1655 (!) 142/60 97.8 °F (36.6 °C) Temporal 54 18 99 %   07/26/24 1650 (!) 154/75 -- -- 53 18 97 %   07/26/24 1645 (!) 140/68 -- -- 54 21 97 %   07/26/24 1640 (!) 150/63 -- -- 55 17 96 %   07/26/24 1635 (!) 130/59 97.8 °F (36.6 °C) Temporal 58 16 95 %   07/26/24 1424 (!) 120/47 99.1 °F (37.3 °C) Temporal 55 16 99 %       Level of consciousness:  Awake, alert    Respiratory: Respirations easy, no distress. Stable.    Cardiovascular: Hemodynamically stable.    Hydration: Adequate.    PONV: Adequately managed.    Post-op pain: Adequately controlled.    Post-op assessment: Tolerated anesthetic well without complication.    Complications:  None.    Satish Bowers MD  July 26, 2024   5:06 PM

## 2024-07-26 NOTE — ANESTHESIA PRE PROCEDURE
[facility] Department of Anesthesiology  Pre-Anesthesia Evaluation/Consultation       Name:  Grge Luna  : 1945  Age:  79 y.o.                                           MRN:  [id number]  Date: 2024           Surgeon: Surgeon(s):  Karen Edwards DPM    Procedure: Procedure(s):  LEFT HALLUX AMPUTATION     Allergies   Allergen Reactions    Seasonal      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 (HCC)    KIMMIE (obstructive sleep apnea)    Essential hypertension    Uncontrolled type 2 diabetes mellitus with hyperglycemia, with long-term current use of insulin (HCC)    Mixed hyperlipidemia    Psychophysiological insomnia    Major depressive disorder with single episode, in partial remission (HCC)    Coronary atherosclerosis    Morbid obesity with BMI of 40.0-44.9, adult (HCC)    Sleep paralysis    Coagulation defect (HCC)    Thrombocytopenia (HCC)    Type 2 diabetes mellitus with chronic kidney disease    Moderate episode of recurrent major depressive disorder (HCC)    PAD (peripheral artery disease) (HCC)    Osteomyelitis (HCC)    Left leg cellulitis    SNOW (acute kidney injury) (HCC)    Elevated erythrocyte sedimentation rate    CRP elevated    Cirrhosis of liver without ascites (HCC)    Type 2 diabetes mellitus with diabetic foot infection (HCC)     Past Medical History:   Diagnosis Date    Anxiety     BPH (benign prostatic hyperplasia)     Depression     Diabetes with proteinuria     Disorder of iron metabolism 2010    GBS (Guillain [place] syndrome) (HCC)     History of colonic polyps     History of colonic polyps     Hyperlipidemia     Hypertension     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 intraocular lens 10/10/2019    Psychophysiological insomnia 5/15/2018    Retinal hemorrhage, bilateral 10/10/2019    Type 2 diabetes

## 2024-07-26 NOTE — DISCHARGE INSTRUCTIONS
Podiatry:  Keep surgical dressing clean dry and intact.  Only weight-bear in your postop shoe.  Take all antibiotics as instructed.  Follow-up in office with Dr. SUMNER within 1 week.      Continue all blood pressure medications except hydralazine at discharge. Please keep a daily log of your blood pressure and reach out to your PCP for instruction on when/if you should resume this medication.

## 2024-07-27 LAB
ANION GAP SERPL CALCULATED.3IONS-SCNC: 9 MMOL/L (ref 3–16)
BACTERIA SPEC AEROBE CULT: ABNORMAL
BACTERIA SPEC AEROBE CULT: ABNORMAL
BACTERIA SPEC ANAEROBE CULT: ABNORMAL
BASOPHILS # BLD: 0 K/UL (ref 0–0.2)
BASOPHILS NFR BLD: 0.9 %
BUN SERPL-MCNC: 32 MG/DL (ref 7–20)
CALCIUM SERPL-MCNC: 8.8 MG/DL (ref 8.3–10.6)
CHLORIDE SERPL-SCNC: 103 MMOL/L (ref 99–110)
CO2 SERPL-SCNC: 25 MMOL/L (ref 21–32)
CREAT SERPL-MCNC: 1.4 MG/DL (ref 0.8–1.3)
DEPRECATED RDW RBC AUTO: 15 % (ref 12.4–15.4)
EOSINOPHIL # BLD: 0.1 K/UL (ref 0–0.6)
EOSINOPHIL NFR BLD: 2 %
GFR SERPLBLD CREATININE-BSD FMLA CKD-EPI: 52 ML/MIN/{1.73_M2}
GLUCOSE BLD-MCNC: 110 MG/DL (ref 70–99)
GLUCOSE BLD-MCNC: 152 MG/DL (ref 70–99)
GLUCOSE BLD-MCNC: 158 MG/DL (ref 70–99)
GLUCOSE BLD-MCNC: 83 MG/DL (ref 70–99)
GLUCOSE BLD-MCNC: 84 MG/DL (ref 70–99)
GLUCOSE SERPL-MCNC: 114 MG/DL (ref 70–99)
GRAM STN SPEC: ABNORMAL
HCT VFR BLD AUTO: 28.1 % (ref 40.5–52.5)
HGB BLD-MCNC: 9.4 G/DL (ref 13.5–17.5)
LYMPHOCYTES # BLD: 0.5 K/UL (ref 1–5.1)
LYMPHOCYTES NFR BLD: 10.1 %
MCH RBC QN AUTO: 27.9 PG (ref 26–34)
MCHC RBC AUTO-ENTMCNC: 33.4 G/DL (ref 31–36)
MCV RBC AUTO: 83.6 FL (ref 80–100)
MONOCYTES # BLD: 0.2 K/UL (ref 0–1.3)
MONOCYTES NFR BLD: 4.4 %
NEUTROPHILS # BLD: 3.9 K/UL (ref 1.7–7.7)
NEUTROPHILS NFR BLD: 82.6 %
ORGANISM: ABNORMAL
ORGANISM: ABNORMAL
PERFORMED ON: ABNORMAL
PERFORMED ON: NORMAL
PERFORMED ON: NORMAL
PLATELET # BLD AUTO: 139 K/UL (ref 135–450)
PMV BLD AUTO: 7.5 FL (ref 5–10.5)
POTASSIUM SERPL-SCNC: 3.8 MMOL/L (ref 3.5–5.1)
RBC # BLD AUTO: 3.36 M/UL (ref 4.2–5.9)
SODIUM SERPL-SCNC: 137 MMOL/L (ref 136–145)
WBC # BLD AUTO: 4.8 K/UL (ref 4–11)

## 2024-07-27 PROCEDURE — 36415 COLL VENOUS BLD VENIPUNCTURE: CPT

## 2024-07-27 PROCEDURE — 6370000000 HC RX 637 (ALT 250 FOR IP): Performed by: INTERNAL MEDICINE

## 2024-07-27 PROCEDURE — 1200000000 HC SEMI PRIVATE

## 2024-07-27 PROCEDURE — 94660 CPAP INITIATION&MGMT: CPT

## 2024-07-27 PROCEDURE — 2500000003 HC RX 250 WO HCPCS: Performed by: REGISTERED NURSE

## 2024-07-27 PROCEDURE — 85025 COMPLETE CBC W/AUTO DIFF WBC: CPT

## 2024-07-27 PROCEDURE — 97161 PT EVAL LOW COMPLEX 20 MIN: CPT

## 2024-07-27 PROCEDURE — 97116 GAIT TRAINING THERAPY: CPT

## 2024-07-27 PROCEDURE — 97530 THERAPEUTIC ACTIVITIES: CPT

## 2024-07-27 PROCEDURE — 6370000000 HC RX 637 (ALT 250 FOR IP)

## 2024-07-27 PROCEDURE — 97165 OT EVAL LOW COMPLEX 30 MIN: CPT

## 2024-07-27 PROCEDURE — 97535 SELF CARE MNGMENT TRAINING: CPT

## 2024-07-27 PROCEDURE — 6360000002 HC RX W HCPCS: Performed by: INTERNAL MEDICINE

## 2024-07-27 PROCEDURE — 2580000003 HC RX 258: Performed by: STUDENT IN AN ORGANIZED HEALTH CARE EDUCATION/TRAINING PROGRAM

## 2024-07-27 PROCEDURE — 94760 N-INVAS EAR/PLS OXIMETRY 1: CPT

## 2024-07-27 PROCEDURE — 6370000000 HC RX 637 (ALT 250 FOR IP): Performed by: STUDENT IN AN ORGANIZED HEALTH CARE EDUCATION/TRAINING PROGRAM

## 2024-07-27 PROCEDURE — 80048 BASIC METABOLIC PNL TOTAL CA: CPT

## 2024-07-27 PROCEDURE — 2580000003 HC RX 258: Performed by: INTERNAL MEDICINE

## 2024-07-27 RX ORDER — METRONIDAZOLE 500 MG/1
500 TABLET ORAL EVERY 8 HOURS SCHEDULED
Status: DISCONTINUED | OUTPATIENT
Start: 2024-07-27 | End: 2024-07-28 | Stop reason: HOSPADM

## 2024-07-27 RX ADMIN — GABAPENTIN 300 MG: 300 CAPSULE ORAL at 20:26

## 2024-07-27 RX ADMIN — AMLODIPINE BESYLATE 10 MG: 10 TABLET ORAL at 10:03

## 2024-07-27 RX ADMIN — CHLORTHALIDONE 25 MG: 25 TABLET ORAL at 10:03

## 2024-07-27 RX ADMIN — BUSPIRONE HYDROCHLORIDE 10 MG: 10 TABLET ORAL at 20:26

## 2024-07-27 RX ADMIN — CEFEPIME 2000 MG: 2 INJECTION, POWDER, FOR SOLUTION INTRAVENOUS at 10:06

## 2024-07-27 RX ADMIN — METRONIDAZOLE 500 MG: 500 INJECTION, SOLUTION INTRAVENOUS at 06:19

## 2024-07-27 RX ADMIN — ATORVASTATIN CALCIUM 20 MG: 20 TABLET, FILM COATED ORAL at 10:03

## 2024-07-27 RX ADMIN — OXYBUTYNIN CHLORIDE 10 MG: 10 TABLET, EXTENDED RELEASE ORAL at 10:03

## 2024-07-27 RX ADMIN — CEFEPIME 2000 MG: 2 INJECTION, POWDER, FOR SOLUTION INTRAVENOUS at 20:31

## 2024-07-27 RX ADMIN — METRONIDAZOLE 500 MG: 500 TABLET ORAL at 14:37

## 2024-07-27 RX ADMIN — INSULIN LISPRO 35 UNITS: 100 INJECTION, SOLUTION INTRAVENOUS; SUBCUTANEOUS at 10:04

## 2024-07-27 RX ADMIN — ATENOLOL 50 MG: 50 TABLET ORAL at 10:03

## 2024-07-27 RX ADMIN — INSULIN LISPRO 35 UNITS: 100 INJECTION, SOLUTION INTRAVENOUS; SUBCUTANEOUS at 12:30

## 2024-07-27 RX ADMIN — BUSPIRONE HYDROCHLORIDE 10 MG: 10 TABLET ORAL at 10:03

## 2024-07-27 RX ADMIN — FERROUS SULFATE TAB EC 324 MG (65 MG FE EQUIVALENT) 324 MG: 324 (65 FE) TABLET DELAYED RESPONSE at 10:03

## 2024-07-27 RX ADMIN — ANTI-FUNGAL POWDER MICONAZOLE NITRATE TALC FREE: 1.42 POWDER TOPICAL at 12:30

## 2024-07-27 RX ADMIN — TRAZODONE HYDROCHLORIDE 50 MG: 50 TABLET ORAL at 20:26

## 2024-07-27 RX ADMIN — DULOXETINE HYDROCHLORIDE 60 MG: 60 CAPSULE, DELAYED RELEASE ORAL at 10:03

## 2024-07-27 RX ADMIN — METRONIDAZOLE 500 MG: 500 TABLET ORAL at 20:26

## 2024-07-27 RX ADMIN — INSULIN GLARGINE 50 UNITS: 100 INJECTION, SOLUTION SUBCUTANEOUS at 20:28

## 2024-07-27 RX ADMIN — TAMSULOSIN HYDROCHLORIDE 0.4 MG: 0.4 CAPSULE ORAL at 10:03

## 2024-07-27 RX ADMIN — SODIUM CHLORIDE, PRESERVATIVE FREE 10 ML: 5 INJECTION INTRAVENOUS at 10:11

## 2024-07-27 RX ADMIN — METRONIDAZOLE 500 MG: 500 TABLET ORAL at 10:31

## 2024-07-27 RX ADMIN — ANTI-FUNGAL POWDER MICONAZOLE NITRATE TALC FREE: 1.42 POWDER TOPICAL at 20:27

## 2024-07-28 VITALS
TEMPERATURE: 98.4 F | RESPIRATION RATE: 16 BRPM | OXYGEN SATURATION: 95 % | WEIGHT: 277.78 LBS | HEART RATE: 60 BPM | DIASTOLIC BLOOD PRESSURE: 82 MMHG | HEIGHT: 69 IN | BODY MASS INDEX: 41.14 KG/M2 | SYSTOLIC BLOOD PRESSURE: 147 MMHG

## 2024-07-28 LAB
GLUCOSE BLD-MCNC: 211 MG/DL (ref 70–99)
GLUCOSE BLD-MCNC: 232 MG/DL (ref 70–99)
PERFORMED ON: ABNORMAL
PERFORMED ON: ABNORMAL

## 2024-07-28 PROCEDURE — 2500000003 HC RX 250 WO HCPCS: Performed by: REGISTERED NURSE

## 2024-07-28 PROCEDURE — 6370000000 HC RX 637 (ALT 250 FOR IP): Performed by: INTERNAL MEDICINE

## 2024-07-28 PROCEDURE — 2580000003 HC RX 258: Performed by: STUDENT IN AN ORGANIZED HEALTH CARE EDUCATION/TRAINING PROGRAM

## 2024-07-28 PROCEDURE — 2580000003 HC RX 258: Performed by: INTERNAL MEDICINE

## 2024-07-28 PROCEDURE — 6370000000 HC RX 637 (ALT 250 FOR IP)

## 2024-07-28 PROCEDURE — 94660 CPAP INITIATION&MGMT: CPT

## 2024-07-28 PROCEDURE — 6360000002 HC RX W HCPCS: Performed by: INTERNAL MEDICINE

## 2024-07-28 RX ORDER — METRONIDAZOLE 500 MG/1
500 TABLET ORAL EVERY 8 HOURS SCHEDULED
Qty: 21 TABLET | Refills: 0 | Status: SHIPPED | OUTPATIENT
Start: 2024-07-28 | End: 2024-08-04

## 2024-07-28 RX ORDER — INSULIN GLARGINE 100 [IU]/ML
90 INJECTION, SOLUTION SUBCUTANEOUS NIGHTLY
Qty: 5 ADJUSTABLE DOSE PRE-FILLED PEN SYRINGE | Refills: 2
Start: 2024-07-28

## 2024-07-28 RX ORDER — FERROUS SULFATE 324(65)MG
324 TABLET, DELAYED RELEASE (ENTERIC COATED) ORAL
Qty: 30 TABLET | Refills: 0 | Status: SHIPPED | OUTPATIENT
Start: 2024-07-28

## 2024-07-28 RX ORDER — GABAPENTIN 300 MG/1
300 CAPSULE ORAL NIGHTLY
Qty: 90 CAPSULE | Refills: 0 | Status: SHIPPED | OUTPATIENT
Start: 2024-07-28 | End: 2024-10-26

## 2024-07-28 RX ORDER — ASPIRIN 81 MG/1
81 TABLET ORAL DAILY
Qty: 90 TABLET | Refills: 0 | Status: SHIPPED | OUTPATIENT
Start: 2024-07-28

## 2024-07-28 RX ORDER — LEVOFLOXACIN 500 MG/1
500 TABLET, FILM COATED ORAL DAILY
Qty: 14 TABLET | Refills: 0 | Status: SHIPPED | OUTPATIENT
Start: 2024-07-28 | End: 2024-08-11

## 2024-07-28 RX ADMIN — ATENOLOL 50 MG: 50 TABLET ORAL at 08:47

## 2024-07-28 RX ADMIN — CHLORTHALIDONE 25 MG: 25 TABLET ORAL at 08:46

## 2024-07-28 RX ADMIN — METRONIDAZOLE 500 MG: 500 TABLET ORAL at 05:13

## 2024-07-28 RX ADMIN — INSULIN LISPRO 35 UNITS: 100 INJECTION, SOLUTION INTRAVENOUS; SUBCUTANEOUS at 08:57

## 2024-07-28 RX ADMIN — FERROUS SULFATE TAB EC 324 MG (65 MG FE EQUIVALENT) 324 MG: 324 (65 FE) TABLET DELAYED RESPONSE at 08:46

## 2024-07-28 RX ADMIN — ANTI-FUNGAL POWDER MICONAZOLE NITRATE TALC FREE: 1.42 POWDER TOPICAL at 08:47

## 2024-07-28 RX ADMIN — SODIUM CHLORIDE, PRESERVATIVE FREE 10 ML: 5 INJECTION INTRAVENOUS at 08:46

## 2024-07-28 RX ADMIN — CEFEPIME 2000 MG: 2 INJECTION, POWDER, FOR SOLUTION INTRAVENOUS at 08:51

## 2024-07-28 RX ADMIN — METRONIDAZOLE 500 MG: 500 TABLET ORAL at 12:17

## 2024-07-28 RX ADMIN — INSULIN LISPRO 1 UNITS: 100 INJECTION, SOLUTION INTRAVENOUS; SUBCUTANEOUS at 08:57

## 2024-07-28 RX ADMIN — DULOXETINE HYDROCHLORIDE 60 MG: 60 CAPSULE, DELAYED RELEASE ORAL at 08:46

## 2024-07-28 RX ADMIN — AMLODIPINE BESYLATE 10 MG: 10 TABLET ORAL at 08:47

## 2024-07-28 RX ADMIN — OXYBUTYNIN CHLORIDE 10 MG: 10 TABLET, EXTENDED RELEASE ORAL at 08:46

## 2024-07-28 RX ADMIN — TAMSULOSIN HYDROCHLORIDE 0.4 MG: 0.4 CAPSULE ORAL at 08:46

## 2024-07-28 RX ADMIN — ATORVASTATIN CALCIUM 20 MG: 20 TABLET, FILM COATED ORAL at 08:46

## 2024-07-28 RX ADMIN — BUSPIRONE HYDROCHLORIDE 10 MG: 10 TABLET ORAL at 08:46

## 2024-07-28 ASSESSMENT — PAIN SCALES - GENERAL: PAINLEVEL_OUTOF10: 0

## 2024-07-28 NOTE — DISCHARGE INSTR - DIET

## 2024-07-28 NOTE — PLAN OF CARE
Problem: Discharge Planning  Goal: Discharge to home or other facility with appropriate resources  7/22/2024 0832 by Nicole Ty RN  Outcome: Progressing  7/21/2024 2352 by Veronica Solorio RN  Outcome: Progressing  Flowsheets (Taken 7/21/2024 2050)  Discharge to home or other facility with appropriate resources:   Identify barriers to discharge with patient and caregiver   Arrange for needed discharge resources and transportation as appropriate     Problem: Safety - Adult  Goal: Free from fall injury  7/22/2024 0832 by Nicole Ty RN  Outcome: Progressing  7/21/2024 2352 by Veronica Solorio RN  Outcome: Progressing     Problem: Neurosensory - Adult  Goal: Achieves stable or improved neurological status  7/22/2024 0832 by Nicole Ty RN  Outcome: Progressing  7/21/2024 2352 by Veronica Solorio RN  Outcome: Progressing  Flowsheets (Taken 7/21/2024 2050)  Achieves stable or improved neurological status: Assess for and report changes in neurological status  Goal: Absence of seizures  Outcome: Progressing  Flowsheets (Taken 7/21/2024 2050 by Veronica Solorio RN)  Absence of seizures: Monitor for seizure activity.  If seizure occurs, document type and location of movements and any associated apnea  Goal: Remains free of injury related to seizures activity  Outcome: Progressing  Flowsheets (Taken 7/21/2024 2050 by Veronica Solorio RN)  Remains free of injury related to seizure activity:   Maintain airway, patient safety  and administer oxygen as ordered   Monitor patient for seizure activity, document and report duration and description of seizure to Licensed Independent Practitioner     Problem: Respiratory - Adult  Goal: Achieves optimal ventilation and oxygenation  Outcome: Progressing     Problem: Cardiovascular - Adult  Goal: Maintains optimal cardiac output and hemodynamic stability  Outcome: Progressing  Flowsheets (Taken 7/21/2024 2050 by Veronica Solorio RN)  Maintains optimal cardiac output and 
  Problem: Discharge Planning  Goal: Discharge to home or other facility with appropriate resources  7/22/2024 2114 by Higinio Dumont RN  Outcome: Progressing     Problem: Safety - Adult  Goal: Free from fall injury  7/22/2024 2114 by Higinio Dumont RN  Outcome: Progressing     Problem: Neurosensory - Adult  Goal: Achieves stable or improved neurological status  7/22/2024 2114 by Higinio Dumont RN  Outcome: Progressing     Problem: Neurosensory - Adult  Goal: Absence of seizures  7/22/2024 2114 by Higinio Dumont RN  Outcome: Progressing     Problem: Neurosensory - Adult  Goal: Remains free of injury related to seizures activity  7/22/2024 2114 by Higinio Dumont RN  Outcome: Progressing     Problem: Respiratory - Adult  Goal: Achieves optimal ventilation and oxygenation  7/22/2024 2114 by Higinio Dumont RN  Outcome: Progressing     Problem: Cardiovascular - Adult  Goal: Maintains optimal cardiac output and hemodynamic stability  7/22/2024 2114 by Higinio Dumont RN  Outcome: Progressing     Problem: Cardiovascular - Adult  Goal: Absence of cardiac dysrhythmias or at baseline  7/22/2024 2114 by Higinio Dumont RN  Outcome: Progressing     Problem: Skin/Tissue Integrity - Adult  Goal: Skin integrity remains intact  7/22/2024 2114 by Higinio Dumont RN  Outcome: Progressing     Problem: Skin/Tissue Integrity - Adult  Goal: Incisions, wounds, or drain sites healing without S/S of infection  7/22/2024 2114 by Higinio Dumont RN  Outcome: Progressing     Problem: Skin/Tissue Integrity - Adult  Goal: Oral mucous membranes remain intact  7/22/2024 2114 by Higinio Dumont RN  Outcome: Progressing     Problem: Musculoskeletal - Adult  Goal: Return mobility to safest level of function  7/22/2024 2114 by Higinio Dumont RN  Outcome: Progressing     Problem: Musculoskeletal - Adult  Goal: Maintain proper alignment of affected 
  Problem: Discharge Planning  Goal: Discharge to home or other facility with appropriate resources  7/25/2024 1055 by Dave Guidry RN  Outcome: Progressing  Flowsheets (Taken 7/25/2024 1055)  Discharge to home or other facility with appropriate resources:   Identify barriers to discharge with patient and caregiver   Identify discharge learning needs (meds, wound care, etc)   Refer to discharge planning if patient needs post-hospital services based on physician order or complex needs related to functional status, cognitive ability or social support system   Arrange for needed discharge resources and transportation as appropriate   Arrange for interpreters to assist at discharge as needed    Problem: Safety - Adult  Goal: Free from fall injury  7/25/2024 1055 by Dave Guidry RN  Outcome: Progressing  Flowsheets (Taken 7/25/2024 1055)  Free From Fall Injury: Instruct family/caregiver on patient safety       Problem: Neurosensory - Adult  Goal: Achieves stable or improved neurological status  7/25/2024 1055 by Dave Guidry RN  Outcome: Progressing  Flowsheets (Taken 7/24/2024 1000 by Teresa Gasca RN)  Achieves stable or improved neurological status: Assess for and report changes in neurological status     Problem: Respiratory - Adult  Goal: Achieves optimal ventilation and oxygenation  7/25/2024 1055 by Dave Guidry RN  Flowsheets (Taken 7/25/2024 1055)  Achieves optimal ventilation and oxygenation:   Assess for changes in respiratory status   Assess for changes in mentation and behavior       Problem: Skin/Tissue Integrity - Adult  Goal: Skin integrity remains intact  7/25/2024 1055 by Dave Guidry RN  Outcome: Progressing  Flowsheets (Taken 7/25/2024 1055)  Skin Integrity Remains Intact: Monitor for areas of redness and/or skin breakdown       Problem: Musculoskeletal - Adult  Goal: Return mobility to safest level of function  7/25/2024 1055 by Dave Guidry, RN  Outcome: Progressing  Flowsheets 
  Problem: Discharge Planning  Goal: Discharge to home or other facility with appropriate resources  7/26/2024 0954 by Dave Guidry RN  Outcome: Progressing  Discharge to home or other facility with appropriate resources:   Identify barriers to discharge with patient and caregiver   Identify discharge learning needs (meds, wound care, etc)   Refer to discharge planning if patient needs post-hospital services based on physician order or complex needs related to functional status, cognitive ability or social support system   Arrange for needed discharge resources and transportation as appropriate   Arrange for interpreters to assist at discharge as needed       Problem: Safety - Adult  Goal: Free from fall injury  7/26/2024 0954 by Dave Guidry RN  Outcome: Progressing  Free From Fall Injury: Instruct family/caregiver on patient safety     Problem: Respiratory - Adult  Goal: Achieves optimal ventilation and oxygenation  7/26/2024 0954 by Dave Guidry RN  Flowsheets (Taken 7/25/2024 1055)  Achieves optimal ventilation and oxygenation:   Assess for changes in respiratory status   Assess for changes in mentation and behavior        Problem: Musculoskeletal - Adult  Goal: Return mobility to safest level of function  7/26/2024 0954 by Dave Guidry RN  Outcome: Progressing  Return Mobility to Safest Level of Function:   Assess patient stability and activity tolerance for standing, transferring and ambulating with or without assistive devices   Assist with transfers and ambulation using safe patient handling equipment as needed   Ensure adequate protection for wounds/incisions during mobilization   Obtain physical therapy/occupational therapy consults as needed   Instruct patient/family in ordered activity level   Apply continuous passive motion per provider or physical therapy orders to increase flexion toward goal    Problem: Infection - Adult  Goal: Absence of infection at discharge  7/26/2024 0954 by Chao 
  Problem: Discharge Planning  Goal: Discharge to home or other facility with appropriate resources  7/27/2024 1058 by Cindy Govea RN  Outcome: Progressing  7/27/2024 0017 by Beth Muro RN  Outcome: Progressing     Problem: Safety - Adult  Goal: Free from fall injury  7/27/2024 1058 by Cindy Govea RN  Outcome: Progressing  7/27/2024 0017 by Beth Muro RN  Outcome: Progressing     Problem: Neurosensory - Adult  Goal: Achieves stable or improved neurological status  7/27/2024 1058 by Cindy Govea RN  Outcome: Progressing  7/27/2024 0017 by Beth uMro RN  Outcome: Progressing  Goal: Absence of seizures  7/27/2024 1058 by Cindy Govea RN  Outcome: Progressing  7/27/2024 0017 by Beth Muro RN  Outcome: Progressing  Goal: Remains free of injury related to seizures activity  7/27/2024 1058 by Cindy Govea RN  Outcome: Progressing  7/27/2024 0017 by Beth Muro RN  Outcome: Progressing     Problem: Respiratory - Adult  Goal: Achieves optimal ventilation and oxygenation  7/27/2024 1058 by Cindy Govea RN  Outcome: Progressing  7/27/2024 0017 by Beth Muro RN  Outcome: Progressing     Problem: Cardiovascular - Adult  Goal: Maintains optimal cardiac output and hemodynamic stability  7/27/2024 1058 by Cindy Govea RN  Outcome: Progressing  7/27/2024 0017 by Beth Muro RN  Outcome: Progressing  Goal: Absence of cardiac dysrhythmias or at baseline  7/27/2024 1058 by Cindy Govea RN  Outcome: Progressing  7/27/2024 0017 by Beth Muro RN  Outcome: Progressing     Problem: Skin/Tissue Integrity - Adult  Goal: Skin integrity remains intact  7/27/2024 1058 by Cindy Govea RN  Outcome: Progressing  7/27/2024 0017 by Beth Muro RN  Outcome: Progressing  Goal: Incisions, wounds, or drain sites healing without S/S of infection  7/27/2024 1058 by Govea, Cindy M, RN  Outcome: Progressing  7/27/2024 0017 by 
  Problem: Discharge Planning  Goal: Discharge to home or other facility with appropriate resources  Outcome: Progressing     Problem: Safety - Adult  Goal: Free from fall injury  Outcome: Progressing     Problem: Neurosensory - Adult  Goal: Achieves stable or improved neurological status  Outcome: Progressing  Goal: Absence of seizures  Outcome: Progressing  Goal: Remains free of injury related to seizures activity  Outcome: Progressing     Problem: Respiratory - Adult  Goal: Achieves optimal ventilation and oxygenation  Outcome: Progressing     Problem: Cardiovascular - Adult  Goal: Maintains optimal cardiac output and hemodynamic stability  Outcome: Progressing  Goal: Absence of cardiac dysrhythmias or at baseline  Outcome: Progressing     Problem: Skin/Tissue Integrity - Adult  Goal: Skin integrity remains intact  Outcome: Progressing  Goal: Incisions, wounds, or drain sites healing without S/S of infection  Outcome: Progressing  Goal: Oral mucous membranes remain intact  Outcome: Progressing     Problem: Musculoskeletal - Adult  Goal: Return mobility to safest level of function  Outcome: Progressing  Goal: Maintain proper alignment of affected body part  Outcome: Progressing  Goal: Return ADL status to a safe level of function  Outcome: Progressing     Problem: Gastrointestinal - Adult  Goal: Minimal or absence of nausea and vomiting  Outcome: Progressing  Goal: Maintains or returns to baseline bowel function  Outcome: Progressing  Goal: Maintains adequate nutritional intake  Outcome: Progressing     Problem: Genitourinary - Adult  Goal: Absence of urinary retention  Outcome: Progressing  Goal: Urinary catheter remains patent  Outcome: Progressing     Problem: Infection - Adult  Goal: Absence of infection at discharge  Outcome: Progressing  Goal: Absence of infection during hospitalization  Outcome: Progressing     Problem: Metabolic/Fluid and Electrolytes - Adult  Goal: Electrolytes maintained within normal 
  Problem: Discharge Planning  Goal: Discharge to home or other facility with appropriate resources  Outcome: Progressing     Problem: Safety - Adult  Goal: Free from fall injury  Outcome: Progressing     Problem: Neurosensory - Adult  Goal: Achieves stable or improved neurological status  Outcome: Progressing  Goal: Absence of seizures  Outcome: Progressing  Goal: Remains free of injury related to seizures activity  Outcome: Progressing     Problem: Respiratory - Adult  Goal: Achieves optimal ventilation and oxygenation  Outcome: Progressing     Problem: Cardiovascular - Adult  Goal: Maintains optimal cardiac output and hemodynamic stability  Outcome: Progressing  Goal: Absence of cardiac dysrhythmias or at baseline  Outcome: Progressing     Problem: Skin/Tissue Integrity - Adult  Goal: Skin integrity remains intact  Outcome: Progressing  Goal: Incisions, wounds, or drain sites healing without S/S of infection  Outcome: Progressing  Goal: Oral mucous membranes remain intact  Outcome: Progressing     Problem: Musculoskeletal - Adult  Goal: Return mobility to safest level of function  Outcome: Progressing  Goal: Maintain proper alignment of affected body part  Outcome: Progressing  Goal: Return ADL status to a safe level of function  Outcome: Progressing     Problem: Gastrointestinal - Adult  Goal: Minimal or absence of nausea and vomiting  Outcome: Progressing  Goal: Maintains or returns to baseline bowel function  Outcome: Progressing  Goal: Maintains adequate nutritional intake  Outcome: Progressing     Problem: Genitourinary - Adult  Goal: Absence of urinary retention  Outcome: Progressing  Goal: Urinary catheter remains patent  Outcome: Progressing     Problem: Infection - Adult  Goal: Absence of infection at discharge  Outcome: Progressing  Goal: Absence of infection during hospitalization  Outcome: Progressing     Problem: Metabolic/Fluid and Electrolytes - Adult  Goal: Electrolytes maintained within normal 
  Problem: Discharge Planning  Goal: Discharge to home or other facility with appropriate resources  Outcome: Progressing     Problem: Safety - Adult  Goal: Free from fall injury  Outcome: Progressing     Problem: Neurosensory - Adult  Goal: Achieves stable or improved neurological status  Outcome: Progressing  Goal: Remains free of injury related to seizures activity  Outcome: Progressing     Problem: Respiratory - Adult  Goal: Achieves optimal ventilation and oxygenation  Outcome: Progressing     
  Problem: Discharge Planning  Goal: Discharge to home or other facility with appropriate resources  Outcome: Progressing  Flowsheets (Taken 7/21/2024 2050)  Discharge to home or other facility with appropriate resources:   Identify barriers to discharge with patient and caregiver   Arrange for needed discharge resources and transportation as appropriate     Problem: Safety - Adult  Goal: Free from fall injury  Outcome: Progressing     Problem: Neurosensory - Adult  Goal: Achieves stable or improved neurological status  Outcome: Progressing  Flowsheets (Taken 7/21/2024 2050)  Achieves stable or improved neurological status: Assess for and report changes in neurological status     Problem: Skin/Tissue Integrity - Adult  Goal: Skin integrity remains intact  Outcome: Progressing  Flowsheets (Taken 7/21/2024 2050)  Skin Integrity Remains Intact:   Monitor for areas of redness and/or skin breakdown   Assess vascular access sites hourly     Problem: Musculoskeletal - Adult  Goal: Return mobility to safest level of function  Outcome: Progressing  Flowsheets (Taken 7/21/2024 2050)  Return Mobility to Safest Level of Function:   Assess patient stability and activity tolerance for standing, transferring and ambulating with or without assistive devices   Assist with transfers and ambulation using safe patient handling equipment as needed     Problem: Infection - Adult  Goal: Absence of infection at discharge  Outcome: Progressing     Problem: Metabolic/Fluid and Electrolytes - Adult  Goal: Electrolytes maintained within normal limits  Outcome: Progressing     
  Problem: Discharge Planning  Goal: Discharge to home or other facility with appropriate resources  Outcome: Progressing  Flowsheets (Taken 7/24/2024 1000)  Discharge to home or other facility with appropriate resources: Identify barriers to discharge with patient and caregiver     Problem: Safety - Adult  Goal: Free from fall injury  Outcome: Progressing     Problem: Neurosensory - Adult  Goal: Achieves stable or improved neurological status  Outcome: Progressing  Flowsheets (Taken 7/24/2024 1000)  Achieves stable or improved neurological status: Assess for and report changes in neurological status  Goal: Absence of seizures  Outcome: Progressing  Flowsheets (Taken 7/24/2024 1000)  Absence of seizures: Monitor for seizure activity.  If seizure occurs, document type and location of movements and any associated apnea  Goal: Remains free of injury related to seizures activity  Outcome: Progressing  Flowsheets (Taken 7/24/2024 1000)  Remains free of injury related to seizure activity: Maintain airway, patient safety  and administer oxygen as ordered     Problem: Respiratory - Adult  Goal: Achieves optimal ventilation and oxygenation  Outcome: Progressing  Flowsheets (Taken 7/24/2024 1000)  Achieves optimal ventilation and oxygenation: Assess for changes in respiratory status     Problem: Cardiovascular - Adult  Goal: Maintains optimal cardiac output and hemodynamic stability  Outcome: Progressing  Flowsheets (Taken 7/24/2024 1000)  Maintains optimal cardiac output and hemodynamic stability: Monitor blood pressure and heart rate  Goal: Absence of cardiac dysrhythmias or at baseline  Outcome: Progressing  Flowsheets (Taken 7/24/2024 1000)  Absence of cardiac dysrhythmias or at baseline: Monitor cardiac rate and rhythm     Problem: Skin/Tissue Integrity - Adult  Goal: Skin integrity remains intact  Outcome: Progressing  Flowsheets (Taken 7/24/2024 1000)  Skin Integrity Remains Intact: Monitor for areas of redness and/or 
  Problem: Pain  Goal: Verbalizes/displays adequate comfort level or baseline comfort level  Outcome: Progressing     Problem: Skin/Tissue Integrity  Goal: Absence of new skin breakdown  Description: 1.  Monitor for areas of redness and/or skin breakdown  2.  Assess vascular access sites hourly  3.  Every 4-6 hours minimum:  Change oxygen saturation probe site  4.  Every 4-6 hours:  If on nasal continuous positive airway pressure, respiratory therapy assess nares and determine need for appliance change or resting period.  Outcome: Progressing     Problem: Chronic Conditions and Co-morbidities  Goal: Patient's chronic conditions and co-morbidity symptoms are monitored and maintained or improved  Outcome: Progressing     Problem: Hematologic - Adult  Goal: Maintains hematologic stability  Outcome: Progressing     Problem: Metabolic/Fluid and Electrolytes - Adult  Goal: Electrolytes maintained within normal limits  Outcome: Progressing     Problem: Metabolic/Fluid and Electrolytes - Adult  Goal: Hemodynamic stability and optimal renal function maintained  Outcome: Progressing     Problem: Infection - Adult  Goal: Absence of infection at discharge  Outcome: Progressing     Problem: Infection - Adult  Goal: Absence of infection during hospitalization  Outcome: Progressing     Problem: Genitourinary - Adult  Goal: Absence of urinary retention  Outcome: Progressing     Problem: Genitourinary - Adult  Goal: Urinary catheter remains patent  Outcome: Progressing     Problem: Gastrointestinal - Adult  Goal: Minimal or absence of nausea and vomiting  Outcome: Progressing     Problem: Gastrointestinal - Adult  Goal: Maintains or returns to baseline bowel function  Outcome: Progressing     Problem: Gastrointestinal - Adult  Goal: Maintains adequate nutritional intake  Outcome: Progressing     Problem: Musculoskeletal - Adult  Goal: Return mobility to safest level of function  Outcome: Progressing     Problem: Musculoskeletal - 
Assess for signs of decreased cardiac output     Problem: Skin/Tissue Integrity - Adult  Goal: Skin integrity remains intact  Outcome: Progressing  Flowsheets (Taken 7/23/2024 2024)  Skin Integrity Remains Intact: Monitor for areas of redness and/or skin breakdown  Goal: Incisions, wounds, or drain sites healing without S/S of infection  Outcome: Progressing  Flowsheets (Taken 7/23/2024 2024)  Incisions, Wounds, or Drain Sites Healing Without Sign and Symptoms of Infection: TWICE DAILY: Assess and document skin integrity  Goal: Oral mucous membranes remain intact  Outcome: Progressing  Flowsheets (Taken 7/23/2024 2024)  Oral Mucous Membranes Remain Intact: Assess oral mucosa and hygiene practices     Problem: Musculoskeletal - Adult  Goal: Return mobility to safest level of function  Outcome: Progressing  Flowsheets (Taken 7/23/2024 2024)  Return Mobility to Safest Level of Function: Assess patient stability and activity tolerance for standing, transferring and ambulating with or without assistive devices  Goal: Maintain proper alignment of affected body part  Outcome: Progressing  Flowsheets (Taken 7/23/2024 2024)  Maintain proper alignment of affected body part: Instruct and reinforce with patient and family use of appropriate assistive device and precautions (e.g. spinal or hip dislocation precautions)  Goal: Return ADL status to a safe level of function  Outcome: Progressing  Flowsheets (Taken 7/23/2024 2024)  Return ADL Status to a Safe Level of Function: Administer medication as ordered     Problem: Gastrointestinal - Adult  Goal: Minimal or absence of nausea and vomiting  Outcome: Progressing  Flowsheets (Taken 7/23/2024 2024)  Minimal or absence of nausea and vomiting: Administer IV fluids as ordered to ensure adequate hydration  Goal: Maintains or returns to baseline bowel function  Outcome: Progressing  Flowsheets (Taken 7/23/2024 2024)  Maintains or returns to baseline bowel function: Assess bowel 
by Irma Vega RN  Outcome: Progressing     Problem: Musculoskeletal - Adult  Goal: Return ADL status to a safe level of function  7/24/2024 2310 by Irma Vega RN  Outcome: Progressing     Problem: Gastrointestinal - Adult  Goal: Minimal or absence of nausea and vomiting  7/24/2024 2310 by Irma Vega RN  Outcome: Progressing     Problem: Gastrointestinal - Adult  Goal: Maintains or returns to baseline bowel function  7/24/2024 2310 by Irma Vega RN  Outcome: Progressing     Problem: Gastrointestinal - Adult  Goal: Maintains adequate nutritional intake  7/24/2024 2310 by Irma Vega RN  Outcome: Progressing     Problem: Genitourinary - Adult  Goal: Absence of urinary retention  7/24/2024 2310 by Irma Vega RN  Outcome: Progressing     Problem: Genitourinary - Adult  Goal: Urinary catheter remains patent  7/24/2024 2310 by Irma Vega RN  Outcome: Progressing     Problem: Infection - Adult  Goal: Absence of infection at discharge  7/24/2024 2310 by Irma Vega RN  Outcome: Progressing     Problem: Infection - Adult  Goal: Absence of infection during hospitalization  7/24/2024 2310 by Irma Vega RN  Outcome: Progressing     Problem: Metabolic/Fluid and Electrolytes - Adult  Goal: Electrolytes maintained within normal limits  7/24/2024 2310 by Irma Vega RN  Outcome: Progressing     Problem: Metabolic/Fluid and Electrolytes - Adult  Goal: Hemodynamic stability and optimal renal function maintained  7/24/2024 2310 by Irma Vega RN  Outcome: Progressing     Problem: Hematologic - Adult  Goal: Maintains hematologic stability  7/24/2024 2310 by Irma Vega RN  Outcome: Progressing     Problem: Chronic Conditions and Co-morbidities  Goal: Patient's chronic conditions and co-morbidity symptoms are monitored and maintained or improved  7/24/2024 2310 by Irma Vega RN  Outcome: Progressing

## 2024-07-28 NOTE — DISCHARGE SUMMARY
tablet Take 1 tablet by mouth daily  Qty: 90 tablet, Refills: 2    Comments: Please send a replace/new response with 90-Day Supply if appropriate to maximize member benefit. Requesting 1 year supply.  Associated Diagnoses: Essential hypertension      oxyBUTYnin (DITROPAN-XL) 10 MG extended release tablet TAKE 1 TABLET BY MOUTH DAILY  Qty: 90 tablet, Refills: 3    Comments: Please send a replace/new response with 90-Day Supply if appropriate to maximize member benefit. Requesting 1 year supply.  Associated Diagnoses: Overactive bladder      traZODone (DESYREL) 50 MG tablet TAKE 1 TO 2 TABLETS BY MOUTH AT  NIGHT  Qty: 60 tablet, Refills: 11    Comments: Requesting 1 year supply  Associated Diagnoses: TANISHA (generalized anxiety disorder); Moderate episode of recurrent major depressive disorder (HCC); Sleep disturbance      insulin lispro, 1 Unit Dial, (HUMALOG KWIKPEN) 100 UNIT/ML SOPN INJECT 32 TO 40 UNITS  SUBCUTANEOUSLY BEFORE MEALS 3  TIMES DAILY  Qty: 120 mL, Refills: 3    Comments: Please send a replace/new response with 90-Day Supply if appropriate to maximize member benefit. Requesting 1 year supply.      fenofibrate (TRIGLIDE) 160 MG tablet TAKE 1 TABLET BY MOUTH DAILY  Qty: 90 tablet, Refills: 3    Comments: Please send a replace/new response with 90-Day Supply if appropriate to maximize member benefit. Requesting 1 year supply.      atorvastatin (LIPITOR) 20 MG tablet TAKE 1 TABLET BY MOUTH ONCE  DAILY  Qty: 90 tablet, Refills: 3    Comments: Please send a replace/new response with 90-Day Supply if appropriate to maximize member benefit. Requesting 1 year supply.      busPIRone (BUSPAR) 10 MG tablet TAKE 1 TABLET BY MOUTH IN THE  MORNING 1 TABLET BY MOUTH AT  NOON AND 1 TABLET BY MOUTH  BEFORE BEDTIME  Qty: 270 tablet, Refills: 1    Comments: Please send a replace/new response with 90-Day Supply if appropriate to maximize member benefit. Requesting 1 year supply.  Associated Diagnoses: Anxiety      tamsulosin

## 2024-07-28 NOTE — DISCHARGE INSTR - COC
Continuity of Care Form    Patient Name: Greg Luna   :  1945  MRN:  [id number]    Admit date:  2024  Discharge date:  ***    Code Status Order: Full Code   Advance Directives:   Advance Care Flowsheet Documentation       Date/Time Healthcare Directive Type of Healthcare Directive Copy in Chart Healthcare Agent Appointed Healthcare Agent's Name Healthcare Agent's Phone Number    24 0652 No, patient does not have an advance directive for healthcare treatment -- -- -- -- --    24 0617 No, patient does not have an advance directive for healthcare treatment -- -- -- -- --            Admitting Physician:  Dong Billings MD  PCP: Narciso Nino MD    Discharging Nurse: ***  Discharging Hospital Unit/Room#: C3Y-7444/4258-01  Discharging Unit Phone Number: ***    Emergency Contact:   Extended Emergency Contact Information  Primary Emergency Contact: JeremyRomy  Address: [address] [place], [place] [id number] [facility]  Home Phone: [phone]  Mobile Phone: [phone]  Relation: Spouse  Secondary Emergency Contact: Cira Gil  Address: [facility]  Home Phone: [phone]  Relation: Brother/Sister    Past Surgical History:  Past Surgical History:   Procedure Laterality Date    CARDIAC PROCEDURE N/A 2024    Peripheral angiography performed by Get Boyd MD at [facility] CARDIAC CATH LAB    CATARACT REMOVAL Left     INVASIVE VASCULAR N/A 2024    Angioplasty peripheral artery performed by Get Boyd MD at [facility] CARDIAC CATH LAB       Immunization History:   Immunization History   Administered Date(s) Administered    COVID-19, MODERNA BLUE border, Primary or Immunocompromised, (age 12y+), IM, 100 mcg/0.5mL 2021, 2021, 2021    Hep A, HAVRIX, VAQTA, (age 12m-18y), IM, 0.5mL 2015    Hepatitis B 2015    Pneumococcal, PCV-13, PREVNAR 13, (age

## 2024-07-29 ENCOUNTER — TELEPHONE (OUTPATIENT)
Dept: INTERNAL MEDICINE CLINIC | Age: 79
End: 2024-07-29

## 2024-07-29 LAB
BACTERIA SPEC AEROBE CULT: ABNORMAL
BACTERIA SPEC AEROBE CULT: ABNORMAL
BACTERIA SPEC ANAEROBE CULT: ABNORMAL
GRAM STN SPEC: ABNORMAL
ORGANISM: ABNORMAL
ORGANISM: ABNORMAL

## 2024-07-30 ENCOUNTER — COMMUNITY CARE MANAGEMENT (OUTPATIENT)
Facility: CLINIC | Age: 79
End: 2024-07-30

## 2024-07-30 NOTE — PROGRESS NOTES
Hospitalist Progress Note      PCP: Narciso Nino MD    Date of Admission: 7/21/2024    Chief Complaint: Left foot pain, swelling    Hospital Course: 79-year-old man with a past medical history of type 2 insulin-dependent diabetes, CKD, hypertension, anxiety/depression who presented to the ED for worsening left foot pain, erythema and edema.  Patient stated he stubbed his toe on the concrete and has been following with podiatry for local wound care.  Patient denied being on recent antibiotics.  While in the ED, he was afebrile with no leukocytosis.  Lactic acid was mildly elevated but normalized after IV fluids.  X-ray of the left foot showed suspected osteomyelitis of the tuft of the distal phalanges of the first and second toe.  He was placed on empiric vancomycin and cefepime and admitted for further workup/treatment with podiatry consultation.    Podiatry evaluated patient bedside 7/22 and did debridement.  Recommend interventional cardiology consult for PAD workup.  Interventional cardiology consulted, patient had angiogram with three-vessel intervention 7/23, formal report pending.  Discussed with Dr. Edwards at bedside 7/23, planning left hallux amputation Friday afternoon 7/26.  Patient will need to remain inpatient until then.  Infectious disease was also consulted for antibiotic recommendations.    Subjective: Patient seen lying in bed after returning from Cath Lab, per bedside RN, 3 vessels were opened up.  Dr. SUMNER podbriceist at bedside, doing a debridement wound change.  Discussed with her, plan will be for left hallux amputation Friday.  Patient will remain inpatient until then.  Patient states he is doing well, having no pain currently.    Assessment/Plan:    Osteomyelitis of left great toe  Cellulitis of left foot  -Patient has been following with podiatry, having weekly debridements, not been on antibiotics  -X-ray here of left foot with suspected osteomyelitis of the tuft of the distal 
      Hospitalist Progress Note      PCP: Narciso Nino MD    Date of Admission: 7/21/2024    Chief Complaint: Left foot pain, swelling    Hospital Course: 79-year-old man with a past medical history of type 2 insulin-dependent diabetes, CKD, hypertension, anxiety/depression who presented to the ED for worsening left foot pain, erythema and edema.  Patient stated he stubbed his toe on the concrete and has been following with podiatry for local wound care.  Patient denied being on recent antibiotics.  While in the ED, he was afebrile with no leukocytosis.  Lactic acid was mildly elevated but normalized after IV fluids.  X-ray of the left foot showed suspected osteomyelitis of the tuft of the distal phalanges of the first and second toe.  He was placed on empiric vancomycin and cefepime and admitted for further workup/treatment with podiatry consultation.    Podiatry evaluated patient bedside 7/22 and did debridement.  Recommend interventional cardiology consult for PAD workup.  Interventional cardiology consulted, patient had angiogram with three-vessel intervention 7/23, formal report pending.  Discussed with Dr. Edwards at bedside 7/23, planning left hallux amputation Friday afternoon 7/26.  Patient will need to remain inpatient until then.  Infectious disease was also consulted for antibiotic recommendations.  Wound cultures growing Morganella and staph.    Subjective: Patient seen in bed. Very pleasant. He is patiently awaiting surgery this afternoon. He denies any pain. He is hopeful for discharge tomorrow. No needs voiced.    Assessment/Plan:    Osteomyelitis of left great toe  Cellulitis of left foot  -Patient has been following with podiatry, having weekly debridements, not been on antibiotics  -X-ray here of left foot with suspected osteomyelitis of the tuft of the distal phalanges of the first and second toes  -Culture growing Morganella and staph aures--> antibiotics adjusted to IV cefepime and 
      Hospitalist Progress Note      PCP: Narciso Nino MD    Date of Admission: 7/21/2024    Chief Complaint: Left foot pain, swelling    Hospital Course: 79-year-old man with a past medical history of type 2 insulin-dependent diabetes, CKD, hypertension, anxiety/depression who presented to the ED for worsening left foot pain, erythema and edema.  Patient stated he stubbed his toe on the concrete and has been following with podiatry for local wound care.  Patient denied being on recent antibiotics.  While in the ED, he was afebrile with no leukocytosis.  Lactic acid was mildly elevated but normalized after IV fluids.  X-ray of the left foot showed suspected osteomyelitis of the tuft of the distal phalanges of the first and second toe.  He was placed on empiric vancomycin and cefepime and admitted for further workup/treatment with podiatry consultation.    Podiatry evaluated patient bedside 7/22 and did debridement.  Recommend interventional cardiology consult for PAD workup.  Interventional cardiology consulted, patient had angiogram with three-vessel intervention 7/23, formal report pending.  Discussed with Dr. Edwards at bedside 7/23, planning left hallux amputation Friday afternoon 7/26.  Patient will need to remain inpatient until then.  Infectious disease was also consulted for antibiotic recommendations.  Wound cultures growing Morganella and staph.    Subjective: Patient seen lying in bed, in good spirits, states he is just bored.  Denies any pain in his foot, no chest pain or shortness of breath.  Discussed still the plan is for surgery on Friday, he still agreeable.    Assessment/Plan:    Osteomyelitis of left great toe  Cellulitis of left foot  -Patient has been following with podiatry, having weekly debridements, not been on antibiotics  -X-ray here of left foot with suspected osteomyelitis of the tuft of the distal phalanges of the first and second toes  -Started on IV Vanco/cefepime--> Flagyl 
      Hospitalist Progress Note      PCP: Narciso Nino MD    Date of Admission: 7/21/2024    Chief Complaint: Left foot pain, swelling    Hospital Course: 79-year-old man with a past medical history of type 2 insulin-dependent diabetes, CKD, hypertension, anxiety/depression who presented to the ED for worsening left foot pain, erythema and edema.  Patient stated he stubbed his toe on the concrete and has been following with podiatry for local wound care.  Patient denied being on recent antibiotics.  While in the ED, he was afebrile with no leukocytosis.  Lactic acid was mildly elevated but normalized after IV fluids.  X-ray of the left foot showed suspected osteomyelitis of the tuft of the distal phalanges of the first and second toe.  He was placed on empiric vancomycin and cefepime and admitted for further workup/treatment with podiatry consultation.    Subjective: Patient seen lying in bed, still having pain in his left foot, is only ambulate with a walker at home.  States has been following with podiatry, no recent antibiotics.  Podiatrist has been doing some debridement and it has been getting better but now with worsening pain, erythema and edema.  Patient denies any fevers or chills, no shortness of breath.    Assessment/Plan:    Osteomyelitis of left great toe  Cellulitis of left foot  -Patient has been following with podiatry, having weekly debridements, not been on antibiotics  -X-ray here of left foot with suspected osteomyelitis of the tuft of the distal phalanges of the first and second toes  -Started on IV Vanco/cefepime, continue for now.  -Podiatry here consulted, appreciate recommendations.   -Remains afebrile with no leukocytosis.  Lactic acid now WNL.  Vital signs stable.    Type 2 insulin-dependent diabetes  -Verified patient's Lantus dosing is 90 units nightly, lispro 35 units 3 times daily with meals.  -Glucose labile ranging from 147--4 05  -Continue home regimen, add low-dose SSI 
      Infectious Diseases Inpatient Progress Note      CHIEF COMPLAINT:       Left great toe osteomyelitis  Diabetic foot infection   Staph aureus infection  ESR elevation  PVD  SNOW          HISTORY OF PRESENT ILLNESS:  79 y.o. man with a significant history for anxiety, BPH, diabetes, hypertension, cirrhosis of liver secondary to WYLIE obesity BMI 36 neuropathy admitted to hospital secondary to left great toe cellulitis osteomyelitis.  Patient sustained injury to the left great toe 3 weeks ago walking in the pool side he stubbed his great toe following this he developed ulceration now progressed to osteomyelitis nonhealing wound with associated cellulitis and drainage.  Patient does have venous stasis skin changes in both lower extremity.  Patient now admitted to hospital secondary to worsening left foot infection.  Labs reviewed creatinine was 2 lactic acid 2.3  LFT normal hemoglobin A1c 6.6 WBC 6.1 hemoglobin 9.7 ESR 72, wound culture positive for Morganella and Staph aureus, x-ray left great toe osteomyelitis of the tuft of the distal phalanx.  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 
      Infectious Diseases Inpatient Progress Note      CHIEF COMPLAINT:       Left great toe osteomyelitis  Diabetic foot infection   Staph aureus infection  ESR elevation  PVD  SNOW          HISTORY OF PRESENT ILLNESS:  79 y.o. man with a significant history for anxiety, BPH, diabetes, hypertension, cirrhosis of liver secondary to WYLIE obesity BMI 36 neuropathy admitted to hospital secondary to left great toe cellulitis osteomyelitis.  Patient sustained injury to the left great toe 3 weeks ago walking in the pool side he stubbed his great toe following this he developed ulceration now progressed to osteomyelitis nonhealing wound with associated cellulitis and drainage.  Patient does have venous stasis skin changes in both lower extremity.  Patient now admitted to hospital secondary to worsening left foot infection.  Labs reviewed creatinine was 2 lactic acid 2.3  LFT normal hemoglobin A1c 6.6 WBC 6.1 hemoglobin 9.7 ESR 72, wound culture positive for Morganella and Staph aureus, x-ray left great toe osteomyelitis of the tuft of the distal phalanx.  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 
  Physician Progress Note      PATIENT:               AZUCENA BELTRE  CSN #:                  [id number]  :                       1945  ADMIT DATE:       2024 1:44 PM  DISCH DATE:        2024 12:41 PM  RESPONDING  PROVIDER #:        Karen Edwards DPM          QUERY TEXT:    Patient admitted with left OM. Gangrene is noted. Per OP note: Proximal   phalanx was noted to be gray in color without any sheen to the cartilage there   was also hemorrhagic necrotic tissue plantar to the proximal phalanx.  Gas   was encountered in this tissue plane. If possible, please document in progress   notes and discharge summary further specificity regarding gangrene as   follows:    The medical record reflects the following:  Risk Factors: DM2, OM/abscess left foot  Clinical Indicators: gangrene hallux left, Gas encountered per OP note  Treatment: left hallux amputation, ID consult, IV Cefepime x 2 gm Q 12 hr WILL   cover MSSA and Morganella  Change to oral  Metronidazole x  500 mg Q 8 hr due to Foot infection, Home   going oral Levofloxacin x 500 mg Q 24 hr x 14 DAYS and Oral  Flagyl x 500 mg Q   8 hr x 7 days    Thank you,  Jacquie Hannah RN,BSN,CCDS,CRCR  Options provided:  -- Gas gangrene  -- Other - I will add my own diagnosis  -- Disagree - Not applicable / Not valid  -- Disagree - Clinically unable to determine / Unknown  -- Refer to Clinical Documentation Reviewer    PROVIDER RESPONSE TEXT:    This patient has gas gangrene.    Query created by: Jacquie Hannah on 2024 8:21 AM      Electronically signed by:  Karen Edwards DPM 2024 2:27 PM          
4 Eyes Skin Assessment     NAME:  Greg Luna  YOB: 1945  MEDICAL RECORD NUMBER:  [id number]    The patient is being assessed for  Admission    I agree that at least one RN has performed a thorough Head to Toe Skin Assessment on the patient. ALL assessment sites listed below have been assessed.      Areas assessed by both nurses:    Head, Face, Ears, Shoulders, Back, Chest, Arms, Elbows, Hands, Sacrum. Buttock, Coccyx, Ischium, and Legs. Feet and Heels        Does the Patient have a Wound? Yes wound(s) were present on assessment. LDA wound assessment was Initiated and completed by RN       Hector Prevention initiated by RN: no  Wound Care Orders initiated by RN: No    Pressure Injury (Stage 3,4, Unstageable, DTI, NWPT, and Complex wounds) if present, place Wound referral order by RN under : yes    New Ostomies, if present place, Ostomy referral order under : No     Nurse 1 eSignature: Electronically signed by Veronica Solorio RN on 7/21/24 at 11:00 PM EDT    **SHARE this note so that the co-signing nurse can place an eSignature**    Nurse 2 eSignature: {Esignature:[id number]}      
Assessment complete. VSS. Patient resting in bed. Respirations even and easy. Call light in reach. Fall precautions in place. No needs expressed at this time.   
Clinical Pharmacy Note  Vancomycin Consult    Greg Luna is a 79 y.o. male ordered vancomycin for SSTI; consult received from Dr. Billings to manage therapy. Also receiving cefepime and flagyl .    Allergies:  Seasonal     Temp max:  Temp (24hrs), Av.7 °F (36.5 °C), Min:97.4 °F (36.3 °C), Max:97.9 °F (36.6 °C)      Recent Labs     24  1421 24  0533 24  0530   WBC 7.7 6.1 5.3         Recent Labs     24  0533 24  0433 24  1309 24  0530   BUN 56*  --  38* 36*   CREATININE 1.9* 1.6* 1.4* 1.5*           Intake/Output Summary (Last 24 hours) at 2024 0759  Last data filed at 2024 0535  Gross per 24 hour   Intake 1255.09 ml   Output 1380 ml   Net -124.91 ml         Culture Results:  Foot --> Morganella morganii                 Staph Aureus    Ht Readings from Last 1 Encounters:   24 1.753 m (5' 9\")        Wt Readings from Last 1 Encounters:   24 114.7 kg (252 lb 13.9 oz)         Estimated Creatinine Clearance: 52 mL/min (A) (based on SCr of 1.5 mg/dL (H)).    Assessment:  Day # 4 of vancomycin.  Current regimen: intermittent dosing based on levels    Vancomycin level: 13.8 mg/L    Scr 2 ->1.9 ->1.6--> 1.5 mg/dL this am  Baseline SCr appears to be 1.5-1.6 mg/dL    Plan:  Reviewed Dr Shine's note  + osteomyelitis great toe. Opting for toe amputation  Staph Aureus sensitives should be back  per micro.    Vanco 1250 mg x 1 today    Random vanco level tomorrow am.    Thank you for the consult.   Kirsten Toscano, [facility]   2024  7:59 AM      
Clinical Pharmacy Note  Vancomycin Consult    Greg Luna is a 79 y.o. male ordered vancomycin for SSTI; consult received from Dr. Billings to manage therapy. Also receiving cefepime.    Allergies:  Seasonal     Temp max:  Temp (24hrs), Av °F (36.7 °C), Min:97.4 °F (36.3 °C), Max:98.6 °F (37 °C)      Recent Labs     24  1421 24  0533   WBC 7.7 6.1         Recent Labs     24  1421 24  0533 24  0433   BUN 58* 56*  --    CREATININE 2.0* 1.9* 1.6*           Intake/Output Summary (Last 24 hours) at 2024 0826  Last data filed at 2024 1744  Gross per 24 hour   Intake 1747.27 ml   Output --   Net 1747.27 ml         Culture Results:  Prelim foot; gram stain 2+ Gram positive cocci     Ht Readings from Last 1 Encounters:   24 1.753 m (5' 9\")        Wt Readings from Last 1 Encounters:   24 113 kg (249 lb 1.9 oz)         Estimated Creatinine Clearance: 48 mL/min (A) (based on SCr of 1.6 mg/dL (H)).    Assessment:  Day # 3 of vancomycin.  Current regimen: intermittent dosing based on levels    Vancomycin level: 13.0 mg/L    Scr 2 ->1.9 ->1.6 mg/dL this am  Baseline SCr appears to be 1.5-1.6 mg/dL    Plan:  Vanco 1250 mg x 1 today  Pt is currently in cath lab for angiography.. Timed dose for return to floor   Random vanco level tomorrow am.    Thank you for the consult.   Kirsten Toscano CONRAD   2024  8:26 AM      
Clinical Pharmacy Note  Vancomycin Consult    Greg Luna is a 79 y.o. male ordered vancomycin for SSTI; consult received from Dr. Billings to manage therapy. Also receiving cefepime.    Allergies:  Seasonal     Temp max:  Temp (24hrs), Av.2 °F (36.8 °C), Min:97.6 °F (36.4 °C), Max:98.4 °F (36.9 °C)      Recent Labs     24  1421 24  0533   WBC 7.7 6.1       Recent Labs     24  1421 24  0533   BUN 58* 56*   CREATININE 2.0* 1.9*         Intake/Output Summary (Last 24 hours) at 2024 0729  Last data filed at 2024 0030  Gross per 24 hour   Intake 1250 ml   Output 500 ml   Net 750 ml       Culture Results:  pending    Ht Readings from Last 1 Encounters:   24 1.753 m (5' 9\")        Wt Readings from Last 1 Encounters:   24 112.9 kg (248 lb 14.4 oz)         Estimated Creatinine Clearance: 40 mL/min (A) (based on SCr of 1.9 mg/dL (H)).    Assessment:  Day # 2 of vancomycin.  Current regimen: intermittent dosing based on levels    Vancomycin level: 10.2 mg/L    Scr 2 ->1.9 mg/dL this am  Baseline SCr appears to be 1.5-1.6 mg/dL    Plan:  Vanco 1500 mg x 1 today  Random vanco level tomorrow am.    Thank you for the consult.     Caryn Martin, PharmD.  2024  7:30 AM      
Clinical Pharmacy Note  Vancomycin Consult    Pharmacy consult received for one-time dose of vancomycin in the Emergency Department per Dr. Sanders.    Ht Readings from Last 1 Encounters:   07/21/24 1.753 m (5' 9\")        Wt Readings from Last 1 Encounters:   07/21/24 113.6 kg (250 lb 7.1 oz)         Assessment/Plan:  Vancomycin 1500 x 1 in ED.  If vancomycin is to continue on admission and pharmacy is to manage dosing, please re-consult with admission orders.         
Department of Podiatry Progress Note  Attending         HISTORY OF PRESENT ILLNESS:                The patient is a 79 y.o. male with significant past medical history of DM2 with neuropathy, CKD, HTN, HLD,  who is consulted for management of ulceration of L hallux. Patient reports the ulcer has been present for a few months, has been being treated outpatient weekly with debridement with Dr. Perez, my partner, and then over the weekend noticed new swelling of the foot, and admits he felt somewhat nauseated, so presented to the ED. States since arriving and being placed on IV abx feels better. Denies significant pain.     Interval progress hx:  s/p angioplasty of the LLE with success opening AT, PT, peroneal. Reports no pain, feels fine. Denies f/c/n/v.     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    History of colonic polyps     History of colonic polyps     Hyperlipidemia     Hypertension     Liver cirrhosis secondary to WYLIE ([facility])     Long term (current) use of insulin ([facility]) 10/10/2019    Obesity     Presence of intraocular lens 10/10/2019    Psychophysiological insomnia 5/15/2018    Retinal hemorrhage, 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 with mild nonproliferative diabetic retinopathy without macular edema, bilateral ([facility]) 10/10/2019    Type 2 diabetes mellitus with proteinuria ([facility]) 11/19/2019    Type II or unspecified type diabetes mellitus without mention of complication, not stated as uncontrolled     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     Past Surgical History:        Procedure Laterality Date    CATARACT REMOVAL Left 2010     Current Medications:    Current Facility-Administered Medications: sodium chloride flush 0.9 % injection 5-40 mL, 5-40 mL, IntraVENous, 2 times per day  sodium chloride flush 0.9 
Interventional cardiology note    Patient with history of nonhealing diabetic foot ulcer.  Consult placed and case was reviewed with Dr. Edwards.  Discussed risk, benefits, alternatives of peripheral angiography with patient given high likelihood of ischemic portion of disease.  Patient would like to proceed with angiography and possible intervention will have this scheduled and plan to proceed tomorrow    Plan:  N.p.o. after midn ight  Peripheral angiography in a.m. 
MD stated ok to give 50 units  of Lantus per pt request.   
MD stated to hold Redeemr this pm. May give full amt of Lantus. Sent another message to MD r/t pt concerns r/t insulin states \" I cut it in half at home when my bs is like that because it will drop low and that does not feel good \"   
Message sent to MD r/t lovenox order for this pm, also for Lantus amt to be given with bs of 156.  
Occupational Therapy  Facility/Department: [address] MED SURG  Occupational Therapy Initial Assessment    Name: Greg Luna  : 1945  MRN: [id number]  Date of Service: 2024    Discharge Recommendations:  Home with assist PRN        Greg Luna scored a 21/24 on the [facility] ADL Inpatient form.  At this time, no further OT is recommended upon discharge. Recommend patient returns to prior setting with prior services.      Patient Diagnosis(es): The primary encounter diagnosis was Osteomyelitis of left foot, unspecified type (HCC). Diagnoses of Left leg cellulitis and PAD (peripheral artery disease) (HCC) were also pertinent to this visit.  Past Medical History:  has a past medical history of Anxiety, BPH (benign prostatic hyperplasia), Depression, Diabetes with proteinuria, Disorder of iron metabolism, GBS (Guillain [place] syndrome) (HCC), History of colonic polyps, History of colonic polyps, Hyperlipidemia, Hypertension, Liver cirrhosis secondary to WYLIE (HCC), Long term (current) use of insulin (HCC), Obesity, Presence of intraocular lens, Psychophysiological insomnia, Retinal hemorrhage, bilateral, Type 2 diabetes mellitus with mild nonproliferative diabetic retinopathy without macular edema, bilateral (HCC), Type 2 diabetes mellitus with proteinuria (HCC), Type II or unspecified type diabetes mellitus without mention of complication, not stated as uncontrolled, and Vitreous degeneration of right eye.  Past Surgical History:  has a past surgical history that includes Cataract removal (Left, ); Cardiac procedure (N/A, 2024); and invasive vascular (N/A, 2024).    Treatment Diagnosis: impaired ADL/fxl mobility    Assessment   Performance deficits / Impairments: Decreased functional mobility ;Decreased high-level IADLs;Decreased ADL status;Decreased balance  Assessment: 80 yo male admitted with L great toe osteomyelitis s/p  L hallux amp now WBAT within surgical shoe. PMH: DM, KIMMIE, PAD, 
PT awake, alert, and oriented. Room air. Vss. Pt stable. Denies pain. Report called to Dave OREILLY. Ready for transfer back to [facility].  
Patient alert and oriented x4.Vitals stable, glucose 224 100 units total of Lantus administered. Patient tolerated nightly medications well. Upon assessment, right femerol site is clean, dry, and intact. There is some bruising extending from the site down towards pt groin. Area is soft to the touch. Right pedal pulse +2. Area is marked. Secure message sent to Basia Quinones NP making her aware. No new orders placed.  
Patient given verbal and written discharge instructions. Questions answered. No concerns. Son at bedside and will transport patient home today.   
Pharmacy Medication Reconciliation Note     List of medications patient is currently taking is complete.    Source of information:   1. Conversation with pt at bedside, he is very knowledgeable   2. [facility]CP office note from 6/26/24      Notes regarding home medications:   1.  Hydralazine decreased to 50 mg BID, Buspar 10 mg BID, Cymbalta 60 mg daily.  2. Lantus dose reduced to 90 units at HS. Lispro is 35 units TID.     Kirsten Toscano RPH   7/22/2024  10:53 AM   
Physical Therapy  Eval Attempt    24    Name: Greg Luna   : 1945    MRN: [id number]    Physical therapy orders acknowledged. Pt unable to be assessed this date as patient with \"plan for amp of L hallux Friday late afternoon after 3pm\" (per podiatry). PT will plan to assess tomorrow s/p amputation.    Electronically signed by Chirag Coffman PT on 2024 at 2:04 PM        
Pt new admit to floor in room 4258. Pt alert and oriented x 4 denies any pain Pt oriented to room and call light within reach.  
Pt received into bay 9 from OR. Pt drowsy, yet oriented. O2 at 2L/NC. Resp easy, unlabored. Vss. Pt stable. Dressing c/d/I. Denies pain. Will monitor.   
Report called to RN / groin remains stable soft without bleed / tolerated drink and snack / ready for transfer to [facility] on monitor  
[facility]        Chief Complaint   Patient presents with    Leg Swelling    Leg Pain     Lower left leg pain and swelling 4 days ago. Left great toe nail may be infected and pt is seeing a podiatrist. Last visited the podiatrist Wednesday and the pain and swelling started the next day.            History of Present Illness:  Greg Luna is a 79 y.o. patient who presented to the hospital with complaints of leg pain. I have been asked to provide consultation regarding further management and testing.    Patient with history of non-healing ulcer of the LLE  Discussed case with Dr. 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    Surgical History:   has a past surgical history that includes Cataract removal (Left, 2010); Cardiac procedure (N/A, 7/23/2024); and invasive vascular (N/A, 7/23/2024).     Social History:   reports that he has never smoked. He has never used smokeless tobacco. He reports that he does not drink alcohol and does not use drugs.     Family History:  No family history of premature coronary 
[R79.82]     Cirrhosis of liver without ascites (HCC) [K74.60]     Type 2 diabetes mellitus with diabetic foot infection (HCC) [E11.628, L08.9]     Osteomyelitis (HCC) [M86.9]     PAD (peripheral artery disease) (HCC) [I73.9]        Medications:  Reviewed    Infusion Medications    sodium chloride      dextrose       Scheduled Medications    miconazole   Topical BID    sodium chloride flush  5-40 mL IntraVENous 2 times per day    insulin glargine  50 Units SubCUTAneous Nightly    And    insulin glargine  50 Units SubCUTAneous Nightly    [Held by provider] hydrALAZINE  50 mg Oral BID    busPIRone  10 mg Oral BID    DULoxetine  60 mg Oral Daily    insulin lispro  35 Units SubCUTAneous TID WC    insulin lispro  0-4 Units SubCUTAneous TID WC    insulin lispro  0-4 Units SubCUTAneous Nightly    gabapentin  300 mg Oral Nightly    metroNIDAZOLE  500 mg IntraVENous Q8H    [Held by provider] amLODIPine  10 mg Oral Daily    atorvastatin  20 mg Oral Daily    oxyBUTYnin  1 tablet Oral Daily    tamsulosin  0.4 mg Oral Daily    traZODone  50 mg Oral Nightly    enoxaparin  30 mg SubCUTAneous BID    cefepime  2,000 mg IntraVENous Q12H    atenolol  50 mg Oral Daily    And    chlorthalidone  25 mg Oral Daily     PRN Meds: sodium chloride flush, sodium chloride, acetaminophen, ondansetron **OR** ondansetron, polyethylene glycol, glucose, dextrose bolus **OR** dextrose bolus, glucagon (rDNA), dextrose      Intake/Output Summary (Last 24 hours) at 7/25/2024 0744  Last data filed at 7/24/2024 0915  Gross per 24 hour   Intake 240 ml   Output 1 ml   Net 239 ml         Physical Exam Performed:    BP (!) 159/70   Pulse 56   Temp 97.5 °F (36.4 °C) (Axillary)   Resp 18   Ht 1.753 m (5' 9\")   Wt 114 kg (251 lb 5.2 oz)   SpO2 95%   BMI 37.11 kg/m²     General appearance: No apparent distress, appears stated age and cooperative.  HEENT: Conjunctivae/corneas clear.  Neck: Supple, with full range of motion.  Respiratory:  Normal respiratory 
Full Code    PT/OT Eval Status: Recommend return to home    Dispo: Home, tomorrow AM    Kimberly Mullen PA-C      NOTE:  This report was transcribed using voice recognition software.  Every effort was made to ensure accuracy; however, inadvertent computerized transcription errors may be present.   
on the side of a flat bed without using bedrails?: None  How much help is needed moving to and from a bed to a chair?: None  How much help is needed standing up from a chair using your arms?: None  How much help is needed walking in hospital room?: A Little  How much help is needed climbing 3-5 steps with a railing?: A Little  AM-PAC Inpatient Mobility Raw Score : 22  AM-PAC Inpatient T-Scale Score : 53.28  Mobility Inpatient CMS 0-100% Score: 20.91  Mobility Inpatient CMS G-Code Modifier : CJ      Goals  Short Term Goals  Time Frame for Short Term Goals: upon d/c  Short Term Goal 1: Transfers sit <> stand wiht mod I.  Short Term Goal 2: Ambulate with RW 1 lap around the unit with mod I  Short Term Goal 3: Negotiate a flight of steps with railing with SBA/Sup  Patient Goals   Patient Goals : to go home       Education  Patient Education  Education Given To: Patient  Education Provided: Role of Therapy;Plan of Care;Transfer Training;Precautions;Equipment  Education Provided Comments: stairs  Education Method: Verbal;Demonstration  Barriers to Learning: None  Education Outcome: Verbalized understanding;Demonstrated understanding      Therapy Time   Individual Concurrent Group Co-treatment   Time In 0900         Time Out 0945         Minutes 45         Timed Code Treatment Minutes: 30 Minutes       Electronically signed by Mery Madrigal, PT [id number] on 7/27/2024 at 9:51 AM      
BID    busPIRone  10 mg Oral BID    DULoxetine  60 mg Oral Daily    insulin lispro  35 Units SubCUTAneous TID WC    insulin lispro  0-4 Units SubCUTAneous TID WC    insulin lispro  0-4 Units SubCUTAneous Nightly    gabapentin  300 mg Oral Nightly    metroNIDAZOLE  500 mg IntraVENous Q8H    amLODIPine  10 mg Oral Daily    atorvastatin  20 mg Oral Daily    oxyBUTYnin  1 tablet Oral Daily    tamsulosin  0.4 mg Oral Daily    traZODone  50 mg Oral Nightly    cefepime  2,000 mg IntraVENous Q12H    atenolol  50 mg Oral Daily    And    chlorthalidone  25 mg Oral Daily       Continuous Infusions:   sodium chloride      dextrose         PRN Meds:  sodium chloride flush, sodium chloride, acetaminophen, ondansetron **OR** ondansetron, polyethylene glycol, glucose, dextrose bolus **OR** dextrose bolus, glucagon (rDNA), dextrose    Imaging:   XR TOE LEFT (MIN 2 VIEWS)   Final Result   Suspected osteomyelitis of the tuft of the distal phalanges of the 1st and   2nd toes.             All pertinent images and reports for the current Hospitalization were reviewed by me.    IMPRESSION:    Patient Active Problem List   Diagnosis Code    BPH (benign prostatic hyperplasia) N40.0    Anxiety F41.9    Liver cirrhosis secondary to WYLIE (HCC) K75.81, K74.60    KIMMIE (obstructive sleep apnea) G47.33    Essential hypertension I10    Uncontrolled type 2 diabetes mellitus with hyperglycemia, with long-term current use of insulin (HCC) E11.65, Z79.4    Mixed hyperlipidemia E78.2    Psychophysiological insomnia F51.04    Major depressive disorder with single episode, in partial remission (HCC) F32.4    Coronary atherosclerosis I25.10    Morbid obesity with BMI of 40.0-44.9, adult (HCC) E66.01, Z68.41    Sleep paralysis G47.8    Coagulation defect (HCC) D68.9    Thrombocytopenia (HCC) D69.6    Type 2 diabetes mellitus with chronic kidney disease E11.22    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

## 2024-07-30 NOTE — PROGRESS NOTES
Transition Care Management Nurse attempted 2 outreach calls and was unable to connect with patient. Voicemail message was left with [facility] number. Patient does have a follow up appointment scheduled with PCP on 8/6. TCM will make one final attempt to connect with patient.     Thank you,   Pedro Jennings RN, BSN    [facility] TCM

## 2024-07-30 NOTE — TELEPHONE ENCOUNTER
Susie,     Can you please schedule patient for Right lower extremity angio with staged intervention in 2 to 3 weeks.  Order placed for procedure and lab.      Patient just discharge from hospital this week.        Please go for blood work one week prior to your procedure.       The morning of your procedure you will park in the hospital parking lot and report directly to the registration desk for check in.    Pre-Procedure Instructions   You will need to fast for at least 8 hours prior to procedure. No caffeine the morning of.   Hold your diuretic, aldactone the morning of procedure.   Hold all diabetic medications including, Metfomin.  If you take Lantus/Levemir only take ½ your normal dose the evening before.  All other medications can be taken in the morning with sips of water.   You will need to take 325 mg aspirin the morning of.  If you are currently taking 81 mg please take 4 tablets that morning.   Do not use any lotions, creams or perfume the morning of procedure.   Pre-procedure lab work will need to be completed 5-7 days prior to procedure.   Please have a responsible adult to drive you home after procedure. We advise you have someone to stay with you for 24 hours following procedure for precautionary measures. Depending on procedure you may require an overnight stay.   Cath lab will provide you with all post procedure instructions.     If you have any questions regarding the procedure itself or medications, please call [phone] and ask to speak with a nurse.      Thank you     Dilshad

## 2024-07-31 ENCOUNTER — TELEPHONE (OUTPATIENT)
Dept: CARDIOLOGY CLINIC | Age: 79
End: 2024-07-31

## 2024-07-31 NOTE — TELEPHONE ENCOUNTER
I called and spoke to Greg and offered him Tuesday 8/20, he stated that his wife is now in the hospital and he isn't sure when she will be discharged and that she is his only ride so he would like to wait till mid September to have his procedure.    Date of Procedure: Tuesday 9/17/24 @ [facility] with Dr. Boyd     Time of arrival: 7:30 am     Procedure time: 9:00 am     Greg is agreeable to date and time. Reviewed and sent Greg a [employer] message with all his procedure instructions and he verbalized understanding. Encouraged to call with any questions or concerns.     Published on [employer] and e-mail to Teresa.

## 2024-08-01 ENCOUNTER — TELEPHONE (OUTPATIENT)
Dept: INTERNAL MEDICINE CLINIC | Age: 79
End: 2024-08-01

## 2024-08-01 NOTE — TELEPHONE ENCOUNTER
Care Transitions Initial Follow Up Call    Outreach made within 2 business days of discharge: Yes    Patient: Greg Luna Patient : 1945   MRN: [id number]  Reason for Admission: Osteomyelitis  Discharge Date: 24       Spoke with: Patient    Discharge department/facility: [facility] ER Patient Contact:  Was patient able to fill all prescriptions: Yes  Was patient instructed to bring all medications to the follow-up visit: Yes  Is patient taking all medications as directed in the discharge summary? Yes  Does patient understand their discharge instructions: Yes  Does patient have questions or concerns that need addressed prior to 7-14 day follow up office visit: no    Additional needs identified to be addressed with provider  No needs identified             Scheduled appointment with PCP within 7-14 days    Follow Up  Future Appointments   Date Time Provider Department Center   2024  4:30 PM Talib Wellington MD [facility]   2024  1:00 PM Mari Bennett MD [facility]   2024  9:45 AM Talib Wellington MD [facility]   10/22/2024  8:40 AM Ceferino Murcia MD [facility]       Mae Hancock MA

## 2024-08-06 ENCOUNTER — OFFICE VISIT (OUTPATIENT)
Dept: INTERNAL MEDICINE CLINIC | Age: 79
End: 2024-08-06

## 2024-08-06 VITALS
DIASTOLIC BLOOD PRESSURE: 50 MMHG | WEIGHT: 253 LBS | BODY MASS INDEX: 37.36 KG/M2 | SYSTOLIC BLOOD PRESSURE: 112 MMHG | OXYGEN SATURATION: 94 % | HEART RATE: 57 BPM

## 2024-08-06 DIAGNOSIS — Z09 HOSPITAL DISCHARGE FOLLOW-UP: Primary | ICD-10-CM

## 2024-08-06 NOTE — PROGRESS NOTES
Post-Discharge Transitional Care  Follow Up      Greg Luna   YOB: 1945    Date of Office Visit:  8/6/2024  Date of Hospital Admission: 7/21/24  Date of Hospital Discharge: 7/28/24  Risk of hospital readmission (high >=14%. Medium >=10%) :Readmission Risk Score: 13.8      Care management risk score Rising risk (score 2-5) and Complex Care (Scores >=6): No Risk Score On File     Non face to face  following discharge, date last encounter closed (first attempt may have been earlier): 08/01/2024    Call initiated 2 business days of discharge: Yes    ASSESSMENT/PLAN:   Hospital discharge follow-up  -     CT DISCHARGE MEDS RECONCILED W/ CURRENT OUTPATIENT MED LIST    Medical Decision Making: moderate complexity  No follow-ups on file.           Subjective:   HPI:  Follow up of Hospital problems/diagnosis(es): Peripheral vascular disease, T2DM, controlled. Osteomyelitis.     Inpatient course: Discharge summary reviewed- see chart.    Interval history/Current status: Patient is feeling ok.     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 (HCC)    KIMMIE (obstructive sleep apnea)    Essential hypertension    Uncontrolled type 2 diabetes mellitus with hyperglycemia, with long-term current use of insulin (HCC)    Mixed hyperlipidemia    Psychophysiological insomnia    Major depressive disorder with single episode, in partial remission (HCC)    Coronary atherosclerosis    Morbid obesity with BMI of 40.0-44.9, adult (HCC)    Sleep paralysis    Coagulation defect (HCC)    Thrombocytopenia (HCC)    Type 2 diabetes mellitus with chronic kidney disease    Moderate episode of recurrent major depressive disorder (HCC)    PAD (peripheral artery disease) (HCC)    Osteomyelitis (HCC)    Left leg cellulitis    SNOW (acute kidney injury) (HCC)    Elevated erythrocyte sedimentation rate    CRP elevated    Cirrhosis of liver without ascites

## 2024-09-10 DIAGNOSIS — R93.6 ABNORMAL FINDINGS ON DIAGNOSTIC IMAGING OF LIMBS: ICD-10-CM

## 2024-09-10 DIAGNOSIS — Z01.812 PRE-PROCEDURE LAB EXAM: ICD-10-CM

## 2024-09-10 LAB
ANION GAP SERPL CALCULATED.3IONS-SCNC: 11 MMOL/L (ref 3–16)
BUN SERPL-MCNC: 40 MG/DL (ref 7–20)
CALCIUM SERPL-MCNC: 8.9 MG/DL (ref 8.3–10.6)
CHLORIDE SERPL-SCNC: 103 MMOL/L (ref 99–110)
CO2 SERPL-SCNC: 25 MMOL/L (ref 21–32)
CREAT SERPL-MCNC: 1.2 MG/DL (ref 0.8–1.3)
DEPRECATED RDW RBC AUTO: 16.2 % (ref 12.4–15.4)
GFR SERPLBLD CREATININE-BSD FMLA CKD-EPI: 62 ML/MIN/{1.73_M2}
GLUCOSE SERPL-MCNC: 134 MG/DL (ref 70–99)
HCT VFR BLD AUTO: 33.2 % (ref 40.5–52.5)
HGB BLD-MCNC: 11.4 G/DL (ref 13.5–17.5)
MCH RBC QN AUTO: 28.7 PG (ref 26–34)
MCHC RBC AUTO-ENTMCNC: 34.4 G/DL (ref 31–36)
MCV RBC AUTO: 83.5 FL (ref 80–100)
PLATELET # BLD AUTO: 113 K/UL (ref 135–450)
PMV BLD AUTO: 8.3 FL (ref 5–10.5)
POTASSIUM SERPL-SCNC: 4.4 MMOL/L (ref 3.5–5.1)
RBC # BLD AUTO: 3.98 M/UL (ref 4.2–5.9)
SODIUM SERPL-SCNC: 139 MMOL/L (ref 136–145)
WBC # BLD AUTO: 3.8 K/UL (ref 4–11)

## 2024-09-17 ENCOUNTER — HOSPITAL ENCOUNTER (OUTPATIENT)
Age: 79
Setting detail: OUTPATIENT SURGERY
Discharge: HOME | End: 2024-09-17
Attending: STUDENT IN AN ORGANIZED HEALTH CARE EDUCATION/TRAINING PROGRAM | Admitting: STUDENT IN AN ORGANIZED HEALTH CARE EDUCATION/TRAINING PROGRAM
Payer: MEDICARE

## 2024-09-17 VITALS
WEIGHT: 257 LBS | SYSTOLIC BLOOD PRESSURE: 117 MMHG | TEMPERATURE: 97.2 F | HEIGHT: 69 IN | RESPIRATION RATE: 18 BRPM | HEART RATE: 52 BPM | BODY MASS INDEX: 38.06 KG/M2 | OXYGEN SATURATION: 98 % | DIASTOLIC BLOOD PRESSURE: 40 MMHG

## 2024-09-17 DIAGNOSIS — I73.9 PAD (PERIPHERAL ARTERY DISEASE) (HCC): ICD-10-CM

## 2024-09-17 PROCEDURE — 7100000010 HC PHASE II RECOVERY - FIRST 15 MIN: Performed by: STUDENT IN AN ORGANIZED HEALTH CARE EDUCATION/TRAINING PROGRAM

## 2024-09-17 PROCEDURE — 99152 MOD SED SAME PHYS/QHP 5/>YRS: CPT | Performed by: STUDENT IN AN ORGANIZED HEALTH CARE EDUCATION/TRAINING PROGRAM

## 2024-09-17 PROCEDURE — 6360000002 HC RX W HCPCS: Performed by: STUDENT IN AN ORGANIZED HEALTH CARE EDUCATION/TRAINING PROGRAM

## 2024-09-17 PROCEDURE — 2709999900 HC NON-CHARGEABLE SUPPLY: Performed by: STUDENT IN AN ORGANIZED HEALTH CARE EDUCATION/TRAINING PROGRAM

## 2024-09-17 PROCEDURE — C1760 CLOSURE DEV, VASC: HCPCS | Performed by: STUDENT IN AN ORGANIZED HEALTH CARE EDUCATION/TRAINING PROGRAM

## 2024-09-17 PROCEDURE — 2500000003 HC RX 250 WO HCPCS: Performed by: STUDENT IN AN ORGANIZED HEALTH CARE EDUCATION/TRAINING PROGRAM

## 2024-09-17 PROCEDURE — C1887 CATHETER, GUIDING: HCPCS | Performed by: STUDENT IN AN ORGANIZED HEALTH CARE EDUCATION/TRAINING PROGRAM

## 2024-09-17 PROCEDURE — 2580000003 HC RX 258: Performed by: STUDENT IN AN ORGANIZED HEALTH CARE EDUCATION/TRAINING PROGRAM

## 2024-09-17 PROCEDURE — 7100000011 HC PHASE II RECOVERY - ADDTL 15 MIN: Performed by: STUDENT IN AN ORGANIZED HEALTH CARE EDUCATION/TRAINING PROGRAM

## 2024-09-17 PROCEDURE — C1769 GUIDE WIRE: HCPCS | Performed by: STUDENT IN AN ORGANIZED HEALTH CARE EDUCATION/TRAINING PROGRAM

## 2024-09-17 PROCEDURE — C1894 INTRO/SHEATH, NON-LASER: HCPCS | Performed by: STUDENT IN AN ORGANIZED HEALTH CARE EDUCATION/TRAINING PROGRAM

## 2024-09-17 PROCEDURE — 99153 MOD SED SAME PHYS/QHP EA: CPT | Performed by: STUDENT IN AN ORGANIZED HEALTH CARE EDUCATION/TRAINING PROGRAM

## 2024-09-17 RX ORDER — SODIUM CHLORIDE 9 MG/ML
INJECTION, SOLUTION INTRAVENOUS PRN
Status: DISCONTINUED | OUTPATIENT
Start: 2024-09-17 | End: 2024-09-17 | Stop reason: HOSPADM

## 2024-09-17 RX ORDER — SODIUM CHLORIDE 0.9 % (FLUSH) 0.9 %
5-40 SYRINGE (ML) INJECTION EVERY 12 HOURS SCHEDULED
Status: DISCONTINUED | OUTPATIENT
Start: 2024-09-17 | End: 2024-09-17 | Stop reason: HOSPADM

## 2024-09-17 RX ORDER — SODIUM CHLORIDE 0.9 % (FLUSH) 0.9 %
5-40 SYRINGE (ML) INJECTION PRN
Status: DISCONTINUED | OUTPATIENT
Start: 2024-09-17 | End: 2024-09-17 | Stop reason: HOSPADM

## 2024-09-17 RX ORDER — ASPIRIN 325 MG
325 TABLET ORAL ONCE
Status: DISCONTINUED | OUTPATIENT
Start: 2024-09-17 | End: 2024-09-17 | Stop reason: HOSPADM

## 2024-09-17 RX ORDER — HEPARIN SODIUM 1000 [USP'U]/ML
INJECTION, SOLUTION INTRAVENOUS; SUBCUTANEOUS PRN
Status: DISCONTINUED | OUTPATIENT
Start: 2024-09-17 | End: 2024-09-17 | Stop reason: HOSPADM

## 2024-09-17 RX ORDER — ACETAMINOPHEN 325 MG/1
650 TABLET ORAL EVERY 4 HOURS PRN
Status: DISCONTINUED | OUTPATIENT
Start: 2024-09-17 | End: 2024-09-17 | Stop reason: HOSPADM

## 2024-09-17 RX ORDER — MIDAZOLAM HYDROCHLORIDE 1 MG/ML
INJECTION INTRAMUSCULAR; INTRAVENOUS PRN
Status: DISCONTINUED | OUTPATIENT
Start: 2024-09-17 | End: 2024-09-17 | Stop reason: HOSPADM

## 2024-09-17 RX ORDER — FENTANYL CITRATE 50 UG/ML
INJECTION, SOLUTION INTRAMUSCULAR; INTRAVENOUS PRN
Status: DISCONTINUED | OUTPATIENT
Start: 2024-09-17 | End: 2024-09-17 | Stop reason: HOSPADM

## 2024-09-17 RX ADMIN — Medication 10 ML: at 07:56

## 2024-09-17 RX ADMIN — SODIUM CHLORIDE: 9 INJECTION, SOLUTION INTRAVENOUS at 07:56

## 2024-09-18 LAB — ECHO BSA: 2.38 M2

## 2024-09-26 ENCOUNTER — OFFICE VISIT (OUTPATIENT)
Dept: INTERNAL MEDICINE CLINIC | Age: 79
End: 2024-09-26

## 2024-09-26 VITALS
SYSTOLIC BLOOD PRESSURE: 126 MMHG | BODY MASS INDEX: 37.95 KG/M2 | HEART RATE: 55 BPM | OXYGEN SATURATION: 96 % | DIASTOLIC BLOOD PRESSURE: 65 MMHG | TEMPERATURE: 98.2 F | WEIGHT: 257 LBS

## 2024-09-26 DIAGNOSIS — F33.1 MODERATE EPISODE OF RECURRENT MAJOR DEPRESSIVE DISORDER (HCC): ICD-10-CM

## 2024-09-26 DIAGNOSIS — F41.1 GAD (GENERALIZED ANXIETY DISORDER): ICD-10-CM

## 2024-09-26 DIAGNOSIS — Z79.4 TYPE 2 DIABETES MELLITUS WITH STAGE 3A CHRONIC KIDNEY DISEASE, WITH LONG-TERM CURRENT USE OF INSULIN (HCC): ICD-10-CM

## 2024-09-26 DIAGNOSIS — F41.9 ANXIETY: ICD-10-CM

## 2024-09-26 DIAGNOSIS — K75.81 LIVER CIRRHOSIS SECONDARY TO NASH (HCC): ICD-10-CM

## 2024-09-26 DIAGNOSIS — N18.31 TYPE 2 DIABETES MELLITUS WITH STAGE 3A CHRONIC KIDNEY DISEASE, WITH LONG-TERM CURRENT USE OF INSULIN (HCC): ICD-10-CM

## 2024-09-26 DIAGNOSIS — I73.9 PAD (PERIPHERAL ARTERY DISEASE) (HCC): ICD-10-CM

## 2024-09-26 DIAGNOSIS — K74.60 LIVER CIRRHOSIS SECONDARY TO NASH (HCC): ICD-10-CM

## 2024-09-26 DIAGNOSIS — G47.9 SLEEP DISTURBANCE: ICD-10-CM

## 2024-09-26 DIAGNOSIS — E11.22 TYPE 2 DIABETES MELLITUS WITH STAGE 3A CHRONIC KIDNEY DISEASE, WITH LONG-TERM CURRENT USE OF INSULIN (HCC): ICD-10-CM

## 2024-09-26 DIAGNOSIS — I10 ESSENTIAL HYPERTENSION: ICD-10-CM

## 2024-09-26 DIAGNOSIS — I25.10 ATHEROSCLEROSIS OF CORONARY ARTERY OF NATIVE HEART WITHOUT ANGINA PECTORIS, UNSPECIFIED VESSEL OR LESION TYPE: Primary | ICD-10-CM

## 2024-09-26 RX ORDER — BUSPIRONE HYDROCHLORIDE 10 MG/1
10 TABLET ORAL 2 TIMES DAILY
Qty: 180 TABLET | Refills: 3 | Status: SHIPPED | OUTPATIENT
Start: 2024-09-26

## 2024-09-26 RX ORDER — AMLODIPINE BESYLATE 10 MG/1
10 TABLET ORAL DAILY
Qty: 90 TABLET | Refills: 3 | Status: SHIPPED | OUTPATIENT
Start: 2024-09-26

## 2024-09-26 RX ORDER — LISINOPRIL 10 MG/1
10 TABLET ORAL DAILY
Qty: 90 TABLET | Refills: 3 | Status: SHIPPED | OUTPATIENT
Start: 2024-09-26

## 2024-09-26 RX ORDER — ATENOLOL AND CHLORTHALIDONE TABLET 50; 25 MG/1; MG/1
TABLET ORAL
Qty: 90 TABLET | Refills: 3 | Status: SHIPPED | OUTPATIENT
Start: 2024-09-26

## 2024-09-26 RX ORDER — GABAPENTIN 300 MG/1
300 CAPSULE ORAL NIGHTLY
Qty: 90 CAPSULE | Refills: 3 | Status: SHIPPED | OUTPATIENT
Start: 2024-09-26 | End: 2025-09-21

## 2024-09-26 RX ORDER — ATORVASTATIN CALCIUM 20 MG/1
TABLET, FILM COATED ORAL
Qty: 90 TABLET | Refills: 3 | Status: SHIPPED | OUTPATIENT
Start: 2024-09-26

## 2024-09-26 RX ORDER — HYDROXYZINE HYDROCHLORIDE 10 MG/1
10 TABLET, FILM COATED ORAL 3 TIMES DAILY PRN
Qty: 90 TABLET | Refills: 3 | Status: SHIPPED | OUTPATIENT
Start: 2024-09-26 | End: 2025-01-24

## 2024-09-26 RX ORDER — MIRABEGRON 50 MG/1
50 TABLET, EXTENDED RELEASE ORAL DAILY
Qty: 30 TABLET | Refills: 5 | Status: SHIPPED | OUTPATIENT
Start: 2024-09-26

## 2024-09-26 RX ORDER — FENOFIBRATE 160 MG/1
160 TABLET ORAL DAILY
Qty: 90 TABLET | Refills: 3 | Status: SHIPPED | OUTPATIENT
Start: 2024-09-26

## 2024-09-26 RX ORDER — TRAZODONE HYDROCHLORIDE 50 MG/1
TABLET, FILM COATED ORAL
Qty: 90 TABLET | Refills: 3 | Status: SHIPPED | OUTPATIENT
Start: 2024-09-26

## 2024-09-26 NOTE — ASSESSMENT & PLAN NOTE
Blood pressure is well-controlled continue lisinopril and atenolol chlorthalidone    Orders:    lisinopril (PRINIVIL;ZESTRIL) 10 MG tablet; Take 1 tablet by mouth daily

## 2024-09-26 NOTE — PROGRESS NOTES
Greg Luna (:  1945) is a 79 y.o. 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 (HCC)            Type 2 diabetes mellitus with stage 3a chronic kidney disease, with long-term current use of insulin (HCC)    Continue metformin             Anxiety    Well-controlled continue BuSpar    Orders:    busPIRone (BUSPAR) 10 MG tablet; Take 1 tablet by mouth 2 times daily    TANISHA (generalized anxiety disorder)       Orders:    traZODone (DESYREL) 50 MG tablet; TAKE 1 TO 2 TABLETS BY  MOUTH AT NIGHT    Moderate episode of recurrent major depressive disorder (HCC)       Orders:    traZODone (DESYREL) 50 MG tablet; TAKE 1 TO 2 TABLETS BY  MOUTH AT NIGHT    Sleep disturbance    Refill trazodone    Orders:    traZODone (DESYREL) 50 MG tablet; TAKE 1 TO 2 TABLETS BY  MOUTH AT NIGHT      No follow-ups on file.       Subjective   Patient presents for new to provider visit.  Patient feels well today.        Review of Systems   Constitutional:  Negative for fatigue and fever.   HENT:  Negative for congestion, nosebleeds and sore throat.    Respiratory:  Negative for cough, chest tightness and shortness of breath.    Cardiovascular:  Negative for chest pain, palpitations and leg swelling.   Gastrointestinal:  Negative for abdominal distention, abdominal pain and blood in stool.   Endocrine: Negative for cold intolerance and heat intolerance.   Genitourinary:  Negative for difficulty urinating.   Musculoskeletal:  Negative

## 2024-10-02 RX ORDER — INSULIN GLARGINE 100 [IU]/ML
90 INJECTION, SOLUTION SUBCUTANEOUS NIGHTLY
Qty: 5 ADJUSTABLE DOSE PRE-FILLED PEN SYRINGE | Refills: 2 | Status: SHIPPED | OUTPATIENT
Start: 2024-10-02

## 2024-10-02 NOTE — TELEPHONE ENCOUNTER
Medication:   Requested Prescriptions     Pending Prescriptions Disp Refills    insulin glargine (LANTUS SOLOSTAR) 100 UNIT/ML injection pen 5 Adjustable Dose Pre-filled Pen Syringe 2     Sig: Inject 90 Units into the skin nightly INJECT SUBCUTANEOUSLY 100  UNITS AT NIGHT       Last Filled:      Patient Phone Number: [phone] (home)     Last appt: 9/26/2024   Next appt: 1/6/2025  Future Appointments   Date Time Provider Department Center   10/3/2024  2:15 PM Mari Bennett MD [facility]   10/22/2024  8:40 AM Ceferino Murcia MD [facility]   12/9/2024 11:15 AM Get Boyd MD [facility]   1/6/2025 10:45 AM Talib Wellington MD [facility] DEP

## 2024-10-02 NOTE — TELEPHONE ENCOUNTER
Pt is requesting a refill for his   insulin glargine (LANTUS SOLOSTAR) 100 UNIT/ML injection pen      Please send to [facility]    Thank you

## 2024-10-14 NOTE — TELEPHONE ENCOUNTER
Future Appointments   Date Time Provider Department Center   10/22/2024  8:40 AM Ceferino Murcia MD [facility]   11/20/2024  9:00 AM Susie Moran APRN - CNP [facility] DEP   12/9/2024 11:15 AM Get Boyd MD [facility]   1/6/2025 10:45 AM Talib Wellington MD [facility] DEP   1/9/2025  2:00 PM Mari Bennett MD [facility]       Last appt 9/26/24   yes

## 2024-10-15 RX ORDER — INSULIN GLARGINE 100 [IU]/ML
INJECTION, SOLUTION SUBCUTANEOUS
Qty: 45 ML | Refills: 7 | Status: SHIPPED | OUTPATIENT
Start: 2024-10-15

## 2024-10-19 DIAGNOSIS — N18.2 TYPE 2 DIABETES MELLITUS WITH STAGE 2 CHRONIC KIDNEY DISEASE, WITH LONG-TERM CURRENT USE OF INSULIN (HCC): ICD-10-CM

## 2024-10-19 DIAGNOSIS — E11.22 TYPE 2 DIABETES MELLITUS WITH STAGE 2 CHRONIC KIDNEY DISEASE, WITH LONG-TERM CURRENT USE OF INSULIN (HCC): ICD-10-CM

## 2024-10-19 DIAGNOSIS — Z79.4 TYPE 2 DIABETES MELLITUS WITH STAGE 2 CHRONIC KIDNEY DISEASE, WITH LONG-TERM CURRENT USE OF INSULIN (HCC): ICD-10-CM

## 2024-10-21 RX ORDER — PEN NEEDLE, DIABETIC 31 GX5/16"
NEEDLE, DISPOSABLE MISCELLANEOUS
Qty: 100 EACH | Refills: 1 | Status: SHIPPED | OUTPATIENT
Start: 2024-10-21

## 2024-10-21 NOTE — TELEPHONE ENCOUNTER
Medication:   Requested Prescriptions     Pending Prescriptions Disp Refills    [employer] PEN NEEDLES 31G 31G X 8 MM MISC [Pharmacy Med Name: KRO PEN NEEDLE 8MM X 31G] 100 each 1     Sig: USE TO INJECT INSULIN THREE TIMES A DAY       Last Filled:      Patient Phone Number: [phone] (home)     Last appt: 9/26/2024   Next appt: 11/20/2024  Future Appointments   Date Time Provider Department Center   10/22/2024  8:40 AM Ceferino Murcia MD [facility]   11/20/2024  9:00 AM Susie Moran APRN - CNP [facility]   12/9/2024 11:15 AM Get Boyd MD [facility]   1/6/2025 10:45 AM Talib Wellington MD [facility]   1/9/2025  2:00 PM Mari Bennett MD [facility]

## 2024-10-22 ENCOUNTER — OFFICE VISIT (OUTPATIENT)
Dept: PULMONOLOGY | Age: 79
End: 2024-10-22
Payer: MEDICARE

## 2024-10-22 VITALS
OXYGEN SATURATION: 97 % | DIASTOLIC BLOOD PRESSURE: 70 MMHG | TEMPERATURE: 97.6 F | SYSTOLIC BLOOD PRESSURE: 126 MMHG | RESPIRATION RATE: 18 BRPM | HEIGHT: 69 IN | HEART RATE: 51 BPM | WEIGHT: 260 LBS | BODY MASS INDEX: 38.51 KG/M2

## 2024-10-22 DIAGNOSIS — E66.01 MORBID OBESITY WITH BMI OF 40.0-44.9, ADULT: ICD-10-CM

## 2024-10-22 DIAGNOSIS — G47.33 OSA (OBSTRUCTIVE SLEEP APNEA): ICD-10-CM

## 2024-10-22 DIAGNOSIS — G47.8 SLEEP PARALYSIS: Primary | ICD-10-CM

## 2024-10-22 DIAGNOSIS — F32.A DEPRESSION, UNSPECIFIED DEPRESSION TYPE: ICD-10-CM

## 2024-10-22 PROCEDURE — 1123F ACP DISCUSS/DSCN MKR DOCD: CPT | Performed by: INTERNAL MEDICINE

## 2024-10-22 PROCEDURE — G8427 DOCREV CUR MEDS BY ELIG CLIN: HCPCS | Performed by: INTERNAL MEDICINE

## 2024-10-22 PROCEDURE — 99214 OFFICE O/P EST MOD 30 MIN: CPT | Performed by: INTERNAL MEDICINE

## 2024-10-22 PROCEDURE — 3074F SYST BP LT 130 MM HG: CPT | Performed by: INTERNAL MEDICINE

## 2024-10-22 PROCEDURE — G8484 FLU IMMUNIZE NO ADMIN: HCPCS | Performed by: INTERNAL MEDICINE

## 2024-10-22 PROCEDURE — G8417 CALC BMI ABV UP PARAM F/U: HCPCS | Performed by: INTERNAL MEDICINE

## 2024-10-22 PROCEDURE — 3078F DIAST BP <80 MM HG: CPT | Performed by: INTERNAL MEDICINE

## 2024-10-22 PROCEDURE — 1036F TOBACCO NON-USER: CPT | Performed by: INTERNAL MEDICINE

## 2024-10-22 RX ORDER — TAMSULOSIN HYDROCHLORIDE 0.4 MG/1
CAPSULE ORAL
Qty: 90 CAPSULE | Refills: 3 | Status: SHIPPED | OUTPATIENT
Start: 2024-10-22

## 2024-10-22 RX ORDER — DULOXETIN HYDROCHLORIDE 60 MG/1
60 CAPSULE, DELAYED RELEASE ORAL DAILY
Qty: 90 CAPSULE | Refills: 3 | Status: SHIPPED | OUTPATIENT
Start: 2024-10-22

## 2024-10-22 NOTE — ASSESSMENT & PLAN NOTE
Titration is not required during this visit.    PAP download information:  Usage > 4 hours  94 %   Pressure setting  autobipap cwp    AHI with usage  1.0    See media tab for full download data.    Greg Luna  is deriving benefit from PAP demonstrated by improved [place], AHI, symptoms.      Mask full face.

## 2024-10-22 NOTE — PROGRESS NOTES
REASON FOR CONSULTATION/CC: KIMMIE      CONSULTING PHYSICIAN: Talib Wellington MD   PCP: Talib Wellington MD    HISTORY OF PRESENT ILLNESS: Greg Luna is a 79 y.o. year old male with a history of KIMMIE who presents :     Sleep history:          KIMMIE  Mask: xiomy view full face mask.   hypersomnia  controlled.   Sleeps best with sleeping on side.         Sleep paralysis  Controlled  with  cpap.      Obestiy  Discussed.        [place] Sleepiness Scale:        8/21/2023    10:08 AM 8/16/2022    10:12 AM 7/1/2021    11:47 AM 7/7/2020    11:55 AM 7/9/2019    10:07 AM 5/15/2018    10:44 AM 7/3/2017    11:08 AM   Sleep Medicine   Sitting and reading 1 1 2 2 2 2 1   Watching TV 1 2 2 2 2 2 2   Sitting, inactive in a public place (e.g. a theatre or a meeting) 1 0 2 0 1 1 0   As a passenger in a car for an hour without a break 2 2 2 2 2 2 1   Lying down to rest in the afternoon when circumstances permit 1 0 2 0 2 2 1   Sitting and talking to someone 0 0 0 0 0 0 0   Sitting quietly after a lunch without alcohol 1 0 0 1 0 1 0   In a car, while stopped for a few minutes in traffic 1 0 0 0 0 0 0   [place] Sleepiness Score 8 5 10 7 9 10 5   Neck (Inches)       19.75         0 = no chance of dozing  1 = slight chance of dozing  2 = moderate chance of dozing  3 = high chance of dozing    Interpretation:   0-7:It is unlikely that you are abnormally sleepy.   8-9:You have an average amount of daytime sleepiness.   10-15:You may be excessively sleepy depending on the situation. You may want to consider   seeking medical attention.   16-24:You are excessively sleepy and should consider seeking medical attention       Objective:   PHYSICAL EXAM:  Blood pressure 126/70, pulse 51, temperature 97.6 °F (36.4 °C), resp. rate 18, height 1.753 m (5' 9\"), weight 117.9 kg (260 lb), SpO2 97%.'  Gen: No distress.    ENT:   Resp: No accessory muscle use. No crackles. No wheezes. No rhonchi.    CV: Regular rate. Regular rhythm. No murmur or rub. No

## 2024-10-22 NOTE — TELEPHONE ENCOUNTER
Future Appointments   Date Time Provider Department Center   10/22/2024  8:40 AM Ceferino Murcia MD [facility]   11/20/2024  9:00 AM Susie Moran APRN - CNP [facility] DEP   12/9/2024 11:15 AM Get Boyd MD [facility]   1/6/2025 10:45 AM Talib Wellington MD [facility] DEP   1/9/2025  2:00 PM Mari Bennett MD [facility]       Last appt 9/26/24

## 2024-10-22 NOTE — PROGRESS NOTES
Greg Luna         : 1945  [] [facility]     [] [facility]      [] [facility]     [][employer]    [] [employer]  [] [facility]   [] Other:  Diagnosis: [x] KIMMIE (G47.33) [] CSA (G47.31) [] Apnea (G47.30)   Length of Need: [] 12 Months [x] 99 Months [] Other:    Machine (MITCH!): [] Respironics Dream Station      Auto [] ResMed AirSense     Auto [] Other:     []  CPAP () [] Bilevel ()   Mode: [] Auto [] Spontaneous    Mode: [] Auto [] Spontaneous          CWP  EPAP Min:   IPAP Max:   PS     Comfort Settings:   - Ramp Pressure: 5 cmH2O                                        - Ramp time: 15 min                                     -  Flex/EPR - 3 full time                                    - For ResMed Bilevel (TiMax-4 sec   TiMin- 0.2 sec)     Humidifier: [x] Heated ()        [x] Water chamber replacement ()/ 1 per 6 months        Mask:  Mask of Patient's Choice   [] Nasal () /1 per 3 months [x] Full Face () /1 per 3 months   [] Patient choice -Size and fit mask [x] Patient Choice - Size and fit mask   [] Dispense:  [] Dispense:    [] Headgear () / 1 per 3 months [x] Headgear () / 1 per 3 months   [] Replacement Nasal Cushion ()/2 per month [x] Interface Replacement ()/1 per month   [x] Replacement Nasal Pillows ()/2 per month         Tubing: [x] Heated ()/1 per 3 months    [] Standard ()/1 per 3 months [] Other:           Filters: [x] Non-disposable ()/1 per 6 months     [x] Ultra-Fine, Disposable ()/2 per month        Miscellaneous: [] Chin Strap ()/ 1 per 6 months [] O2 bleed-in:       LPM   [] Oximetry on CPAP/Bilevel []  Other:          Start Order Date: 10/22/24    MEDICAL JUSTIFICATION:  I, the undersigned, certify that the above prescribed supplies are medically necessary for this patient’s wellbeing.  In my opinion, the supplies are both reasonable and necessary in reference to accepted standards of medicalpractice in treatment of

## 2024-10-23 DIAGNOSIS — I10 ESSENTIAL HYPERTENSION: ICD-10-CM

## 2024-10-23 DIAGNOSIS — F32.A DEPRESSION, UNSPECIFIED DEPRESSION TYPE: ICD-10-CM

## 2024-10-23 DIAGNOSIS — F41.9 ANXIETY: ICD-10-CM

## 2024-10-23 DIAGNOSIS — Z79.4 TYPE 2 DIABETES MELLITUS WITH STAGE 3A CHRONIC KIDNEY DISEASE, WITH LONG-TERM CURRENT USE OF INSULIN (HCC): ICD-10-CM

## 2024-10-23 DIAGNOSIS — E11.22 TYPE 2 DIABETES MELLITUS WITH STAGE 3A CHRONIC KIDNEY DISEASE, WITH LONG-TERM CURRENT USE OF INSULIN (HCC): ICD-10-CM

## 2024-10-23 DIAGNOSIS — N18.31 TYPE 2 DIABETES MELLITUS WITH STAGE 3A CHRONIC KIDNEY DISEASE, WITH LONG-TERM CURRENT USE OF INSULIN (HCC): ICD-10-CM

## 2024-10-23 RX ORDER — FENOFIBRATE 160 MG/1
160 TABLET ORAL DAILY
Qty: 90 TABLET | Refills: 3 | OUTPATIENT
Start: 2024-10-23

## 2024-10-23 RX ORDER — ATENOLOL AND CHLORTHALIDONE TABLET 50; 25 MG/1; MG/1
TABLET ORAL
Qty: 90 TABLET | Refills: 3 | OUTPATIENT
Start: 2024-10-23

## 2024-10-23 RX ORDER — BUSPIRONE HYDROCHLORIDE 10 MG/1
10 TABLET ORAL 2 TIMES DAILY
Qty: 180 TABLET | Refills: 3 | OUTPATIENT
Start: 2024-10-23

## 2024-10-23 RX ORDER — ATORVASTATIN CALCIUM 20 MG/1
TABLET, FILM COATED ORAL
Qty: 90 TABLET | Refills: 3 | OUTPATIENT
Start: 2024-10-23

## 2024-10-23 RX ORDER — LISINOPRIL 10 MG/1
10 TABLET ORAL DAILY
Qty: 90 TABLET | Refills: 3 | OUTPATIENT
Start: 2024-10-23

## 2024-10-23 RX ORDER — AMLODIPINE BESYLATE 10 MG/1
10 TABLET ORAL DAILY
Qty: 90 TABLET | Refills: 3 | OUTPATIENT
Start: 2024-10-23

## 2024-10-23 RX ORDER — TAMSULOSIN HYDROCHLORIDE 0.4 MG/1
CAPSULE ORAL
Qty: 90 CAPSULE | Refills: 3 | OUTPATIENT
Start: 2024-10-23

## 2024-10-23 RX ORDER — DULOXETIN HYDROCHLORIDE 60 MG/1
60 CAPSULE, DELAYED RELEASE ORAL DAILY
Qty: 90 CAPSULE | Refills: 3 | OUTPATIENT
Start: 2024-10-23

## 2024-10-23 NOTE — TELEPHONE ENCOUNTER
Medication:   Requested Prescriptions     Pending Prescriptions Disp Refills    metFORMIN (GLUCOPHAGE) 500 MG tablet 180 tablet 3     Sig: Take 1 tablet by mouth 2 times daily    spironolactone (ALDACTONE) 25 MG tablet 45 tablet 3     Sig: Take 0.5 tablets by mouth daily       Last Filled:      Patient Phone Number: [phone] (home)     Last appt: 9/26/2024   Next appt: 11/20/2024  Future Appointments   Date Time Provider Department Center   11/20/2024  9:00 AM Ssuie Moran APRN - CNP [facility]   12/9/2024 11:15 AM Get Boyd MD [facility]   1/6/2025 10:45 AM Talib Wellington MD [facility]   1/9/2025  2:00 PM Mari Bennett MD [facility]   10/22/2025  9:00 AM Ceferino Murcia MD [facility]

## 2024-10-24 RX ORDER — SPIRONOLACTONE 25 MG/1
12.5 TABLET ORAL DAILY
Qty: 45 TABLET | Refills: 3 | Status: SHIPPED | OUTPATIENT
Start: 2024-10-24

## 2024-11-11 ENCOUNTER — HOSPITAL ENCOUNTER (EMERGENCY)
Age: 79
Discharge: HOME | End: 2024-11-12
Payer: MEDICARE

## 2024-11-11 ENCOUNTER — APPOINTMENT (OUTPATIENT)
Dept: GENERAL RADIOLOGY | Age: 79
End: 2024-11-11
Payer: MEDICARE

## 2024-11-11 DIAGNOSIS — M25.422 EFFUSION OF LEFT ELBOW: Primary | ICD-10-CM

## 2024-11-11 DIAGNOSIS — E11.65 POORLY CONTROLLED DIABETES MELLITUS (HCC): ICD-10-CM

## 2024-11-11 DIAGNOSIS — R03.0 ELEVATED BLOOD PRESSURE READING: ICD-10-CM

## 2024-11-11 DIAGNOSIS — S53.402A ELBOW SPRAIN, LEFT, INITIAL ENCOUNTER: ICD-10-CM

## 2024-11-11 LAB
GLUCOSE BLD-MCNC: 275 MG/DL (ref 70–99)
PERFORMED ON: ABNORMAL

## 2024-11-11 PROCEDURE — 73080 X-RAY EXAM OF ELBOW: CPT

## 2024-11-11 PROCEDURE — 99284 EMERGENCY DEPT VISIT MOD MDM: CPT

## 2024-11-11 PROCEDURE — 6370000000 HC RX 637 (ALT 250 FOR IP): Performed by: GENERAL ACUTE CARE HOSPITAL

## 2024-11-11 RX ORDER — HYDROCODONE BITARTRATE AND ACETAMINOPHEN 5; 325 MG/1; MG/1
1 TABLET ORAL ONCE
Status: COMPLETED | OUTPATIENT
Start: 2024-11-11 | End: 2024-11-11

## 2024-11-11 RX ORDER — ONDANSETRON 4 MG/1
4 TABLET, ORALLY DISINTEGRATING ORAL ONCE
Status: COMPLETED | OUTPATIENT
Start: 2024-11-11 | End: 2024-11-11

## 2024-11-11 RX ADMIN — ONDANSETRON 4 MG: 4 TABLET, ORALLY DISINTEGRATING ORAL at 23:23

## 2024-11-11 RX ADMIN — HYDROCODONE BITARTRATE AND ACETAMINOPHEN 1 TABLET: 5; 325 TABLET ORAL at 23:23

## 2024-11-11 ASSESSMENT — PAIN SCALES - GENERAL: PAINLEVEL_OUTOF10: 10

## 2024-11-12 ENCOUNTER — TELEPHONE (OUTPATIENT)
Dept: ORTHOPEDIC SURGERY | Age: 79
End: 2024-11-12

## 2024-11-12 VITALS
RESPIRATION RATE: 16 BRPM | TEMPERATURE: 97.5 F | HEART RATE: 65 BPM | SYSTOLIC BLOOD PRESSURE: 175 MMHG | OXYGEN SATURATION: 96 % | DIASTOLIC BLOOD PRESSURE: 60 MMHG

## 2024-11-12 PROCEDURE — 96372 THER/PROPH/DIAG INJ SC/IM: CPT

## 2024-11-12 PROCEDURE — 6360000002 HC RX W HCPCS: Performed by: GENERAL ACUTE CARE HOSPITAL

## 2024-11-12 RX ORDER — DEXAMETHASONE SODIUM PHOSPHATE 4 MG/ML
10 INJECTION, SOLUTION INTRA-ARTICULAR; INTRALESIONAL; INTRAMUSCULAR; INTRAVENOUS; SOFT TISSUE ONCE
Status: COMPLETED | OUTPATIENT
Start: 2024-11-12 | End: 2024-11-12

## 2024-11-12 RX ORDER — HYDROCODONE BITARTRATE AND ACETAMINOPHEN 5; 325 MG/1; MG/1
1 TABLET ORAL EVERY 6 HOURS PRN
Qty: 12 TABLET | Refills: 0 | Status: SHIPPED | OUTPATIENT
Start: 2024-11-12 | End: 2024-11-15

## 2024-11-12 RX ADMIN — DEXAMETHASONE SODIUM PHOSPHATE 10 MG: 4 INJECTION INTRA-ARTICULAR; INTRALESIONAL; INTRAMUSCULAR; INTRAVENOUS; SOFT TISSUE at 00:51

## 2024-11-12 ASSESSMENT — ENCOUNTER SYMPTOMS
BACK PAIN: 0
SORE THROAT: 0
VOMITING: 0
WHEEZING: 0
NAUSEA: 0
VOICE CHANGE: 0
CHEST TIGHTNESS: 0
COUGH: 0
ABDOMINAL PAIN: 0
SHORTNESS OF BREATH: 0

## 2024-11-12 NOTE — TELEPHONE ENCOUNTER
General Question     Subject: REQ GEL INJECTION/REQ A CALL BACK   Patient and /or Facility Request: Greg Luna   Contact Number: [phone]     PATIENT CALLED IN TO SEE IF HE CAN GET APPROVE FOR GEL INJECTION..    RT KNEE REQ GEL INJECTION. AND WOULD LIKE TO SPEAK TO SOMEONE ABOUT HAVING SX SOON WITH .     REQ A CALL BACK...    PLEASE ADVISE

## 2024-11-12 NOTE — ED NOTES
D/C: Order noted for d/c. Pt confirmed d/c paperwork  have correct name. Discharge and education instructions reviewed with patient. Teach-back successful.  Pt verbalized understanding. Pt denied questions at this time. No acute distress noted. Patient instructed to follow-up as noted - return to emergency department if symptoms worsen. Patient verbalized understanding. Discharged per EDMD with discharge instructions. Pt discharged  to private vehicle. Patient stable upon departure. Thanked patient for choosing [facility] for care.

## 2024-11-12 NOTE — DISCHARGE INSTRUCTIONS
Wear Ace wrap as directed.  Apply ice to the affected area for 20 minutes every 3-4 hours.  Take the prescribed Norco as directed for pain.    You were given a single dose of steroids here in the ED.  This will increase your blood glucose levels.  It is important that you follow a strict diabetic diet and maintain good glycemic control.    Continue all home medications as directed.    Follow-up with listed orthopedic physician tomorrow.  Return to nearest ED for high fever, incessant vomiting, severe pain, or any other worsening symptoms.

## 2024-11-12 NOTE — TELEPHONE ENCOUNTER
I called and left a message that if the patient still has medicare he may make appointment for visco injections

## 2024-11-12 NOTE — ED PROVIDER NOTES
**ADVANCED PRACTICE PROVIDER, I HAVE EVALUATED THIS PATIENT**        [facility] EMERGENCY DEPARTMENT  EMERGENCY DEPARTMENT ENCOUNTER      Pt Name: Greg Luan  MRN:[id number]  Birthdate 1945  Date of evaluation: 11/11/2024  Provider: ADDISON Acosta CNP  Note Started: 12:03 AM EST 11/12/24        Chief Complaint:    Chief Complaint   Patient presents with    Arm Pain     Pt present in the ED via self from home c/o elbow pain that started this morning. Pt also c/o numbness in that arm. Pt denies injuries to the arm. Pain 10/10         Nursing Notes, Past Medical Hx, Past Surgical Hx, Social Hx, Allergies, and Family Hx were all reviewed and agreed with or any disagreements were addressed in the HPI.    HPI: (Location, Duration, Timing, Severity, Quality, Assoc Sx, Context, Modifying factors)    History From: Patient  Limitations to history : None    Social Determinants Significantly Affecting Health : None    Chief Complaint-    This is a  79 y.o. male who presents to the emergency department today via private car from home for evaluation of left elbow pain which began last night while sleeping.  He denies known injury.  Patient is right-hand dominant.  Patient states that he believes he may have \"overdid it\" while using his cane.  Patient reports history of arthritis in the right knee.  He states that he has been advised that he needs a right knee replacement.  Patient states that he uses a cane to ambulate and states that due to his weight he does have to use a significant amount of force with the left upper extremity when getting around.  He states that he notices that his left elbow resolved and sore at the end of the day however he has never had pain like this.  He reports a pain level of 9 out of 10 at present.  He describes his pain as constant, dull, and throbbing.  He states that the pain radiates into his forearm.  He reports feeling a pins and needle sensation in the forearm.

## 2024-11-14 ENCOUNTER — OFFICE VISIT (OUTPATIENT)
Dept: ORTHOPEDIC SURGERY | Age: 79
End: 2024-11-14
Payer: MEDICARE

## 2024-11-14 VITALS — BODY MASS INDEX: 38.51 KG/M2 | HEIGHT: 69 IN | WEIGHT: 260 LBS

## 2024-11-14 DIAGNOSIS — M17.11 PRIMARY OSTEOARTHRITIS OF RIGHT KNEE: Primary | ICD-10-CM

## 2024-11-14 PROCEDURE — 1125F AMNT PAIN NOTED PAIN PRSNT: CPT | Performed by: PHYSICIAN ASSISTANT

## 2024-11-14 PROCEDURE — 1123F ACP DISCUSS/DSCN MKR DOCD: CPT | Performed by: PHYSICIAN ASSISTANT

## 2024-11-14 PROCEDURE — 1036F TOBACCO NON-USER: CPT | Performed by: PHYSICIAN ASSISTANT

## 2024-11-14 PROCEDURE — 99213 OFFICE O/P EST LOW 20 MIN: CPT | Performed by: PHYSICIAN ASSISTANT

## 2024-11-14 RX ORDER — BUPIVACAINE HYDROCHLORIDE 2.5 MG/ML
2 INJECTION, SOLUTION INFILTRATION; PERINEURAL ONCE
Status: COMPLETED | OUTPATIENT
Start: 2024-11-14 | End: 2024-11-14

## 2024-11-14 RX ORDER — TRIAMCINOLONE ACETONIDE 40 MG/ML
40 INJECTION, SUSPENSION INTRA-ARTICULAR; INTRAMUSCULAR ONCE
Status: COMPLETED | OUTPATIENT
Start: 2024-11-14 | End: 2024-11-14

## 2024-11-14 RX ADMIN — BUPIVACAINE HYDROCHLORIDE 5 MG: 2.5 INJECTION, SOLUTION INFILTRATION; PERINEURAL at 15:40

## 2024-11-14 RX ADMIN — TRIAMCINOLONE ACETONIDE 40 MG: 40 INJECTION, SUSPENSION INTRA-ARTICULAR; INTRAMUSCULAR at 15:40

## 2024-11-15 NOTE — PROGRESS NOTES
Thrombocytopenia (HCC)    Type 2 diabetes mellitus with chronic kidney disease    Moderate episode of recurrent major depressive disorder (HCC)    PAD (peripheral artery disease) (HCC)    Osteomyelitis    Left leg cellulitis    SNOW (acute kidney injury) (HCC)    Elevated erythrocyte sedimentation rate    CRP elevated    Cirrhosis of liver without ascites (HCC)    Type 2 diabetes mellitus with diabetic foot infection (HCC)           Current Outpatient Medications on File Prior to Visit   Medication Sig Dispense Refill    HYDROcodone-acetaminophen (NORCO) 5-325 MG per tablet Take 1 tablet by mouth every 6 hours as needed for Pain for up to 3 days. Intended supply: 3 days. Take lowest dose possible to manage pain Max Daily Amount: 4 tablets 12 tablet 0    metFORMIN (GLUCOPHAGE) 500 MG tablet Take 1 tablet by mouth 2 times daily 180 tablet 3    spironolactone (ALDACTONE) 25 MG tablet Take 0.5 tablets by mouth daily 45 tablet 3    spironolactone (ALDACTONE) 25 MG tablet Take 0.5 tablets by mouth daily 45 tablet 3    DULoxetine (CYMBALTA) 60 MG extended release capsule Take 1 capsule by mouth daily 90 capsule 3    tamsulosin (FLOMAX) 0.4 MG capsule TAKE 1 CAPSULE BY MOUTH DAILY 90 capsule 3    [employer] PEN NEEDLES 31G 31G X 8 MM MISC USE TO INJECT INSULIN THREE TIMES A  each 1    LANTUS SOLOSTAR 100 UNIT/ML injection pen INJECT SUBCUTANEOUSLY 100U EVERY NIGHT 45 mL 7    busPIRone (BUSPAR) 10 MG tablet Take 1 tablet by mouth 2 times daily 180 tablet 3    amLODIPine (NORVASC) 10 MG tablet Take 1 tablet by mouth daily 90 tablet 3    atenolol-chlorthalidone (TENORETIC) 50-25 MG per tablet TAKE 1 TABLET BY MOUTH  DAILY 90 tablet 3    fenofibrate 160 MG tablet Take 1 tablet by mouth daily 90 tablet 3    gabapentin (NEURONTIN) 300 MG capsule Take 1 capsule by mouth nightly for 360 days. 90 capsule 3    lisinopril (PRINIVIL;ZESTRIL) 10 MG tablet Take 1 tablet by mouth daily 90 tablet 3    traZODone (DESYREL) 50 MG tablet

## 2024-11-18 ENCOUNTER — TELEPHONE (OUTPATIENT)
Dept: INTERNAL MEDICINE CLINIC | Age: 79
End: 2024-11-18

## 2024-11-18 DIAGNOSIS — R35.1 BENIGN PROSTATIC HYPERPLASIA WITH NOCTURIA: Primary | ICD-10-CM

## 2024-11-18 DIAGNOSIS — N40.1 BENIGN PROSTATIC HYPERPLASIA WITH NOCTURIA: Primary | ICD-10-CM

## 2024-11-18 RX ORDER — OXYBUTYNIN CHLORIDE 10 MG/1
10 TABLET, EXTENDED RELEASE ORAL DAILY
Qty: 90 TABLET | Refills: 3 | Status: SHIPPED | OUTPATIENT
Start: 2024-11-18

## 2024-11-18 RX ORDER — OXYBUTYNIN CHLORIDE 10 MG/1
10 TABLET, EXTENDED RELEASE ORAL DAILY
COMMUNITY
End: 2024-11-18 | Stop reason: SDUPTHER

## 2024-11-18 NOTE — TELEPHONE ENCOUNTER
PT called in wanting to know if Dr. Wellington could refill his prescription for Oxybutynin 10 mg ER for 90 days & to send it to the [facility] on [facility].     PT adv that he did not  the Myrbetriq due to the cost of it being near or over $400 for it so PT adv that the Oxybutynin is more cost effective for him.     Best call back number: [phone]

## 2024-11-20 ENCOUNTER — OFFICE VISIT (OUTPATIENT)
Dept: INTERNAL MEDICINE CLINIC | Age: 79
End: 2024-11-20

## 2024-11-20 VITALS
BODY MASS INDEX: 38.51 KG/M2 | HEIGHT: 69 IN | SYSTOLIC BLOOD PRESSURE: 128 MMHG | DIASTOLIC BLOOD PRESSURE: 80 MMHG | WEIGHT: 260 LBS | HEART RATE: 56 BPM | OXYGEN SATURATION: 96 %

## 2024-11-20 DIAGNOSIS — G63 POLYNEUROPATHY ASSOCIATED WITH UNDERLYING DISEASE (HCC): ICD-10-CM

## 2024-11-20 DIAGNOSIS — Z79.4 UNCONTROLLED TYPE 2 DIABETES MELLITUS WITH HYPERGLYCEMIA, WITH LONG-TERM CURRENT USE OF INSULIN (HCC): ICD-10-CM

## 2024-11-20 DIAGNOSIS — E11.65 UNCONTROLLED TYPE 2 DIABETES MELLITUS WITH HYPERGLYCEMIA, WITH LONG-TERM CURRENT USE OF INSULIN (HCC): ICD-10-CM

## 2024-11-20 DIAGNOSIS — F32.4 MAJOR DEPRESSIVE DISORDER WITH SINGLE EPISODE, IN PARTIAL REMISSION (HCC): ICD-10-CM

## 2024-11-20 DIAGNOSIS — N40.1 BENIGN PROSTATIC HYPERPLASIA WITH NOCTURIA: ICD-10-CM

## 2024-11-20 DIAGNOSIS — I10 ESSENTIAL HYPERTENSION: Primary | ICD-10-CM

## 2024-11-20 DIAGNOSIS — I25.10 ATHEROSCLEROSIS OF CORONARY ARTERY OF NATIVE HEART WITHOUT ANGINA PECTORIS, UNSPECIFIED VESSEL OR LESION TYPE: ICD-10-CM

## 2024-11-20 DIAGNOSIS — R35.1 BENIGN PROSTATIC HYPERPLASIA WITH NOCTURIA: ICD-10-CM

## 2024-11-20 DIAGNOSIS — Z00.00 MEDICARE ANNUAL WELLNESS VISIT, SUBSEQUENT: ICD-10-CM

## 2024-11-20 DIAGNOSIS — E78.2 MIXED HYPERLIPIDEMIA: ICD-10-CM

## 2024-11-20 ASSESSMENT — ENCOUNTER SYMPTOMS
VOICE CHANGE: 0
ABDOMINAL PAIN: 0
COUGH: 0
DIARRHEA: 0
VOMITING: 0
SHORTNESS OF BREATH: 0
PHOTOPHOBIA: 0
CONSTIPATION: 0
NAUSEA: 0
TROUBLE SWALLOWING: 0
COLOR CHANGE: 0

## 2024-11-20 ASSESSMENT — PATIENT HEALTH QUESTIONNAIRE - PHQ9
10. IF YOU CHECKED OFF ANY PROBLEMS, HOW DIFFICULT HAVE THESE PROBLEMS MADE IT FOR YOU TO DO YOUR WORK, TAKE CARE OF THINGS AT HOME, OR GET ALONG WITH OTHER PEOPLE: NOT DIFFICULT AT ALL
SUM OF ALL RESPONSES TO PHQ QUESTIONS 1-9: 1
8. MOVING OR SPEAKING SO SLOWLY THAT OTHER PEOPLE COULD HAVE NOTICED. OR THE OPPOSITE, BEING SO FIGETY OR RESTLESS THAT YOU HAVE BEEN MOVING AROUND A LOT MORE THAN USUAL: NOT AT ALL
3. TROUBLE FALLING OR STAYING ASLEEP: NOT AT ALL
4. FEELING TIRED OR HAVING LITTLE ENERGY: NOT AT ALL
9. THOUGHTS THAT YOU WOULD BE BETTER OFF DEAD, OR OF HURTING YOURSELF: NOT AT ALL
SUM OF ALL RESPONSES TO PHQ QUESTIONS 1-9: 1
SUM OF ALL RESPONSES TO PHQ9 QUESTIONS 1 & 2: 0
7. TROUBLE CONCENTRATING ON THINGS, SUCH AS READING THE NEWSPAPER OR WATCHING TELEVISION: SEVERAL DAYS
1. LITTLE INTEREST OR PLEASURE IN DOING THINGS: NOT AT ALL
6. FEELING BAD ABOUT YOURSELF - OR THAT YOU ARE A FAILURE OR HAVE LET YOURSELF OR YOUR FAMILY DOWN: NOT AT ALL
SUM OF ALL RESPONSES TO PHQ QUESTIONS 1-9: 1
SUM OF ALL RESPONSES TO PHQ QUESTIONS 1-9: 1
5. POOR APPETITE OR OVEREATING: NOT AT ALL
2. FEELING DOWN, DEPRESSED OR HOPELESS: NOT AT ALL

## 2024-11-20 ASSESSMENT — LIFESTYLE VARIABLES
HOW MANY STANDARD DRINKS CONTAINING ALCOHOL DO YOU HAVE ON A TYPICAL DAY: PATIENT DOES NOT DRINK
HOW OFTEN DO YOU HAVE A DRINK CONTAINING ALCOHOL: NEVER

## 2024-11-20 NOTE — PATIENT INSTRUCTIONS
Everyday Guide\" from [employer]. Call 1-640.431.3875 or search [employer] online.  You need 1146-1337 mg of calcium and 3297-5663 IU of vitamin D per day. It is possible to meet your calcium requirement with diet alone, but a vitamin D supplement is usually necessary to meet this goal.  When exposed to the sun, use a sunscreen that protects against both UVA and UVB radiation with an SPF of 30 or greater. Reapply every 2 to 3 hours or after sweating, drying off with a towel, or swimming.  Always wear a seat belt when traveling in a car. Always wear a helmet when riding a bicycle or motorcycle.

## 2024-11-20 NOTE — PROGRESS NOTES
Assessment & Plan      
    Greg Luna (:  1945) is a 79 y.o. male,Established patient, here for evaluation of the following chief complaint(s):  Medicare AWV, Hypertension, Depression, and Diabetes    Pt is a 79 year old male who presents to the office today for AWV visit.  Also discussed hypertension depression neuropathy and diabetes.  Patient had recent right toe amputation.  Denies any changes in gait or recent falls.  Sensation is intermittent and neuropathy does not interfere with gait per patient report.  Compliant with all medications feels generally well today.  Wants to discuss RSV vaccination.       Assessment & Plan  Essential hypertension   Chronic, at goal (stable), continue current treatment plan       - Stable on 10 mg amlodipine, atenolol chlorthalidone 50-25 mg, lisinopril 10 mg, spironolactone 25. Continue current regime.   Atherosclerosis of coronary artery of native heart without angina pectoris, unspecified vessel or lesion type   Chronic, at goal (stable), continue current treatment plan       -Stable on fenofibrate 160 mg. Continue current regime.   Uncontrolled type 2 diabetes mellitus with hyperglycemia, with long-term current use of insulin (HCC)   Chronic, at goal (stable), continue current treatment plan       - Stable on 100 units of Lantus nightly metformin 500 twice daily.   Benign prostatic hyperplasia with nocturia   Chronic, at goal (stable), continue current treatment plan       Well-controlled with Flomax/ Oxybutynin daily.  No changes in bowel habits. Will maintain current regime.  Mixed hyperlipidemia   Chronic, at goal (stable), continue current treatment plan       - Stable on Lipitor 20 mg. Encouraged heart healthy low carb diet. Increase physical activity.   Major depressive disorder with single episode, in partial remission (HCC)   Chronic, at goal (stable), continue current treatment plan       -Mood is stable today denies any depressive symptoms or loneliness.  Patient in good 
Yes Talib Wellington MD   atenolol-chlorthalidone (TENORETIC) 50-25 MG per tablet TAKE 1 TABLET BY MOUTH  DAILY Yes Talib Wellington MD   fenofibrate 160 MG tablet Take 1 tablet by mouth daily Yes Talib Wellington MD   gabapentin (NEURONTIN) 300 MG capsule Take 1 capsule by mouth nightly for 360 days. Yes Talib Wellington MD   lisinopril (PRINIVIL;ZESTRIL) 10 MG tablet Take 1 tablet by mouth daily Yes Talib Wellington MD   traZODone (DESYREL) 50 MG tablet TAKE 1 TO 2 TABLETS BY  MOUTH AT NIGHT Yes Talib Wellington MD   atorvastatin (LIPITOR) 20 MG tablet TAKE 1 TABLET BY MOUTH ONCE DAILY Yes Talib Wellington MD   hydrOXYzine HCl (ATARAX) 10 MG tablet Take 1 tablet by mouth 3 times daily as needed for Itching Yes Talib Wellington MD   aspirin 81 MG EC tablet Take 1 tablet by mouth daily Yes Kimberly Mullen PA-C   ONETOUCH ULTRA strip USE ONE STRIP TO TEST FOUR TIMES A DAY AS NEEDED Yes Narciso Nino MD   diclofenac sodium (VOLTAREN) 1 % GEL Apply 2 g topically 4 times daily Yes ProviderMaxi MD   ketoconazole (NIZORAL) 2 % cream  Yes Provider, Historical, MD   Handicap Placard MISC by Does not apply route Duration - 5 years Yes Narciso Nino MD       [facility] (Including outside providers/suppliers regularly involved in providing care):   Patient Care Team:  Talib Wellington MD as PCP - General (Internal Medicine)  Talib Wellington MD as PCP - Empaneled Provider  Carmen Weinstein MD as Consulting Physician (Psychiatry)      Reviewed and updated this visit:  Tobacco  Allergies  Meds  Problems  Med Hx  Surg Hx  Soc Hx  Fam Hx

## 2024-11-20 NOTE — ASSESSMENT & PLAN NOTE
Chronic, at goal (stable), continue current treatment plan       - Stable on Lipitor 20 mg. Encouraged heart healthy low carb diet. Increase physical activity.

## 2024-11-20 NOTE — ASSESSMENT & PLAN NOTE
Chronic, at goal (stable), continue current treatment plan       -Stable on fenofibrate 160 mg. Continue current regime.

## 2024-11-20 NOTE — ASSESSMENT & PLAN NOTE
Chronic, at goal (stable), continue current treatment plan       - Stable on 100 units of Lantus nightly metformin 500 twice daily.

## 2024-11-20 NOTE — ASSESSMENT & PLAN NOTE
Chronic, at goal (stable), continue current treatment plan       Well-controlled with Flomax/ Oxybutynin daily.  No changes in bowel habits. Will maintain current regime.

## 2024-11-20 NOTE — ASSESSMENT & PLAN NOTE
Chronic, at goal (stable), continue current treatment plan       - Stable on 10 mg amlodipine, atenolol chlorthalidone 50-25 mg, lisinopril 10 mg, spironolactone 25. Continue current regime.

## 2024-11-20 NOTE — ASSESSMENT & PLAN NOTE
Chronic, at goal (stable), continue current treatment plan       -Mood is stable today denies any depressive symptoms or loneliness.  Patient in good spirits today.  Will continue on trazodone for sleep and duloxetine 60 mg daily for depression.

## 2024-12-03 NOTE — PROGRESS NOTES
[facility]      Cardiology Consult    Greg Luna  1945  December 3, 2024    Referring Physician: Talib Wellington MD  Reason for Referral: wound     CC: \"I am doing fine\"    HPI:  The patient is 79 y.o. male with a past medical history significant for Anxiety, BPH (benign prostatic hyperplasia), Depression, Diabetes with proteinuria, Disorder of iron metabolism, GBS (Guillain Hartfield syndrome) (HCC), History of colonic polyps, History of colonic polyps, Hyperlipidemia, Hypertension, Liver cirrhosis secondary to WYLIE (HCC), Long term (current) use of insulin (HCC), Obesity, Presence of intraocular lens, Psychophysiological insomnia, Retinal hemorrhage, bilateral, Type 2 diabetes mellitus with mild nonproliferative diabetic retinopathy without macular edema, bilateral (HCC), Type 2 diabetes mellitus with proteinuria (HCC), Type II or unspecified type diabetes mellitus without mention of complication, not stated as uncontrolled, and Vitreous degeneration of right eye. patient who presented to the hospital with complaints of leg pain. I have been asked to provide consultation regarding further management and testing.Patient with history of non-healing ulcer of the LLE.  Discussed case with Dr. Edwards patient was evaluated for concerns of PAD. No palpable PT pulse on my assessment and thready DP    Today he presents for follow up and states that overall he is feeling well. He denies any new sounding cardiac complaints. He denies any chest pains or worsening shortness of breath. He reports medication compliance and is tolerating. He denies any abnormal bleeding or bruising. He denies exertional chest pain/pressure, dyspnea at rest, worsening CUADRA, PND, orthopnea, palpitations, lightheadedness, weight changes, changes in LE edema, and syncope.        Past Medical History:   Diagnosis Date    Anxiety     BPH (benign prostatic hyperplasia)     Depression     Diabetes with proteinuria     Disorder of

## 2024-12-05 ENCOUNTER — OFFICE VISIT (OUTPATIENT)
Dept: ORTHOPEDIC SURGERY | Age: 79
End: 2024-12-05

## 2024-12-05 DIAGNOSIS — M17.11 PRIMARY OSTEOARTHRITIS OF RIGHT KNEE: Primary | ICD-10-CM

## 2024-12-09 ENCOUNTER — OFFICE VISIT (OUTPATIENT)
Dept: CARDIOLOGY CLINIC | Age: 79
End: 2024-12-09
Payer: MEDICARE

## 2024-12-09 VITALS
HEART RATE: 71 BPM | OXYGEN SATURATION: 99 % | WEIGHT: 258 LBS | SYSTOLIC BLOOD PRESSURE: 134 MMHG | DIASTOLIC BLOOD PRESSURE: 60 MMHG | HEIGHT: 69 IN | BODY MASS INDEX: 38.21 KG/M2

## 2024-12-09 DIAGNOSIS — I73.9 PAD (PERIPHERAL ARTERY DISEASE) (HCC): Primary | ICD-10-CM

## 2024-12-09 PROCEDURE — 3075F SYST BP GE 130 - 139MM HG: CPT | Performed by: STUDENT IN AN ORGANIZED HEALTH CARE EDUCATION/TRAINING PROGRAM

## 2024-12-09 PROCEDURE — 99214 OFFICE O/P EST MOD 30 MIN: CPT | Performed by: STUDENT IN AN ORGANIZED HEALTH CARE EDUCATION/TRAINING PROGRAM

## 2024-12-09 PROCEDURE — G8417 CALC BMI ABV UP PARAM F/U: HCPCS | Performed by: STUDENT IN AN ORGANIZED HEALTH CARE EDUCATION/TRAINING PROGRAM

## 2024-12-09 PROCEDURE — G8484 FLU IMMUNIZE NO ADMIN: HCPCS | Performed by: STUDENT IN AN ORGANIZED HEALTH CARE EDUCATION/TRAINING PROGRAM

## 2024-12-09 PROCEDURE — 1159F MED LIST DOCD IN RCRD: CPT | Performed by: STUDENT IN AN ORGANIZED HEALTH CARE EDUCATION/TRAINING PROGRAM

## 2024-12-09 PROCEDURE — G8427 DOCREV CUR MEDS BY ELIG CLIN: HCPCS | Performed by: STUDENT IN AN ORGANIZED HEALTH CARE EDUCATION/TRAINING PROGRAM

## 2024-12-09 PROCEDURE — 3078F DIAST BP <80 MM HG: CPT | Performed by: STUDENT IN AN ORGANIZED HEALTH CARE EDUCATION/TRAINING PROGRAM

## 2024-12-09 PROCEDURE — 1123F ACP DISCUSS/DSCN MKR DOCD: CPT | Performed by: STUDENT IN AN ORGANIZED HEALTH CARE EDUCATION/TRAINING PROGRAM

## 2024-12-09 PROCEDURE — 1036F TOBACCO NON-USER: CPT | Performed by: STUDENT IN AN ORGANIZED HEALTH CARE EDUCATION/TRAINING PROGRAM

## 2024-12-09 NOTE — PATIENT INSTRUCTIONS
Arterial ultrasound of legs to check blood flow in 6 months prior to our appt.      If any symptoms of chest pain or shortness of breath.

## 2024-12-29 DIAGNOSIS — F33.1 MODERATE EPISODE OF RECURRENT MAJOR DEPRESSIVE DISORDER (HCC): ICD-10-CM

## 2024-12-29 DIAGNOSIS — G47.9 SLEEP DISTURBANCE: ICD-10-CM

## 2024-12-29 DIAGNOSIS — F41.1 GAD (GENERALIZED ANXIETY DISORDER): ICD-10-CM

## 2024-12-30 NOTE — TELEPHONE ENCOUNTER
Medication:   Requested Prescriptions     Pending Prescriptions Disp Refills    traZODone (DESYREL) 50 MG tablet [Pharmacy Med Name: traZODone HCl 50 MG Oral Tablet] 90 tablet 7     Sig: TAKE 1 TO 2 TABLETS BY MOUTH AT  NIGHT       Last Filled:      Patient Phone Number: [phone] (home)     Last appt: 11/20/2024   Next appt: 1/6/2025  Future Appointments   Date Time Provider Department Center   1/6/2025 10:45 AM Talib Wellington MD [facility] DEP   1/9/2025  2:00 PM Mari Bennett MD [facility]   6/16/2025  9:00 [facility]   7/7/2025 11:00 AM Get Boyd MD [facility]   10/22/2025  9:00 AM Ceferino Murcia MD [facility]

## 2024-12-31 RX ORDER — TRAZODONE HYDROCHLORIDE 50 MG/1
TABLET, FILM COATED ORAL
Qty: 90 TABLET | Refills: 7 | Status: SHIPPED | OUTPATIENT
Start: 2024-12-31

## 2025-01-06 PROBLEM — I70.222 ATHEROSCLEROSIS OF NATIVE ARTERIES OF EXTREMITIES WITH REST PAIN, LEFT LEG (HCC): Status: ACTIVE | Noted: 2025-01-06

## 2025-01-06 PROBLEM — N18.32 CHRONIC KIDNEY DISEASE, STAGE 3B (HCC): Status: ACTIVE | Noted: 2025-01-06

## 2025-01-30 ENCOUNTER — OFFICE VISIT (OUTPATIENT)
Dept: ORTHOPEDIC SURGERY | Age: 80
End: 2025-01-30

## 2025-01-30 VITALS — HEIGHT: 69 IN | BODY MASS INDEX: 38.1 KG/M2

## 2025-01-30 DIAGNOSIS — M17.12 PRIMARY OSTEOARTHRITIS OF LEFT KNEE: Primary | ICD-10-CM

## 2025-01-31 NOTE — PROGRESS NOTES
air  Skin: warm, dry and intact with out erythema or significant increased temperature  Neuro: grossly intact both lower extremities. Intact sensation to light touch. Motor exam 4+ to 5/5 in all major motor groups.  LLE: Examination reveals that active knee range of motion is 0 to 120 degrees.  There is varus deformity, positive crepitus, positive medial joint line tenderness, positive antalgic gait.  Neurologically, plantar flexion and dorsiflexion is intact. 5/5 strength.     Imagin views of the left knee were performed and interpreted today.  These are significant for tricompartmental degenerative changes to osteoarthritis with near bone-on-bone arthritis of the medial compartment.  There are small periarticular osteophytes.    Assessment:  Left knee osteoarthritis    Plan:  We discussed the diagnosis and treatment options.  We discussed treatment options for osteoarthritis to include activity modification, physical therapy exercises, nonsteroidal anti-inflammatory medication (NSAIDs), intra-articular steroid injection, intra-articular viscosupplementation injection, and possibly total knee arthroplasty if conservative measures are not helpful.  He is interested in viscosupplementation injection.  A left knee injection was performed today as described below.  He will follow-up here as needed should his symptoms persist.    PROCEDURE NOTE:  After verbal consent was obtained, the patient's left knee was prepped with alcohol. Skin was anesthetized with ethyl chloride.  The knee was then injected under sterile technique with the prepackaged dose of Durolane. A bandage was applied. The patient tolerated the procedure well and there were no complications.     Total time spent on today's encounter was at least 24 minutes. This time included reviewing prior notes, radiographs, and lab results when available, reviewing history obtained by medical assistant, performing history and physical exam, reviewing

## 2025-02-06 NOTE — TELEPHONE ENCOUNTER
Pt is requesting a refill for insulin lispro protamine & lispro (HUMALOG MIX 75/25) (75-25) 100 UNIT per ML SUSP injection vial     [facility]     Thank you

## 2025-02-07 SDOH — ECONOMIC STABILITY: INCOME INSECURITY: IN THE LAST 12 MONTHS, WAS THERE A TIME WHEN YOU WERE NOT ABLE TO PAY THE MORTGAGE OR RENT ON TIME?: NO

## 2025-02-07 SDOH — ECONOMIC STABILITY: FOOD INSECURITY: WITHIN THE PAST 12 MONTHS, YOU WORRIED THAT YOUR FOOD WOULD RUN OUT BEFORE YOU GOT MONEY TO BUY MORE.: NEVER TRUE

## 2025-02-07 SDOH — ECONOMIC STABILITY: FOOD INSECURITY: WITHIN THE PAST 12 MONTHS, THE FOOD YOU BOUGHT JUST DIDN'T LAST AND YOU DIDN'T HAVE MONEY TO GET MORE.: NEVER TRUE

## 2025-02-07 ASSESSMENT — PATIENT HEALTH QUESTIONNAIRE - PHQ9
1. LITTLE INTEREST OR PLEASURE IN DOING THINGS: SEVERAL DAYS
5. POOR APPETITE OR OVEREATING: NOT AT ALL
6. FEELING BAD ABOUT YOURSELF - OR THAT YOU ARE A FAILURE OR HAVE LET YOURSELF OR YOUR FAMILY DOWN: SEVERAL DAYS
9. THOUGHTS THAT YOU WOULD BE BETTER OFF DEAD, OR OF HURTING YOURSELF: NOT AT ALL
4. FEELING TIRED OR HAVING LITTLE ENERGY: SEVERAL DAYS
2. FEELING DOWN, DEPRESSED OR HOPELESS: SEVERAL DAYS
6. FEELING BAD ABOUT YOURSELF - OR THAT YOU ARE A FAILURE OR HAVE LET YOURSELF OR YOUR FAMILY DOWN: SEVERAL DAYS
SUM OF ALL RESPONSES TO PHQ QUESTIONS 1-9: 5
8. MOVING OR SPEAKING SO SLOWLY THAT OTHER PEOPLE COULD HAVE NOTICED. OR THE OPPOSITE, BEING SO FIGETY OR RESTLESS THAT YOU HAVE BEEN MOVING AROUND A LOT MORE THAN USUAL: NOT AT ALL
SUM OF ALL RESPONSES TO PHQ QUESTIONS 1-9: 5
1. LITTLE INTEREST OR PLEASURE IN DOING THINGS: SEVERAL DAYS
10. IF YOU CHECKED OFF ANY PROBLEMS, HOW DIFFICULT HAVE THESE PROBLEMS MADE IT FOR YOU TO DO YOUR WORK, TAKE CARE OF THINGS AT HOME, OR GET ALONG WITH OTHER PEOPLE: NOT DIFFICULT AT ALL
4. FEELING TIRED OR HAVING LITTLE ENERGY: SEVERAL DAYS
3. TROUBLE FALLING OR STAYING ASLEEP: NOT AT ALL
3. TROUBLE FALLING OR STAYING ASLEEP: NOT AT ALL
10. IF YOU CHECKED OFF ANY PROBLEMS, HOW DIFFICULT HAVE THESE PROBLEMS MADE IT FOR YOU TO DO YOUR WORK, TAKE CARE OF THINGS AT HOME, OR GET ALONG WITH OTHER PEOPLE: NOT DIFFICULT AT ALL
SUM OF ALL RESPONSES TO PHQ9 QUESTIONS 1 & 2: 2
7. TROUBLE CONCENTRATING ON THINGS, SUCH AS READING THE NEWSPAPER OR WATCHING TELEVISION: SEVERAL DAYS
2. FEELING DOWN, DEPRESSED OR HOPELESS: SEVERAL DAYS
5. POOR APPETITE OR OVEREATING: NOT AT ALL
9. THOUGHTS THAT YOU WOULD BE BETTER OFF DEAD, OR OF HURTING YOURSELF: NOT AT ALL
SUM OF ALL RESPONSES TO PHQ QUESTIONS 1-9: 5
8. MOVING OR SPEAKING SO SLOWLY THAT OTHER PEOPLE COULD HAVE NOTICED. OR THE OPPOSITE - BEING SO FIDGETY OR RESTLESS THAT YOU HAVE BEEN MOVING AROUND A LOT MORE THAN USUAL: NOT AT ALL
7. TROUBLE CONCENTRATING ON THINGS, SUCH AS READING THE NEWSPAPER OR WATCHING TELEVISION: SEVERAL DAYS

## 2025-02-10 ENCOUNTER — OFFICE VISIT (OUTPATIENT)
Dept: INTERNAL MEDICINE CLINIC | Age: 80
End: 2025-02-10

## 2025-02-10 VITALS
WEIGHT: 270 LBS | DIASTOLIC BLOOD PRESSURE: 50 MMHG | SYSTOLIC BLOOD PRESSURE: 90 MMHG | BODY MASS INDEX: 39.87 KG/M2 | HEART RATE: 53 BPM | OXYGEN SATURATION: 97 %

## 2025-02-10 DIAGNOSIS — E11.22 TYPE 2 DIABETES MELLITUS WITH STAGE 3A CHRONIC KIDNEY DISEASE, WITH LONG-TERM CURRENT USE OF INSULIN (HCC): ICD-10-CM

## 2025-02-10 DIAGNOSIS — Z12.5 ENCOUNTER FOR SCREENING FOR MALIGNANT NEOPLASM OF PROSTATE: ICD-10-CM

## 2025-02-10 DIAGNOSIS — F33.1 MODERATE EPISODE OF RECURRENT MAJOR DEPRESSIVE DISORDER (HCC): ICD-10-CM

## 2025-02-10 DIAGNOSIS — Z79.4 TYPE 2 DIABETES MELLITUS WITH STAGE 3A CHRONIC KIDNEY DISEASE, WITH LONG-TERM CURRENT USE OF INSULIN (HCC): ICD-10-CM

## 2025-02-10 DIAGNOSIS — N18.31 TYPE 2 DIABETES MELLITUS WITH STAGE 3A CHRONIC KIDNEY DISEASE, WITH LONG-TERM CURRENT USE OF INSULIN (HCC): ICD-10-CM

## 2025-02-10 DIAGNOSIS — N40.1 BENIGN PROSTATIC HYPERPLASIA WITH NOCTURIA: ICD-10-CM

## 2025-02-10 DIAGNOSIS — I70.222 ATHEROSCLEROSIS OF NATIVE ARTERIES OF EXTREMITIES WITH REST PAIN, LEFT LEG (HCC): Primary | ICD-10-CM

## 2025-02-10 DIAGNOSIS — R35.1 BENIGN PROSTATIC HYPERPLASIA WITH NOCTURIA: ICD-10-CM

## 2025-02-10 DIAGNOSIS — K74.60 LIVER CIRRHOSIS SECONDARY TO NASH (HCC): ICD-10-CM

## 2025-02-10 DIAGNOSIS — K75.81 LIVER CIRRHOSIS SECONDARY TO NASH (HCC): ICD-10-CM

## 2025-02-10 LAB — HBA1C MFR BLD: 6.8 %

## 2025-02-10 RX ORDER — FERROUS SULFATE 324(65)MG
TABLET, DELAYED RELEASE (ENTERIC COATED) ORAL
COMMUNITY

## 2025-02-10 RX ORDER — INSULIN LISPRO 100 [IU]/ML
35 INJECTION, SOLUTION INTRAVENOUS; SUBCUTANEOUS
Qty: 40 ADJUSTABLE DOSE PRE-FILLED PEN SYRINGE | Refills: 3 | Status: SHIPPED | OUTPATIENT
Start: 2025-02-10 | End: 2025-02-10

## 2025-02-10 RX ORDER — FINASTERIDE 5 MG/1
5 TABLET, FILM COATED ORAL DAILY
Qty: 90 TABLET | Refills: 3 | Status: SHIPPED | OUTPATIENT
Start: 2025-02-10

## 2025-02-10 RX ORDER — INSULIN LISPRO 100 [IU]/ML
35 INJECTION, SOLUTION INTRAVENOUS; SUBCUTANEOUS
Qty: 40 ADJUSTABLE DOSE PRE-FILLED PEN SYRINGE | Refills: 3 | Status: SHIPPED | OUTPATIENT
Start: 2025-02-10

## 2025-02-10 NOTE — ASSESSMENT & PLAN NOTE
T2DM: Patient with history of type 2  diabetes.Patient's last a1c was   Hemoglobin A1C   Date Value Ref Range Status   07/21/2024 6.6 See comment % Final     Comment:     Comment:  Diagnosis of Diabetes: > or = 6.5%  Increased risk of diabetes (Prediabetes): 5.7-6.4%  Glycemic Control: Nonpregnant Adults: <7.0%                    Pregnant: <6.0%       . Patient is compliant with   Key Antihyperglycemic Medications               insulin lispro, 1 Unit Dial, (HUMALOG KWIKPEN) 100 UNIT/ML SOPN (Taking) Inject 35 Units into the skin 3 times daily (before meals)    insulin lispro protamine & lispro (HUMALOG MIX 75/25) (75-25) 100 UNIT per ML SUSP injection vial (Taking) Inject into the skin    metFORMIN (GLUCOPHAGE) 500 MG tablet (Taking) Take 1 tablet by mouth 2 times daily    LANTUS SOLOSTAR 100 UNIT/ML injection pen (Taking) INJECT SUBCUTANEOUSLY 100U EVERY NIGHT        . Patient does not monitor blood sugars at home. Patient does not have neuropathy, nephropathy, or history of vascular disorder.    Orders:    PSA Screening    LIPID PANEL

## 2025-02-10 NOTE — ASSESSMENT & PLAN NOTE
Urinating a couple times per hour.   - Continue tamsulone  - Start finasteride.     Orders:    finasteride (PROSCAR) 5 MG tablet; Take 1 tablet by mouth daily

## 2025-02-10 NOTE — PROGRESS NOTES
Greg Luna is a 79 y.o. male with a past medical history noted below who presents for routine follow up on chronic conditions and discussion of preventative health care.  Chief Complaint   Patient presents with    3 Month Follow-Up   Patient is here for follow up. He notes urinary frequency.         Past Medical History:   Diagnosis Date    Anxiety     BPH (benign prostatic hyperplasia)     Depression     Diabetes with proteinuria     Disorder of iron metabolism 4/27/2010    GBS (Guillain Bancroft syndrome) (HCC) 1990's    History of colonic polyps     History of colonic polyps     Hyperlipidemia     Hypertension     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 intraocular lens 10/10/2019    Psychophysiological insomnia 5/15/2018    Retinal hemorrhage, 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 with mild nonproliferative diabetic retinopathy without macular edema, bilateral (HCC) 10/10/2019    Type 2 diabetes mellitus with proteinuria ([facility]) 11/19/2019    Type II or unspecified type diabetes mellitus without mention of complication, not stated as uncontrolled     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 (HCC)    KIMMIE (obstructive sleep apnea)    Essential hypertension    Uncontrolled type 2 diabetes mellitus with hyperglycemia, with long-term current use of insulin (HCC)    Mixed hyperlipidemia    Psychophysiological insomnia    Major depressive disorder with single episode, in partial remission (HCC)    Coronary atherosclerosis    Morbid obesity with BMI of 40.0-44.9, adult    Sleep paralysis    Coagulation defect (HCC)    Thrombocytopenia (HCC)    Type 2 diabetes mellitus with chronic kidney disease    Moderate episode of recurrent major depressive disorder (HCC)    PAD (peripheral artery disease)

## 2025-02-11 LAB
CHOLEST SERPL-MCNC: 124 MG/DL (ref 0–199)
HDLC SERPL-MCNC: 29 MG/DL (ref 40–60)
LDLC SERPL CALC-MCNC: 62 MG/DL
PSA SERPL DL<=0.01 NG/ML-MCNC: 0.26 NG/ML (ref 0–4)
TRIGL SERPL-MCNC: 166 MG/DL (ref 0–150)
VLDLC SERPL CALC-MCNC: 33 MG/DL

## 2025-02-27 ENCOUNTER — PATIENT MESSAGE (OUTPATIENT)
Dept: PULMONOLOGY | Age: 80
End: 2025-02-27

## 2025-03-03 RX ORDER — BLOOD SUGAR DIAGNOSTIC
STRIP MISCELLANEOUS
Qty: 300 STRIP | Refills: 1 | Status: SHIPPED | OUTPATIENT
Start: 2025-03-03

## 2025-03-03 NOTE — TELEPHONE ENCOUNTER
Future Appointments   Date Time Provider Department Center   4/10/2025 12:15 PM Mari Bennett MD [facility]   5/12/2025 11:30 AM Talib Wellington MD [facility]   6/16/2025  9:00 AM T [facility] 3 [facility]   7/7/2025 11:00 AM Get Boyd MD [facility]   10/22/2025  9:00 AM Ceferino Murcia MD [facility]       Last appt 2/10/25

## 2025-03-13 ENCOUNTER — HOSPITAL ENCOUNTER (OUTPATIENT)
Dept: WOUND CARE | Age: 80
Discharge: HOME | End: 2025-03-13
Attending: STUDENT IN AN ORGANIZED HEALTH CARE EDUCATION/TRAINING PROGRAM
Payer: MEDICARE

## 2025-03-13 VITALS
WEIGHT: 260.14 LBS | SYSTOLIC BLOOD PRESSURE: 120 MMHG | BODY MASS INDEX: 38.53 KG/M2 | HEIGHT: 69 IN | DIASTOLIC BLOOD PRESSURE: 60 MMHG | HEART RATE: 50 BPM | TEMPERATURE: 97.2 F | RESPIRATION RATE: 20 BRPM

## 2025-03-13 DIAGNOSIS — L08.9 TYPE 2 DIABETES MELLITUS WITH DIABETIC FOOT INFECTION (HCC): ICD-10-CM

## 2025-03-13 DIAGNOSIS — L97.402 DIABETIC ULCER OF HEEL ASSOCIATED WITH DIABETES MELLITUS DUE TO UNDERLYING CONDITION, WITH FAT LAYER EXPOSED, UNSPECIFIED LATERALITY (HCC): Primary | ICD-10-CM

## 2025-03-13 DIAGNOSIS — E08.621 DIABETIC ULCER OF HEEL ASSOCIATED WITH DIABETES MELLITUS DUE TO UNDERLYING CONDITION, WITH FAT LAYER EXPOSED, UNSPECIFIED LATERALITY (HCC): Primary | ICD-10-CM

## 2025-03-13 DIAGNOSIS — E11.628 TYPE 2 DIABETES MELLITUS WITH DIABETIC FOOT INFECTION (HCC): ICD-10-CM

## 2025-03-13 PROCEDURE — 99213 OFFICE O/P EST LOW 20 MIN: CPT

## 2025-03-13 PROCEDURE — 11042 DBRDMT SUBQ TIS 1ST 20SQCM/<: CPT

## 2025-03-13 RX ORDER — LIDOCAINE 50 MG/G
OINTMENT TOPICAL PRN
OUTPATIENT
Start: 2025-03-13

## 2025-03-13 RX ORDER — NEOMYCIN/BACITRACIN/POLYMYXINB 3.5-400-5K
OINTMENT (GRAM) TOPICAL PRN
OUTPATIENT
Start: 2025-03-13

## 2025-03-13 RX ORDER — LIDOCAINE 40 MG/G
CREAM TOPICAL PRN
OUTPATIENT
Start: 2025-03-13

## 2025-03-13 RX ORDER — GINSENG 100 MG
CAPSULE ORAL PRN
OUTPATIENT
Start: 2025-03-13

## 2025-03-13 RX ORDER — BETAMETHASONE DIPROPIONATE 0.5 MG/G
CREAM TOPICAL PRN
OUTPATIENT
Start: 2025-03-13

## 2025-03-13 RX ORDER — LIDOCAINE HYDROCHLORIDE 20 MG/ML
JELLY TOPICAL PRN
OUTPATIENT
Start: 2025-03-13

## 2025-03-13 RX ORDER — TRIAMCINOLONE ACETONIDE 1 MG/G
OINTMENT TOPICAL PRN
OUTPATIENT
Start: 2025-03-13

## 2025-03-13 RX ORDER — LIDOCAINE HYDROCHLORIDE 40 MG/ML
SOLUTION TOPICAL PRN
Status: DISCONTINUED | OUTPATIENT
Start: 2025-03-13 | End: 2025-03-14 | Stop reason: HOSPADM

## 2025-03-13 RX ORDER — LIDOCAINE HYDROCHLORIDE 40 MG/ML
SOLUTION TOPICAL PRN
OUTPATIENT
Start: 2025-03-13

## 2025-03-13 RX ORDER — MUPIROCIN 20 MG/G
OINTMENT TOPICAL PRN
OUTPATIENT
Start: 2025-03-13

## 2025-03-13 RX ORDER — GENTAMICIN SULFATE 1 MG/G
OINTMENT TOPICAL PRN
OUTPATIENT
Start: 2025-03-13

## 2025-03-13 RX ORDER — BACITRACIN ZINC AND POLYMYXIN B SULFATE 500; 1000 [USP'U]/G; [USP'U]/G
OINTMENT TOPICAL PRN
OUTPATIENT
Start: 2025-03-13

## 2025-03-13 RX ORDER — ATENOLOL AND CHLORTHALIDONE TABLET 50; 25 MG/1; MG/1
1 TABLET ORAL DAILY
COMMUNITY

## 2025-03-13 RX ORDER — SILVER SULFADIAZINE 10 MG/G
CREAM TOPICAL PRN
OUTPATIENT
Start: 2025-03-13

## 2025-03-13 RX ORDER — SODIUM CHLOR/HYPOCHLOROUS ACID 0.033 %
SOLUTION, IRRIGATION IRRIGATION PRN
OUTPATIENT
Start: 2025-03-13

## 2025-03-13 RX ORDER — CLOBETASOL PROPIONATE 0.5 MG/G
OINTMENT TOPICAL PRN
OUTPATIENT
Start: 2025-03-13

## 2025-03-13 RX ADMIN — LIDOCAINE HYDROCHLORIDE 5 ML: 40 SOLUTION TOPICAL at 15:18

## 2025-03-13 ASSESSMENT — PAIN SCALES - GENERAL: PAINLEVEL_OUTOF10: 0

## 2025-03-13 NOTE — PATIENT INSTRUCTIONS
[facility] Wound Care Physician Orders and Discharge Instructions  [address], [address]        [place], [place] [address]  Telephone: [phone]      FAX [phone]  MONDAY - THURSDAY 8:30 am - 4:30 pm and Friday 8:30 am - 11:30 am      NAME:  Greg Luna  YOB: 1945  MEDICAL RECORD NUMBER:  [id number]  DATE:  3/13/2025      Return Appointment:  [x] Return Appointment: With  Dr. Karen Edwards in  1  Week(s)                  [] Nurse Visit for Wound Assessment:  [] DME/Wound Dressing Supplies Provided by: Other n/a     Please call them directly to reorder supplies when you run out)  [] ECF or Home Healthcare:  Other: n/a  Your Home Care Agency is responsible to order your supplies.     [x] Orders placed during your visit:                     May order on [employer]   Important Reminders:   Please wash hands with soap and water before and after every dressing change.    IF YOU ARE UNABLE TO OBTAIN WOUND SUPPLIES, CONTINUE TO USE THE SUPPLIES YOU HAVE AVAILABLE UNTIL YOU ARE ABLE TO REACH US. IT IS MOST IMPORTANT TO KEEP THE WOUND COVERED AT ALL TIMES.  ___________________________________________________________________  Wound Location: Right  Heel    WOUND CLEANSING: n/a    PRIMARY DRESSING: MEDIHONEY                                                     2 kidney bean to surround wound and protect posterior ankle    SECONDARY DRESSING: 4X4 gauze pad and Kerlix/Rolled Gauze    Spandagrip Low (10-20mm/Hg) to Right lower leg(s).    Dressing Frequency:Daily       Pressure Relief and Off Loading:    Surgical shoe    Specialty equipment ordered:         []Wheelchair cushion            [] Specialty Bed/Mattress  ______________________________________________________________________________________________                       [x]Activity: Activity as tolerated                    [] Assistive Devices   cane   Use as instructed by the

## 2025-03-13 NOTE — PROGRESS NOTES
[facility] Wound Care Center   Progress Note and Procedure Note      Greg Luna  MEDICAL RECORD NUMBER:  [id number]  AGE: 80 y.o.   GENDER: male  : 1945  EPISODE DATE:  3/13/2025    Subjective:     Chief Complaint   Patient presents with    Wound Check     Initial visit - right heel wound.  States started as a blister then he popped it about 2025.  Has been seeing Dr Edwards for treatment - using Betadine.         HISTORY of PRESENT ILLNESS HPI     Greg Luna is a 80 y.o. male who presents today for wound/ulcer evaluation.   History of Wound Context: Patient well-known to me presented in my office 2 weeks ago with a new blister that needed to be deroofed he stated that it had been forming for a while and he had tried to drain it at home himself and that it got infected and painful.  I deroofed it in the office and took a culture.  I saw him again last week and the culture had grown nothing and the ulceration was larger and not resolving.  So he was referred here to wound care for a higher level of care.  Patient reports it has been sore.  He is finished with his Keflex as of today.  Has been dressing it with Betadine.  Denies fever, chills, nausea, vomiting.  Wound/Ulcer Pain Timing/Severity: intermittent  Quality of pain: aching  Severity:  3 / 10   Modifying Factors: Pain worsens with shoe gear  Associated Signs/Symptoms: none    Ulcer Identification:  Ulcer Type: diabetic and neuropathic    Contributing Factors: edema, diabetes, and shear force    Acute Wound: N/A    PAST MEDICAL HISTORY        Diagnosis Date    Anxiety     Arthritis     BPH (benign prostatic hyperplasia)     Depression     Diabetes with proteinuria     Disorder of iron metabolism 2010    GBS (Guillain [place] syndrome)     History of colonic polyps     History of colonic polyps     Hyperlipidemia     Hypertension     Liver cirrhosis secondary to WYLIE (HCC)     Long term (current) use of insulin (HCC)

## 2025-03-18 ENCOUNTER — PATIENT MESSAGE (OUTPATIENT)
Dept: PULMONOLOGY | Age: 80
End: 2025-03-18

## 2025-03-20 ENCOUNTER — HOSPITAL ENCOUNTER (OUTPATIENT)
Dept: WOUND CARE | Age: 80
Discharge: HOME | End: 2025-03-20
Attending: STUDENT IN AN ORGANIZED HEALTH CARE EDUCATION/TRAINING PROGRAM
Payer: MEDICARE

## 2025-03-20 VITALS
TEMPERATURE: 97.1 F | HEART RATE: 57 BPM | DIASTOLIC BLOOD PRESSURE: 55 MMHG | SYSTOLIC BLOOD PRESSURE: 166 MMHG | RESPIRATION RATE: 20 BRPM

## 2025-03-20 DIAGNOSIS — L97.412 ULCER OF RIGHT HEEL, WITH FAT LAYER EXPOSED (HCC): ICD-10-CM

## 2025-03-20 DIAGNOSIS — E11.628 TYPE 2 DIABETES MELLITUS WITH DIABETIC FOOT INFECTION (HCC): Primary | ICD-10-CM

## 2025-03-20 DIAGNOSIS — L08.9 TYPE 2 DIABETES MELLITUS WITH DIABETIC FOOT INFECTION (HCC): Primary | ICD-10-CM

## 2025-03-20 PROCEDURE — 11042 DBRDMT SUBQ TIS 1ST 20SQCM/<: CPT

## 2025-03-20 RX ORDER — LIDOCAINE 40 MG/G
CREAM TOPICAL PRN
OUTPATIENT
Start: 2025-03-20

## 2025-03-20 RX ORDER — CLOBETASOL PROPIONATE 0.5 MG/G
OINTMENT TOPICAL PRN
OUTPATIENT
Start: 2025-03-20

## 2025-03-20 RX ORDER — GENTAMICIN SULFATE 1 MG/G
OINTMENT TOPICAL PRN
OUTPATIENT
Start: 2025-03-20

## 2025-03-20 RX ORDER — SODIUM CHLOR/HYPOCHLOROUS ACID 0.033 %
SOLUTION, IRRIGATION IRRIGATION PRN
OUTPATIENT
Start: 2025-03-20

## 2025-03-20 RX ORDER — LIDOCAINE HYDROCHLORIDE 40 MG/ML
SOLUTION TOPICAL PRN
Status: DISCONTINUED | OUTPATIENT
Start: 2025-03-20 | End: 2025-03-21 | Stop reason: HOSPADM

## 2025-03-20 RX ORDER — LIDOCAINE HYDROCHLORIDE 40 MG/ML
SOLUTION TOPICAL PRN
OUTPATIENT
Start: 2025-03-20

## 2025-03-20 RX ORDER — NEOMYCIN/BACITRACIN/POLYMYXINB 3.5-400-5K
OINTMENT (GRAM) TOPICAL PRN
OUTPATIENT
Start: 2025-03-20

## 2025-03-20 RX ORDER — LIDOCAINE HYDROCHLORIDE 20 MG/ML
JELLY TOPICAL PRN
OUTPATIENT
Start: 2025-03-20

## 2025-03-20 RX ORDER — BETAMETHASONE DIPROPIONATE 0.5 MG/G
CREAM TOPICAL PRN
OUTPATIENT
Start: 2025-03-20

## 2025-03-20 RX ORDER — MUPIROCIN 20 MG/G
OINTMENT TOPICAL PRN
OUTPATIENT
Start: 2025-03-20

## 2025-03-20 RX ORDER — BACITRACIN ZINC AND POLYMYXIN B SULFATE 500; 1000 [USP'U]/G; [USP'U]/G
OINTMENT TOPICAL PRN
OUTPATIENT
Start: 2025-03-20

## 2025-03-20 RX ORDER — SILVER SULFADIAZINE 10 MG/G
CREAM TOPICAL PRN
OUTPATIENT
Start: 2025-03-20

## 2025-03-20 RX ORDER — TRIAMCINOLONE ACETONIDE 1 MG/G
OINTMENT TOPICAL PRN
OUTPATIENT
Start: 2025-03-20

## 2025-03-20 RX ORDER — GINSENG 100 MG
CAPSULE ORAL PRN
OUTPATIENT
Start: 2025-03-20

## 2025-03-20 RX ORDER — LIDOCAINE 50 MG/G
OINTMENT TOPICAL PRN
OUTPATIENT
Start: 2025-03-20

## 2025-03-20 RX ADMIN — LIDOCAINE HYDROCHLORIDE: 40 SOLUTION TOPICAL at 15:27

## 2025-03-20 ASSESSMENT — PAIN SCALES - GENERAL
PAINLEVEL_OUTOF10: 0
PAINLEVEL_OUTOF10: 0

## 2025-03-20 NOTE — PROGRESS NOTES
warmth, or redness.  Red streaks leading from the area.  Pus draining from the area.  A fever.     _______________________________________________________________________________________________    : DALTON     Electronically signed by Electronically signed by Dalton on 3/20/2025 at 3:43 PM     Wound Care Center Information: Should you experience any significant changes in your wound(s) or have questions about your wound care, please contact the [facility] Wound Center at [phone] MONDAY - THURSDAY 8:30 am - 4:30 pm and Friday 8:30 am - 11:30 am.    If you need help with your wound outside these hours and cannot wait until we are again available, contact your PCP or go to the hospital emergency room.       Physician Signature:_______________________    Date: ___________ Time:  ____________    Dr. Karen Edwards      Electronically signed by Karen Edwards DPM on 3/20/2025 at 3:50 PM

## 2025-03-27 ENCOUNTER — HOSPITAL ENCOUNTER (OUTPATIENT)
Dept: WOUND CARE | Age: 80
Discharge: HOME | End: 2025-03-27
Attending: STUDENT IN AN ORGANIZED HEALTH CARE EDUCATION/TRAINING PROGRAM
Payer: MEDICARE

## 2025-03-27 VITALS
HEART RATE: 53 BPM | RESPIRATION RATE: 18 BRPM | TEMPERATURE: 98.1 F | SYSTOLIC BLOOD PRESSURE: 107 MMHG | DIASTOLIC BLOOD PRESSURE: 56 MMHG

## 2025-03-27 DIAGNOSIS — L08.9 TYPE 2 DIABETES MELLITUS WITH DIABETIC FOOT INFECTION (HCC): Primary | ICD-10-CM

## 2025-03-27 DIAGNOSIS — L97.412 DIABETIC ULCER OF RIGHT HEEL ASSOCIATED WITH DIABETES MELLITUS DUE TO UNDERLYING CONDITION, WITH FAT LAYER EXPOSED: ICD-10-CM

## 2025-03-27 DIAGNOSIS — L97.412 ULCER OF RIGHT HEEL, WITH FAT LAYER EXPOSED (HCC): ICD-10-CM

## 2025-03-27 DIAGNOSIS — E08.621 DIABETIC ULCER OF RIGHT HEEL ASSOCIATED WITH DIABETES MELLITUS DUE TO UNDERLYING CONDITION, WITH FAT LAYER EXPOSED: ICD-10-CM

## 2025-03-27 DIAGNOSIS — E11.628 TYPE 2 DIABETES MELLITUS WITH DIABETIC FOOT INFECTION (HCC): Primary | ICD-10-CM

## 2025-03-27 PROCEDURE — 11042 DBRDMT SUBQ TIS 1ST 20SQCM/<: CPT

## 2025-03-27 RX ORDER — CLOBETASOL PROPIONATE 0.5 MG/G
OINTMENT TOPICAL PRN
OUTPATIENT
Start: 2025-03-27

## 2025-03-27 RX ORDER — LIDOCAINE HYDROCHLORIDE 40 MG/ML
SOLUTION TOPICAL PRN
Status: DISCONTINUED | OUTPATIENT
Start: 2025-03-27 | End: 2025-03-28 | Stop reason: HOSPADM

## 2025-03-27 RX ORDER — LIDOCAINE HYDROCHLORIDE 20 MG/ML
JELLY TOPICAL PRN
OUTPATIENT
Start: 2025-03-27

## 2025-03-27 RX ORDER — GENTAMICIN SULFATE 1 MG/G
OINTMENT TOPICAL PRN
OUTPATIENT
Start: 2025-03-27

## 2025-03-27 RX ORDER — GINSENG 100 MG
CAPSULE ORAL PRN
OUTPATIENT
Start: 2025-03-27

## 2025-03-27 RX ORDER — LIDOCAINE HYDROCHLORIDE 40 MG/ML
SOLUTION TOPICAL PRN
OUTPATIENT
Start: 2025-03-27

## 2025-03-27 RX ORDER — LIDOCAINE 40 MG/G
CREAM TOPICAL PRN
OUTPATIENT
Start: 2025-03-27

## 2025-03-27 RX ORDER — LIDOCAINE 50 MG/G
OINTMENT TOPICAL PRN
OUTPATIENT
Start: 2025-03-27

## 2025-03-27 RX ORDER — BACITRACIN ZINC AND POLYMYXIN B SULFATE 500; 1000 [USP'U]/G; [USP'U]/G
OINTMENT TOPICAL PRN
OUTPATIENT
Start: 2025-03-27

## 2025-03-27 RX ORDER — MUPIROCIN 20 MG/G
OINTMENT TOPICAL PRN
OUTPATIENT
Start: 2025-03-27

## 2025-03-27 RX ORDER — NEOMYCIN/BACITRACIN/POLYMYXINB 3.5-400-5K
OINTMENT (GRAM) TOPICAL PRN
OUTPATIENT
Start: 2025-03-27

## 2025-03-27 RX ORDER — TRIAMCINOLONE ACETONIDE 1 MG/G
OINTMENT TOPICAL PRN
OUTPATIENT
Start: 2025-03-27

## 2025-03-27 RX ORDER — SILVER SULFADIAZINE 10 MG/G
CREAM TOPICAL PRN
OUTPATIENT
Start: 2025-03-27

## 2025-03-27 RX ORDER — BETAMETHASONE DIPROPIONATE 0.5 MG/G
CREAM TOPICAL PRN
OUTPATIENT
Start: 2025-03-27

## 2025-03-27 RX ORDER — SODIUM CHLOR/HYPOCHLOROUS ACID 0.033 %
SOLUTION, IRRIGATION IRRIGATION PRN
OUTPATIENT
Start: 2025-03-27

## 2025-03-27 RX ADMIN — LIDOCAINE HYDROCHLORIDE: 40 SOLUTION TOPICAL at 15:13

## 2025-03-27 ASSESSMENT — PAIN SCALES - GENERAL
PAINLEVEL_OUTOF10: 0
PAINLEVEL_OUTOF10: 0

## 2025-03-27 NOTE — PROGRESS NOTES
care doctor.    For Persistent Pain not relieved by the above interventions, please notify your family doctor.     Seek immediate medical care if:    You have symptoms of infection, such as:  Increased pain, swelling, warmth, or redness.  Red streaks leading from the area.  Pus draining from the area.  A fever.     _______________________________________________________________________________________________    : DALTON     Electronically signed by Electronically signed by Dalton on 3/27/2025 at 3:18 PM     Wound Care Center Information: Should you experience any significant changes in your wound(s) or have questions about your wound care, please contact the [facility] Wound Center at [phone] MONDAY - THURSDAY 8:30 am - 4:30 pm and Friday 8:30 am - 11:30 am.    If you need help with your wound outside these hours and cannot wait until we are again available, contact your PCP or go to the hospital emergency room.       Physician Signature:_______________________    Date: ___________ Time:  ____________    Dr. Karen Edwards      Electronically signed by Karen Edwards DPM on 3/27/2025 at 4:46 PM

## 2025-03-27 NOTE — PATIENT INSTRUCTIONS
[facility] Wound Care Physician Orders and Discharge Instructions  [address], [address]        [place], [place] [address]  Telephone: [phone]      FAX [phone]  MONDAY - THURSDAY 8:30 am - 4:30 pm and Friday 8:30 am - 11:30 am      NAME:  Greg Luna  YOB: 1945  MEDICAL RECORD NUMBER:  [id number]  DATE:  3/27/2025      Return Appointment:  [x] Return Appointment: With  Dr. Karen Edwards in  1  Week(s)                  [] Nurse Visit for Wound Assessment:  [] DME/Wound Dressing Supplies Provided by: Other n/a     Please call them directly to reorder supplies when you run out)  [] ECF or Home Healthcare:  Other: n/a  Your Home Care Agency is responsible to order your supplies.     [] Orders placed during your visit:                     Important Reminders:   Please wash hands with soap and water before and after every dressing change.    IF YOU ARE UNABLE TO OBTAIN WOUND SUPPLIES, CONTINUE TO USE THE SUPPLIES YOU HAVE AVAILABLE UNTIL YOU ARE ABLE TO REACH US. IT IS MOST IMPORTANT TO KEEP THE WOUND COVERED AT ALL TIMES.  ___________________________________________________________________  Wound Location: Right  Heel    WOUND CLEANSING: n/a    PRIMARY DRESSING: MEDIHONEY                                                     2 kidney bean to surround wound and protect posterior ankle    SECONDARY DRESSING: 4X4 gauze pad and Kerlix/Rolled Gauze    Spandagrip Low (10-20mm/Hg) to Right lower leg(s).    Dressing Frequency:Daily       Pressure Relief and Off Loading:    Surgical shoe    Specialty equipment ordered:         []Wheelchair cushion            [] Specialty Bed/Mattress  ______________________________________________________________________________________________                       [x]Activity: Activity as tolerated                    [] Assistive Devices   cane   Use as instructed by the

## 2025-04-03 ENCOUNTER — HOSPITAL ENCOUNTER (OUTPATIENT)
Dept: WOUND CARE | Age: 80
Discharge: HOME | End: 2025-04-03
Attending: STUDENT IN AN ORGANIZED HEALTH CARE EDUCATION/TRAINING PROGRAM
Payer: MEDICARE

## 2025-04-03 VITALS
DIASTOLIC BLOOD PRESSURE: 50 MMHG | RESPIRATION RATE: 18 BRPM | HEART RATE: 63 BPM | SYSTOLIC BLOOD PRESSURE: 122 MMHG | TEMPERATURE: 97.1 F

## 2025-04-03 DIAGNOSIS — L08.9 TYPE 2 DIABETES MELLITUS WITH DIABETIC FOOT INFECTION (HCC): Primary | ICD-10-CM

## 2025-04-03 DIAGNOSIS — E11.628 TYPE 2 DIABETES MELLITUS WITH DIABETIC FOOT INFECTION (HCC): Primary | ICD-10-CM

## 2025-04-03 PROCEDURE — 11042 DBRDMT SUBQ TIS 1ST 20SQCM/<: CPT

## 2025-04-03 RX ORDER — GENTAMICIN SULFATE 1 MG/G
OINTMENT TOPICAL PRN
OUTPATIENT
Start: 2025-04-03

## 2025-04-03 RX ORDER — LIDOCAINE 50 MG/G
OINTMENT TOPICAL PRN
OUTPATIENT
Start: 2025-04-03

## 2025-04-03 RX ORDER — LIDOCAINE HYDROCHLORIDE 40 MG/ML
SOLUTION TOPICAL PRN
OUTPATIENT
Start: 2025-04-03

## 2025-04-03 RX ORDER — LIDOCAINE HYDROCHLORIDE 20 MG/ML
JELLY TOPICAL PRN
OUTPATIENT
Start: 2025-04-03

## 2025-04-03 RX ORDER — LIDOCAINE 40 MG/G
CREAM TOPICAL PRN
OUTPATIENT
Start: 2025-04-03

## 2025-04-03 RX ORDER — TRIAMCINOLONE ACETONIDE 1 MG/G
OINTMENT TOPICAL PRN
OUTPATIENT
Start: 2025-04-03

## 2025-04-03 RX ORDER — BACITRACIN ZINC AND POLYMYXIN B SULFATE 500; 1000 [USP'U]/G; [USP'U]/G
OINTMENT TOPICAL PRN
OUTPATIENT
Start: 2025-04-03

## 2025-04-03 RX ORDER — GINSENG 100 MG
CAPSULE ORAL PRN
OUTPATIENT
Start: 2025-04-03

## 2025-04-03 RX ORDER — CLOBETASOL PROPIONATE 0.5 MG/G
OINTMENT TOPICAL PRN
OUTPATIENT
Start: 2025-04-03

## 2025-04-03 RX ORDER — SILVER SULFADIAZINE 10 MG/G
CREAM TOPICAL PRN
OUTPATIENT
Start: 2025-04-03

## 2025-04-03 RX ORDER — MUPIROCIN 20 MG/G
OINTMENT TOPICAL PRN
OUTPATIENT
Start: 2025-04-03

## 2025-04-03 RX ORDER — NEOMYCIN/BACITRACIN/POLYMYXINB 3.5-400-5K
OINTMENT (GRAM) TOPICAL PRN
OUTPATIENT
Start: 2025-04-03

## 2025-04-03 RX ORDER — SODIUM CHLOR/HYPOCHLOROUS ACID 0.033 %
SOLUTION, IRRIGATION IRRIGATION PRN
OUTPATIENT
Start: 2025-04-03

## 2025-04-03 RX ORDER — LIDOCAINE HYDROCHLORIDE 40 MG/ML
SOLUTION TOPICAL PRN
Status: DISCONTINUED | OUTPATIENT
Start: 2025-04-03 | End: 2025-04-04 | Stop reason: HOSPADM

## 2025-04-03 RX ORDER — BETAMETHASONE DIPROPIONATE 0.5 MG/G
CREAM TOPICAL PRN
OUTPATIENT
Start: 2025-04-03

## 2025-04-03 ASSESSMENT — PAIN SCALES - GENERAL: PAINLEVEL_OUTOF10: 0

## 2025-04-03 NOTE — PROGRESS NOTES
diclofenac sodium (VOLTAREN) 1 % GEL Apply 2 g topically 4 times daily      ketoconazole (NIZORAL) 2 % cream       Handicap Placard MISC by Does not apply route Duration - 5 years 1 each 0     No current facility-administered medications on file prior to encounter.       REVIEW OF SYSTEMS  Review of Systems    Pertinent items are noted in HPI.    Objective:      BP (!) 122/50   Pulse 63   Temp 97.1 °F (36.2 °C) (Infrared)   Resp 18     Wt Readings from Last 3 Encounters:   03/13/25 118 kg (260 lb 2.3 oz)   02/10/25 122.5 kg (270 lb)   12/09/24 117 kg (258 lb)       PHYSICAL EXAM  Physical Exam    LOWER EXTREMITY EXAMINATION      VASCULAR: DP and PT pulses are palpable 1/4 b/l. CFT is brisk to the digits of the foot b/l. Skin temperature is warm to cool from proximal to distal with no focal calor noted. No edema noted. No pain with calf compression b/l.    NEUROLOGIC: Gross and epicritic sensation is diminished b/l. Protective sensation is diminished at all pedal sites b/l.    DERMATOLOGIC: Right heel has full-thickness ulceration posterior medial right at the edge of the plantar skin, minimal hyperkeratotic rim, red granular base, no SOLANGE erythema, smaller than last week.    MUSCULOSKELETAL: Muscle strength is 5/5 for all pedal groups tested. No pain with palpation of the foot or ankle b/l. Ankle joint ROM is decreased in dorsiflexion with the knee extended. Hammer toes b/l.         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    Performed by: GONZALES Christopher

## 2025-04-03 NOTE — PATIENT INSTRUCTIONS
[facility] Wound Care Physician Orders and Discharge Instructions  [address], [address]        [place], [place] [address]  Telephone: [phone]      FAX [phone]  MONDAY - THURSDAY 8:30 am - 4:30 pm and Friday 8:30 am - 11:30 am      NAME:  Greg Luna  YOB: 1945  MEDICAL RECORD NUMBER:  [id number]  DATE:  4/3/2025      Return Appointment:  [x] Return Appointment: With  Dr. Karen Edwards in  1  Week(s)                  [] Nurse Visit for Wound Assessment:  [] DME/Wound Dressing Supplies Provided by: Other n/a     Please call them directly to reorder supplies when you run out)  [] ECF or Home Healthcare:  Other: n/a  Your Home Care Agency is responsible to order your supplies.     [] Orders placed during your visit:                     Important Reminders:   Please wash hands with soap and water before and after every dressing change.    IF YOU ARE UNABLE TO OBTAIN WOUND SUPPLIES, CONTINUE TO USE THE SUPPLIES YOU HAVE AVAILABLE UNTIL YOU ARE ABLE TO REACH US. IT IS MOST IMPORTANT TO KEEP THE WOUND COVERED AT ALL TIMES.  ___________________________________________________________________  Wound Location: Right  Heel    WOUND CLEANSING: n/a    PRIMARY DRESSING: MEDIHONEY                                                     2 kidney bean to surround wound and protect posterior ankle    SECONDARY DRESSING: 4X4 gauze pad and Kerlix/Rolled Gauze    Spandagrip Low (10-20mm/Hg) to Right lower leg(s).    Dressing Frequency:Daily       Pressure Relief and Off Loading:    Surgical shoe    Specialty equipment ordered:         []Wheelchair cushion            [] Specialty Bed/Mattress  ______________________________________________________________________________________________                       [x]Activity: Activity as tolerated                    [] Assistive Devices   cane   Use as instructed by the

## 2025-04-10 ENCOUNTER — HOSPITAL ENCOUNTER (OUTPATIENT)
Dept: WOUND CARE | Age: 80
Discharge: HOME | End: 2025-04-10
Attending: STUDENT IN AN ORGANIZED HEALTH CARE EDUCATION/TRAINING PROGRAM
Payer: MEDICARE

## 2025-04-10 VITALS
DIASTOLIC BLOOD PRESSURE: 55 MMHG | HEART RATE: 61 BPM | TEMPERATURE: 97.2 F | SYSTOLIC BLOOD PRESSURE: 147 MMHG | RESPIRATION RATE: 18 BRPM

## 2025-04-10 DIAGNOSIS — L97.412 ULCER OF RIGHT HEEL, WITH FAT LAYER EXPOSED (HCC): ICD-10-CM

## 2025-04-10 DIAGNOSIS — E11.628 TYPE 2 DIABETES MELLITUS WITH DIABETIC FOOT INFECTION (HCC): Primary | ICD-10-CM

## 2025-04-10 DIAGNOSIS — L08.9 TYPE 2 DIABETES MELLITUS WITH DIABETIC FOOT INFECTION (HCC): Primary | ICD-10-CM

## 2025-04-10 PROCEDURE — 11042 DBRDMT SUBQ TIS 1ST 20SQCM/<: CPT

## 2025-04-10 RX ORDER — GINSENG 100 MG
CAPSULE ORAL PRN
OUTPATIENT
Start: 2025-04-10

## 2025-04-10 RX ORDER — TRIAMCINOLONE ACETONIDE 1 MG/G
OINTMENT TOPICAL PRN
OUTPATIENT
Start: 2025-04-10

## 2025-04-10 RX ORDER — SILVER SULFADIAZINE 10 MG/G
CREAM TOPICAL PRN
OUTPATIENT
Start: 2025-04-10

## 2025-04-10 RX ORDER — LIDOCAINE HYDROCHLORIDE 20 MG/ML
JELLY TOPICAL PRN
OUTPATIENT
Start: 2025-04-10

## 2025-04-10 RX ORDER — MUPIROCIN 20 MG/G
OINTMENT TOPICAL PRN
OUTPATIENT
Start: 2025-04-10

## 2025-04-10 RX ORDER — LIDOCAINE 50 MG/G
OINTMENT TOPICAL PRN
OUTPATIENT
Start: 2025-04-10

## 2025-04-10 RX ORDER — BACITRACIN ZINC AND POLYMYXIN B SULFATE 500; 1000 [USP'U]/G; [USP'U]/G
OINTMENT TOPICAL PRN
OUTPATIENT
Start: 2025-04-10

## 2025-04-10 RX ORDER — GENTAMICIN SULFATE 1 MG/G
OINTMENT TOPICAL PRN
OUTPATIENT
Start: 2025-04-10

## 2025-04-10 RX ORDER — CLOBETASOL PROPIONATE 0.5 MG/G
OINTMENT TOPICAL PRN
OUTPATIENT
Start: 2025-04-10

## 2025-04-10 RX ORDER — LIDOCAINE HYDROCHLORIDE 40 MG/ML
SOLUTION TOPICAL PRN
Status: DISCONTINUED | OUTPATIENT
Start: 2025-04-10 | End: 2025-04-11 | Stop reason: HOSPADM

## 2025-04-10 RX ORDER — SODIUM CHLOR/HYPOCHLOROUS ACID 0.033 %
SOLUTION, IRRIGATION IRRIGATION PRN
OUTPATIENT
Start: 2025-04-10

## 2025-04-10 RX ORDER — BETAMETHASONE DIPROPIONATE 0.5 MG/G
CREAM TOPICAL PRN
OUTPATIENT
Start: 2025-04-10

## 2025-04-10 RX ORDER — LIDOCAINE HYDROCHLORIDE 40 MG/ML
SOLUTION TOPICAL PRN
OUTPATIENT
Start: 2025-04-10

## 2025-04-10 RX ORDER — LIDOCAINE 40 MG/G
CREAM TOPICAL PRN
OUTPATIENT
Start: 2025-04-10

## 2025-04-10 RX ORDER — NEOMYCIN/BACITRACIN/POLYMYXINB 3.5-400-5K
OINTMENT (GRAM) TOPICAL PRN
OUTPATIENT
Start: 2025-04-10

## 2025-04-10 RX ADMIN — LIDOCAINE HYDROCHLORIDE: 40 SOLUTION TOPICAL at 15:06

## 2025-04-10 NOTE — PROGRESS NOTES
[facility] Wound Care Center   Progress Note and Procedure Note      Greg Luna  MEDICAL RECORD NUMBER:  [id number]  AGE: 80 y.o.   GENDER: male  : 1945  EPISODE DATE:  4/10/2025    Subjective:     Chief Complaint   Patient presents with    Wound Check     Follow Up on Right Heel         HISTORY of PRESENT ILLNESS HPI     Greg Luna is a 80 y.o. male who presents today for wound/ulcer evaluation.   History of Wound Context: Patient well-known to me presented in my office 2 weeks ago with a new blister that needed to be deroofed he stated that it had been forming for a while and he had tried to drain it at home himself and that it got infected and painful.  I deroofed it in the office and took a culture.  I saw him again last week and the culture had grown nothing and the ulceration was larger and not resolving.  So he was referred here to wound care for a higher level of care.  Patient reports it has been sore.  He is finished with his Keflex as of today.  Has been dressing it with Betadine.     Interim history: Patient reports he has been compliant with dressing changes.  Denies fever, chills, nausea, vomiting.    Wound/Ulcer Pain Timing/Severity: intermittent  Quality of pain: aching  Severity:  3 / 10   Modifying Factors: Pain worsens with shoe gear  Associated Signs/Symptoms: none    Ulcer Identification:  Ulcer Type: diabetic and neuropathic    Contributing Factors: edema, diabetes, and shear force    Acute Wound: N/A    PAST MEDICAL HISTORY        Diagnosis Date    Anxiety     Arthritis     BPH (benign prostatic hyperplasia)     Depression     Diabetes with proteinuria     Disorder of iron metabolism 2010    GBS (Guillain [place] syndrome)     History of colonic polyps     History of colonic polyps     Hyperlipidemia     Hypertension     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

## 2025-04-17 ENCOUNTER — HOSPITAL ENCOUNTER (OUTPATIENT)
Dept: WOUND CARE | Age: 80
Discharge: HOME | End: 2025-04-17
Attending: STUDENT IN AN ORGANIZED HEALTH CARE EDUCATION/TRAINING PROGRAM
Payer: MEDICARE

## 2025-04-17 VITALS
SYSTOLIC BLOOD PRESSURE: 140 MMHG | DIASTOLIC BLOOD PRESSURE: 61 MMHG | HEART RATE: 55 BPM | RESPIRATION RATE: 18 BRPM | TEMPERATURE: 97.3 F

## 2025-04-17 DIAGNOSIS — E11.628 TYPE 2 DIABETES MELLITUS WITH DIABETIC FOOT INFECTION (HCC): Primary | ICD-10-CM

## 2025-04-17 DIAGNOSIS — L97.412 DIABETIC ULCER OF RIGHT HEEL ASSOCIATED WITH DIABETES MELLITUS DUE TO UNDERLYING CONDITION, WITH FAT LAYER EXPOSED: ICD-10-CM

## 2025-04-17 DIAGNOSIS — E08.621 DIABETIC ULCER OF RIGHT HEEL ASSOCIATED WITH DIABETES MELLITUS DUE TO UNDERLYING CONDITION, WITH FAT LAYER EXPOSED: ICD-10-CM

## 2025-04-17 DIAGNOSIS — L08.9 TYPE 2 DIABETES MELLITUS WITH DIABETIC FOOT INFECTION (HCC): Primary | ICD-10-CM

## 2025-04-17 PROCEDURE — 11042 DBRDMT SUBQ TIS 1ST 20SQCM/<: CPT

## 2025-04-17 RX ORDER — TRIAMCINOLONE ACETONIDE 1 MG/G
OINTMENT TOPICAL PRN
OUTPATIENT
Start: 2025-04-17

## 2025-04-17 RX ORDER — GINSENG 100 MG
CAPSULE ORAL PRN
OUTPATIENT
Start: 2025-04-17

## 2025-04-17 RX ORDER — BETAMETHASONE DIPROPIONATE 0.5 MG/G
CREAM TOPICAL PRN
OUTPATIENT
Start: 2025-04-17

## 2025-04-17 RX ORDER — CLOBETASOL PROPIONATE 0.5 MG/G
OINTMENT TOPICAL PRN
OUTPATIENT
Start: 2025-04-17

## 2025-04-17 RX ORDER — SODIUM CHLOR/HYPOCHLOROUS ACID 0.033 %
SOLUTION, IRRIGATION IRRIGATION PRN
OUTPATIENT
Start: 2025-04-17

## 2025-04-17 RX ORDER — NEOMYCIN/BACITRACIN/POLYMYXINB 3.5-400-5K
OINTMENT (GRAM) TOPICAL PRN
OUTPATIENT
Start: 2025-04-17

## 2025-04-17 RX ORDER — MUPIROCIN 20 MG/G
OINTMENT TOPICAL PRN
OUTPATIENT
Start: 2025-04-17

## 2025-04-17 RX ORDER — LIDOCAINE HYDROCHLORIDE 40 MG/ML
SOLUTION TOPICAL PRN
OUTPATIENT
Start: 2025-04-17

## 2025-04-17 RX ORDER — LIDOCAINE HYDROCHLORIDE 40 MG/ML
SOLUTION TOPICAL PRN
Status: DISCONTINUED | OUTPATIENT
Start: 2025-04-17 | End: 2025-04-18 | Stop reason: HOSPADM

## 2025-04-17 RX ORDER — BACITRACIN ZINC AND POLYMYXIN B SULFATE 500; 1000 [USP'U]/G; [USP'U]/G
OINTMENT TOPICAL PRN
OUTPATIENT
Start: 2025-04-17

## 2025-04-17 RX ORDER — GENTAMICIN SULFATE 1 MG/G
OINTMENT TOPICAL PRN
OUTPATIENT
Start: 2025-04-17

## 2025-04-17 RX ORDER — LIDOCAINE HYDROCHLORIDE 20 MG/ML
JELLY TOPICAL PRN
OUTPATIENT
Start: 2025-04-17

## 2025-04-17 RX ORDER — LIDOCAINE 40 MG/G
CREAM TOPICAL PRN
OUTPATIENT
Start: 2025-04-17

## 2025-04-17 RX ORDER — LIDOCAINE 50 MG/G
OINTMENT TOPICAL PRN
OUTPATIENT
Start: 2025-04-17

## 2025-04-17 RX ORDER — SILVER SULFADIAZINE 10 MG/G
CREAM TOPICAL PRN
OUTPATIENT
Start: 2025-04-17

## 2025-04-17 RX ADMIN — LIDOCAINE HYDROCHLORIDE: 40 SOLUTION TOPICAL at 14:57

## 2025-04-17 NOTE — PATIENT INSTRUCTIONS
[facility] Wound Care Physician Orders and Discharge Instructions  [address], [address]        [place], [place] [address]  Telephone: [phone]      FAX [phone]  MONDAY - THURSDAY 8:30 am - 4:30 pm and Friday 8:30 am - 11:30 am      NAME:  Greg Luna  YOB: 1945  MEDICAL RECORD NUMBER:  [id number]  DATE:  4/17/2025      Return Appointment:  [x] Return Appointment: With  Dr. Karen Edwards in  1  Week(s)                  [] Nurse Visit for Wound Assessment:  [] DME/Wound Dressing Supplies Provided by: Other n/a     Please call them directly to reorder supplies when you run out)  [] ECF or Home Healthcare:  Other: n/a  Your Home Care Agency is responsible to order your supplies.     [] Orders placed during your visit:                     Important Reminders:   Please wash hands with soap and water before and after every dressing change.    IF YOU ARE UNABLE TO OBTAIN WOUND SUPPLIES, CONTINUE TO USE THE SUPPLIES YOU HAVE AVAILABLE UNTIL YOU ARE ABLE TO REACH US. IT IS MOST IMPORTANT TO KEEP THE WOUND COVERED AT ALL TIMES.  ___________________________________________________________________  Wound Location: Right  Heel    WOUND CLEANSING: n/a    PRIMARY DRESSING: ABHINAV and lightly moisten with saline                                                       2 kidney bean to surround wound and protect posterior ankle    SECONDARY DRESSING: 4X4 gauze pad and Kerlix/Rolled Gauze    Spandagrip Low (10-20mm/Hg) to Right lower leg(s).    Dressing Frequency: EVERY OTHER DAY       Pressure Relief and Off Loading:    Surgical shoe    Specialty equipment ordered:         []Wheelchair cushion            [] Specialty Bed/Mattress  ______________________________________________________________________________________________                       [x]Activity: Activity as tolerated                    [] Assistive Devices   cane   Use as instructed by the

## 2025-04-17 NOTE — PROGRESS NOTES
Post-Procedure Width (cm) 0.6 cm 04/17/25 1504   Post-Procedure Depth (cm) 0.3 cm 04/17/25 1504   Post-Procedure Surface Area (cm^2) 0.3 cm^2 04/17/25 1504   Post-Procedure Volume (cm^3) 0.09 cm^3 04/17/25 1504   Wound Assessment Granulation tissue;Slough 04/17/25 1452   Drainage Amount Moderate (25-50%) 04/17/25 1452   Drainage Description Serous;Yellow 04/17/25 1452   Odor None 04/17/25 1452   Shruti-wound Assessment Hyperkeratosis (callous) 04/17/25 1452   Margins Attached edges 04/17/25 1452   Wound Thickness Description not for Pressure Injury Full thickness 04/17/25 1452   Number of days: 34     Incision 07/26/24 Toe (Comment  which one) Left (Active)   Number of days: 264       Total Surface Area Debrided:  0.3 sq cm     Estimated Blood Loss:  Minimal    Hemostasis Achieved:  by pressure    Procedural Pain:  1  / 10     Post Procedural Pain:  0 / 10     Response to treatment:  Well tolerated by patient.       Plan:   Ulceration is much smaller than last week, will continue with same dressings and offloading pads.  Treatment Note please see attached Discharge Instructions    Written patient dismissal instructions given to patient and signed by patient or POA.           Patient Instructions     [facility] Wound Care Physician Orders and Discharge Instructions  [address], [address]        [place] [address]  Telephone: [phone]      FAX [phone]  MONDAY - THURSDAY 8:30 am - 4:30 pm and Friday 8:30 am - 11:30 am      NAME:  Greg Luna  YOB: 1945  MEDICAL RECORD NUMBER:  [id number]  DATE:  4/17/2025      Return Appointment:  [x] Return Appointment: With  Dr. Karen Edwards in  1  Week(s)                  [] Nurse Visit for Wound Assessment:  [] DME/Wound Dressing Supplies Provided by: Other n/a     Please call them directly to reorder supplies when you run out)  [] ECF or Home Healthcare:  Other: n/a  Your Home Care Agency is responsible to order your supplies.

## 2025-04-24 ENCOUNTER — HOSPITAL ENCOUNTER (OUTPATIENT)
Dept: WOUND CARE | Age: 80
Discharge: HOME | End: 2025-04-24
Attending: STUDENT IN AN ORGANIZED HEALTH CARE EDUCATION/TRAINING PROGRAM
Payer: MEDICARE

## 2025-04-24 VITALS
TEMPERATURE: 98.6 F | HEART RATE: 52 BPM | SYSTOLIC BLOOD PRESSURE: 123 MMHG | DIASTOLIC BLOOD PRESSURE: 50 MMHG | RESPIRATION RATE: 18 BRPM

## 2025-04-24 DIAGNOSIS — L97.412 ULCER OF RIGHT HEEL, WITH FAT LAYER EXPOSED (HCC): ICD-10-CM

## 2025-04-24 DIAGNOSIS — E11.628 TYPE 2 DIABETES MELLITUS WITH DIABETIC FOOT INFECTION (HCC): Primary | ICD-10-CM

## 2025-04-24 DIAGNOSIS — L08.9 TYPE 2 DIABETES MELLITUS WITH DIABETIC FOOT INFECTION (HCC): Primary | ICD-10-CM

## 2025-04-24 PROCEDURE — 11042 DBRDMT SUBQ TIS 1ST 20SQCM/<: CPT

## 2025-04-24 RX ORDER — SILVER SULFADIAZINE 10 MG/G
CREAM TOPICAL PRN
OUTPATIENT
Start: 2025-04-24

## 2025-04-24 RX ORDER — BETAMETHASONE DIPROPIONATE 0.5 MG/G
CREAM TOPICAL PRN
OUTPATIENT
Start: 2025-04-24

## 2025-04-24 RX ORDER — MUPIROCIN 20 MG/G
OINTMENT TOPICAL PRN
OUTPATIENT
Start: 2025-04-24

## 2025-04-24 RX ORDER — NEOMYCIN/BACITRACIN/POLYMYXINB 3.5-400-5K
OINTMENT (GRAM) TOPICAL PRN
OUTPATIENT
Start: 2025-04-24

## 2025-04-24 RX ORDER — LIDOCAINE HYDROCHLORIDE 40 MG/ML
SOLUTION TOPICAL PRN
OUTPATIENT
Start: 2025-04-24

## 2025-04-24 RX ORDER — GENTAMICIN SULFATE 1 MG/G
OINTMENT TOPICAL PRN
OUTPATIENT
Start: 2025-04-24

## 2025-04-24 RX ORDER — LIDOCAINE 50 MG/G
OINTMENT TOPICAL PRN
OUTPATIENT
Start: 2025-04-24

## 2025-04-24 RX ORDER — LIDOCAINE HYDROCHLORIDE 40 MG/ML
SOLUTION TOPICAL PRN
Status: DISCONTINUED | OUTPATIENT
Start: 2025-04-24 | End: 2025-04-25 | Stop reason: HOSPADM

## 2025-04-24 RX ORDER — CLOBETASOL PROPIONATE 0.5 MG/G
OINTMENT TOPICAL PRN
OUTPATIENT
Start: 2025-04-24

## 2025-04-24 RX ORDER — TRIAMCINOLONE ACETONIDE 1 MG/G
OINTMENT TOPICAL PRN
OUTPATIENT
Start: 2025-04-24

## 2025-04-24 RX ORDER — SODIUM CHLOR/HYPOCHLOROUS ACID 0.033 %
SOLUTION, IRRIGATION IRRIGATION PRN
OUTPATIENT
Start: 2025-04-24

## 2025-04-24 RX ORDER — BACITRACIN ZINC AND POLYMYXIN B SULFATE 500; 1000 [USP'U]/G; [USP'U]/G
OINTMENT TOPICAL PRN
OUTPATIENT
Start: 2025-04-24

## 2025-04-24 RX ORDER — GINSENG 100 MG
CAPSULE ORAL PRN
OUTPATIENT
Start: 2025-04-24

## 2025-04-24 RX ORDER — LIDOCAINE HYDROCHLORIDE 20 MG/ML
JELLY TOPICAL PRN
OUTPATIENT
Start: 2025-04-24

## 2025-04-24 RX ORDER — LIDOCAINE 40 MG/G
CREAM TOPICAL PRN
OUTPATIENT
Start: 2025-04-24

## 2025-04-24 NOTE — PROGRESS NOTES
[facility] Wound Care Center   Progress Note and Procedure Note      Greg Luna  MEDICAL RECORD NUMBER:  [id number]  AGE: 80 y.o.   GENDER: male  : 1945  EPISODE DATE:  2025    Subjective:     Chief Complaint   Patient presents with    Wound Check     Follow up wound Right Foot           HISTORY of PRESENT ILLNESS HPI     Greg Luna is a 80 y.o. male who presents today for wound/ulcer evaluation.   History of Wound Context: Patient well-known to me presented in my office 2 weeks ago with a new blister that needed to be deroofed he stated that it had been forming for a while and he had tried to drain it at home himself and that it got infected and painful.  I deroofed it in the office and took a culture.  I saw him again last week and the culture had grown nothing and the ulceration was larger and not resolving.  So he was referred here to wound care for a higher level of care.  Patient reports it has been sore.  He is finished with his Keflex as of today.  Has been dressing it with Betadine.     Interim history: Patient reports he has been compliant with dressing changes.  Denies fever, chills, nausea, vomiting.    Wound/Ulcer Pain Timing/Severity: intermittent  Quality of pain: aching  Severity:  3 / 10   Modifying Factors: Pain worsens with shoe gear  Associated Signs/Symptoms: none    Ulcer Identification:  Ulcer Type: diabetic and neuropathic    Contributing Factors: edema, diabetes, and shear force    Acute Wound: N/A    PAST MEDICAL HISTORY        Diagnosis Date    Anxiety     Arthritis     BPH (benign prostatic hyperplasia)     Depression     Diabetes with proteinuria     Disorder of iron metabolism 2010    GBS (Guillain [place] syndrome)     History of colonic polyps     History of colonic polyps     Hyperlipidemia     Hypertension     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

## 2025-04-24 NOTE — PATIENT INSTRUCTIONS
[facility] Wound Care Physician Orders and Discharge Instructions  [address]        [place], [place] [address]  Telephone: [phone]      FAX [phone]  MONDAY - THURSDAY 8:30 am - 4:30 pm and Friday 8:30 am - 11:30 am      NAME:  Greg Luna  YOB: 1945  MEDICAL RECORD NUMBER:  [id number]  DATE:  4/24/2025      Return Appointment:  [x] Return Appointment: With  Dr. Karen Edwards in  2  Week(s)                  [] Nurse Visit for Wound Assessment:  [] DME/Wound Dressing Supplies Provided by: Other n/a     Please call them directly to reorder supplies when you run out)  [] ECF or Home Healthcare:  Other: n/a  Your Home Care Agency is responsible to order your supplies.     [] Orders placed during your visit:                     Important Reminders:   Please wash hands with soap and water before and after every dressing change.    IF YOU ARE UNABLE TO OBTAIN WOUND SUPPLIES, CONTINUE TO USE THE SUPPLIES YOU HAVE AVAILABLE UNTIL YOU ARE ABLE TO REACH US. IT IS MOST IMPORTANT TO KEEP THE WOUND COVERED AT ALL TIMES.  ___________________________________________________________________  Wound Location: Right  Heel    WOUND CLEANSING: n/a    PRIMARY DRESSING: ABHINAV and lightly moisten with saline                                                       2 kidney bean to surround wound and protect posterior ankle    SECONDARY DRESSING: 4X4 gauze pad and Kerlix/Rolled Gauze    Spandagrip Low (10-20mm/Hg) to Right lower leg(s).    Dressing Frequency: EVERY OTHER DAY       Pressure Relief and Off Loading:    Surgical shoe    Specialty equipment ordered:         []Wheelchair cushion            [] Specialty Bed/Mattress  ______________________________________________________________________________________________                       [x]Activity: Activity as tolerated                    [] Assistive Devices   cane   Use as instructed by the

## 2025-04-28 ENCOUNTER — OFFICE VISIT (OUTPATIENT)
Dept: ORTHOPEDIC SURGERY | Age: 80
End: 2025-04-28
Payer: MEDICARE

## 2025-04-28 VITALS — BODY MASS INDEX: 38.51 KG/M2 | WEIGHT: 260 LBS | HEIGHT: 69 IN

## 2025-04-28 DIAGNOSIS — M17.11 PRIMARY OSTEOARTHRITIS OF RIGHT KNEE: ICD-10-CM

## 2025-04-28 DIAGNOSIS — M17.12 PRIMARY OSTEOARTHRITIS OF LEFT KNEE: Primary | ICD-10-CM

## 2025-04-28 PROCEDURE — G8417 CALC BMI ABV UP PARAM F/U: HCPCS | Performed by: PHYSICIAN ASSISTANT

## 2025-04-28 PROCEDURE — 99213 OFFICE O/P EST LOW 20 MIN: CPT | Performed by: PHYSICIAN ASSISTANT

## 2025-04-28 PROCEDURE — G8427 DOCREV CUR MEDS BY ELIG CLIN: HCPCS | Performed by: PHYSICIAN ASSISTANT

## 2025-04-28 PROCEDURE — 20610 DRAIN/INJ JOINT/BURSA W/O US: CPT | Performed by: PHYSICIAN ASSISTANT

## 2025-04-28 PROCEDURE — 1036F TOBACCO NON-USER: CPT | Performed by: PHYSICIAN ASSISTANT

## 2025-04-28 PROCEDURE — 1125F AMNT PAIN NOTED PAIN PRSNT: CPT | Performed by: PHYSICIAN ASSISTANT

## 2025-04-28 PROCEDURE — 1159F MED LIST DOCD IN RCRD: CPT | Performed by: PHYSICIAN ASSISTANT

## 2025-04-28 PROCEDURE — 1123F ACP DISCUSS/DSCN MKR DOCD: CPT | Performed by: PHYSICIAN ASSISTANT

## 2025-04-28 RX ORDER — TRIAMCINOLONE ACETONIDE 40 MG/ML
40 INJECTION, SUSPENSION INTRA-ARTICULAR; INTRAMUSCULAR ONCE
Status: COMPLETED | OUTPATIENT
Start: 2025-04-28 | End: 2025-04-28

## 2025-04-28 RX ORDER — BUPIVACAINE HYDROCHLORIDE 2.5 MG/ML
2 INJECTION, SOLUTION INFILTRATION; PERINEURAL ONCE
Status: COMPLETED | OUTPATIENT
Start: 2025-04-28 | End: 2025-04-28

## 2025-04-28 RX ADMIN — BUPIVACAINE HYDROCHLORIDE 5 MG: 2.5 INJECTION, SOLUTION INFILTRATION; PERINEURAL at 09:03

## 2025-04-28 RX ADMIN — BUPIVACAINE HYDROCHLORIDE 5 MG: 2.5 INJECTION, SOLUTION INFILTRATION; PERINEURAL at 09:02

## 2025-04-28 RX ADMIN — TRIAMCINOLONE ACETONIDE 40 MG: 40 INJECTION, SUSPENSION INTRA-ARTICULAR; INTRAMUSCULAR at 09:03

## 2025-04-28 NOTE — PROGRESS NOTES
This dictation was done with [employer] dictation and may contain mechanical errors related to translation.  Height 1.753 m (5' 9\"), weight 117.9 kg (260 lb).    This is a pleasant 80-year-old gentleman who is doing fairly well with cortisone injections and recently had a Durolane injection in his knees towards the end of last year in the beginning of this year.  This patient is here in follow-up for ongoing pain and dysfunction in their knees. There x-rays done previously showed joint space narrowing subchondral sclerosis with osteophyte formation. They currently have had relief with injections of cortisone, anti-inflammatories and a home exercise program. There are here with increased pain over the last few days with swelling and dysfunction.  They noticed that there was some increasing pain and and swelling and disability over the last 7 to 10 days especially.  This increase led to them making an appointment.    On examination this is a well-developed patient in no acute distress. They are alert and oriented ×3. They walk without antalgia or any significant varus or valgus deformity. They have crepitus during flexion and extension in the patellofemoral joint. They have a negative Lachman and a negative Cole. There are good distal pulses and good dorsiflexion and plantarflexion strength present    My impression is bilateral knee osteoarthritis    After a discussion of the multiple options, they consented to a cortisone shot. 1 ml of 40mg/ml Kenalog and 2 ml's of 0.25%Marcaine were injected into both the right and the left knee joint spaces.  The leg was slightly flexed and injected just lateral to the patella tendon to under the patella. This was done with sterile technique and they tolerated it well.  During today's visit, there was approximately 20 minutes of face-to-face discussion in regards to the patient's current condition and treatment options.More than 50 % of the time was counseling and coordination of

## 2025-05-06 DIAGNOSIS — N18.31 TYPE 2 DIABETES MELLITUS WITH STAGE 3A CHRONIC KIDNEY DISEASE, WITH LONG-TERM CURRENT USE OF INSULIN (HCC): ICD-10-CM

## 2025-05-06 DIAGNOSIS — E11.22 TYPE 2 DIABETES MELLITUS WITH STAGE 3A CHRONIC KIDNEY DISEASE, WITH LONG-TERM CURRENT USE OF INSULIN (HCC): ICD-10-CM

## 2025-05-06 DIAGNOSIS — Z79.4 TYPE 2 DIABETES MELLITUS WITH STAGE 3A CHRONIC KIDNEY DISEASE, WITH LONG-TERM CURRENT USE OF INSULIN (HCC): ICD-10-CM

## 2025-05-06 RX ORDER — INSULIN LISPRO 100 [IU]/ML
INJECTION, SOLUTION INTRAVENOUS; SUBCUTANEOUS
Qty: 105 ML | Refills: 3 | Status: SHIPPED | OUTPATIENT
Start: 2025-05-06

## 2025-05-06 NOTE — TELEPHONE ENCOUNTER
Future Appointments   Date Time Provider Department Center   5/8/2025  2:45 PM Karen Edwards DPM WS WOUND [place] [facility]   5/12/2025 11:30 AM Talib Wellington MD [facility] BS ECC DEP   6/16/2025  9:00 AM W[facility] VAS LAB 3 [facility] [place] [facility]   7/7/2025 11:00 AM Get Boyd MD [facility]   7/24/2025  1:15 PM Giovani De Luna MD [facility]   8/4/2025 10:30 AM Juan Pablo Summers MD [facility]   8/11/2025 10:30 AM Juan Pablo Summers MD [facility]   8/18/2025 10:30 AM Juan Pablo Summers MD [facility]   10/27/2025 10:40 AM Ceferino Murcia MD  P[facility]       Last appt 2/10/25

## 2025-05-08 ENCOUNTER — HOSPITAL ENCOUNTER (OUTPATIENT)
Dept: WOUND CARE | Age: 80
Discharge: HOME | End: 2025-05-08
Attending: STUDENT IN AN ORGANIZED HEALTH CARE EDUCATION/TRAINING PROGRAM
Payer: MEDICARE

## 2025-05-08 VITALS
TEMPERATURE: 98.2 F | HEART RATE: 58 BPM | RESPIRATION RATE: 18 BRPM | DIASTOLIC BLOOD PRESSURE: 65 MMHG | SYSTOLIC BLOOD PRESSURE: 134 MMHG

## 2025-05-08 DIAGNOSIS — L97.412 ULCER OF RIGHT HEEL, WITH FAT LAYER EXPOSED (HCC): ICD-10-CM

## 2025-05-08 DIAGNOSIS — L08.9 TYPE 2 DIABETES MELLITUS WITH DIABETIC FOOT INFECTION (HCC): Primary | ICD-10-CM

## 2025-05-08 DIAGNOSIS — E11.628 TYPE 2 DIABETES MELLITUS WITH DIABETIC FOOT INFECTION (HCC): Primary | ICD-10-CM

## 2025-05-08 PROCEDURE — 99212 OFFICE O/P EST SF 10 MIN: CPT

## 2025-05-08 RX ORDER — NEOMYCIN/BACITRACIN/POLYMYXINB 3.5-400-5K
OINTMENT (GRAM) TOPICAL PRN
OUTPATIENT
Start: 2025-05-08

## 2025-05-08 RX ORDER — MUPIROCIN 20 MG/G
OINTMENT TOPICAL PRN
OUTPATIENT
Start: 2025-05-08

## 2025-05-08 RX ORDER — LIDOCAINE 50 MG/G
OINTMENT TOPICAL PRN
OUTPATIENT
Start: 2025-05-08

## 2025-05-08 RX ORDER — GINSENG 100 MG
CAPSULE ORAL PRN
OUTPATIENT
Start: 2025-05-08

## 2025-05-08 RX ORDER — BACITRACIN ZINC AND POLYMYXIN B SULFATE 500; 1000 [USP'U]/G; [USP'U]/G
OINTMENT TOPICAL PRN
OUTPATIENT
Start: 2025-05-08

## 2025-05-08 RX ORDER — LIDOCAINE HYDROCHLORIDE 40 MG/ML
SOLUTION TOPICAL PRN
Status: DISCONTINUED | OUTPATIENT
Start: 2025-05-08 | End: 2025-05-09 | Stop reason: HOSPADM

## 2025-05-08 RX ORDER — LIDOCAINE HYDROCHLORIDE 20 MG/ML
JELLY TOPICAL PRN
OUTPATIENT
Start: 2025-05-08

## 2025-05-08 RX ORDER — SILVER SULFADIAZINE 10 MG/G
CREAM TOPICAL PRN
OUTPATIENT
Start: 2025-05-08

## 2025-05-08 RX ORDER — BETAMETHASONE DIPROPIONATE 0.5 MG/G
CREAM TOPICAL PRN
OUTPATIENT
Start: 2025-05-08

## 2025-05-08 RX ORDER — CLOBETASOL PROPIONATE 0.5 MG/G
OINTMENT TOPICAL PRN
OUTPATIENT
Start: 2025-05-08

## 2025-05-08 RX ORDER — SODIUM CHLOR/HYPOCHLOROUS ACID 0.033 %
SOLUTION, IRRIGATION IRRIGATION PRN
OUTPATIENT
Start: 2025-05-08

## 2025-05-08 RX ORDER — LIDOCAINE HYDROCHLORIDE 40 MG/ML
SOLUTION TOPICAL PRN
OUTPATIENT
Start: 2025-05-08

## 2025-05-08 RX ORDER — TRIAMCINOLONE ACETONIDE 1 MG/G
OINTMENT TOPICAL PRN
OUTPATIENT
Start: 2025-05-08

## 2025-05-08 RX ORDER — LIDOCAINE 40 MG/G
CREAM TOPICAL PRN
OUTPATIENT
Start: 2025-05-08

## 2025-05-08 RX ORDER — GENTAMICIN SULFATE 1 MG/G
OINTMENT TOPICAL PRN
OUTPATIENT
Start: 2025-05-08

## 2025-05-08 RX ADMIN — LIDOCAINE HYDROCHLORIDE: 40 SOLUTION TOPICAL at 14:59

## 2025-05-08 ASSESSMENT — PAIN SCALES - GENERAL: PAINLEVEL_OUTOF10: 0

## 2025-05-08 NOTE — PROGRESS NOTES
[facility]   Progress Note and Procedure Note      Greg Luna  MEDICAL RECORD NUMBER:  [id number]  AGE: 80 y.o.   GENDER: male  : 1945  EPISODE DATE:  2025    Subjective:     Chief Complaint   Patient presents with    Wound Check     Follow up visit for right heel wound         HISTORY of PRESENT ILLNESS HPI     Greg Luna is a 80 y.o. male who presents today for wound/ulcer evaluation.   History of Wound Context: Patient well-known to me presented in my office 2 weeks ago with a new blister that needed to be deroofed he stated that it had been forming for a while and he had tried to drain it at home himself and that it got infected and painful.  I deroofed it in the office and took a culture.  I saw him again last week and the culture had grown nothing and the ulceration was larger and not resolving.  So he was referred here to wound care for a higher level of care.  Patient reports it has been sore.  He is finished with his Keflex as of today.  Has been dressing it with Betadine.     Interim history: Patient reports he has been compliant with dressing changes.  Denies fever, chills, nausea, vomiting.    Wound/Ulcer Pain Timing/Severity: intermittent  Quality of pain: aching  Severity:  3 / 10   Modifying Factors: Pain worsens with shoe gear  Associated Signs/Symptoms: none    Ulcer Identification:  Ulcer Type: diabetic and neuropathic    Contributing Factors: edema, diabetes, and shear force    Acute Wound: N/A    PAST MEDICAL HISTORY        Diagnosis Date    Anxiety     Arthritis     BPH (benign prostatic hyperplasia)     Depression     Diabetes with proteinuria     Disorder of iron metabolism 2010    GBS (Guillain [place] syndrome)     History of colonic polyps     History of colonic polyps     Hyperlipidemia     Hypertension     Liver cirrhosis secondary to WYLIE (HCC)     Long term (current) use of insulin (HCC) 10/10/2019    Obesity     Presence

## 2025-05-12 ENCOUNTER — OFFICE VISIT (OUTPATIENT)
Dept: INTERNAL MEDICINE CLINIC | Age: 80
End: 2025-05-12
Payer: MEDICARE

## 2025-05-12 VITALS
WEIGHT: 263 LBS | OXYGEN SATURATION: 96 % | DIASTOLIC BLOOD PRESSURE: 60 MMHG | BODY MASS INDEX: 38.84 KG/M2 | SYSTOLIC BLOOD PRESSURE: 122 MMHG | HEART RATE: 54 BPM

## 2025-05-12 DIAGNOSIS — E11.22 TYPE 2 DIABETES MELLITUS WITH STAGE 2 CHRONIC KIDNEY DISEASE, WITH LONG-TERM CURRENT USE OF INSULIN (HCC): ICD-10-CM

## 2025-05-12 DIAGNOSIS — N18.31 TYPE 2 DIABETES MELLITUS WITH STAGE 3A CHRONIC KIDNEY DISEASE, WITH LONG-TERM CURRENT USE OF INSULIN (HCC): ICD-10-CM

## 2025-05-12 DIAGNOSIS — Z79.4 TYPE 2 DIABETES MELLITUS WITH STAGE 3A CHRONIC KIDNEY DISEASE, WITH LONG-TERM CURRENT USE OF INSULIN (HCC): ICD-10-CM

## 2025-05-12 DIAGNOSIS — G47.33 OSA (OBSTRUCTIVE SLEEP APNEA): ICD-10-CM

## 2025-05-12 DIAGNOSIS — N32.81 OVERACTIVE BLADDER: ICD-10-CM

## 2025-05-12 DIAGNOSIS — E11.22 TYPE 2 DIABETES MELLITUS WITH STAGE 3A CHRONIC KIDNEY DISEASE, WITH LONG-TERM CURRENT USE OF INSULIN (HCC): ICD-10-CM

## 2025-05-12 DIAGNOSIS — I10 ESSENTIAL HYPERTENSION: Primary | ICD-10-CM

## 2025-05-12 DIAGNOSIS — I25.10 ATHEROSCLEROSIS OF CORONARY ARTERY OF NATIVE HEART WITHOUT ANGINA PECTORIS, UNSPECIFIED VESSEL OR LESION TYPE: ICD-10-CM

## 2025-05-12 DIAGNOSIS — Z79.4 TYPE 2 DIABETES MELLITUS WITH STAGE 2 CHRONIC KIDNEY DISEASE, WITH LONG-TERM CURRENT USE OF INSULIN (HCC): ICD-10-CM

## 2025-05-12 DIAGNOSIS — L97.402 DIABETIC ULCER OF HEEL ASSOCIATED WITH DIABETES MELLITUS DUE TO UNDERLYING CONDITION, WITH FAT LAYER EXPOSED, UNSPECIFIED LATERALITY (HCC): ICD-10-CM

## 2025-05-12 DIAGNOSIS — R32 URINARY INCONTINENCE, UNSPECIFIED TYPE: ICD-10-CM

## 2025-05-12 DIAGNOSIS — E08.621 DIABETIC ULCER OF HEEL ASSOCIATED WITH DIABETES MELLITUS DUE TO UNDERLYING CONDITION, WITH FAT LAYER EXPOSED, UNSPECIFIED LATERALITY (HCC): ICD-10-CM

## 2025-05-12 DIAGNOSIS — N18.2 TYPE 2 DIABETES MELLITUS WITH STAGE 2 CHRONIC KIDNEY DISEASE, WITH LONG-TERM CURRENT USE OF INSULIN (HCC): ICD-10-CM

## 2025-05-12 LAB — HBA1C MFR BLD: 7.7 %

## 2025-05-12 PROCEDURE — 83036 HEMOGLOBIN GLYCOSYLATED A1C: CPT | Performed by: STUDENT IN AN ORGANIZED HEALTH CARE EDUCATION/TRAINING PROGRAM

## 2025-05-12 PROCEDURE — 3078F DIAST BP <80 MM HG: CPT | Performed by: STUDENT IN AN ORGANIZED HEALTH CARE EDUCATION/TRAINING PROGRAM

## 2025-05-12 PROCEDURE — G8417 CALC BMI ABV UP PARAM F/U: HCPCS | Performed by: STUDENT IN AN ORGANIZED HEALTH CARE EDUCATION/TRAINING PROGRAM

## 2025-05-12 PROCEDURE — 3074F SYST BP LT 130 MM HG: CPT | Performed by: STUDENT IN AN ORGANIZED HEALTH CARE EDUCATION/TRAINING PROGRAM

## 2025-05-12 PROCEDURE — G8427 DOCREV CUR MEDS BY ELIG CLIN: HCPCS | Performed by: STUDENT IN AN ORGANIZED HEALTH CARE EDUCATION/TRAINING PROGRAM

## 2025-05-12 PROCEDURE — 1036F TOBACCO NON-USER: CPT | Performed by: STUDENT IN AN ORGANIZED HEALTH CARE EDUCATION/TRAINING PROGRAM

## 2025-05-12 PROCEDURE — 3051F HG A1C>EQUAL 7.0%<8.0%: CPT | Performed by: STUDENT IN AN ORGANIZED HEALTH CARE EDUCATION/TRAINING PROGRAM

## 2025-05-12 PROCEDURE — 1123F ACP DISCUSS/DSCN MKR DOCD: CPT | Performed by: STUDENT IN AN ORGANIZED HEALTH CARE EDUCATION/TRAINING PROGRAM

## 2025-05-12 PROCEDURE — 99213 OFFICE O/P EST LOW 20 MIN: CPT | Performed by: STUDENT IN AN ORGANIZED HEALTH CARE EDUCATION/TRAINING PROGRAM

## 2025-05-12 PROCEDURE — 1159F MED LIST DOCD IN RCRD: CPT | Performed by: STUDENT IN AN ORGANIZED HEALTH CARE EDUCATION/TRAINING PROGRAM

## 2025-05-12 RX ORDER — MIRABEGRON 50 MG/1
50 TABLET, FILM COATED, EXTENDED RELEASE ORAL DAILY
Qty: 30 TABLET | Refills: 5 | Status: SHIPPED | OUTPATIENT
Start: 2025-05-12

## 2025-05-12 RX ORDER — GABAPENTIN 600 MG/1
600 TABLET ORAL DAILY
COMMUNITY
Start: 2025-03-12

## 2025-05-12 NOTE — ASSESSMENT & PLAN NOTE
T2DM: Patient with history of type 2  diabetes.Patient's last a1c was   Hemoglobin A1C   Date Value Ref Range Status   05/12/2025 7.7 % Final   . Patient is compliant with   Key Antihyperglycemic Medications              HUMALOG KWIKPEN 100 UNIT/ML SOPN (Taking) INJECT 35 UNITS SUBCUTANEOUSLY 3 TIMES DAILY BEFORE MEALS    empagliflozin (JARDIANCE) 10 MG tablet (Taking) Take 1 tablet by mouth daily    metFORMIN (GLUCOPHAGE) 500 MG tablet (Taking) Take 1 tablet by mouth 2 times daily    LANTUS SOLOSTAR 100 UNIT/ML injection pen (Taking) INJECT SUBCUTANEOUSLY 100U EVERY NIGHT              Orders:    POCT glycosylated hemoglobin (Hb A1C)

## 2025-05-12 NOTE — PROGRESS NOTES
Greg Luna is a 80 y.o. male with a past medical history noted below who presents for routine follow up on chronic conditions and discussion of preventative health care.  Chief Complaint   Patient presents with    Follow-up     Patient is feeling ok today. He reports that he started finasteride since last visit, but reports it did not help. He reports incontinence. He does take oxybutynin that has helped. He has stopped taking finasteride after 3 months. He also take tamsulosin.         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 with mild nonproliferative diabetic retinopathy without macular edema, bilateral ([facility]) 10/10/2019    Type 2 diabetes mellitus with proteinuria ([facility]) 11/19/2019    Type II or unspecified type diabetes mellitus without mention of complication, not stated as uncontrolled     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 (HCC)    KIMMIE (obstructive sleep apnea)    Essential hypertension    Uncontrolled type 2 diabetes mellitus with hyperglycemia, with long-term current use of insulin ([facility])    Mixed hyperlipidemia    Psychophysiological insomnia    Major depressive disorder with single episode, in partial remission    Coronary atherosclerosis    Morbid obesity with BMI of 40.0-44.9, adult ([facility])    Sleep paralysis

## 2025-05-13 RX ORDER — PEN NEEDLE, DIABETIC 31 GX5/16"
NEEDLE, DISPOSABLE MISCELLANEOUS
Qty: 100 EACH | Refills: 1 | Status: SHIPPED | OUTPATIENT
Start: 2025-05-13

## 2025-06-16 ENCOUNTER — RESULTS FOLLOW-UP (OUTPATIENT)
Dept: CARDIOLOGY CLINIC | Age: 80
End: 2025-06-16

## 2025-06-16 ENCOUNTER — HOSPITAL ENCOUNTER (OUTPATIENT)
Dept: VASCULAR LAB | Age: 80
Discharge: HOME | End: 2025-06-18
Attending: STUDENT IN AN ORGANIZED HEALTH CARE EDUCATION/TRAINING PROGRAM
Payer: MEDICARE

## 2025-06-16 DIAGNOSIS — I73.9 PAD (PERIPHERAL ARTERY DISEASE): ICD-10-CM

## 2025-06-16 LAB
VAS LEFT ABI: 1.04
VAS LEFT ATA DIST PSV: 39 CM/S
VAS LEFT ATA MID PSV: 0 CM/S
VAS LEFT ATA PROX PSV: 0 CM/S
VAS LEFT CFA DIST PSV: 141.1 CM/S
VAS LEFT CFA PROX PSV: 128.2 CM/S
VAS LEFT DORSALIS PEDIS BP: 124 MMHG
VAS LEFT PERONEAL MID PSV: 0 CM/S
VAS LEFT PFA PROX PSV: 121 CM/S
VAS LEFT POP A DIST PSV: 67.9 CM/S
VAS LEFT POP A PROX PSV: 76.5 CM/S
VAS LEFT POP A PROX VEL RATIO: 0.79
VAS LEFT PTA BP: 144 MMHG
VAS LEFT PTA DIST PSV: 128 CM/S
VAS LEFT PTA MID PSV: 144 CM/S
VAS LEFT PTA PROX PSV: 111 CM/S
VAS LEFT SFA DIST PSV: 97.3 CM/S
VAS LEFT SFA DIST VEL RATIO: 0.73
VAS LEFT SFA MID PSV: 133.7 CM/S
VAS LEFT SFA MID VEL RATIO: 1.03
VAS LEFT SFA PROX PSV: 130.1 CM/S
VAS LEFT SFA PROX VEL RATIO: 0.92
VAS RIGHT ABI: 1.06
VAS RIGHT ARM BP: 138 MMHG
VAS RIGHT ATA DIST PSV: 71 CM/S
VAS RIGHT ATA MID PSV: 0 CM/S
VAS RIGHT ATA PROX PSV: 0 CM/S
VAS RIGHT CFA DIST PSV: 133.8 CM/S
VAS RIGHT CFA PROX PSV: 153.9 CM/S
VAS RIGHT DORSALIS PEDIS BP: 130 MMHG
VAS RIGHT PERONEAL MID PSV: 76.1 CM/S
VAS RIGHT PFA PROX PSV: 113 CM/S
VAS RIGHT POP A DIST PSV: 74 CM/S
VAS RIGHT POP A PROX PSV: 91.2 CM/S
VAS RIGHT POP A PROX VEL RATIO: 0.89
VAS RIGHT PTA BP: 146 MMHG
VAS RIGHT PTA DIST PSV: 154 CM/S
VAS RIGHT PTA MID PSV: 163 CM/S
VAS RIGHT PTA PROX PSV: 150 CM/S
VAS RIGHT SFA DIST PSV: 102.8 CM/S
VAS RIGHT SFA DIST VEL RATIO: 1.03
VAS RIGHT SFA MID PSV: 99.8 CM/S
VAS RIGHT SFA MID VEL RATIO: 0.9
VAS RIGHT SFA PROX PSV: 117.3 CM/S
VAS RIGHT SFA PROX VEL RATIO: 0.8

## 2025-06-16 PROCEDURE — 93925 LOWER EXTREMITY STUDY: CPT | Performed by: SURGERY

## 2025-06-16 PROCEDURE — 93925 LOWER EXTREMITY STUDY: CPT

## 2025-06-17 ENCOUNTER — TELEPHONE (OUTPATIENT)
Dept: CARDIOLOGY CLINIC | Age: 80
End: 2025-06-17

## 2025-06-17 DIAGNOSIS — I10 ESSENTIAL (PRIMARY) HYPERTENSION: ICD-10-CM

## 2025-06-17 DIAGNOSIS — I73.9 PAD (PERIPHERAL ARTERY DISEASE): Primary | ICD-10-CM

## 2025-06-17 NOTE — TELEPHONE ENCOUNTER
Susie,     Please schedule for peripheral angiogram with possible intervention for right tibial revascularization.      Please go for blood work one week prior to your procedure.       The morning of your procedure you will park in the hospital parking lot and report directly to the registration desk for check in.    Pre-Procedure Instructions   You will need to fast for at least 8 hours prior to procedure. No caffeine the morning of.   Hold your diuretic, spirolactone  the morning of procedure.   Hold all diabetic medications including, Metfomin.  If you take Lantus/Levemir only take ½ your normal dose the evening before.  All other medications can be taken in the morning with sips of water.   You will need to take 325 mg aspirin the morning of.  If you are currently taking 81 mg please take 4 tablets that morning.   Do not use any lotions, creams or perfume the morning of procedure.   Pre-procedure lab work will need to be completed 5-7 days prior to procedure.   Please have a responsible adult to drive you home after procedure. We advise you have someone to stay with you for 24 hours following procedure for precautionary measures. Depending on procedure you may require an overnight stay.   Cath lab will provide you with all post procedure instructions.     If you have any questions regarding the procedure itself or medications, please call [phone] and ask to speak with a nurse.

## 2025-06-18 NOTE — TELEPHONE ENCOUNTER
I spoke to Greg this afternoon and he is wanting to know why he needs this procedure? He said he had a doppler but haven't heard the results. He said before he schedules he would like the results of his doppler and would like to know why he needs this procedure.   I told him I would put a message back to Dr. Boyd and his RN and that his RN will probably reach out to him tomorrow, he v/u.

## 2025-06-19 NOTE — TELEPHONE ENCOUNTER
Called and spoke to Greg he states that he does not have any wounds that everything has healed and he wanted to make sure that the procedure was still necessary. Please review and advise on recommendations.

## 2025-06-24 NOTE — TELEPHONE ENCOUNTER
Spoke to patient and he stated he has appt On July 7th and will discuss with Dr. Boyd and decide on the procedure.

## 2025-07-07 ENCOUNTER — OFFICE VISIT (OUTPATIENT)
Dept: CARDIOLOGY CLINIC | Age: 80
End: 2025-07-07

## 2025-07-07 VITALS
WEIGHT: 264 LBS | SYSTOLIC BLOOD PRESSURE: 128 MMHG | BODY MASS INDEX: 39.1 KG/M2 | OXYGEN SATURATION: 96 % | HEIGHT: 69 IN | HEART RATE: 55 BPM | DIASTOLIC BLOOD PRESSURE: 62 MMHG

## 2025-07-07 DIAGNOSIS — I73.9 PAD (PERIPHERAL ARTERY DISEASE): Primary | ICD-10-CM

## 2025-07-07 DIAGNOSIS — I10 ESSENTIAL (PRIMARY) HYPERTENSION: ICD-10-CM

## 2025-07-07 DIAGNOSIS — R93.6 ABNORMAL FINDINGS ON DIAGNOSTIC IMAGING OF LIMBS: ICD-10-CM

## 2025-07-07 NOTE — PROGRESS NOTES
[facility]      Cardiology Consult    Greg Luna  1945  July 7, 2025    Referring Physician: Talib Wellington MD  Reason for Referral: wound     CC: \"I am doing pretty good\"    HPI:  The patient is 80 y.o. male with a past medical history significant for Anxiety, BPH (benign prostatic hyperplasia), Depression, Diabetes with proteinuria, Disorder of iron metabolism, GBS (Guillain [place] syndrome) (HCC), History of colonic polyps, History of colonic polyps, Hyperlipidemia, Hypertension, Liver cirrhosis secondary to WYLIE (HCC), Long term (current) use of insulin (HCC), Obesity, Presence of intraocular lens, Psychophysiological insomnia, Retinal hemorrhage, bilateral, Type 2 diabetes mellitus with mild nonproliferative diabetic retinopathy without macular edema, bilateral (HCC), Type 2 diabetes mellitus with proteinuria (HCC), Type II or unspecified type diabetes mellitus without mention of complication, not stated as uncontrolled, and Vitreous degeneration of right eye. patient who presented to the hospital with complaints of leg pain. I have been asked to provide consultation regarding further management and testing.Patient with history of non-healing ulcer of the LLE.  Discussed case with Dr. Edwards patient was evaluated for concerns of PAD. No palpable PT pulse on my assessment and thready DP    Today he presents for follow up and states that overall he is feeling well. He denies any new sounding cardiac complaints. He denies any chest pains or worsening shortness of breath. He reports medication compliance and is tolerating. He denies any abnormal bleeding or bruising. He denies exertional chest pain/pressure, dyspnea at rest, worsening CUADRA, PND, orthopnea, palpitations, lightheadedness, weight changes, changes in LE edema, and syncope. He had a blister and popped himself.  He went to wound care to make sure it would heal.  The wound is healed.         Past Medical History:   Diagnosis Date

## 2025-07-11 DIAGNOSIS — N32.81 OVERACTIVE BLADDER: ICD-10-CM

## 2025-07-11 RX ORDER — MIRABEGRON 50 MG/1
50 TABLET, FILM COATED, EXTENDED RELEASE ORAL DAILY
Qty: 90 TABLET | Refills: 2 | Status: SHIPPED | OUTPATIENT
Start: 2025-07-11 | End: 2026-04-07

## 2025-07-11 NOTE — TELEPHONE ENCOUNTER
Future Appointments   Date Time Provider Department Center   7/24/2025  1:15 PM Giovani De Luna MD [facility]   8/4/2025 10:30 AM Juan Pablo Summers MD [facility]   8/11/2025 10:30 AM Juan Pablo Summers MD [facility]   8/12/2025 10:45 AM Talib Wellington MD [facility] ECC DEP   8/18/2025 10:30 AM Juan Pablo Summers MD [facility]   10/27/2025 10:40 AM Ceferino Murcia MD  P[facility]       Last appt 5/12/25

## 2025-07-28 RX ORDER — AMLODIPINE BESYLATE 10 MG/1
10 TABLET ORAL DAILY
Qty: 90 TABLET | Refills: 3 | Status: SHIPPED | OUTPATIENT
Start: 2025-07-28

## 2025-08-04 ENCOUNTER — OFFICE VISIT (OUTPATIENT)
Dept: ORTHOPEDIC SURGERY | Age: 80
End: 2025-08-04
Payer: MEDICARE

## 2025-08-04 VITALS — HEIGHT: 69 IN | WEIGHT: 262 LBS | BODY MASS INDEX: 38.8 KG/M2

## 2025-08-04 DIAGNOSIS — M17.12 PRIMARY OSTEOARTHRITIS OF LEFT KNEE: Primary | ICD-10-CM

## 2025-08-04 DIAGNOSIS — M17.11 PRIMARY OSTEOARTHRITIS OF RIGHT KNEE: ICD-10-CM

## 2025-08-04 PROCEDURE — 20610 DRAIN/INJ JOINT/BURSA W/O US: CPT | Performed by: PHYSICIAN ASSISTANT

## 2025-08-11 ENCOUNTER — OFFICE VISIT (OUTPATIENT)
Dept: ORTHOPEDIC SURGERY | Age: 80
End: 2025-08-11

## 2025-08-11 VITALS — BODY MASS INDEX: 38.8 KG/M2 | HEIGHT: 69 IN | WEIGHT: 262 LBS

## 2025-08-11 DIAGNOSIS — M17.11 PRIMARY OSTEOARTHRITIS OF RIGHT KNEE: ICD-10-CM

## 2025-08-11 DIAGNOSIS — M17.12 PRIMARY OSTEOARTHRITIS OF LEFT KNEE: Primary | ICD-10-CM

## 2025-08-12 ENCOUNTER — OFFICE VISIT (OUTPATIENT)
Dept: INTERNAL MEDICINE CLINIC | Age: 80
End: 2025-08-12

## 2025-08-12 VITALS
HEART RATE: 54 BPM | BODY MASS INDEX: 39.13 KG/M2 | DIASTOLIC BLOOD PRESSURE: 50 MMHG | OXYGEN SATURATION: 95 % | SYSTOLIC BLOOD PRESSURE: 102 MMHG | WEIGHT: 265 LBS

## 2025-08-12 DIAGNOSIS — F32.A DEPRESSION, UNSPECIFIED DEPRESSION TYPE: ICD-10-CM

## 2025-08-12 DIAGNOSIS — G47.33 OSA (OBSTRUCTIVE SLEEP APNEA): ICD-10-CM

## 2025-08-12 DIAGNOSIS — Z79.4 TYPE 2 DIABETES MELLITUS WITH STAGE 2 CHRONIC KIDNEY DISEASE, WITH LONG-TERM CURRENT USE OF INSULIN (HCC): Primary | ICD-10-CM

## 2025-08-12 DIAGNOSIS — F41.9 ANXIETY: ICD-10-CM

## 2025-08-12 DIAGNOSIS — E11.22 TYPE 2 DIABETES MELLITUS WITH STAGE 2 CHRONIC KIDNEY DISEASE, WITH LONG-TERM CURRENT USE OF INSULIN (HCC): Primary | ICD-10-CM

## 2025-08-12 DIAGNOSIS — N18.2 TYPE 2 DIABETES MELLITUS WITH STAGE 2 CHRONIC KIDNEY DISEASE, WITH LONG-TERM CURRENT USE OF INSULIN (HCC): Primary | ICD-10-CM

## 2025-08-12 DIAGNOSIS — N32.81 OVERACTIVE BLADDER: ICD-10-CM

## 2025-08-12 DIAGNOSIS — I73.9 PAD (PERIPHERAL ARTERY DISEASE): ICD-10-CM

## 2025-08-12 RX ORDER — BUSPIRONE HYDROCHLORIDE 10 MG/1
10 TABLET ORAL 2 TIMES DAILY
Qty: 180 TABLET | Refills: 3 | Status: SHIPPED | OUTPATIENT
Start: 2025-08-12

## 2025-08-12 RX ORDER — DULOXETIN HYDROCHLORIDE 60 MG/1
60 CAPSULE, DELAYED RELEASE ORAL DAILY
Qty: 90 CAPSULE | Refills: 3 | Status: SHIPPED | OUTPATIENT
Start: 2025-08-12

## 2025-08-12 RX ORDER — TAMSULOSIN HYDROCHLORIDE 0.4 MG/1
CAPSULE ORAL
Qty: 90 CAPSULE | Refills: 3 | Status: SHIPPED | OUTPATIENT
Start: 2025-08-12

## 2025-08-12 RX ORDER — HYDRALAZINE HYDROCHLORIDE 25 MG/1
25 TABLET, FILM COATED ORAL 2 TIMES DAILY
Qty: 180 TABLET | Refills: 3 | Status: SHIPPED | OUTPATIENT
Start: 2025-08-12

## 2025-08-12 RX ORDER — FENOFIBRATE 160 MG/1
160 TABLET ORAL DAILY
Qty: 90 TABLET | Refills: 3 | Status: SHIPPED | OUTPATIENT
Start: 2025-08-12

## 2025-08-12 RX ORDER — TAMSULOSIN HYDROCHLORIDE 0.4 MG/1
0.4 CAPSULE ORAL DAILY
Qty: 90 CAPSULE | Refills: 3 | Status: SHIPPED | OUTPATIENT
Start: 2025-08-12

## 2025-08-12 RX ORDER — INSULIN GLARGINE 100 [IU]/ML
INJECTION, SOLUTION SUBCUTANEOUS
Qty: 45 ML | Refills: 7 | Status: SHIPPED | OUTPATIENT
Start: 2025-08-12

## 2025-08-12 RX ORDER — ATORVASTATIN CALCIUM 20 MG/1
TABLET, FILM COATED ORAL
Qty: 90 TABLET | Refills: 3 | Status: SHIPPED | OUTPATIENT
Start: 2025-08-12

## 2025-08-12 RX ORDER — HYDROXYZINE HYDROCHLORIDE 10 MG/1
10 TABLET, FILM COATED ORAL 3 TIMES DAILY PRN
Qty: 90 TABLET | Refills: 3 | Status: SHIPPED | OUTPATIENT
Start: 2025-08-12 | End: 2025-12-10

## 2025-08-12 RX ORDER — ATORVASTATIN CALCIUM 20 MG/1
20 TABLET, FILM COATED ORAL DAILY
Qty: 90 TABLET | Refills: 3 | Status: SHIPPED | OUTPATIENT
Start: 2025-08-12

## 2025-08-12 RX ORDER — INSULIN GLARGINE 100 [IU]/ML
INJECTION, SOLUTION SUBCUTANEOUS
Qty: 90 ML | Refills: 3 | Status: SHIPPED | OUTPATIENT
Start: 2025-08-12

## 2025-08-12 ASSESSMENT — PATIENT HEALTH QUESTIONNAIRE - PHQ9
SUM OF ALL RESPONSES TO PHQ QUESTIONS 1-9: 9
1. LITTLE INTEREST OR PLEASURE IN DOING THINGS: SEVERAL DAYS
9. THOUGHTS THAT YOU WOULD BE BETTER OFF DEAD, OR OF HURTING YOURSELF: NOT AT ALL
7. TROUBLE CONCENTRATING ON THINGS, SUCH AS READING THE NEWSPAPER OR WATCHING TELEVISION: SEVERAL DAYS
SUM OF ALL RESPONSES TO PHQ QUESTIONS 1-9: 9
3. TROUBLE FALLING OR STAYING ASLEEP: NEARLY EVERY DAY
10. IF YOU CHECKED OFF ANY PROBLEMS, HOW DIFFICULT HAVE THESE PROBLEMS MADE IT FOR YOU TO DO YOUR WORK, TAKE CARE OF THINGS AT HOME, OR GET ALONG WITH OTHER PEOPLE: SOMEWHAT DIFFICULT
8. MOVING OR SPEAKING SO SLOWLY THAT OTHER PEOPLE COULD HAVE NOTICED. OR THE OPPOSITE, BEING SO FIGETY OR RESTLESS THAT YOU HAVE BEEN MOVING AROUND A LOT MORE THAN USUAL: NOT AT ALL
4. FEELING TIRED OR HAVING LITTLE ENERGY: SEVERAL DAYS
2. FEELING DOWN, DEPRESSED OR HOPELESS: SEVERAL DAYS
SUM OF ALL RESPONSES TO PHQ QUESTIONS 1-9: 9
6. FEELING BAD ABOUT YOURSELF - OR THAT YOU ARE A FAILURE OR HAVE LET YOURSELF OR YOUR FAMILY DOWN: NOT AT ALL
5. POOR APPETITE OR OVEREATING: MORE THAN HALF THE DAYS
SUM OF ALL RESPONSES TO PHQ QUESTIONS 1-9: 9

## 2025-08-18 ENCOUNTER — OFFICE VISIT (OUTPATIENT)
Dept: ORTHOPEDIC SURGERY | Age: 80
End: 2025-08-18
Payer: MEDICARE

## 2025-08-18 VITALS — BODY MASS INDEX: 38.8 KG/M2 | HEIGHT: 69 IN | WEIGHT: 262 LBS

## 2025-08-18 DIAGNOSIS — M17.12 PRIMARY OSTEOARTHRITIS OF LEFT KNEE: Primary | ICD-10-CM

## 2025-08-18 DIAGNOSIS — M17.11 PRIMARY OSTEOARTHRITIS OF RIGHT KNEE: ICD-10-CM

## 2025-08-18 PROCEDURE — 20610 DRAIN/INJ JOINT/BURSA W/O US: CPT | Performed by: PHYSICIAN ASSISTANT

## (undated) DEVICE — PODIATRY: Brand: MEDLINE INDUSTRIES, INC.

## (undated) DEVICE — DRESSING,GAUZE,XEROFORM,CURAD,1"X8",ST: Brand: CURAD

## (undated) DEVICE — CATHETER 5FR JR4 CORDIS 100CM

## (undated) DEVICE — SPONGE GZ W4XL4IN COT 12 PLY TYP VII WVN C FLD DSGN STERILE

## (undated) DEVICE — GLOVE SURG SZ 7 L12IN FNGR THK79MIL GRN LTX FREE

## (undated) DEVICE — GUIDEWIRE VASC L150CM DIA0.035IN FLX END L7CM J 3MM PTFE

## (undated) DEVICE — MERCY HEALTH WEST TURNOVER: Brand: MEDLINE INDUSTRIES, INC.

## (undated) DEVICE — SYRINGE MED 10ML LUERLOCK TIP W/O SFTY DISP

## (undated) DEVICE — Device

## (undated) DEVICE — SUTURE VICRYL + SZ 3-0 L27IN ABSRB UD L26MM SH 1/2 CIR VCP416H

## (undated) DEVICE — DESTINATION PERIPHERAL GUIDING SHEATH: Brand: DESTINATION

## (undated) DEVICE — CXI SUPPORT CATHETER: Brand: CXI

## (undated) DEVICE — TUBING, SUCTION, 1/4" X 12', STRAIGHT: Brand: MEDLINE

## (undated) DEVICE — PREMIUM DRY TRAY LF: Brand: MEDLINE INDUSTRIES, INC.

## (undated) DEVICE — SHEET,DRAPE,53X77,STERILE: Brand: MEDLINE

## (undated) DEVICE — Device: Brand: ASAHI GLADIUS MONGO14 ES

## (undated) DEVICE — AMPLATZ EXTRA STIFF WIRE GUIDE: Brand: AMPLATZ

## (undated) DEVICE — SYRINGE IRRIG 60ML SFT PLIABLE BLB EZ TO GRP 1 HND USE W/

## (undated) DEVICE — TRANSFER SET 3": Brand: MEDLINE INDUSTRIES, INC.

## (undated) DEVICE — SOLUTION SCRB 4OZ 7.5% POVIDONE IOD ANTIMIC BTL

## (undated) DEVICE — ARMADA 14 PTA CATHETER 2.5 MM X 200 MM X 150 CM / OVER-THE-WIRE: Brand: ARMADA

## (undated) DEVICE — T-DRAPE,EXTREMITY,STERILE: Brand: MEDLINE

## (undated) DEVICE — SOLUTION PREP PAINT POV IOD FOR SKIN MUCOUS MEM

## (undated) DEVICE — HANDPIECE SET WITH HIGH FLOW TIP AND SUCTION TUBE: Brand: INTERPULSE

## (undated) DEVICE — DILATOR: Brand: COOK

## (undated) DEVICE — INTRODUCER SHTH 0.018 IN 4 FRX40 CM KT SFT TIP NIT SS VSI

## (undated) DEVICE — ZIMMER® STERILE DISPOSABLE TOURNIQUET CUFF WITH PLC, DUAL PORT, SINGLE BLADDER, 18 IN. (46 CM)

## (undated) DEVICE — RADIFOCUS GLIDEWIRE ADVANTAGE GUIDEWIRE: Brand: GLIDEWIRE ADVANTAGE

## (undated) DEVICE — ELECTRODE PT RET AD L9FT HI MOIST COND ADH HYDRGEL CORDED

## (undated) DEVICE — CATHETER 5FR CORDIS PIG 145DEG 110CM

## (undated) DEVICE — INTENDED FOR TISSUE SEPARATION, AND OTHER PROCEDURES THAT REQUIRE A SHARP SURGICAL BLADE TO PUNCTURE OR CUT.: Brand: BARD-PARKER ® STAINLESS STEEL BLADES

## (undated) DEVICE — SUTURE VICRYL + SZ 3-0 L27IN ABSRB UD CT-2 L26MM 1/2 CIR TAPR VCP232H

## (undated) DEVICE — HI-TORQUE COMMAND ES GUIDE WIRE .014" 300 CM: Brand: HI-TORQUE COMMAND

## (undated) DEVICE — BANDAGE GZ W2XL75IN ST RAYON POLY CNFRM STRTCH LTWT

## (undated) DEVICE — BANDAGE COMPR W4INXL5YD BGE HI E W/ REM CLP SURE-WRAP

## (undated) DEVICE — PADDING,UNDERCAST,COTTON, 4"X4YD STERILE: Brand: MEDLINE

## (undated) DEVICE — NEEDLE 25GA X 1.5 IN ECLIPSE

## (undated) DEVICE — STOCKINETTE,IMPERVIOUS,12X48,STERILE: Brand: MEDLINE

## (undated) DEVICE — COVER LT HNDL BLU PLAS

## (undated) DEVICE — SPONGE LAP W18XL18IN WHT COT 4 PLY FLD STRUNG RADPQ DISP ST 2 PER PACK

## (undated) DEVICE — SUTURE NONABSORBABLE MONOFILAMENT 4-0 PS-2 18 IN BLK ETHILON 1667G

## (undated) DEVICE — PINNACLE INTRODUCER SHEATH: Brand: PINNACLE

## (undated) DEVICE — LOOP VES W25MM THK1MM MAXI RED SIL FLD REPELLENT 100 PER

## (undated) DEVICE — NEPTUNE E-SEP SMOKE EVACUATION PENCIL, COATED, 70MM BLADE, PUSH BUTTON SWITCH: Brand: NEPTUNE E-SEP

## (undated) DEVICE — 150CM STANDARD JWIRE

## (undated) DEVICE — BASIC SINGLE BASIN 1-LF: Brand: MEDLINE INDUSTRIES, INC.

## (undated) DEVICE — 1010 S-DRAPE TOWEL DRAPE 10/BX: Brand: STERI-DRAPE™

## (undated) DEVICE — BANDAGE,GAUZE,BULKEE II,4.5"X4.1YD,STRL: Brand: MEDLINE

## (undated) DEVICE — GLOVE SURG SZ 7 CRM LTX FREE POLYISOPRENE POLYMER BEAD ANTI

## (undated) DEVICE — TOWEL,OR,DSP,ST,BLUE,STD,4/PK,20PK/CS: Brand: MEDLINE

## (undated) DEVICE — PERCLOSE™ PROSTYLE™ SUTURE-MEDIATED CLOSURE AND REPAIR SYSTEM: Brand: PERCLOSE™ PROSTYLE™

## (undated) DEVICE — SOLUTION IRRIG 3000ML 0.9% SOD CHL USP UROMATIC PLAS CONT

## (undated) DEVICE — CATHETER 5FR IM CORDIS 100CM

## (undated) DEVICE — BANDAGE COMPR W4INXL10YD WHITE/BEIGE E MTRX HK LOOP CLSR